# Patient Record
Sex: MALE | Race: ASIAN | NOT HISPANIC OR LATINO
[De-identification: names, ages, dates, MRNs, and addresses within clinical notes are randomized per-mention and may not be internally consistent; named-entity substitution may affect disease eponyms.]

---

## 2017-04-10 ENCOUNTER — APPOINTMENT (OUTPATIENT)
Dept: NEUROLOGY | Facility: CLINIC | Age: 57
End: 2017-04-10

## 2017-06-16 ENCOUNTER — INPATIENT (INPATIENT)
Facility: HOSPITAL | Age: 57
LOS: 4 days | Discharge: HOME | DRG: 312 | End: 2017-06-21
Attending: INTERNAL MEDICINE | Admitting: INTERNAL MEDICINE
Payer: MEDICAID

## 2017-06-16 VITALS
TEMPERATURE: 98 F | OXYGEN SATURATION: 98 % | DIASTOLIC BLOOD PRESSURE: 100 MMHG | HEART RATE: 54 BPM | RESPIRATION RATE: 18 BRPM | SYSTOLIC BLOOD PRESSURE: 186 MMHG

## 2017-06-16 DIAGNOSIS — R42 DIZZINESS AND GIDDINESS: ICD-10-CM

## 2017-06-16 LAB
ALBUMIN SERPL ELPH-MCNC: 4 G/DL — SIGNIFICANT CHANGE UP (ref 3.3–5)
ALP SERPL-CCNC: 62 U/L — SIGNIFICANT CHANGE UP (ref 40–120)
ALT FLD-CCNC: 16 U/L RC — SIGNIFICANT CHANGE UP (ref 10–45)
ANION GAP SERPL CALC-SCNC: 11 MMOL/L — SIGNIFICANT CHANGE UP (ref 5–17)
APPEARANCE UR: CLEAR — SIGNIFICANT CHANGE UP
AST SERPL-CCNC: 21 U/L — SIGNIFICANT CHANGE UP (ref 10–40)
BILIRUB SERPL-MCNC: 1.1 MG/DL — SIGNIFICANT CHANGE UP (ref 0.2–1.2)
BILIRUB UR-MCNC: NEGATIVE — SIGNIFICANT CHANGE UP
BUN SERPL-MCNC: 14 MG/DL — SIGNIFICANT CHANGE UP (ref 7–23)
CALCIUM SERPL-MCNC: 9 MG/DL — SIGNIFICANT CHANGE UP (ref 8.4–10.5)
CHLORIDE SERPL-SCNC: 106 MMOL/L — SIGNIFICANT CHANGE UP (ref 96–108)
CK MB BLD-MCNC: 1.9 % — SIGNIFICANT CHANGE UP (ref 0–3.5)
CK MB CFR SERPL CALC: 2.4 NG/ML — SIGNIFICANT CHANGE UP (ref 0–6.7)
CK SERPL-CCNC: 129 U/L — SIGNIFICANT CHANGE UP (ref 30–200)
CO2 SERPL-SCNC: 24 MMOL/L — SIGNIFICANT CHANGE UP (ref 22–31)
COLOR SPEC: SIGNIFICANT CHANGE UP
CREAT SERPL-MCNC: 1.14 MG/DL — SIGNIFICANT CHANGE UP (ref 0.5–1.3)
DIFF PNL FLD: NEGATIVE — SIGNIFICANT CHANGE UP
GLUCOSE SERPL-MCNC: 96 MG/DL — SIGNIFICANT CHANGE UP (ref 70–99)
GLUCOSE UR QL: NEGATIVE — SIGNIFICANT CHANGE UP
HCT VFR BLD CALC: 44.5 % — SIGNIFICANT CHANGE UP (ref 39–50)
HGB BLD-MCNC: 14.1 G/DL — SIGNIFICANT CHANGE UP (ref 13–17)
KETONES UR-MCNC: NEGATIVE — SIGNIFICANT CHANGE UP
LEUKOCYTE ESTERASE UR-ACNC: NEGATIVE — SIGNIFICANT CHANGE UP
MCHC RBC-ENTMCNC: 25.3 PG — LOW (ref 27–34)
MCHC RBC-ENTMCNC: 31.7 GM/DL — LOW (ref 32–36)
MCV RBC AUTO: 79.9 FL — LOW (ref 80–100)
NITRITE UR-MCNC: NEGATIVE — SIGNIFICANT CHANGE UP
PA ADP PRP-ACNC: 87 PRU — LOW (ref 194–417)
PH UR: 6.5 — SIGNIFICANT CHANGE UP (ref 5–8)
PLATELET # BLD AUTO: 150 K/UL — SIGNIFICANT CHANGE UP (ref 150–400)
POTASSIUM SERPL-MCNC: 4.2 MMOL/L — SIGNIFICANT CHANGE UP (ref 3.5–5.3)
POTASSIUM SERPL-SCNC: 4.2 MMOL/L — SIGNIFICANT CHANGE UP (ref 3.5–5.3)
PROT SERPL-MCNC: 7.6 G/DL — SIGNIFICANT CHANGE UP (ref 6–8.3)
PROT UR-MCNC: NEGATIVE — SIGNIFICANT CHANGE UP
RBC # BLD: 5.57 M/UL — SIGNIFICANT CHANGE UP (ref 4.2–5.8)
RBC # FLD: 14.3 % — SIGNIFICANT CHANGE UP (ref 10.3–14.5)
SODIUM SERPL-SCNC: 141 MMOL/L — SIGNIFICANT CHANGE UP (ref 135–145)
SP GR SPEC: 1.01 — LOW (ref 1.01–1.02)
TROPONIN T SERPL-MCNC: <0.01 NG/ML — SIGNIFICANT CHANGE UP (ref 0–0.06)
UROBILINOGEN FLD QL: NEGATIVE — SIGNIFICANT CHANGE UP
WBC # BLD: 4.9 K/UL — SIGNIFICANT CHANGE UP (ref 3.8–10.5)
WBC # FLD AUTO: 4.9 K/UL — SIGNIFICANT CHANGE UP (ref 3.8–10.5)

## 2017-06-16 PROCEDURE — 70450 CT HEAD/BRAIN W/O DYE: CPT | Mod: 26

## 2017-06-16 PROCEDURE — 71010: CPT | Mod: 26

## 2017-06-16 PROCEDURE — 99285 EMERGENCY DEPT VISIT HI MDM: CPT

## 2017-06-16 RX ORDER — LOSARTAN POTASSIUM 100 MG/1
100 TABLET, FILM COATED ORAL ONCE
Qty: 0 | Refills: 0 | Status: COMPLETED | OUTPATIENT
Start: 2017-06-16 | End: 2017-06-16

## 2017-06-16 RX ORDER — HYDRALAZINE HCL 50 MG
10 TABLET ORAL ONCE
Qty: 0 | Refills: 0 | Status: COMPLETED | OUTPATIENT
Start: 2017-06-16 | End: 2017-06-16

## 2017-06-16 RX ORDER — MECLIZINE HCL 12.5 MG
25 TABLET ORAL ONCE
Qty: 0 | Refills: 0 | Status: COMPLETED | OUTPATIENT
Start: 2017-06-16 | End: 2017-06-16

## 2017-06-16 RX ADMIN — Medication 25 MILLIGRAM(S): at 15:03

## 2017-06-16 RX ADMIN — LOSARTAN POTASSIUM 100 MILLIGRAM(S): 100 TABLET, FILM COATED ORAL at 15:03

## 2017-06-16 RX ADMIN — Medication 10 MILLIGRAM(S): at 15:03

## 2017-06-16 NOTE — ED PROVIDER NOTE - PROGRESS NOTE DETAILS
Patient had 9 beats of VT while in ED--on coreg, took it this morning, BP stable, asymptomatic   Seen by neuro pending MRI/MRA

## 2017-06-16 NOTE — CONSULT NOTE ADULT - PROBLEM SELECTOR RECOMMENDATION 9
[]MRI brain w/o con, MRA head w/o con, MRA neck w/con  []P2Y12 test []MRI brain w/o con, MRA head w/o con, MRA neck w/con  []P2Y12 test  []c/w , plavix 75, lipitor 80

## 2017-06-16 NOTE — ED ADULT NURSE NOTE - OBJECTIVE STATEMENT
55 y/o male PMH cardiac stent placement, stroke, presents to ED c/o dizziness, lightheadedness, SOB since last night. Pt states he began feeling symptoms last night and they are similar to when he was hospitalized last year and was diagnosed with artery occlusion. Also reports recent left leg swelling. Denies chest pain, palpitations. Pt lungs clear bilaterally. Skin warm, dry, intact. Gross motor and neuro intact. Residual left sided weakness noted from previous CVA. Pt does not use walker or assistive devices at home. Pt NSR on cardiac monitor. BP elevated, pt states he is normally on losartan on home, but ran out of meds for "past few days." Pt safety and comfort maintained.

## 2017-06-16 NOTE — CONSULT NOTE ADULT - SUBJECTIVE AND OBJECTIVE BOX
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 No nausea, vomiting  No diarrhea or constipation.   Genitourinary: No dysuria, frequency, hematuria or incontinence  Neurological: No headaches, lightheadedness, vertigo, numbness or tremors  Psychiatric: No depression, anxiety, mood swings or difficulty sleeping  Musculoskeletal: No joint pain or swelling; No muscle, back or extremity pain  Skin: No itching, burning, rashes or lesions   Lymph Nodes: No enlarged glands  Endocrine: No heat or cold intolerance; No hair loss, No h/o diabetes or thyroid dysfunction  Allergy and Immunologic: No hives or eczema    MEDICATIONS        PMH: Stented coronary artery  CVA (cerebral vascular accident)  HTN (hypertension)       PSH: No significant past surgical history      Family history: No history of dementia, strokes, or seizures   FAMILY HISTORY:  No pertinent family history in first degree relatives      SOCIAL HISTORY:  No history of tobacco or alcohol use     Allergies    No Known Allergies    Intolerances            Vital Signs Last 24 Hrs  T(C): 36.6, Max: 36.6 (06-16 @ 14:16)  T(F): 97.9, Max: 97.9 (06-16 @ 14:16)  HR: 62 (54 - 62)  BP: 150/97 (150/97 - 186/100)  BP(mean): --  RR: 18 (18 - 18)  SpO2: 98% (98% - 100%)      On Neurological Examination:    Mental Status - Patient is alert, awake, oriented X3. fluent, names, no dysarthria no aphasia Follows commands well and able to answer questions appropriately. Mood and affect  normal    Cranial Nerves - PERRL, EOMI, VFF, V1-V3 intact, no gross facial asymmetry, tongue/uvula midline    Motor Exam -   Right upper  Left upper  Right lower  Left lower      nml bulk/tone    Sensory    Intact to light touch and pinprick bilaterally    Coord: FTN intact bilaterally     Gait -  normal  ataxia     Reflexes:                                                         GENERAL Exam:     Nontoxic , No Acute Distress   	  HEENT:  normocephalic, atraumatic  		  LUNGS:	Clear bilaterally  No Wheeze  Decreased bilaterally  	  HEART:	 Normal S1S2   No murmur RRR        	  GI/ ABDOMEN:  Soft  Non tender    EXTREMITIES:   No Edema  No Clubbing  No Cyanosis No Edema    MUSCULOSKELETAL: Normal Range of Motion  	   SKIN:      Normal   No Ecchymosis               LABS:  CBC Full  -  ( 16 Jun 2017 15:14 )  WBC Count : 4.9 K/uL  Hemoglobin : 14.1 g/dL  Hematocrit : 44.5 %  Platelet Count - Automated : 150 K/uL  Mean Cell Volume : 79.9 fl  Mean Cell Hemoglobin : 25.3 pg  Mean Cell Hemoglobin Concentration : 31.7 gm/dL  Auto Neutrophil # : x  Auto Lymphocyte # : x  Auto Monocyte # : x  Auto Eosinophil # : x  Auto Basophil # : x  Auto Neutrophil % : x  Auto Lymphocyte % : x  Auto Monocyte % : x  Auto Eosinophil % : x  Auto Basophil % : x      06-16    141  |  106  |  14  ----------------------------<  96  4.2   |  24  |  1.14    Ca    9.0      16 Jun 2017 15:14    TPro  7.6  /  Alb  4.0  /  TBili  1.1  /  DBili  x   /  AST  21  /  ALT  16  /  AlkPhos  62  06-16    LIVER FUNCTIONS - ( 16 Jun 2017 15:14 )  Alb: 4.0 g/dL / Pro: 7.6 g/dL / ALK PHOS: 62 U/L / ALT: 16 U/L RC / AST: 21 U/L / GGT: x           Hemoglobin A1C:             RADIOLOGY  CTH   MRI: Neurology consult    MARCO A [id number]     Patient is a 56y old  Male who presents with a chief complaint of transient vertigo    HPI:  56 RH [place] M PMH 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 Pt now at baseline per patient. States walking at baseline no new focal weakness numbness Of note patient ran out of his Losartan and has not taken it for a weak    REVIEW OF SYSTEMS:    Constitutional: No fever, chills, fatigue, weakness  Eyes: no eye pain, visual disturbances, or discharge  ENT:  No difficulty hearing, tinnitus, vertigo; No sinus or throat pain  Neck: No pain or stiffness  Respiratory: No cough, dyspnea, wheezing   Cardiovascular: No chest pain, palpitations,   Gastrointestinal: No abdominal or epigastric pain. No nausea, vomiting  No diarrhea or constipation.   Genitourinary: No dysuria, frequency, hematuria or incontinence  Neurological: No headaches, lightheadedness, vertigo, numbness or tremors  Psychiatric: No depression, anxiety, mood swings or difficulty sleeping  Musculoskeletal: No joint pain or swelling; No muscle, back or extremity pain  Skin: No itching, burning, rashes or lesions   Lymph Nodes: No enlarged glands  Endocrine: No heat or cold intolerance; No hair loss, No h/o diabetes or thyroid dysfunction  Allergy and Immunologic: No hives or eczema    MEDICATIONS    , plavix, losartan 100 mg daily, carvedilol 12.5 BID, hydralazine 10 BID, lipitor    PMH: Stented coronary artery  CVA (cerebral vascular accident)  HTN (hypertension)  asa above     PSH: CABG        FAMILY HISTORY:  No pertinent family history in first degree relatives      SOCIAL HISTORY:  No history of tobacco or alcohol use     Allergies    No Known Allergies    Intolerances            Vital Signs Last 24 Hrs  T(C): 36.6, Max: 36.6 (06-16 @ 14:16)  T(F): 97.9, Max: 97.9 (06-16 @ 14:16)  HR: 62 (54 - 62)  BP: 150/97 (150/97 - 186/100)  BP(mean): --  RR: 18 (18 - 18)  SpO2: 98% (98% - 100%)      On Neurological Examination:    Mental Status - Patient is alert, awake, oriented X3. fluent, names, subtle dysarthria no aphasia Follows commands well and able to answer questions appropriately. Mood and affect  normal    Cranial Nerves - PERRL, EOMI, VFF, V1-V3 intact, no gross facial asymmetry, tongue/uvula midline    Motor Exam -  RUE, RLE, 5/5 LUe LLE mild drift chronic left hemiparesis    Sensory    Intact to light touch and pinprick bilaterally    Coord: FTN intact bilaterally left mild ataxia in proportion to weakness    Gait - mildly wide based left leg circumduction     Fabienne-Hallpike negative    GENERAL Exam:     Nontoxic , No Acute Distress   	  HEENT:  normocephalic, atraumatic  		  LUNGS:	Clear bilaterally  No Wheeze  Decreased bilaterally  	  HEART:	 Normal S1S2   No murmur RRR        	  GI/ ABDOMEN:  Soft  Non tender    EXTREMITIES:   No Edema  No Clubbing  No Cyanosis No Edema    MUSCULOSKELETAL: Normal Range of Motion  	   SKIN:      Normal   No Ecchymosis               LABS:  CBC Full  -  ( 16 Jun 2017 15:14 )  WBC Count : 4.9 K/uL  Hemoglobin : 14.1 g/dL  Hematocrit : 44.5 %  Platelet Count - Automated : 150 K/uL  Mean Cell Volume : 79.9 fl  Mean Cell Hemoglobin : 25.3 pg  Mean Cell Hemoglobin Concentration : 31.7 gm/dL  Auto Neutrophil # : x  Auto Lymphocyte # : x  Auto Monocyte # : x  Auto Eosinophil # : x  Auto Basophil # : x  Auto Neutrophil % : x  Auto Lymphocyte % : x  Auto Monocyte % : x  Auto Eosinophil % : x  Auto Basophil % : x      06-16    141  |  106  |  14  ----------------------------<  96  4.2   |  24  |  1.14    Ca    9.0      16 Jun 2017 15:14    TPro  7.6  /  Alb  4.0  /  TBili  1.1  /  DBili  x   /  AST  21  /  ALT  16  /  AlkPhos  62  06-16    LIVER FUNCTIONS - ( 16 Jun 2017 15:14 )  Alb: 4.0 g/dL / Pro: 7.6 g/dL / ALK PHOS: 62 U/L / ALT: 16 U/L RC / AST: 21 U/L / GGT: x           Hemoglobin A1C:             RADIOLOGY  CTH   No CT evidence for acute infarct or acute hemorrhage. Old infarcts are   noted as described. If the patient has new and persistent symptoms,   consider short interval follow-up head CT or brain MRI follow-up if there   are no MRI contraindications.

## 2017-06-16 NOTE — ED ADULT NURSE NOTE - CHPI ED SYMPTOMS NEG
no change in level of consciousness/no confusion/no loss of consciousness/no nausea/no vomiting/no fever/no blurred vision

## 2017-06-16 NOTE — ED PROVIDER NOTE - ATTENDING CONTRIBUTION TO CARE
Attending MD Palacios:  I personally have seen and examined this patient.  Resident note reviewed and agree on plan of care and except where noted.  See MDM for details.

## 2017-06-16 NOTE — ED ADULT NURSE REASSESSMENT NOTE - NS ED NURSE REASSESS COMMENT FT1
[address] [place] [address] room notified me of 4 runs of vtach. MD taveras notified. To be addressed by admitting team.     Currently 60 NSR.

## 2017-06-16 NOTE — H&P ADULT - HISTORY OF PRESENT ILLNESS
A 55 yo M w/ CAD sp cabg and stents (last stent 2006, CABG 2002), CVA (2005) w Lt sided  weakness, htn presenting w/ dizziness while leaning over to pick something off the floor yesterday. He was sitting in a chair and when he leaned over, noticed room spinning, self resolved in 3-4 minutes. In the morning he had a similar episode while walking, resolved in a few minutes. No chest pain, no loc, no falls, but noticed some gait ataxia. Reports high BP this am to 190s, ran out of losartan 2 days ago. Also has sob and dry cough and mild LE edema for the last 2 months.

## 2017-06-16 NOTE — ED PROVIDER NOTE - OBJECTIVE STATEMENT
55 yo M w/ CAD sp cabg and stents (last stent 2006, CABG 2002), CVA (2005) w L weakness, htn presenting w/ dizziness while leaning over to pick something off the floor yesterday. He was sitting in a chair and when he leaned over, noticed room spinning, self resolved in 3-4 minutes. In the morning he had a similar episode while walking, resolved in a few minutes. No chest pain, no loc, no falls, but noticed some gait ataxia. Reports high BP this am to 190s, ran out of losartan 2 days ago. Also has sob and dry cough and mild LE edema for the last 2 months.

## 2017-06-16 NOTE — ED PROVIDER NOTE - CARE PLAN
Principal Discharge DX:	Vertigo  Instructions for follow-up, activity and diet:	MRI/MRA head and neck   tele for VT

## 2017-06-16 NOTE — ED PROVIDER NOTE - MEDICAL DECISION MAKING DETAILS
vertigo like symptoms w hypertensive urgency. will give iv hydralazine, losartan po at home dose, check trops and ct head. vertigo like symptoms w hypertensive urgency. will give iv hydralazine, losartan po at home dose, check trops and ct head.  Attending MD Palacios: 57 yo male with PMH for CAD, CVA, HTN presents with complaint of dizziness, room spinning, had similar episode yesterday but resolved on its own.  Patient denies chest pain, palpitations, SOB, or diaphoresis. Patient denies fever, chills, nausea, vomiting, or diarrhea. Attending MD Palacios: A & O x 3, NAD, HEENT WNL and no facial asymmetry; lungs CTAB, heart with reg rhythm without murmur; abdomen soft NTND; extremities with no edema; skin with no rashes, neuro exam non focal with no motor or sensory deficits. Plan: likely BPV but due to hx of CVA will obtain CT, EKG, treat with Meclizine and reassess.

## 2017-06-17 DIAGNOSIS — I10 ESSENTIAL (PRIMARY) HYPERTENSION: ICD-10-CM

## 2017-06-17 DIAGNOSIS — Z95.5 PRESENCE OF CORONARY ANGIOPLASTY IMPLANT AND GRAFT: ICD-10-CM

## 2017-06-17 LAB
ANION GAP SERPL CALC-SCNC: 12 MMOL/L — SIGNIFICANT CHANGE UP (ref 5–17)
BUN SERPL-MCNC: 13 MG/DL — SIGNIFICANT CHANGE UP (ref 7–23)
CALCIUM SERPL-MCNC: 8.4 MG/DL — SIGNIFICANT CHANGE UP (ref 8.4–10.5)
CHLORIDE SERPL-SCNC: 105 MMOL/L — SIGNIFICANT CHANGE UP (ref 96–108)
CO2 SERPL-SCNC: 23 MMOL/L — SIGNIFICANT CHANGE UP (ref 22–31)
CREAT SERPL-MCNC: 1.04 MG/DL — SIGNIFICANT CHANGE UP (ref 0.5–1.3)
GLUCOSE SERPL-MCNC: 106 MG/DL — HIGH (ref 70–99)
HCT VFR BLD CALC: 40.2 % — SIGNIFICANT CHANGE UP (ref 39–50)
HGB BLD-MCNC: 13.4 G/DL — SIGNIFICANT CHANGE UP (ref 13–17)
MAGNESIUM SERPL-MCNC: 1.9 MG/DL — SIGNIFICANT CHANGE UP (ref 1.6–2.6)
MCHC RBC-ENTMCNC: 25.4 PG — LOW (ref 27–34)
MCHC RBC-ENTMCNC: 33.3 GM/DL — SIGNIFICANT CHANGE UP (ref 32–36)
MCV RBC AUTO: 76.3 FL — LOW (ref 80–100)
PHOSPHATE SERPL-MCNC: 2.9 MG/DL — SIGNIFICANT CHANGE UP (ref 2.5–4.5)
PLATELET # BLD AUTO: 159 K/UL — SIGNIFICANT CHANGE UP (ref 150–400)
POTASSIUM SERPL-MCNC: 3.9 MMOL/L — SIGNIFICANT CHANGE UP (ref 3.5–5.3)
POTASSIUM SERPL-SCNC: 3.9 MMOL/L — SIGNIFICANT CHANGE UP (ref 3.5–5.3)
RBC # BLD: 5.27 M/UL — SIGNIFICANT CHANGE UP (ref 4.2–5.8)
RBC # FLD: 15.7 % — HIGH (ref 10.3–14.5)
SODIUM SERPL-SCNC: 140 MMOL/L — SIGNIFICANT CHANGE UP (ref 135–145)
WBC # BLD: 4.51 K/UL — SIGNIFICANT CHANGE UP (ref 3.8–10.5)
WBC # FLD AUTO: 4.51 K/UL — SIGNIFICANT CHANGE UP (ref 3.8–10.5)

## 2017-06-17 PROCEDURE — 70551 MRI BRAIN STEM W/O DYE: CPT | Mod: 26

## 2017-06-17 PROCEDURE — 70548 MR ANGIOGRAPHY NECK W/DYE: CPT | Mod: 26

## 2017-06-17 PROCEDURE — 99233 SBSQ HOSP IP/OBS HIGH 50: CPT | Mod: GC

## 2017-06-17 RX ORDER — ATORVASTATIN CALCIUM 80 MG/1
80 TABLET, FILM COATED ORAL AT BEDTIME
Qty: 0 | Refills: 0 | Status: DISCONTINUED | OUTPATIENT
Start: 2017-06-17 | End: 2017-06-21

## 2017-06-17 RX ORDER — HYDRALAZINE HCL 50 MG
10 TABLET ORAL EVERY 8 HOURS
Qty: 0 | Refills: 0 | Status: DISCONTINUED | OUTPATIENT
Start: 2017-06-17 | End: 2017-06-17

## 2017-06-17 RX ORDER — METOCLOPRAMIDE HCL 10 MG
10 TABLET ORAL EVERY 8 HOURS
Qty: 0 | Refills: 0 | Status: DISCONTINUED | OUTPATIENT
Start: 2017-06-17 | End: 2017-06-21

## 2017-06-17 RX ORDER — ASPIRIN/CALCIUM CARB/MAGNESIUM 324 MG
325 TABLET ORAL DAILY
Qty: 0 | Refills: 0 | Status: DISCONTINUED | OUTPATIENT
Start: 2017-06-17 | End: 2017-06-21

## 2017-06-17 RX ORDER — MECLIZINE HCL 12.5 MG
12.5 TABLET ORAL EVERY 8 HOURS
Qty: 0 | Refills: 0 | Status: COMPLETED | OUTPATIENT
Start: 2017-06-17 | End: 2017-06-19

## 2017-06-17 RX ORDER — METOCLOPRAMIDE HCL 10 MG
10 TABLET ORAL EVERY 8 HOURS
Qty: 0 | Refills: 0 | Status: DISCONTINUED | OUTPATIENT
Start: 2017-06-17 | End: 2017-06-17

## 2017-06-17 RX ORDER — CLOPIDOGREL BISULFATE 75 MG/1
75 TABLET, FILM COATED ORAL DAILY
Qty: 0 | Refills: 0 | Status: DISCONTINUED | OUTPATIENT
Start: 2017-06-17 | End: 2017-06-21

## 2017-06-17 RX ORDER — LOSARTAN POTASSIUM 100 MG/1
100 TABLET, FILM COATED ORAL DAILY
Qty: 0 | Refills: 0 | Status: DISCONTINUED | OUTPATIENT
Start: 2017-06-17 | End: 2017-06-17

## 2017-06-17 RX ORDER — LOSARTAN POTASSIUM 100 MG/1
100 TABLET, FILM COATED ORAL DAILY
Qty: 0 | Refills: 0 | Status: DISCONTINUED | OUTPATIENT
Start: 2017-06-17 | End: 2017-06-21

## 2017-06-17 RX ORDER — CARVEDILOL PHOSPHATE 80 MG/1
6.25 CAPSULE, EXTENDED RELEASE ORAL EVERY 12 HOURS
Qty: 0 | Refills: 0 | Status: DISCONTINUED | OUTPATIENT
Start: 2017-06-17 | End: 2017-06-17

## 2017-06-17 RX ADMIN — CLOPIDOGREL BISULFATE 75 MILLIGRAM(S): 75 TABLET, FILM COATED ORAL at 11:58

## 2017-06-17 RX ADMIN — ATORVASTATIN CALCIUM 80 MILLIGRAM(S): 80 TABLET, FILM COATED ORAL at 21:15

## 2017-06-17 RX ADMIN — Medication 12.5 MILLIGRAM(S): at 21:15

## 2017-06-17 RX ADMIN — Medication 325 MILLIGRAM(S): at 11:58

## 2017-06-17 RX ADMIN — Medication 12.5 MILLIGRAM(S): at 05:24

## 2017-06-17 RX ADMIN — Medication 10 MILLIGRAM(S): at 12:23

## 2017-06-17 RX ADMIN — Medication 10 MILLIGRAM(S): at 05:24

## 2017-06-17 RX ADMIN — Medication 12.5 MILLIGRAM(S): at 14:14

## 2017-06-17 RX ADMIN — LOSARTAN POTASSIUM 100 MILLIGRAM(S): 100 TABLET, FILM COATED ORAL at 09:58

## 2017-06-17 NOTE — CHART NOTE - NSCHARTNOTEFT_GEN_A_CORE
Called by RN for patient with /111, HR 52.  Patient did not receive morning dose of Coreg. Patient states he was previously on Losartan 100 mg po daily at home but he prescription had run out prior to admission.  Coreg D/C for now.  Will restart Losartan and continue to monitor blood pressure.  Discussed with Dr Umanzor who agrees with plan.

## 2017-06-17 NOTE — PROVIDER CONTACT NOTE (OTHER) - ASSESSMENT
Pt is alert and oriented. VSS. Pt is asymptomatic. Denies palpitations, headache, lightheadedness, chest pain. Pt sinus jennifer on tele.

## 2017-06-17 NOTE — PROGRESS NOTE ADULT - SUBJECTIVE AND OBJECTIVE BOX
Patient is a 56y old  Male who presents with a chief complaint of The patient is a 56y Male complaining of dizziness. (2017 20:57)      SUBJECTIVE / OVERNIGHT EVENTS:   Feels better.  Denies CP/SOB/Palpitation/HA.    MEDICATIONS  (STANDING):  aspirin 325milliGRAM(s) Oral daily  meclizine 12.5milliGRAM(s) Oral every 8 hours  atorvastatin 80milliGRAM(s) Oral at bedtime  clopidogrel Tablet 75milliGRAM(s) Oral daily  hydrochlorothiazide    Capsule 12.5milliGRAM(s) Oral daily  losartan 100milliGRAM(s) Oral daily    MEDICATIONS  (PRN):  metoclopramide 10milliGRAM(s) Oral every 8 hours PRN nausea and/or vomiting      Vital Signs Last 24 Hrs  T(C): 36.7, Max: 36.7 (-17 @ 04:23)  HR: 62 (53 - 62)  BP: 154/85 (154/85 - 189/93)  RR: 19 (18 - 19)  SpO2: 95% (95% - 100%)  Wt(kg): --  CAPILLARY BLOOD GLUCOSE    I&O's Summary  I & Os for 24h ending 2017 07:00  =============================================  IN: 240 ml / OUT: 0 ml / NET: 240 ml    I & Os for current day (as of 2017 02:13)  =============================================  IN: 760 ml / OUT: 250 ml / NET: 510 ml      PHYSICAL EXAM:  GENERAL: NAD, well-developed  HEAD:  Atraumatic, Normocephalic  EYES: EOMI, PERRLA, conjunctiva and sclera clear  NECK: Supple, No JVD  CHEST/LUNG: Clear to auscultation bilaterally; No wheeze  HEART: Regular rate and rhythm; No murmurs, rubs, or gallops  ABDOMEN: Soft, Nontender, Nondistended; Bowel sounds present  EXTREMITIES:  2+ Peripheral Pulses, No clubbing, cyanosis, or edema  PSYCH: AAOx3  NEUROLOGY: non-focal  SKIN: No rashes or lesions    LABS:                        13.4   4.51  )-----------( 159      ( 2017 08:23 )             40.2     -    140  |  105  |  13  ----------------------------<  106<H>  3.9   |  23  |  1.04    Ca    8.4      2017 05:21  Phos  2.9     -  Mg     1.9     -    TPro  7.6  /  Alb  4.0  /  TBili  1.1  /  DBili  x   /  AST  21  /  ALT  16  /  AlkPhos  62  06-16      CARDIAC MARKERS ( 2017 15:14 )  x     / <0.01 ng/mL / 129 U/L / x     / 2.4 ng/mL      Urinalysis Basic - ( 2017 17:18 )    Color: PL Yellow / Appearance: Clear / S.006 / pH: x  Gluc: x / Ketone: Negative  / Bili: Negative / Urobili: Negative   Blood: x / Protein: Negative / Nitrite: Negative   Leuk Esterase: Negative / RBC: x / WBC x   Sq Epi: x / Non Sq Epi: x / Bacteria: x      CAPILLARY BLOOD GLUCOSE                RADIOLOGY & ADDITIONAL TESTS:    Imaging Personally Reviewed:    Consultant(s) Notes Reviewed:      Care Discussed with Consultants/Other Providers:

## 2017-06-18 LAB
ANION GAP SERPL CALC-SCNC: 14 MMOL/L — SIGNIFICANT CHANGE UP (ref 5–17)
BUN SERPL-MCNC: 15 MG/DL — SIGNIFICANT CHANGE UP (ref 7–23)
CALCIUM SERPL-MCNC: 9.2 MG/DL — SIGNIFICANT CHANGE UP (ref 8.4–10.5)
CHLORIDE SERPL-SCNC: 100 MMOL/L — SIGNIFICANT CHANGE UP (ref 96–108)
CO2 SERPL-SCNC: 22 MMOL/L — SIGNIFICANT CHANGE UP (ref 22–31)
CREAT SERPL-MCNC: 1.16 MG/DL — SIGNIFICANT CHANGE UP (ref 0.5–1.3)
GLUCOSE SERPL-MCNC: 90 MG/DL — SIGNIFICANT CHANGE UP (ref 70–99)
HCT VFR BLD CALC: 43.7 % — SIGNIFICANT CHANGE UP (ref 39–50)
HGB BLD-MCNC: 14.1 G/DL — SIGNIFICANT CHANGE UP (ref 13–17)
MCHC RBC-ENTMCNC: 24.5 PG — LOW (ref 27–34)
MCHC RBC-ENTMCNC: 32.3 GM/DL — SIGNIFICANT CHANGE UP (ref 32–36)
MCV RBC AUTO: 76 FL — LOW (ref 80–100)
PLATELET # BLD AUTO: 179 K/UL — SIGNIFICANT CHANGE UP (ref 150–400)
POTASSIUM SERPL-MCNC: 3.7 MMOL/L — SIGNIFICANT CHANGE UP (ref 3.5–5.3)
POTASSIUM SERPL-SCNC: 3.7 MMOL/L — SIGNIFICANT CHANGE UP (ref 3.5–5.3)
RBC # BLD: 5.75 M/UL — SIGNIFICANT CHANGE UP (ref 4.2–5.8)
RBC # FLD: 15.6 % — HIGH (ref 10.3–14.5)
SODIUM SERPL-SCNC: 136 MMOL/L — SIGNIFICANT CHANGE UP (ref 135–145)
WBC # BLD: 5.02 K/UL — SIGNIFICANT CHANGE UP (ref 3.8–10.5)
WBC # FLD AUTO: 5.02 K/UL — SIGNIFICANT CHANGE UP (ref 3.8–10.5)

## 2017-06-18 RX ADMIN — Medication 12.5 MILLIGRAM(S): at 21:25

## 2017-06-18 RX ADMIN — ATORVASTATIN CALCIUM 80 MILLIGRAM(S): 80 TABLET, FILM COATED ORAL at 21:25

## 2017-06-18 RX ADMIN — CLOPIDOGREL BISULFATE 75 MILLIGRAM(S): 75 TABLET, FILM COATED ORAL at 11:49

## 2017-06-18 RX ADMIN — Medication 12.5 MILLIGRAM(S): at 13:25

## 2017-06-18 RX ADMIN — Medication 12.5 MILLIGRAM(S): at 05:38

## 2017-06-18 RX ADMIN — Medication 325 MILLIGRAM(S): at 11:49

## 2017-06-18 NOTE — CONSULT NOTE ADULT - ATTENDING COMMENTS
Cristóbal Meza MD  [address] [place], [id number] [phone]
I have seen and examined this patient with the stroke neurology team.     History was reviewed with the patient and available family members.   ROS: All negative except documented in the HPI.   Neurological exam was performed and agree with exam as documented above.   Laboratory results and imaging studies were reviewed by me.   I agree with the neurology resident note as documented above.    56 years old man with multiple vascular risk factors including hypertension, CAD s/p CABG, strokes in 2005 and 2016 and age is admitted to [facility] for evaluation and management of dizziness. He reports with her history of dizziness - described as vertiginous sensations lasting for 1 to 2 minutes without any associated focal neurological deficits/symptoms. His neurological examination today shows left nasolabial flattening, left hemiparesis, and left-sided ataxia/dysmetria - out of proportion to weakness. Fabienne-Hallpike maneuver performed at the bedside was negative for nystagmus and also did not reproduce his typical symptoms. MRI brain did not show any acute stroke and MRA head and neck showed bilateral vertebral artery origin stenoses, left vertebral artery  terminating in PICA and intra-articular distal right vertebral artery severe stenosis - just proximal to vertebrobasilar junction as well as atherosclerotic changes involving the basilar artery, as well as bilateral MCAs M1 stenoses, which appears to be stable as compared to previous CTA. Impression: Dizziness/vertiginous sensations - likely secondary to vertebrobasilar insufficiency likely related to symptomatic intracranial atherosclerosis. Plan: Continue aspirin 325 mg once a day and clopidogrel 75 mg once a day ([id number]) for 3 months followed by Aggrenox one capsule twice a day. Atorvastatin 80 mg at bedtime. Continue with aggressive vascular risk factors modification and DVT prophylaxis. Allow permissive HTN for about 24-48 hours followed by gradual normotension. MRA head with NOVA to determine blood flow in the posterior circulation through vertebral stenosis. PT/OT. Would continue to follow.    Above-mentioned plan was discussed with the patient and family members at bedside in detail. All the questions were answered and concerns were addressed.

## 2017-06-18 NOTE — CONSULT NOTE ADULT - ASSESSMENT
56 RH [place] M CVA 2005 residual right hemiparesis, HTN CAD s/p stents, s/p CABG, intracranial atherosclerotic diseases on , plavix and Lipitor 80 p/w lightheadedness an transient episodes of vertigo Now at baseline PE c/w old CVA left hemiparesis. CTH no acute infarct old infarcts right CR right cerebellar NIHSS 3 MRS 2
NEAR-SYNCOPE-no evidence of recent cardiac injury,no significant arrhythmias noted-likely Vertiginous,MRI/MRA results noted.  CADs/p CABG-Chronic stable.  TTE-Evaluate LVEF-prior EF-42% on ARB  HTN better controlled on resumption of meds.

## 2017-06-18 NOTE — CONSULT NOTE ADULT - SUBJECTIVE AND OBJECTIVE BOX
Patient is a 56y old  Male who presents with a chief complaint of dizziness.     HPI:  A 55 yo M w/ CAD s/p CABG and stents (last stent , CABG ),s/p CVA () w Lt sided  weakness, HTN presenting w/ dizziness while leaning over to pick something off the floor  He was sitting in a chair and when he leaned over, noticed room spinning, self resolved in 3-4 minutes. In the morning he had a similar episode while walking, resolved in a few minutes. No chest pain, no loc, no falls, but noticed some gait ataxia. Reports high BP this am to 190s, ran out of losartan 2 days ago. Also has sob and dry cough and mild LE edema for the last 2 months.     PAST MEDICAL & SURGICAL HISTORY:  Stented coronary artery-  CVA (cerebral vascular accident)--Left paresis  HTN (hypertension)  CABG-    PREVIOUS DIAGNOSTIC TESTING:      ECHO  FINDINGS:Study Date: 8/3/2016 Moderate global left ventricular systolic dysfunction.Mild diastolic dysfunction (Stage I).Mild-moderate mitral regurgitation.EF (Castellanos Method): 42 %        MEDICATIONS  (STANDING):  aspirin 325milliGRAM(s) Oral daily  meclizine 12.5milliGRAM(s) Oral every 8 hours  atorvastatin 80milliGRAM(s) Oral at bedtime  clopidogrel Tablet 75milliGRAM(s) Oral daily  hydrochlorothiazide    Capsule 12.5milliGRAM(s) Oral daily  losartan 100milliGRAM(s) Oral daily    MEDICATIONS  (PRN):  metoclopramide 10milliGRAM(s) Oral every 8 hours PRN nausea and/or vomiting      FAMILY HISTORY:  No pertinent family history in first degree relatives    CIGARETTES:Non Smoker    ALCOHOL:Denies    REVIEW OF SYSTEMS:  · General	negative	  · Respiratory and Thorax	negative	  · Cardiovascular	negative	  · Gastrointestinal	negative	  · Musculoskeletal	negative	  · Neurological Comments	Lt sided weakness,	    Vital Signs Last 24 Hrs  T(C): 37.1, Max: 37.1 ( @ 05:01)  T(F): 98.8, Max: 98.8 (18 @ 05:01)  HR: 50 (50 - 62)  BP: 138/85 (138/85 - 189/93) ORTHOSTATIC BP SUPINE-150/70 STANDING-144/99  RR: 18 (18 - 19)  SpO2: 95% (95% - 97%)      PHYSICAL EXAM:  GENERAL: NAD, well-groomed, well-developed BMI-27  HEAD:  Atraumatic, Normocephalic  EYES: EOMI, PERRLA, conjunctiva and sclera clear  ENMT: No tonsillar erythema, exudates, or enlargement; Moist mucous membranes, Good dentition, No lesions  NECK: Supple, No JVD, Normal thyroid  NERVOUS SYSTEM:  Alert & Oriented X3, Good concentration; Motor Strength 3/5 Left upper and lower extremities; DTRs 2+ intact and symmetric  CHEST/LUNG: Clear to percussion bilaterally; No rales, rhonchi, wheezing, or rubs  HEART: Regular rate and rhythm; 2/6 Systolic murmur,No rubs, or gallops  ABDOMEN: Soft, Nontender, Nondistended; Bowel sounds present  EXTREMITIES:  2+ Peripheral Pulses, No clubbing, cyanosis, or edema  LYMPH: No lymphadenopathy noted  SKIN: No rashes or lesions      INTERPRETATION OF TELEMETRY:Sinus Rhythm,sinus bradycardia    ECG:SINUS BRADYCARDIA Ventricular Rate 55 BPM  NON-SPECIFIC INTRA-VENTRICULAR CONDUCTION BLOCK      LABS:                        13.4   4.51  )-----------( 159      ( 2017 08:23 )             40.2     06-17    140  |  105  |  13  ----------------------------<  106<H>  3.9   |  23  |  1.04    Ca    8.4      2017 05:21  Phos  2.9     06-17  Mg     1.9     06-17    TPro  7.6  /  Alb  4.0  /  TBili  1.1  /  DBili  x   /  AST  21  /  ALT  16  /  AlkPhos  62  06-16    CARDIAC MARKERS ( 2017 15:14 )  x     / <0.01 ng/mL / 129 U/L / x     / 2.4 ng/mL    Urinalysis Basic - ( 2017 17:18 )    Color: PL Yellow / Appearance: Clear / S.006 / pH: x  Gluc: x / Ketone: Negative  / Bili: Negative / Urobili: Negative   Blood: x / Protein: Negative / Nitrite: Negative   Leuk Esterase: Negative / RBC: x / WBC x   Sq Epi: x / Non Sq Epi: x / Bacteria: x    RADIOLOGY & ADDITIONAL STUDIES:MRI/MRA-PROCEDURE DATE:  2017  There is no evidence of recent infarction Old right-sided cerebellar infarcts are appreciated.The MR angiographic evaluation of the [place] demonstrates the  internal carotid arteries to be patent The MR angiographic evaluation of the cervical arteries demonstrates the internal carotid arteries and vertebral arteries to be patent.Significant stenoses involving  the bilateral M1 segments is suspected.

## 2017-06-19 PROCEDURE — 70544 MR ANGIOGRAPHY HEAD W/O DYE: CPT | Mod: 26

## 2017-06-19 RX ADMIN — CLOPIDOGREL BISULFATE 75 MILLIGRAM(S): 75 TABLET, FILM COATED ORAL at 11:32

## 2017-06-19 RX ADMIN — Medication 12.5 MILLIGRAM(S): at 17:18

## 2017-06-19 RX ADMIN — Medication 12.5 MILLIGRAM(S): at 22:21

## 2017-06-19 RX ADMIN — Medication 12.5 MILLIGRAM(S): at 05:29

## 2017-06-19 RX ADMIN — ATORVASTATIN CALCIUM 80 MILLIGRAM(S): 80 TABLET, FILM COATED ORAL at 21:46

## 2017-06-19 RX ADMIN — Medication 325 MILLIGRAM(S): at 11:32

## 2017-06-19 NOTE — PROGRESS NOTE ADULT - PROBLEM SELECTOR PLAN 1
Neuro f/up noted.  MRI brain/Neck reviewed  (No significant change since the prior angiogram of 8/10/2016.   There is markedly diminished flow in the distal vertebral arteries   bilaterally and the right anterior cerebral artery. There is compromise   of the bilateral M1 segments. Intracranial flow is as above.).    Cardio f/up appreciated.

## 2017-06-19 NOTE — PHYSICAL THERAPY INITIAL EVALUATION ADULT - MANUAL MUSCLE TESTING RESULTS, REHAB EVAL
grossly assessed due to/Strength is grossly at least 3/5 throughout except L UE and L LE ROM limited due to PMH on CVA

## 2017-06-19 NOTE — PROGRESS NOTE ADULT - SUBJECTIVE AND OBJECTIVE BOX
SUBJ:Patient awake,alert,less dizziness,no chest pain,dyspnea,is ambulating to bathroom-feels better.      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  EYES:Anicteric [x ]EOMI.  ENMT:No oral lesions.  NECK:[ ]Bruits [ ]Thyroid [ ]JVD.  RESPIRATORY:Equal air entry bilaterally,[x ]Clear to auscultation[ ]Rales[ ]Rhonchi[ ]Wheeze.  CARDIOVASCULAR:Regular rhythm[ ]Rub[x ]Murmur-2/6 systolic              [x ]Normal PMI.  GASTROINTESTINAL:[x ]BS[ ]Tenderness[ ]Reboumd[ ]Ridgitity[ ]Organomegaly.  EXTREMITIES:[ ]Clubbing[ ]Cyanosis[ ]Edema.  VASCULAR:[x ]Pulses intact and symmetrical.  NEUROLOGICAL:Alert &OrientetedX 3[ ]Normal strength[ ]NoFocal deficit. Noted left paresis with no contractures  SKIN:[ ]Lesions[ ]Rash.  MUSCULOSKEKETAL:[ ]Calf tenderness[ ]Joint abnormalities.     TELEMETRY:Sinus rhythm,no significant arrhythmias.    ECG:SINUS BRADYCARDIA NON-SPECIFIC INTRA-VENTRICULAR CONDUCTION BLOCK    TTE:Pending    LABS:                        14.1   5.02  )-----------( 179      ( 18 Jun 2017 10:23 )             43.7     06-18    136  |  100  |  15  ----------------------------<  90  3.7   |  22  |  1.16    Ca    9.2      18 Jun 2017 10:29    I&O's Summary  I & Os for 24h ending 18 Jun 2017 07:00  =============================================  IN: 1000 ml / OUT: 250 ml / NET: 750 ml    I & Os for current day (as of 19 Jun 2017 05:56)  =============================================  IN: 735 ml / OUT: 1050 ml / NET: -315 ml      IMPRESSION AND PLAN:NEAR SYNCOPE,asymptomatic sinus bradycardia,no signif arrhythmias-likely Vertiginous-improving.    ACCELERATED HTN-2/2 non compliance to meds,better controlled    CAD s/p CABG-chronic stable with Hx CVA residual Left paresis,Repeat TTE-evaluate LV Systolic Function,otherwise cardiac status is stable.

## 2017-06-19 NOTE — PHYSICAL THERAPY INITIAL EVALUATION ADULT - ACTIVE RANGE OF MOTION EXAMINATION, REHAB EVAL
L UE and L LE ROM limited due to PMH on CVA/Right UE Active ROM was WFL (within functional limits)/Right LE Active ROM was WFL (within functional limits)

## 2017-06-19 NOTE — PROVIDER CONTACT NOTE (OTHER) - ACTION/TREATMENT ORDERED:
MONI kinsey. Holding coreg till 8 am. Blood work to be ordered in the am.
NP Abbie kinsey. No new orders. Nursing care is ongoing.
Continue to monitor at this time
Intervention to be advised

## 2017-06-19 NOTE — PHYSICAL THERAPY INITIAL EVALUATION ADULT - PERTINENT HX OF CURRENT PROBLEM, REHAB EVAL
Pt is a 56 y.o. male with PMH CVA 2005 residual right hemiparesis, cerebellar infarcts 2016 no residual HTN CAD s/p stents, s/p CABG, intracranial atherosclerotic diseases admitted with lightheadedness an episode of vertigo yesterday and this am each lasting about 4-5 minutes. Continued below.

## 2017-06-19 NOTE — PROGRESS NOTE ADULT - SUBJECTIVE AND OBJECTIVE BOX
Patient is a 56y old  Male who presents with a chief complaint of The patient is a 56y Male complaining of dizziness. (16 Jun 2017 20:57)      SUBJECTIVE / OVERNIGHT EVENTS:   Feels better.  Denies CP/SOB/Palpitation/HA.    MEDICATIONS  (STANDING):  aspirin 325milliGRAM(s) Oral daily  atorvastatin 80milliGRAM(s) Oral at bedtime  clopidogrel Tablet 75milliGRAM(s) Oral daily  hydrochlorothiazide    Capsule 12.5milliGRAM(s) Oral daily  losartan 100milliGRAM(s) Oral daily    MEDICATIONS  (PRN):  metoclopramide 10milliGRAM(s) Oral every 8 hours PRN nausea and/or vomiting      Vital Signs Last 24 Hrs  T(C): 36.7, Max: 36.7 (06-19 @ 04:24)  HR: 69 (56 - 69)  BP: 178/93 (142/77 - 178/93)  RR: 19 (17 - 19)  SpO2: 93% (93% - 97%)  Wt(kg): --  CAPILLARY BLOOD GLUCOSE    I&O's Summary  I & Os for 24h ending 19 Jun 2017 07:00  =============================================  IN: 735 ml / OUT: 1050 ml / NET: -315 ml    I & Os for current day (as of 19 Jun 2017 23:19)  =============================================  IN: 940 ml / OUT: 350 ml / NET: 590 ml      PHYSICAL EXAM:  GENERAL: NAD, well-developed  HEAD:  Atraumatic, Normocephalic  EYES: EOMI, PERRLA, conjunctiva and sclera clear  NECK: Supple, No JVD  CHEST/LUNG: Clear to auscultation bilaterally; No wheeze  HEART: Regular rate and rhythm; No murmurs, rubs, or gallops  ABDOMEN: Soft, Nontender, Nondistended; Bowel sounds present  EXTREMITIES:  2+ Peripheral Pulses, No clubbing, cyanosis, or edema  PSYCH: AAOx3  NEUROLOGY: non-focal  SKIN: No rashes or lesions    LABS:                        14.1   5.02  )-----------( 179      ( 18 Jun 2017 10:23 )             43.7     06-18    136  |  100  |  15  ----------------------------<  90  3.7   |  22  |  1.16    Ca    9.2      18 Jun 2017 10:29              CAPILLARY BLOOD GLUCOSE                RADIOLOGY & ADDITIONAL TESTS:    Imaging Personally Reviewed:    Consultant(s) Notes Reviewed:      Care Discussed with Consultants/Other Providers:

## 2017-06-19 NOTE — PHYSICAL THERAPY INITIAL EVALUATION ADULT - ADDITIONAL COMMENTS
+MRA neck 6/17/17: Evidence of prior cerebellar infarcts that were recent on the prior MR 8/9/2016. Old right corona radiata distribution infarct. Marked compromise to the bilateral M1 segments. Question of bilateral vertebral origin stenoses with the left vertebral at the V4 segment not visualized with termination in the PICA. The right vertebral distal segment is markedly narrowed at the level of the vertebral basilar junction.    Pt states he lives in a private home with 2 friends with 15 steps to enter, +HR. Pt states he owns a cane. Pt states he is independent with all ADLs and ambulation.

## 2017-06-20 LAB
ANION GAP SERPL CALC-SCNC: 14 MMOL/L — SIGNIFICANT CHANGE UP (ref 5–17)
BUN SERPL-MCNC: 11 MG/DL — SIGNIFICANT CHANGE UP (ref 7–23)
CALCIUM SERPL-MCNC: 8.8 MG/DL — SIGNIFICANT CHANGE UP (ref 8.4–10.5)
CHLORIDE SERPL-SCNC: 102 MMOL/L — SIGNIFICANT CHANGE UP (ref 96–108)
CO2 SERPL-SCNC: 23 MMOL/L — SIGNIFICANT CHANGE UP (ref 22–31)
CREAT SERPL-MCNC: 1.17 MG/DL — SIGNIFICANT CHANGE UP (ref 0.5–1.3)
GLUCOSE SERPL-MCNC: 104 MG/DL — HIGH (ref 70–99)
HCT VFR BLD CALC: 42 % — SIGNIFICANT CHANGE UP (ref 39–50)
HGB BLD-MCNC: 13.9 G/DL — SIGNIFICANT CHANGE UP (ref 13–17)
MCHC RBC-ENTMCNC: 25 PG — LOW (ref 27–34)
MCHC RBC-ENTMCNC: 33.1 GM/DL — SIGNIFICANT CHANGE UP (ref 32–36)
MCV RBC AUTO: 75.7 FL — LOW (ref 80–100)
PLATELET # BLD AUTO: 174 K/UL — SIGNIFICANT CHANGE UP (ref 150–400)
POTASSIUM SERPL-MCNC: 3.6 MMOL/L — SIGNIFICANT CHANGE UP (ref 3.5–5.3)
POTASSIUM SERPL-SCNC: 3.6 MMOL/L — SIGNIFICANT CHANGE UP (ref 3.5–5.3)
RBC # BLD: 5.55 M/UL — SIGNIFICANT CHANGE UP (ref 4.2–5.8)
RBC # FLD: 15.3 % — HIGH (ref 10.3–14.5)
SODIUM SERPL-SCNC: 139 MMOL/L — SIGNIFICANT CHANGE UP (ref 135–145)
WBC # BLD: 5.28 K/UL — SIGNIFICANT CHANGE UP (ref 3.8–10.5)
WBC # FLD AUTO: 5.28 K/UL — SIGNIFICANT CHANGE UP (ref 3.8–10.5)

## 2017-06-20 PROCEDURE — 71020: CPT | Mod: 26

## 2017-06-20 RX ADMIN — Medication 325 MILLIGRAM(S): at 12:21

## 2017-06-20 RX ADMIN — LOSARTAN POTASSIUM 100 MILLIGRAM(S): 100 TABLET, FILM COATED ORAL at 05:10

## 2017-06-20 RX ADMIN — CLOPIDOGREL BISULFATE 75 MILLIGRAM(S): 75 TABLET, FILM COATED ORAL at 12:21

## 2017-06-20 RX ADMIN — ATORVASTATIN CALCIUM 80 MILLIGRAM(S): 80 TABLET, FILM COATED ORAL at 21:03

## 2017-06-20 NOTE — PROGRESS NOTE ADULT - PROBLEM SELECTOR PLAN 1
Neuro f/up noted.  MRI brain/Neck reviewed  (No significant change since the prior angiogram of 8/10/2016.)  There is markedly diminished flow in the distal vertebral arteries   bilaterally and the right anterior cerebral artery. There is compromise   of the bilateral M1 segments. Intracranial flow is as above.).    Cardio f/up appreciated.

## 2017-06-20 NOTE — PROGRESS NOTE ADULT - SUBJECTIVE AND OBJECTIVE BOX
Patient is a 56y old  Male who presents with a chief complaint of The patient is a 56y Male complaining of dizziness. (16 Jun 2017 20:57)      SUBJECTIVE / OVERNIGHT EVENTS:   Feels better.  Denies CP/SOB/Palpitation/HA.    MEDICATIONS  (STANDING):  aspirin 325milliGRAM(s) Oral daily  atorvastatin 80milliGRAM(s) Oral at bedtime  clopidogrel Tablet 75milliGRAM(s) Oral daily  hydrochlorothiazide    Capsule 12.5milliGRAM(s) Oral daily  losartan 100milliGRAM(s) Oral daily    MEDICATIONS  (PRN):  metoclopramide 10milliGRAM(s) Oral every 8 hours PRN nausea and/or vomiting  guaiFENesin    Syrup 200milliGRAM(s) Oral every 6 hours PRN Cough      Vital Signs Last 24 Hrs  T(C): 36.9, Max: 36.9 (06-20 @ 20:31)  HR: 72 (72 - 83)  BP: 133/86 (128/86 - 150/93)  RR: 18 (18 - 19)  SpO2: 97% (94% - 97%)  Wt(kg): --  CAPILLARY BLOOD GLUCOSE    I&O's Summary  I & Os for 24h ending 20 Jun 2017 07:00  =============================================  IN: 1060 ml / OUT: 350 ml / NET: 710 ml    I & Os for current day (as of 21 Jun 2017 00:16)  =============================================  IN: 1490 ml / OUT: 1300 ml / NET: 190 ml      PHYSICAL EXAM:  GENERAL: NAD, well-developed  HEAD:  Atraumatic, Normocephalic  EYES: EOMI, PERRLA, conjunctiva and sclera clear  NECK: Supple, No JVD  CHEST/LUNG: Clear to auscultation bilaterally; No wheeze  HEART: Regular rate and rhythm; No murmurs, rubs, or gallops  ABDOMEN: Soft, Nontender, Nondistended; Bowel sounds present  EXTREMITIES:  2+ Peripheral Pulses, No clubbing, cyanosis, or edema  PSYCH: AAOx3  NEUROLOGY: non-focal  SKIN: No rashes or lesions    LABS:                        13.9   5.28  )-----------( 174      ( 20 Jun 2017 07:24 )             42.0     06-20    139  |  102  |  11  ----------------------------<  104<H>  3.6   |  23  |  1.17    Ca    8.8      20 Jun 2017 07:26              CAPILLARY BLOOD GLUCOSE                RADIOLOGY & ADDITIONAL TESTS:    Imaging Personally Reviewed:    Consultant(s) Notes Reviewed:      Care Discussed with Consultants/Other Providers:

## 2017-06-21 VITALS
RESPIRATION RATE: 18 BRPM | OXYGEN SATURATION: 95 % | HEART RATE: 65 BPM | DIASTOLIC BLOOD PRESSURE: 84 MMHG | TEMPERATURE: 98 F | SYSTOLIC BLOOD PRESSURE: 144 MMHG

## 2017-06-21 LAB — HBA1C BLD-MCNC: 6 % — HIGH (ref 4–5.6)

## 2017-06-21 RX ORDER — AZITHROMYCIN 500 MG/1
500 TABLET, FILM COATED ORAL DAILY
Qty: 0 | Refills: 0 | Status: DISCONTINUED | OUTPATIENT
Start: 2017-06-21 | End: 2017-06-21

## 2017-06-21 RX ORDER — AZITHROMYCIN 500 MG/1
1 TABLET, FILM COATED ORAL
Qty: 2 | Refills: 0 | OUTPATIENT
Start: 2017-06-21 | End: 2017-06-23

## 2017-06-21 RX ORDER — FLUTICASONE PROPIONATE 50 MCG
1 SPRAY, SUSPENSION NASAL
Qty: 1 | Refills: 0 | OUTPATIENT
Start: 2017-06-21

## 2017-06-21 RX ORDER — LOSARTAN POTASSIUM 100 MG/1
1 TABLET, FILM COATED ORAL
Qty: 30 | Refills: 0 | OUTPATIENT
Start: 2017-06-21 | End: 2017-07-21

## 2017-06-21 RX ORDER — LOSARTAN POTASSIUM 100 MG/1
1 TABLET, FILM COATED ORAL
Qty: 30 | Refills: 0 | OUTPATIENT
Start: 2017-06-21

## 2017-06-21 RX ORDER — ASPIRIN/CALCIUM CARB/MAGNESIUM 324 MG
1 TABLET ORAL
Qty: 0 | Refills: 0 | COMMUNITY
Start: 2017-06-21

## 2017-06-21 RX ORDER — HYDROCHLOROTHIAZIDE 25 MG
12.5 TABLET ORAL DAILY
Qty: 0 | Refills: 0 | Status: DISCONTINUED | OUTPATIENT
Start: 2017-06-21 | End: 2017-06-21

## 2017-06-21 RX ORDER — LOSARTAN POTASSIUM 100 MG/1
100 TABLET, FILM COATED ORAL DAILY
Qty: 0 | Refills: 0 | Status: DISCONTINUED | OUTPATIENT
Start: 2017-06-21 | End: 2017-06-21

## 2017-06-21 RX ORDER — FLUTICASONE PROPIONATE 50 MCG
1 SPRAY, SUSPENSION NASAL
Qty: 0 | Refills: 0 | Status: DISCONTINUED | OUTPATIENT
Start: 2017-06-21 | End: 2017-06-21

## 2017-06-21 RX ADMIN — Medication 325 MILLIGRAM(S): at 12:14

## 2017-06-21 RX ADMIN — CLOPIDOGREL BISULFATE 75 MILLIGRAM(S): 75 TABLET, FILM COATED ORAL at 12:14

## 2017-06-21 RX ADMIN — Medication 1 SPRAY(S): at 18:42

## 2017-06-21 NOTE — PROGRESS NOTE ADULT - SUBJECTIVE AND OBJECTIVE BOX
Patient is a 56y old  Male who presents with a chief complaint of The patient is a 56y Male complaining of dizziness. (16 Jun 2017 20:57)      SUBJECTIVE / OVERNIGHT EVENTS:   Feels better.  Denies CP/SOB/Palpitation/HA.    MEDICATIONS  (STANDING):  aspirin 325milliGRAM(s) Oral daily  atorvastatin 80milliGRAM(s) Oral at bedtime  clopidogrel Tablet 75milliGRAM(s) Oral daily  fluticasone propionate 50 MICROgram(s)/spray Nasal Spray 1Spray(s) Both Nostrils two times a day  azithromycin   Tablet 500milliGRAM(s) Oral daily  hydrochlorothiazide 12.5milliGRAM(s) Oral daily  losartan 100milliGRAM(s) Oral daily    MEDICATIONS  (PRN):  metoclopramide 10milliGRAM(s) Oral every 8 hours PRN nausea and/or vomiting  guaiFENesin    Syrup 200milliGRAM(s) Oral every 6 hours PRN Cough      Vital Signs Last 24 Hrs  T(C): 36.5, Max: 36.9 (06-20 @ 20:31)  HR: 65 (65 - 78)  BP: 144/84 (133/86 - 149/79)  RR: 18 (18 - 18)  SpO2: 95% (95% - 97%)  Wt(kg): --  CAPILLARY BLOOD GLUCOSE    I&O's Summary  I & Os for 24h ending 21 Jun 2017 07:00  =============================================  IN: 1490 ml / OUT: 1300 ml / NET: 190 ml    I & Os for current day (as of 21 Jun 2017 17:16)  =============================================  IN: 1080 ml / OUT: 0 ml / NET: 1080 ml      PHYSICAL EXAM:  GENERAL: NAD, well-developed  HEAD:  Atraumatic, Normocephalic  EYES: EOMI, PERRLA, conjunctiva and sclera clear  NECK: Supple, No JVD  CHEST/LUNG: Clear to auscultation bilaterally; No wheeze  HEART: Regular rate and rhythm; No murmurs, rubs, or gallops  ABDOMEN: Soft, Nontender, Nondistended; Bowel sounds present  EXTREMITIES:  2+ Peripheral Pulses, No clubbing, cyanosis, or edema  PSYCH: AAOx3  NEUROLOGY: non-focal  SKIN: No rashes or lesions    LABS:                        13.9   5.28  )-----------( 174      ( 20 Jun 2017 07:24 )             42.0     06-20    139  |  102  |  11  ----------------------------<  104<H>  3.6   |  23  |  1.17    Ca    8.8      20 Jun 2017 07:26              CAPILLARY BLOOD GLUCOSE                RADIOLOGY & ADDITIONAL TESTS:    Imaging Personally Reviewed:    Consultant(s) Notes Reviewed:      Care Discussed with Consultants/Other Providers:

## 2017-06-21 NOTE — DISCHARGE NOTE ADULT - CARE PLAN
Principal Discharge DX:	Vertigo  Goal:	stable  Instructions for follow-up, activity and diet:	Please f/u with your PCP in 4 to 7 days.  Secondary Diagnosis:	HTN (hypertension)  Goal:	stable  Instructions for follow-up, activity and diet:	Follow up with your medical doctor to establish long term blood pressure treatment goals. Principal Discharge DX:	Vertigo  Goal:	stable  Instructions for follow-up, activity and diet:	Please f/u with your PCP in 4 to 7 days. Please also see neurologist for further follow up and management  Secondary Diagnosis:	HTN (hypertension)  Goal:	stable  Instructions for follow-up, activity and diet:	Follow up with your medical doctor to establish long term blood pressure treatment goals.

## 2017-06-21 NOTE — DISCHARGE NOTE ADULT - PLAN OF CARE
stable Please f/u with your PCP in 4 to 7 days. Follow up with your medical doctor to establish long term blood pressure treatment goals. Please f/u with your PCP in 4 to 7 days. Please also see neurologist for further follow up and management

## 2017-06-21 NOTE — DISCHARGE NOTE ADULT - PATIENT PORTAL LINK FT
“You can access the [facility] Patient Portal, offered by [facility], by registering with the following website: [email or web address]”

## 2017-06-21 NOTE — DISCHARGE NOTE ADULT - CARE PROVIDER_API CALL
Prem Patel (MBGILMAR), Neurology; Vascular Neurology  [address] [place] [address]  Phone: [phone]  Fax: [phone]

## 2017-06-21 NOTE — DISCHARGE NOTE ADULT - MEDICATION SUMMARY - MEDICATIONS TO TAKE
I will START or STAY ON the medications listed below when I get home from the hospital:    aspirin 325 mg oral tablet  -- 1 tab(s) by mouth once a day  -- Indication: For Vertigo    losartan 100 mg oral tablet  -- 1 tab(s) by mouth once a day  -- Indication: For Essential hypertension    metoclopramide  --  by mouth   -- Indication: For nausea     meclizine  --  by mouth   -- Indication: For Dizziness and giddiness    atorvastatin 80 mg oral tablet  -- 1 tab(s) by mouth once a day (at bedtime)  -- Indication: For hld     clopidogrel 75 mg oral tablet  -- 1 tab(s) by mouth once a day  -- Indication: For Dizziness and giddiness    hydroCHLOROthiazide 12.5 mg oral capsule  -- 1 cap(s) by mouth once a day  -- Indication: For htn     azithromycin 500 mg oral tablet  -- 1 tab(s) by mouth once a day  -- Indication: For abx     fluticasone 50 mcg/inh nasal spray  -- 1 spray(s) into nose 2 times a day  -- Indication: For nasal spray I will START or STAY ON the medications listed below when I get home from the hospital:    aspirin 325 mg oral tablet  -- 1 tab(s) by mouth once a day  -- Indication: For Vertigo    losartan 100 mg oral tablet  -- 1 tab(s) by mouth once a day  -- Indication: For HTN (hypertension)    metoclopramide  --  by mouth   -- Indication: For nausea     atorvastatin 80 mg oral tablet  -- 1 tab(s) by mouth once a day (at bedtime)  -- Indication: For hld     clopidogrel 75 mg oral tablet  -- 1 tab(s) by mouth once a day  -- Indication: For Stented coronary artery    hydroCHLOROthiazide 12.5 mg oral capsule  -- 1 cap(s) by mouth once a day  -- Indication: For HTN (hypertension)    azithromycin 500 mg oral tablet  -- 1 tab(s) by mouth once a day  -- Indication: For Sinusitis     fluticasone 50 mcg/inh nasal spray  -- 1 spray(s) into nose 2 times a day  -- Indication: For nasal spray

## 2017-06-21 NOTE — CHART NOTE - NSCHARTNOTEFT_GEN_A_CORE
Pt. cleared with Neurology ([id number]) and Dr. Umanzor and is stable for discharge. Pt. instructed to make a follow up appointment with neurology. Medications reviewed and reconciled with patient. Patient cleared or discharge.   [facility]   [id number] [phone]

## 2017-06-21 NOTE — DISCHARGE NOTE ADULT - HOSPITAL COURSE
to be completed by physician 57 yo M w/ CAD sp cabg and stents (last stent 2006, CABG 2002), CVA (2005) w Lt sided  weakness, htn presenting w/ dizziness while leaning over to pick something off the floor yesterday. He was sitting in a chair and when he leaned over, noticed room spinning, self resolved in 3-4 minutes. In the morning he had a similar episode while walking, resolved in a few minutes. No chest pain, no loc, no falls, but noticed some gait ataxia. Reports high BP this am to 190s, ran out of losartan 2 days ago. Also has sob and dry cough and mild LE edema for the last 2 months.    6/16 CT Brain- No CT evidence for acute infarct or acute hemorrhage. Old infarcts present           Evaluated by [facility], recommend continuing current medications, MRI Head; 	MRA head/neck.  6/17 11 beats of wct at 0420. while in the ED pateint had 9 beats of wct           Patient with /100 and HR low 50s, Coreg D/C and Losartan restarted, if BP 	remains elevated can start HCTZ.           BP remains elevated with SBP 180s, HCTZ started.  6/17:  regarding recommenddation from neurology attending , Keep BP > 150  6/18 patient had 5 beats of wide complex  6/19  MRA of Head: No significant change since the prior angiogram of 8/10/2016.   There is markedly diminished flow in the distal vertebral arteries   bilaterally and the right anterior cerebral artery. There is compromise   of the bilateral M1 segments. Intracranial flow is as above.  6/20 Pateint with c/o congestion mild SOB, CXR clear lungs  Pt improved and was dc home  f/u PCP

## 2017-06-22 LAB
CHOLEST SERPL-MCNC: 116 MG/DL — SIGNIFICANT CHANGE UP (ref 10–199)
HDLC SERPL-MCNC: 30 MG/DL — LOW (ref 40–125)
LIPID PNL WITH DIRECT LDL SERPL: 31 MG/DL — SIGNIFICANT CHANGE UP
TOTAL CHOLESTEROL/HDL RATIO MEASUREMENT: 3.9 RATIO — SIGNIFICANT CHANGE UP (ref 3.4–9.6)
TRIGL SERPL-MCNC: 274 MG/DL — HIGH (ref 10–149)

## 2017-07-23 PROCEDURE — 85027 COMPLETE CBC AUTOMATED: CPT

## 2017-07-23 PROCEDURE — 80053 COMPREHEN METABOLIC PANEL: CPT

## 2017-07-23 PROCEDURE — 80048 BASIC METABOLIC PNL TOTAL CA: CPT

## 2017-07-23 PROCEDURE — 97530 THERAPEUTIC ACTIVITIES: CPT

## 2017-07-23 PROCEDURE — 70551 MRI BRAIN STEM W/O DYE: CPT

## 2017-07-23 PROCEDURE — 97161 PT EVAL LOW COMPLEX 20 MIN: CPT

## 2017-07-23 PROCEDURE — 81003 URINALYSIS AUTO W/O SCOPE: CPT

## 2017-07-23 PROCEDURE — 70548 MR ANGIOGRAPHY NECK W/DYE: CPT

## 2017-07-23 PROCEDURE — 85576 BLOOD PLATELET AGGREGATION: CPT

## 2017-07-23 PROCEDURE — 99285 EMERGENCY DEPT VISIT HI MDM: CPT | Mod: 25

## 2017-07-23 PROCEDURE — 94640 AIRWAY INHALATION TREATMENT: CPT

## 2017-07-23 PROCEDURE — 84100 ASSAY OF PHOSPHORUS: CPT

## 2017-07-23 PROCEDURE — 84484 ASSAY OF TROPONIN QUANT: CPT

## 2017-07-23 PROCEDURE — 97116 GAIT TRAINING THERAPY: CPT

## 2017-07-23 PROCEDURE — 71046 X-RAY EXAM CHEST 2 VIEWS: CPT

## 2017-07-23 PROCEDURE — 82553 CREATINE MB FRACTION: CPT

## 2017-07-23 PROCEDURE — 83036 HEMOGLOBIN GLYCOSYLATED A1C: CPT

## 2017-07-23 PROCEDURE — 83735 ASSAY OF MAGNESIUM: CPT

## 2017-07-23 PROCEDURE — 96374 THER/PROPH/DIAG INJ IV PUSH: CPT

## 2017-07-23 PROCEDURE — 70450 CT HEAD/BRAIN W/O DYE: CPT

## 2017-07-23 PROCEDURE — 71045 X-RAY EXAM CHEST 1 VIEW: CPT

## 2017-07-23 PROCEDURE — 80061 LIPID PANEL: CPT

## 2017-07-23 PROCEDURE — 70544 MR ANGIOGRAPHY HEAD W/O DYE: CPT

## 2017-07-23 PROCEDURE — 82550 ASSAY OF CK (CPK): CPT

## 2017-08-12 RX ORDER — HYDRALAZINE HCL 50 MG
0 TABLET ORAL
Qty: 0 | Refills: 0 | COMMUNITY

## 2017-08-12 RX ORDER — CARVEDILOL PHOSPHATE 80 MG/1
0 CAPSULE, EXTENDED RELEASE ORAL
Qty: 0 | Refills: 0 | COMMUNITY

## 2017-11-02 RX ADMIN — Medication 650 MILLIGRAM(S): at 00:30

## 2017-11-07 ENCOUNTER — INPATIENT (INPATIENT)
Facility: HOSPITAL | Age: 57
LOS: 33 days | Discharge: NURSING, REHAB OR LONG-TERM CARE | DRG: 64 | End: 2017-12-11
Attending: PSYCHIATRY & NEUROLOGY | Admitting: PSYCHIATRY & NEUROLOGY
Payer: MEDICAID

## 2017-11-07 VITALS
DIASTOLIC BLOOD PRESSURE: 113 MMHG | OXYGEN SATURATION: 98 % | TEMPERATURE: 97 F | SYSTOLIC BLOOD PRESSURE: 216 MMHG | HEART RATE: 69 BPM | RESPIRATION RATE: 18 BRPM

## 2017-11-07 DIAGNOSIS — I16.0 HYPERTENSIVE URGENCY: ICD-10-CM

## 2017-11-07 LAB
ALBUMIN SERPL ELPH-MCNC: 4.6 G/DL — SIGNIFICANT CHANGE UP (ref 3.3–5)
ALP SERPL-CCNC: 65 U/L — SIGNIFICANT CHANGE UP (ref 40–120)
ALT FLD-CCNC: 25 U/L RC — SIGNIFICANT CHANGE UP (ref 10–45)
ANION GAP SERPL CALC-SCNC: 14 MMOL/L — SIGNIFICANT CHANGE UP (ref 5–17)
APTT BLD: 30.7 SEC — SIGNIFICANT CHANGE UP (ref 27.5–37.4)
AST SERPL-CCNC: 29 U/L — SIGNIFICANT CHANGE UP (ref 10–40)
BASOPHILS # BLD AUTO: 0 K/UL — SIGNIFICANT CHANGE UP (ref 0–0.2)
BASOPHILS NFR BLD AUTO: 0.1 % — SIGNIFICANT CHANGE UP (ref 0–2)
BILIRUB SERPL-MCNC: 1 MG/DL — SIGNIFICANT CHANGE UP (ref 0.2–1.2)
BUN SERPL-MCNC: 7 MG/DL — SIGNIFICANT CHANGE UP (ref 7–23)
CALCIUM SERPL-MCNC: 9.2 MG/DL — SIGNIFICANT CHANGE UP (ref 8.4–10.5)
CHLORIDE SERPL-SCNC: 98 MMOL/L — SIGNIFICANT CHANGE UP (ref 96–108)
CO2 SERPL-SCNC: 26 MMOL/L — SIGNIFICANT CHANGE UP (ref 22–31)
CREAT SERPL-MCNC: 1.12 MG/DL — SIGNIFICANT CHANGE UP (ref 0.5–1.3)
EOSINOPHIL # BLD AUTO: 0 K/UL — SIGNIFICANT CHANGE UP (ref 0–0.5)
EOSINOPHIL NFR BLD AUTO: 0.1 % — SIGNIFICANT CHANGE UP (ref 0–6)
GLUCOSE SERPL-MCNC: 134 MG/DL — HIGH (ref 70–99)
HCT VFR BLD CALC: 47.9 % — SIGNIFICANT CHANGE UP (ref 39–50)
HGB BLD-MCNC: 15.9 G/DL — SIGNIFICANT CHANGE UP (ref 13–17)
INR BLD: 1.04 RATIO — SIGNIFICANT CHANGE UP (ref 0.88–1.16)
LYMPHOCYTES # BLD AUTO: 1 K/UL — SIGNIFICANT CHANGE UP (ref 1–3.3)
LYMPHOCYTES # BLD AUTO: 11.2 % — LOW (ref 13–44)
MCHC RBC-ENTMCNC: 26.4 PG — LOW (ref 27–34)
MCHC RBC-ENTMCNC: 33.3 GM/DL — SIGNIFICANT CHANGE UP (ref 32–36)
MCV RBC AUTO: 79.5 FL — LOW (ref 80–100)
MONOCYTES # BLD AUTO: 0.3 K/UL — SIGNIFICANT CHANGE UP (ref 0–0.9)
MONOCYTES NFR BLD AUTO: 3 % — SIGNIFICANT CHANGE UP (ref 2–14)
NEUTROPHILS # BLD AUTO: 7.8 K/UL — HIGH (ref 1.8–7.4)
NEUTROPHILS NFR BLD AUTO: 85.6 % — HIGH (ref 43–77)
PLATELET # BLD AUTO: 229 K/UL — SIGNIFICANT CHANGE UP (ref 150–400)
POTASSIUM SERPL-MCNC: 4 MMOL/L — SIGNIFICANT CHANGE UP (ref 3.5–5.3)
POTASSIUM SERPL-SCNC: 4 MMOL/L — SIGNIFICANT CHANGE UP (ref 3.5–5.3)
PROT SERPL-MCNC: 8.7 G/DL — HIGH (ref 6–8.3)
PROTHROM AB SERPL-ACNC: 11.2 SEC — SIGNIFICANT CHANGE UP (ref 9.8–12.7)
RBC # BLD: 6.03 M/UL — HIGH (ref 4.2–5.8)
RBC # FLD: 14.4 % — SIGNIFICANT CHANGE UP (ref 10.3–14.5)
SODIUM SERPL-SCNC: 138 MMOL/L — SIGNIFICANT CHANGE UP (ref 135–145)
TROPONIN T SERPL-MCNC: <0.01 NG/ML — SIGNIFICANT CHANGE UP (ref 0–0.06)
WBC # BLD: 9.2 K/UL — SIGNIFICANT CHANGE UP (ref 3.8–10.5)
WBC # FLD AUTO: 9.2 K/UL — SIGNIFICANT CHANGE UP (ref 3.8–10.5)

## 2017-11-07 PROCEDURE — 70496 CT ANGIOGRAPHY HEAD: CPT | Mod: 26

## 2017-11-07 PROCEDURE — 70450 CT HEAD/BRAIN W/O DYE: CPT | Mod: 26,59

## 2017-11-07 PROCEDURE — 99285 EMERGENCY DEPT VISIT HI MDM: CPT

## 2017-11-07 PROCEDURE — 70498 CT ANGIOGRAPHY NECK: CPT | Mod: 26

## 2017-11-07 RX ORDER — LOSARTAN POTASSIUM 100 MG/1
100 TABLET, FILM COATED ORAL ONCE
Qty: 0 | Refills: 0 | Status: COMPLETED | OUTPATIENT
Start: 2017-11-07 | End: 2017-11-07

## 2017-11-07 RX ORDER — ENOXAPARIN SODIUM 100 MG/ML
40 INJECTION SUBCUTANEOUS DAILY
Qty: 0 | Refills: 0 | Status: DISCONTINUED | OUTPATIENT
Start: 2017-11-07 | End: 2017-12-05

## 2017-11-07 RX ORDER — CLOPIDOGREL BISULFATE 75 MG/1
75 TABLET, FILM COATED ORAL DAILY
Qty: 0 | Refills: 0 | Status: DISCONTINUED | OUTPATIENT
Start: 2017-11-07 | End: 2017-11-15

## 2017-11-07 RX ORDER — ONDANSETRON 8 MG/1
4 TABLET, FILM COATED ORAL ONCE
Qty: 0 | Refills: 0 | Status: COMPLETED | OUTPATIENT
Start: 2017-11-07 | End: 2017-11-07

## 2017-11-07 RX ORDER — ASPIRIN/CALCIUM CARB/MAGNESIUM 324 MG
325 TABLET ORAL DAILY
Qty: 0 | Refills: 0 | Status: DISCONTINUED | OUTPATIENT
Start: 2017-11-07 | End: 2017-11-08

## 2017-11-07 RX ORDER — INFLUENZA VIRUS VACCINE 15; 15; 15; 15 UG/.5ML; UG/.5ML; UG/.5ML; UG/.5ML
0.5 SUSPENSION INTRAMUSCULAR ONCE
Qty: 0 | Refills: 0 | Status: DISCONTINUED | OUTPATIENT
Start: 2017-11-07 | End: 2017-12-11

## 2017-11-07 RX ORDER — LABETALOL HCL 100 MG
10 TABLET ORAL ONCE
Qty: 0 | Refills: 0 | Status: COMPLETED | OUTPATIENT
Start: 2017-11-07 | End: 2017-11-07

## 2017-11-07 RX ORDER — ATORVASTATIN CALCIUM 80 MG/1
80 TABLET, FILM COATED ORAL AT BEDTIME
Qty: 0 | Refills: 0 | Status: DISCONTINUED | OUTPATIENT
Start: 2017-11-07 | End: 2017-11-15

## 2017-11-07 RX ORDER — ACETAMINOPHEN 500 MG
650 TABLET ORAL EVERY 6 HOURS
Qty: 0 | Refills: 0 | Status: DISCONTINUED | OUTPATIENT
Start: 2017-11-07 | End: 2017-11-15

## 2017-11-07 RX ORDER — HYDROCHLOROTHIAZIDE 25 MG
50 TABLET ORAL ONCE
Qty: 0 | Refills: 0 | Status: COMPLETED | OUTPATIENT
Start: 2017-11-07 | End: 2017-11-07

## 2017-11-07 RX ORDER — ASPIRIN/CALCIUM CARB/MAGNESIUM 324 MG
81 TABLET ORAL DAILY
Qty: 0 | Refills: 0 | Status: DISCONTINUED | OUTPATIENT
Start: 2017-11-07 | End: 2017-11-07

## 2017-11-07 RX ADMIN — ONDANSETRON 4 MILLIGRAM(S): 8 TABLET, FILM COATED ORAL at 18:07

## 2017-11-07 RX ADMIN — Medication 10 MILLIGRAM(S): at 16:36

## 2017-11-07 NOTE — ED PROVIDER NOTE - ATTENDING CONTRIBUTION TO CARE
Attending MD Palacios:  I personally have seen and examined this patient.  PA note reviewed and agree on plan of care and except where noted.  See MDM for details.

## 2017-11-07 NOTE — ED PROVIDER NOTE - OBJECTIVE STATEMENT
57 yo M w/ CAD sp cabg and stents (last stent 2006, CABG 2002), CVA (2008) w Lt sided  weakness, htn presenting w/ acute onset of dizziness, unsteady gait, nausea and vomiting since 9am this morning. pt reports intermittent episodes since yesterday but worse today. pt was admitted in June 2017 for similar symx. pt also reports off HTN meds for over a week. No chest pain or sob. No vision changes. No fever or chills.

## 2017-11-07 NOTE — H&P ADULT - ATTENDING COMMENTS
The patient was seen and examined by me. Imaging (CT head, CTA neck and head) reviewed by me. This is a 57M with HTN, HLD, CAD/CABG, past stroke with residual left hemiparesis, presents with transient right hemiparesis and diplopia, and continuous headache and N/V. CT head shows old right basal ganglia lacune, and CTA shows severe bilateral MCA stenosis, proximal BA occlusion and distal stenosis. On exam, he is awake and alert, has frequent hiccups, right tarsal ptosis, pupils equal, EOMI, VFF, mild dysarthria, dysphagia, left facial, 4+/5 LUE/LLE, 5/5 on right, right dysmetria on FTN and HTS. Impression: cerebral thrombosis with infarction, severe intracranial atherosclerotic disease, possible right lateral medullary stroke. Plan for brain MRI, PT/OT, ASA/Plavix and Lipitor 80. Patient stopped taking all medications because he ran out and could not afford them because he lost his insurance. Will have SW see him. BP is also high and he has 1L urinary retention, so straight cath and may need Wu. Transfer to stroke unit for q2hr neuro checks. NPO pending swallow evaluation. Lovenox VTE prophylaxis.

## 2017-11-07 NOTE — ED ADULT NURSE NOTE - OBJECTIVE STATEMENT
56 yo M arrived to the ed c/o dizziness/nausea/unsteady gait started today @ 0930; last known normal 0930; hx of stroke, baseline L sided upper extremity weakness; code stroke called @ 1530; fingerstick 121; on assessment pt has L sided facial droop, no slurred speech; A&Ox4; pt placed on C.M, transported to CT @1535, labs sent, EKG completed, 2 18G IVs placed on arrival; pt hypertensive in triage 58 yo M arrived to the ed c/o dizziness/nausea/unsteady gait started today @ 0930; last known normal 0930; hx of stroke, baseline L sided upper extremity weakness; code stroke called @ 1530; fingerstick 121; pt reports new R sided weakness; on assessment pt has L sided facial droop, no slurred speech; A&Ox4; pt placed on C.M, transported to CT @1535, labs sent, EKG completed, 2 18G IVs placed on arrival; pt hypertensive in triage

## 2017-11-07 NOTE — CONSULT NOTE ADULT - SUBJECTIVE AND OBJECTIVE BOX
56yo R-handed Mosoth[place] man PMH CVA with residual L-sided weakness, CAD s/p CABG, HTN presents as stroke code for dizziness. Pt at baseline ambulates independently and independent in ADLs. Pt was in usual state of health today at 9AM. At around 9:30AM, he began to complain of lightheadedness not associated with changes in position. Half an hour later, he began to complain of n/v. He also endorses HA, diplopia/blurry vision, R-sided weakness that began a few days ago. No c/o f/c/CP/SOB. He reports he ran out of his BP meds a week ago and has been unable to refill his medications.       PAST MEDICAL & SURGICAL HISTORY:  Stented coronary artery  CVA (cerebral vascular accident)  HTN (hypertension)  No significant past surgical history    Allergies  No Known Allergies    HOME MEDICATIONS: Pt does not know      FAMILY HISTORY:  No pertinent family history in first degree relatives    Social History: Denies tob, EtOH use     Physical Exam:   Vital Signs Last 24 Hrs  T(C): 36.6 (07 Nov 2017 15:55), Max: 36.6 (07 Nov 2017 15:55)  T(F): 97.9 (07 Nov 2017 15:55), Max: 97.9 (07 Nov 2017 15:55)  HR: 80 (07 Nov 2017 15:55) (69 - 80)  BP: 214/116 (07 Nov 2017 15:55) (214/116 - 216/113)  BP(mean): --  RR: 14 (07 Nov 2017 15:55) (14 - 18)  SpO2: 100% (07 Nov 2017 15:55) (98% - 100%)    Exam:  awake, alert, speech fluent, able to name and follow simple commands  EOMI, VFF, L facial droop  no drift x4, LUE 4+/5, LLE 4+/5, RUE and RLE 5/5  intact to LT x 4, no extinction   +dysmetria on L FNF      NIHSS 3, MRS 2     Labs:     CBC Full  -  ( 07 Nov 2017 15:37 )  WBC Count : 9.2 K/uL  Hemoglobin : 15.9 g/dL  Hematocrit : 47.9 %  Platelet Count - Automated : 229 K/uL  Mean Cell Volume : 79.5 fl  Mean Cell Hemoglobin : 26.4 pg  Mean Cell Hemoglobin Concentration : 33.3 gm/dL  Auto Neutrophil # : 7.8 K/uL  Auto Lymphocyte # : 1.0 K/uL  Auto Monocyte # : 0.3 K/uL  Auto Eosinophil # : 0.0 K/uL  Auto Basophil # : 0.0 K/uL  Auto Neutrophil % : 85.6 %  Auto Lymphocyte % : 11.2 %  Auto Monocyte % : 3.0 %  Auto Eosinophil % : 0.1 %  Auto Basophil % : 0.1 %    11-07    138  |  98  |  7   ----------------------------<  134<H>  4.0   |  26  |  1.12    Ca    9.2      07 Nov 2017 15:37    TPro  8.7<H>  /  Alb  4.6  /  TBili  1.0  /  DBili  x   /  AST  29  /  ALT  25  /  AlkPhos  65  11-07    LIVER FUNCTIONS - ( 07 Nov 2017 15:37 )  Alb: 4.6 g/dL / Pro: 8.7 g/dL / ALK PHOS: 65 U/L / ALT: 25 U/L RC / AST: 29 U/L / GGT: x           PT/INR - ( 07 Nov 2017 15:37 )   PT: 11.2 sec;   INR: 1.04 ratio         PTT - ( 07 Nov 2017 15:37 )  PTT:30.7 sec      Imaging:     CT Head:   Chronic R BG infarct. No acute intracranial pathology.

## 2017-11-07 NOTE — ED PROVIDER NOTE - MEDICAL DECISION MAKING DETAILS
Attending MD Palacios: 56 yo male with PMH for HTN, CVA presents with complaint of slurred speech , difficulty walking, and dizziness since 9:30am.  Symptoms started while driving.  Code stroke called.  On exam there is decreased left NLF and left  strength. Otherwise motor is 5/5 bilaterally in the upper and lower extremity.  Plan:  follow up with neuro and control BP.  Patient ran out of BP meds 2 weeks ago.

## 2017-11-07 NOTE — H&P ADULT - NSHPPHYSICALEXAM_GEN_ALL_CORE
awake, alert, oriented x 3, speech fluent, able to name and follow simple commands  EOMI, VFF, L facial droop  no drift x4, LUE 4+/5, LLE 4+/5, RUE and RLE 5/5  intact to LT x 4, no extinction   +dysmetria on L FNF      NIHSS 3, MRS 2

## 2017-11-07 NOTE — ED ADULT TRIAGE NOTE - CHIEF COMPLAINT QUOTE
facial droop, slurred speech since 930am today, feeling off balance, dizziness, nausea, vomit  hx of stroke 2005

## 2017-11-07 NOTE — ED ADULT NURSE REASSESSMENT NOTE - NS ED NURSE REASSESS COMMENT FT1
pt reports speech is getting worse and difficulty swallowing; pt passed dysphagia on assessment, VALENTÍN marin notified, contacting neuro

## 2017-11-07 NOTE — H&P ADULT - ASSESSMENT
56yo R-handed Filipino man PMH CVA with residual L-sided weakness, CAD s/p CABG, HTN presents as stroke code for dizziness. Neuro exam notable for subtle L sided weakness and L FNF dysmetria, consistent with prior exam. NIHSS 3, MRS 2. DDx includes cerebral ischemia due to multifocal stenosis vs hypertensive emergency.     [] f/u CTA Head/Neck  [] MRI Brain w/o   [] TTE  [] tele  [] A1c, Lipid panel  [] Asa/Plavix  [] Atorvastatin 80  [] PT/OT, dysphagia screen

## 2017-11-07 NOTE — H&P ADULT - NSHPLABSRESULTS_GEN_ALL_CORE
CBC Full  -  ( 07 Nov 2017 15:37 )  WBC Count : 9.2 K/uL  Hemoglobin : 15.9 g/dL  Hematocrit : 47.9 %  Platelet Count - Automated : 229 K/uL  Mean Cell Volume : 79.5 fl  Mean Cell Hemoglobin : 26.4 pg  Mean Cell Hemoglobin Concentration : 33.3 gm/dL  Auto Neutrophil # : 7.8 K/uL  Auto Lymphocyte # : 1.0 K/uL  Auto Monocyte # : 0.3 K/uL  Auto Eosinophil # : 0.0 K/uL  Auto Basophil # : 0.0 K/uL  Auto Neutrophil % : 85.6 %  Auto Lymphocyte % : 11.2 %  Auto Monocyte % : 3.0 %  Auto Eosinophil % : 0.1 %  Auto Basophil % : 0.1 %    11-07    138  |  98  |  7   ----------------------------<  134<H>  4.0   |  26  |  1.12    Ca    9.2      07 Nov 2017 15:37    TPro  8.7<H>  /  Alb  4.6  /  TBili  1.0  /  DBili  x   /  AST  29  /  ALT  25  /  AlkPhos  65  11-07    CT Head:   Chronic R BG infarct. No acute intracranial pathology.

## 2017-11-07 NOTE — CONSULT NOTE ADULT - ASSESSMENT
58yo R-handed Argentine man PMH CVA with residual L-sided weakness, CAD s/p CABG, HTN presents as stroke code for dizziness. Neuro exam notable for subtle L sided weakness and L FNF dysmetria, consistent with prior exam. No new neuro deficits on exam. Upon arrival in the ED, pt's BP elevated to 214/116 in setting of not being on anti-hypertensives. Suspect etiology of pt's complaints may be 2/2 hypertensive emergency 2/2 not on home BP meds; less likely CVA given no findings on CT Head and no new neuro findings on exam.      - gradual titration to normotension  - f/u CTA Head/Neck  - if admitted, can check MRI Brain w/o  - check A1c, Lipid panel   - continue with Asa/Plavix   - continue with supportive care as per primary team

## 2017-11-07 NOTE — H&P ADULT - HISTORY OF PRESENT ILLNESS
56yo R-handed Burkin[place] man PMH CVA with residual L-sided weakness, CAD s/p CABG, HTN presents as stroke code for dizziness. Pt at baseline ambulates independently and independent in ADLs. Pt was in usual state of health today at 9AM. At around 9:30AM, he began to complain of lightheadedness not associated with changes in position. Half an hour later, he began to complain of n/v. He also endorses HA, diplopia/blurry vision, R-sided weakness that began a few days ago. No c/o f/c/CP/SOB. He reports he ran out of his BP meds a week ago and has been unable to refill his medications.

## 2017-11-07 NOTE — ED PROVIDER NOTE - PROGRESS NOTE DETAILS
code stroke called pt c/o worsening slurred speech with difficult swallowing. stable vitals. speech intact but more slurred than initial eval. Called neuro resident. Stat Head ct ordered. Case discussed with Dr. Gomez. -VALENTÍN Perez

## 2017-11-08 ENCOUNTER — TRANSCRIPTION ENCOUNTER (OUTPATIENT)
Age: 57
End: 2017-11-08

## 2017-11-08 LAB
ANION GAP SERPL CALC-SCNC: 13 MMOL/L — SIGNIFICANT CHANGE UP (ref 5–17)
ANION GAP SERPL CALC-SCNC: 14 MMOL/L — SIGNIFICANT CHANGE UP (ref 5–17)
APPEARANCE UR: CLEAR — SIGNIFICANT CHANGE UP
BACTERIA # UR AUTO: NEGATIVE — SIGNIFICANT CHANGE UP
BILIRUB UR-MCNC: NEGATIVE — SIGNIFICANT CHANGE UP
BUN SERPL-MCNC: 11 MG/DL — SIGNIFICANT CHANGE UP (ref 7–23)
BUN SERPL-MCNC: 8 MG/DL — SIGNIFICANT CHANGE UP (ref 7–23)
CALCIUM SERPL-MCNC: 9.1 MG/DL — SIGNIFICANT CHANGE UP (ref 8.4–10.5)
CALCIUM SERPL-MCNC: 9.6 MG/DL — SIGNIFICANT CHANGE UP (ref 8.4–10.5)
CHLORIDE SERPL-SCNC: 96 MMOL/L — SIGNIFICANT CHANGE UP (ref 96–108)
CHLORIDE SERPL-SCNC: 97 MMOL/L — SIGNIFICANT CHANGE UP (ref 96–108)
CHOLEST SERPL-MCNC: 241 MG/DL — HIGH (ref 10–199)
CK MB BLD-MCNC: 3.9 % — HIGH (ref 0–3.5)
CK MB CFR SERPL CALC: 3.4 NG/ML — SIGNIFICANT CHANGE UP (ref 0–6.7)
CK MB CFR SERPL CALC: 4 NG/ML — SIGNIFICANT CHANGE UP (ref 0–6.7)
CK SERPL-CCNC: 77 U/L — SIGNIFICANT CHANGE UP (ref 30–200)
CK SERPL-CCNC: 87 U/L — SIGNIFICANT CHANGE UP (ref 30–200)
CO2 SERPL-SCNC: 25 MMOL/L — SIGNIFICANT CHANGE UP (ref 22–31)
CO2 SERPL-SCNC: 25 MMOL/L — SIGNIFICANT CHANGE UP (ref 22–31)
COLOR SPEC: YELLOW — SIGNIFICANT CHANGE UP
CREAT SERPL-MCNC: 0.92 MG/DL — SIGNIFICANT CHANGE UP (ref 0.5–1.3)
CREAT SERPL-MCNC: 0.98 MG/DL — SIGNIFICANT CHANGE UP (ref 0.5–1.3)
DIFF PNL FLD: NEGATIVE — SIGNIFICANT CHANGE UP
EPI CELLS # UR: 10 /HPF — HIGH (ref 0–5)
GLUCOSE SERPL-MCNC: 111 MG/DL — HIGH (ref 70–99)
GLUCOSE SERPL-MCNC: 134 MG/DL — HIGH (ref 70–99)
GLUCOSE UR QL: NEGATIVE MG/DL — SIGNIFICANT CHANGE UP
HBA1C BLD-MCNC: 5.8 % — HIGH (ref 4–5.6)
HCT VFR BLD CALC: 44.5 % — SIGNIFICANT CHANGE UP (ref 39–50)
HCT VFR BLD CALC: 47.8 % — SIGNIFICANT CHANGE UP (ref 39–50)
HDLC SERPL-MCNC: 57 MG/DL — SIGNIFICANT CHANGE UP (ref 40–125)
HGB BLD-MCNC: 15.2 G/DL — SIGNIFICANT CHANGE UP (ref 13–17)
HGB BLD-MCNC: 15.6 G/DL — SIGNIFICANT CHANGE UP (ref 13–17)
HYALINE CASTS # UR AUTO: 0 /LPF — SIGNIFICANT CHANGE UP (ref 0–7)
KETONES UR-MCNC: ABNORMAL
LEUKOCYTE ESTERASE UR-ACNC: NEGATIVE — SIGNIFICANT CHANGE UP
LIPID PNL WITH DIRECT LDL SERPL: 171 MG/DL — HIGH
MAGNESIUM SERPL-MCNC: 1.9 MG/DL — SIGNIFICANT CHANGE UP (ref 1.6–2.6)
MCHC RBC-ENTMCNC: 25.8 PG — LOW (ref 27–34)
MCHC RBC-ENTMCNC: 26.7 PG — LOW (ref 27–34)
MCHC RBC-ENTMCNC: 32.6 GM/DL — SIGNIFICANT CHANGE UP (ref 32–36)
MCHC RBC-ENTMCNC: 34.2 GM/DL — SIGNIFICANT CHANGE UP (ref 32–36)
MCV RBC AUTO: 78.2 FL — LOW (ref 80–100)
MCV RBC AUTO: 79.1 FL — LOW (ref 80–100)
NITRITE UR-MCNC: NEGATIVE — SIGNIFICANT CHANGE UP
PH UR: 7 — SIGNIFICANT CHANGE UP (ref 5–8)
PHOSPHATE SERPL-MCNC: 2.9 MG/DL — SIGNIFICANT CHANGE UP (ref 2.5–4.5)
PLATELET # BLD AUTO: 227 K/UL — SIGNIFICANT CHANGE UP (ref 150–400)
PLATELET # BLD AUTO: 233 K/UL — SIGNIFICANT CHANGE UP (ref 150–400)
POTASSIUM SERPL-MCNC: 3.9 MMOL/L — SIGNIFICANT CHANGE UP (ref 3.5–5.3)
POTASSIUM SERPL-MCNC: 4.6 MMOL/L — SIGNIFICANT CHANGE UP (ref 3.5–5.3)
POTASSIUM SERPL-SCNC: 3.9 MMOL/L — SIGNIFICANT CHANGE UP (ref 3.5–5.3)
POTASSIUM SERPL-SCNC: 4.6 MMOL/L — SIGNIFICANT CHANGE UP (ref 3.5–5.3)
PROT UR-MCNC: ABNORMAL MG/DL
RBC # BLD: 5.69 M/UL — SIGNIFICANT CHANGE UP (ref 4.2–5.8)
RBC # BLD: 6.05 M/UL — HIGH (ref 4.2–5.8)
RBC # FLD: 14.4 % — SIGNIFICANT CHANGE UP (ref 10.3–14.5)
RBC # FLD: 14.6 % — HIGH (ref 10.3–14.5)
RBC CASTS # UR COMP ASSIST: 4 /HPF — SIGNIFICANT CHANGE UP (ref 0–4)
SODIUM SERPL-SCNC: 134 MMOL/L — LOW (ref 135–145)
SODIUM SERPL-SCNC: 136 MMOL/L — SIGNIFICANT CHANGE UP (ref 135–145)
SP GR SPEC: 1.03 — HIGH (ref 1.01–1.02)
TOTAL CHOLESTEROL/HDL RATIO MEASUREMENT: 4.2 RATIO — SIGNIFICANT CHANGE UP (ref 3.4–9.6)
TRIGL SERPL-MCNC: 67 MG/DL — SIGNIFICANT CHANGE UP (ref 10–149)
TROPONIN T SERPL-MCNC: <0.01 NG/ML — SIGNIFICANT CHANGE UP (ref 0–0.06)
TROPONIN T SERPL-MCNC: <0.01 NG/ML — SIGNIFICANT CHANGE UP (ref 0–0.06)
UROBILINOGEN FLD QL: NEGATIVE MG/DL — SIGNIFICANT CHANGE UP
WBC # BLD: 8.3 K/UL — SIGNIFICANT CHANGE UP (ref 3.8–10.5)
WBC # BLD: 9.4 K/UL — SIGNIFICANT CHANGE UP (ref 3.8–10.5)
WBC # FLD AUTO: 8.3 K/UL — SIGNIFICANT CHANGE UP (ref 3.8–10.5)
WBC # FLD AUTO: 9.4 K/UL — SIGNIFICANT CHANGE UP (ref 3.8–10.5)
WBC UR QL: 1 /HPF — SIGNIFICANT CHANGE UP (ref 0–5)

## 2017-11-08 PROCEDURE — 99223 1ST HOSP IP/OBS HIGH 75: CPT

## 2017-11-08 PROCEDURE — 93010 ELECTROCARDIOGRAM REPORT: CPT

## 2017-11-08 PROCEDURE — 76770 US EXAM ABDO BACK WALL COMP: CPT | Mod: 26

## 2017-11-08 PROCEDURE — 74000: CPT | Mod: 26

## 2017-11-08 RX ORDER — HYDRALAZINE HCL 50 MG
1 TABLET ORAL
Qty: 0 | Refills: 0 | COMMUNITY

## 2017-11-08 RX ORDER — LOSARTAN POTASSIUM 100 MG/1
100 TABLET, FILM COATED ORAL DAILY
Qty: 0 | Refills: 0 | Status: DISCONTINUED | OUTPATIENT
Start: 2017-11-08 | End: 2017-11-15

## 2017-11-08 RX ORDER — HYDRALAZINE HCL 50 MG
10 TABLET ORAL
Qty: 0 | Refills: 0 | Status: DISCONTINUED | OUTPATIENT
Start: 2017-11-08 | End: 2017-12-11

## 2017-11-08 RX ORDER — ASPIRIN/CALCIUM CARB/MAGNESIUM 324 MG
300 TABLET ORAL DAILY
Qty: 0 | Refills: 0 | Status: DISCONTINUED | OUTPATIENT
Start: 2017-11-08 | End: 2017-11-11

## 2017-11-08 RX ORDER — TAMSULOSIN HYDROCHLORIDE 0.4 MG/1
0.4 CAPSULE ORAL AT BEDTIME
Qty: 0 | Refills: 0 | Status: DISCONTINUED | OUTPATIENT
Start: 2017-11-08 | End: 2017-11-11

## 2017-11-08 RX ORDER — SODIUM CHLORIDE 9 MG/ML
1000 INJECTION INTRAMUSCULAR; INTRAVENOUS; SUBCUTANEOUS
Qty: 0 | Refills: 0 | Status: DISCONTINUED | OUTPATIENT
Start: 2017-11-08 | End: 2017-11-09

## 2017-11-08 RX ORDER — ONDANSETRON 8 MG/1
4 TABLET, FILM COATED ORAL EVERY 6 HOURS
Qty: 0 | Refills: 0 | Status: COMPLETED | OUTPATIENT
Start: 2017-11-08 | End: 2017-11-08

## 2017-11-08 RX ORDER — ONDANSETRON 8 MG/1
4 TABLET, FILM COATED ORAL EVERY 8 HOURS
Qty: 0 | Refills: 0 | Status: COMPLETED | OUTPATIENT
Start: 2017-11-08 | End: 2017-11-08

## 2017-11-08 RX ORDER — GABAPENTIN 400 MG/1
100 CAPSULE ORAL THREE TIMES A DAY
Qty: 0 | Refills: 0 | Status: DISCONTINUED | OUTPATIENT
Start: 2017-11-08 | End: 2017-11-15

## 2017-11-08 RX ORDER — ONDANSETRON 8 MG/1
4 TABLET, FILM COATED ORAL ONCE
Qty: 0 | Refills: 0 | Status: COMPLETED | OUTPATIENT
Start: 2017-11-08 | End: 2017-11-08

## 2017-11-08 RX ORDER — SIMETHICONE 80 MG/1
80 TABLET, CHEWABLE ORAL DAILY
Qty: 0 | Refills: 0 | Status: DISCONTINUED | OUTPATIENT
Start: 2017-11-08 | End: 2017-11-15

## 2017-11-08 RX ORDER — HYDRALAZINE HCL 50 MG
10 TABLET ORAL
Qty: 0 | Refills: 0 | Status: DISCONTINUED | OUTPATIENT
Start: 2017-11-08 | End: 2017-11-08

## 2017-11-08 RX ORDER — HYDRALAZINE HCL 50 MG
5 TABLET ORAL ONCE
Qty: 0 | Refills: 0 | Status: COMPLETED | OUTPATIENT
Start: 2017-11-08 | End: 2017-11-08

## 2017-11-08 RX ORDER — HYDROCHLOROTHIAZIDE 25 MG
12.5 TABLET ORAL DAILY
Qty: 0 | Refills: 0 | Status: DISCONTINUED | OUTPATIENT
Start: 2017-11-08 | End: 2017-12-07

## 2017-11-08 RX ORDER — LABETALOL HCL 100 MG
10 TABLET ORAL ONCE
Qty: 0 | Refills: 0 | Status: COMPLETED | OUTPATIENT
Start: 2017-11-08 | End: 2017-11-08

## 2017-11-08 RX ADMIN — ONDANSETRON 4 MILLIGRAM(S): 8 TABLET, FILM COATED ORAL at 05:14

## 2017-11-08 RX ADMIN — Medication 10 MILLIGRAM(S): at 01:30

## 2017-11-08 RX ADMIN — Medication 300 MILLIGRAM(S): at 14:58

## 2017-11-08 RX ADMIN — ONDANSETRON 4 MILLIGRAM(S): 8 TABLET, FILM COATED ORAL at 11:03

## 2017-11-08 RX ADMIN — ONDANSETRON 4 MILLIGRAM(S): 8 TABLET, FILM COATED ORAL at 16:59

## 2017-11-08 RX ADMIN — ENOXAPARIN SODIUM 40 MILLIGRAM(S): 100 INJECTION SUBCUTANEOUS at 14:57

## 2017-11-08 RX ADMIN — Medication 5 MILLIGRAM(S): at 03:35

## 2017-11-08 NOTE — OCCUPATIONAL THERAPY INITIAL EVALUATION ADULT - PERTINENT HX OF CURRENT PROBLEM, REHAB EVAL
Pt is 57M admitted 11/7/17 PMHx CVA w/residual L-sided weakness, CAD s/p CABG, HTN presents as stroke code for dizziness. Pt with c/o n/v, HA, diplopia/blurry vision, R-sided weakness that began a few days ago. Pt reports he ran out of his BP meds a week ago & has been unable to refill his medications

## 2017-11-08 NOTE — DISCHARGE NOTE ADULT - PATIENT PORTAL LINK FT
“You can access the FollowHealth Patient Portal, offered by [facility], by registering with the following website: [email or web address]”

## 2017-11-08 NOTE — DISCHARGE NOTE ADULT - CARE PROVIDERS DIRECT ADDRESSES
[email or web address]

## 2017-11-08 NOTE — DISCHARGE NOTE ADULT - MEDICATION SUMMARY - MEDICATIONS TO TAKE
I will START or STAY ON the medications listed below when I get home from the hospital:    aspirin 81 mg oral tablet, chewable  -- 1 tab(s) by mouth once a day  -- Indication: For Stroke    losartan 100 mg oral tablet  -- 1 tab(s) by mouth once a day  -- Indication: For HTN    doxazosin 2 mg oral tablet  -- 1 tab(s) by mouth once a day (at bedtime)  -- Indication: For HTN    gabapentin 250 mg/5 mL oral solution  -- 12 milliliter(s) by mouth 3 times a day  -- Indication: For Hiccups    metoclopramide 5 mg oral tablet  -- 1 tab(s) by mouth 3 times a day  -- Indication: For GI    atorvastatin 80 mg oral tablet  -- 1 tab(s) by mouth once a day (at bedtime)  -- Indication: For HLD    clopidogrel 75 mg oral tablet  -- 1 tab(s) by mouth once a day  -- Indication: For Stroke    hydroCHLOROthiazide 12.5 mg oral capsule  -- 1 cap(s) by mouth once a day  -- Indication: For HTN    guaiFENesin 100 mg/5 mL oral liquid  -- 10 milliliter(s) by mouth every 6 hours, As needed, Cough  -- Indication: For Hiccups    fluticasone 50 mcg/inh nasal spray  -- 1 spray(s) into nose 2 times a day  -- Indication: For Sinusitis    sodium chloride 0.65% nasal spray  --  into nose   -- Indication: For Sinusitis    amoxicillin-clavulanate 400 mg-57 mg/5 mL oral liquid  -- 875 milligram(s) by mouth 2 times a day  end 12/15/17  -- Indication: For Sinusitis I will START or STAY ON the medications listed below when I get home from the hospital:    aspirin 81 mg oral tablet, chewable  -- 1 tab(s) by jejunostomy tube once a day  -- Indication: For Stroke    losartan 100 mg oral tablet  -- 1 tab(s) by jejunostomy tube once a day  -- Indication: For Hypertension    doxazosin 2 mg oral tablet  -- 1 tab(s) by jejunostomy tube once a day (at bedtime)  -- Indication: For BPH     gabapentin 600 mg oral tablet  -- 1 tab(s) by jejunostomy tube 3 times a day   -- It is very important that you take or use this exactly as directed.  Do not skip doses or discontinue unless directed by your doctor.  May cause drowsiness.  Alcohol may intensify this effect.  Use care when operating dangerous machinery.    -- Indication: For Hiccups     FLUoxetine 20 mg oral capsule  -- 1 cap(s) by jejunostomy tube once a day (at bedtime)  -- Indication: For Weakness    metoclopramide 5 mg oral tablet  -- 1 tab(s) by jejunostomy tube every 8 hours  -- Indication: For Hiccups    atorvastatin 80 mg oral tablet  -- 1 tab(s) by jejunostomy tube once a day (at bedtime)  -- Indication: For Stroke    clopidogrel 75 mg oral tablet  -- 1 tab(s) by jejunostomy tube once a day  -- Indication: For Stroke    hydroCHLOROthiazide 12.5 mg oral capsule  -- 1 cap(s) by jejunostomy tube once a day  -- Indication: For Hypertension    fluticasone 50 mcg/inh nasal spray  -- 1 spray(s) into nose 2 times a day  -- Indication: For Sinusitis    sodium chloride 0.65% nasal spray  -- 1 spray(s) into nose every 6 hours  -- Indication: For Sinusitis    amoxicillin-clavulanate 200 mg-28.5 mg/5 mL oral liquid  -- 21.88 milliliter(s) by jejunostomy tube 2 times a day until 12/15  -- Expires___________________  Finish all this medication unless otherwise directed by prescriber.  Refrigerate and shake well.  Expires_______________________  Take with food or milk.    -- Indication: For Sinusitis

## 2017-11-08 NOTE — OCCUPATIONAL THERAPY INITIAL EVALUATION ADULT - ANTICIPATED DISCHARGE DISPOSITION, OT EVAL
tba after functional eval completed rehabilitation facility/tba after functional eval completed 11/9: acute rehab

## 2017-11-08 NOTE — DISCHARGE NOTE ADULT - CARE PROVIDER_API CALL
Prem Patel (MBBS), Neurology; Vascular Neurology  [address] [address]  Phone: [phone]  Fax: [phone] Prem Patel (FARTUN), Neurology; Vascular Neurology  [address] [facility] [address]  [place] [address]  Phone: [phone]  Fax: [phone]    Carmelo Rhodes (MD), Internal Medicine  [address]  [address] [place] [address]  Phone: [phone]  Fax: [phone]    Rajiv Lee), Urology  [address] [facility] [address]  [place] [address]  Phone: [phone]  Fax: [phone]

## 2017-11-08 NOTE — DISCHARGE NOTE ADULT - PROVIDER TOKENS
TOKGABI:'[id number]' TOKGABI:'[id number]',WENDIE:'[id number]',WENDIE:'[id number]'

## 2017-11-08 NOTE — SWALLOW BEDSIDE ASSESSMENT ADULT - COMMENTS
History continued: CT Brain/CTA Head/Neck: Right basal ganglia and corona radiata infarct without significant change since 6/16/2017. There is severe steno-occlusive disease intracranially involving the right supraclinoid internal carotid artery, A1 and M1 branches, and severe narrowing of the left M1 segment of the middle cerebral artery. The distal right vertebral artery is quite small and the proximal basilar artery is not visualized. The dominant left vertebral artery ends at the PICA. Distal basilar flow appears to be due to retrograde flow from the right posterior communicating artery. 11/8 Per RN note, pt with elevated BP. Pt received 10 mg labetalol. Recommend giving another 10 of labetalol. Pt having increased nausea. Rx: Ordering Zofran. History continued: Neuro exam notable for subtle L sided weakness and L FNF dysmetria, consistent with prior exam. NIHSS 3, MRS 2. DDx includes cerebral ischemia due to multifocal stenosis vs hypertensive emergency. CT Brain/CTA Head/Neck: Right basal ganglia and corona radiata infarct without significant change since 6/16/2017. There is severe steno-occlusive disease intracranially involving the right supraclinoid internal carotid artery, A1 and M1 branches, and severe narrowing of the left M1 segment of the middle cerebral artery. The distal right vertebral artery is quite small and the proximal basilar artery is not visualized. The dominant left vertebral artery ends at the PICA. Distal basilar flow appears to be due to retrograde flow from the right posterior communicating artery. 11/8 Per RN note, pt with elevated BP-> given labetalol. Pt having increased nausea. Rx: Ordering Zofran. Patient c/o 1)difficulty urinating 2) nausea and vomiting 3) hiccups.

## 2017-11-08 NOTE — PHYSICAL THERAPY INITIAL EVALUATION ADULT - GAIT DEVIATIONS NOTED, PT EVAL
limited heel strike of LLE with dragging with fatigue, increased flexion of RLE with prolonged walk/decreased charissa/decreased velocity of limb motion/decreased weight-shifting ability

## 2017-11-08 NOTE — SWALLOW BEDSIDE ASSESSMENT ADULT - SLP PERTINENT HISTORY OF CURRENT PROBLEM
56yo R-handed [place]ogol[place] man PMH CVA with residual L-sided weakness, CAD s/p CABG, HTN presents as stroke code for dizziness. Pt at baseline ambulates independently and independent in ADLs. Pt was in usual state of health 9AM on 11/7. At around 9:30AM, he began to complain of lightheadedness not associated with changes in position. Half an hour later, he began to complain of n/v. He also endorses HA, diplopia/blurry vision, R-sided weakness that began a few days prior to admit. No c/o f/c/CP/SOB. He reports he ran out of his BP meds a week prior to admit and has been unable to refill his medications. Neuro exam notable for subtle L sided weakness and L FNF dysmetria, consistent with prior exam. NIHSS 3, MRS 2.  DDx includes cerebral ischemia due to multifocal stenosis vs hypertensive emergency.  Rx:  f/u CTA Head/Neck, MRI Brain w/o,  Asa/Plavix, Atorvastatin 80. 56yo R-handed [place] man PMH CVA with residual L-sided weakness, CAD s/p CABG, HTN presents as stroke code for dizziness. Pt at baseline ambulates independently and independent in ADLs. Pt was in usual state of health 9AM on 11/7. At around 9:30AM, he began to complain of lightheadedness not associated with changes in position. Half an hour later, he began to complain of n/v. He also endorses HA, diplopia/blurry vision, R-sided weakness that began a few days prior to admit. No c/o f/c/CP/SOB. He reports he ran out of his BP meds a week prior to admit and has been unable to refill his medications.

## 2017-11-08 NOTE — PHYSICAL THERAPY INITIAL EVALUATION ADULT - PERTINENT HX OF CURRENT PROBLEM, REHAB EVAL
Pt is 57M admitted 11/7/17 PMHx CVA w/residual L-sided weakness, CAD s/p CABG, HTN presents as stroke code for dizziness. Pt with c/o n/v, HA, diplopia/blurry vision, R-sided weakness that began a few days ago. Pt reports he ran out of his BP meds a week ago & has been unable to refill his medications.

## 2017-11-08 NOTE — SWALLOW BEDSIDE ASSESSMENT ADULT - SWALLOW EVAL: DIAGNOSIS
Chart reviewed, attempted to see pt for bedside swallow evaluation however being tx to stroke unit and also with reported c/o nausea. Service will f/u to assess swallow function as clinically appropriate. Pt presents with an oral and suspected pharyngeal dysphagia superimposed upon baseline throat clears, coughing, and intermittent expectoration of secretions. The swallow is marked by suspected uncontrolled loss, delayed pharyngeal swallow, reduced hyolaryngeal excursion, and throat clears and coughing post PO intake of less than teaspoon amount of thin liquid suggestive of laryngeal penetration/aspiration.

## 2017-11-08 NOTE — OCCUPATIONAL THERAPY INITIAL EVALUATION ADULT - PRECAUTIONS/LIMITATIONS, REHAB EVAL
continue:CTH: No acute intracranial hemorrhage, mass effect, or evidence of acute territorial infarct.Chronic right corona radiata/basal ganglia and right inferior cerebellar hemisphere infarcts.Air-fluid levels in the bilateral maxillary sinuses, correlate for the presence of acute sinusitis.; no known precautions/limitations

## 2017-11-08 NOTE — DISCHARGE NOTE ADULT - HOSPITAL COURSE
57-year-old right handed man of [place] descent with PMH of CVA with residual L-sided weakness, CAD s/p CABG, HTN. He presented with dizziness, headache, dipoplia/burry vision, and right sided weakness that began a few days prior. On further analysis, CT revealed new infarct to the right lateral medulla extending to the inferior brachium pontis and inferior medial right cerebellar hemisphere, and multiple old lacunar infarcts. CTA showed severe bilateral MCA stenosis, proximal BA occlusion and distal stenosis.     Gastroenterology placed a PEG tube because of dysphagia, complicated by nausea/vomiting with uptitrating feeds, which eventually resolved. Hospitalization was complication by aspiration pneumonia and sepsis treated with vancomycin and zosyn.     Patient started on ASA and clopidogrel for secondary stroke prevention, duration of dual antiplatelet therapy would likely be 3 months followed by Aggrenox for stroke prevention     Patient will be discharge to acute rehab and has been instructed to follow up with doctors. 57-year-old right handed man of [place] descent with PMH of CVA with residual L-sided weakness, CAD s/p CABG, HTN. He presented with dizziness, headache, dipoplia/burry vision, and right sided weakness that began a few days prior. On further analysis, CT revealed new infarct to the right lateral medulla extending to the inferior brachium pontis and inferior medial right cerebellar hemisphere, and multiple old lacunar infarcts. CTA showed severe bilateral MCA stenosis, proximal BA occlusion and distal stenosis.     Gastroenterology placed a PEG tube because of dysphagia, complicated by nausea/vomiting with uptitrating feeds, which eventually had to be changed to J tube. Hospitalization was complication by aspiration pneumonia and sepsis treated with vancomycin and zosyn. Patient tolerating feeds well.     Patient started on ASA and clopidogrel for secondary stroke prevention, duration of dual antiplatelet therapy would likely be 3 months followed by Aggrenox for stroke prevention     Patient will be discharge to acute rehab and has been instructed to follow up with doctors. 57-year-old right handed man of [place] descent with PMH of CVA with residual L-sided weakness, CAD s/p CABG, HTN. He presented with dizziness, headache, dipoplia/burry vision, and right sided weakness that began a few days prior. On further analysis, CT revealed new infarct to the right lateral medulla extending to the inferior brachium pontis and inferior medial right cerebellar hemisphere, and multiple old lacunar infarcts. CTA showed severe bilateral MCA stenosis, proximal BA occlusion and distal stenosis.     Gastroenterology placed a PEG tube because of dysphagia, complicated by nausea/vomiting with uptitrating feeds, which eventually had to be changed to J tube. Hospitalization was complication by aspiration pneumonia and sepsis treated with vancomycin and zosyn. Patient tolerating feeds well.    Also found to have sinusitis, will be discharged on 10 day course of augmentin.    Patient started on ASA and clopidogrel for secondary stroke prevention, duration of dual antiplatelet therapy would likely be 3 months followed by Aggrenox for stroke prevention     Patient will be discharge to acute rehab and has been instructed to follow up with doctors. 57-year-old right handed man of [place]olese descent with PMH of CVA with residual L-sided weakness, CAD s/p CABG, HTN. He presented with dizziness, headache, dipoplia/burry vision, and right sided weakness that began a few days prior. On further analysis, CT revealed new infarct to the right lateral medulla extending to the inferior brachium pontis and inferior medial right cerebellar hemisphere, and multiple old lacunar infarcts. CTA showed severe bilateral MCA stenosis, proximal BA occlusion and distal stenosis. Likely etiology of his stroke is secondary to intracranial atherosclerosis.      Gastroenterology placed a PEG tube because of dysphagia, complicated by nausea/vomiting with uptitrating feeds, which eventually had to be changed to J tube, patient is now tolerating feeds with Jevity 1.5 at goal 55 cc/hr. Hospitalization was complication by aspiration pneumonia and sepsis treated with vancomycin and zosyn, which are both completed. Currently, on Augmentin for sinusitis, for a 10 day course, finishing on 12/15.     Patient started on ASA and clopidogrel for secondary stroke prevention, duration of dual antiplatelet therapy would likely be 3 months followed by Aggrenox for stroke prevention     Patient will be discharge to acute rehab and has been instructed to follow up with doctors.

## 2017-11-08 NOTE — OCCUPATIONAL THERAPY INITIAL EVALUATION ADULT - RANGE OF MOTION EXAMINATION, UPPER EXTREMITY
Left UE Passive ROM was WFL  (within functional limits)/Right UE Active ROM was WNL (within normal limits)

## 2017-11-08 NOTE — PHYSICAL THERAPY INITIAL EVALUATION ADULT - ADDITIONAL COMMENTS
Pt resides in private home with 2 roommates, 13 steps to enter with handrail, flight to bedroom. PTA independent in mobility and ADL's, works as , owns cane. Somewhat unreliable historian.

## 2017-11-08 NOTE — DISCHARGE NOTE ADULT - INSTRUCTIONS
Tube feeding: Jevity 1.5 goal 55 ml/hr Tube feeding: Jevity 1.2 goal 70 ml/hr  G tube: administer tube feeds through lumen marked “Feed”. Do not place crushed meds through the feeding tube Tube feeding: Jevity 1.5 goal 55 ml/hr  J tube: administer tube feeds through lumen marked “Feed”

## 2017-11-08 NOTE — DISCHARGE NOTE ADULT - CARE PLAN
Principal Discharge DX:	CVA (cerebral vascular accident)  Goal:	Prevent recurrence  Instructions for follow-up, activity and diet:	Take medications as prescribed  Follow up with PMD and neurologist

## 2017-11-08 NOTE — OCCUPATIONAL THERAPY INITIAL EVALUATION ADULT - ADDITIONAL COMMENTS
Pt resides in private home with 2 roommates, 13 steps to enter with handrail, flight to bedroom. PTA independent in mobility and ADL's, works as , owns cane. Somewhat unreliable historian.  pt reports that he did not utilize his lUE for dressing and adl's pta.

## 2017-11-08 NOTE — DISCHARGE NOTE ADULT - NS AS DC STROKE ED MATERIALS
Risk Factors for Stroke/Prescribed Medications/Need for Followup After Discharge/Stroke Warning Signs and Symptoms/Call 911 for Stroke/Stroke Education Booklet

## 2017-11-08 NOTE — SWALLOW BEDSIDE ASSESSMENT ADULT - CONSISTENCIES ADMINISTERED
less than one full teaspoon of thin liquid administered to grossly assess swallow function Further PO trials deferred as pt deemed high aspiration risk

## 2017-11-09 LAB
ANION GAP SERPL CALC-SCNC: 14 MMOL/L — SIGNIFICANT CHANGE UP (ref 5–17)
BUN SERPL-MCNC: 12 MG/DL — SIGNIFICANT CHANGE UP (ref 7–23)
CALCIUM SERPL-MCNC: 9 MG/DL — SIGNIFICANT CHANGE UP (ref 8.4–10.5)
CHLORIDE SERPL-SCNC: 96 MMOL/L — SIGNIFICANT CHANGE UP (ref 96–108)
CO2 SERPL-SCNC: 25 MMOL/L — SIGNIFICANT CHANGE UP (ref 22–31)
CREAT SERPL-MCNC: 1.04 MG/DL — SIGNIFICANT CHANGE UP (ref 0.5–1.3)
CULTURE RESULTS: NO GROWTH — SIGNIFICANT CHANGE UP
GLUCOSE SERPL-MCNC: 108 MG/DL — HIGH (ref 70–99)
HCT VFR BLD CALC: 45.8 % — SIGNIFICANT CHANGE UP (ref 39–50)
HGB BLD-MCNC: 15.1 G/DL — SIGNIFICANT CHANGE UP (ref 13–17)
MCHC RBC-ENTMCNC: 25.8 PG — LOW (ref 27–34)
MCHC RBC-ENTMCNC: 32.9 GM/DL — SIGNIFICANT CHANGE UP (ref 32–36)
MCV RBC AUTO: 78.4 FL — LOW (ref 80–100)
PLATELET # BLD AUTO: 218 K/UL — SIGNIFICANT CHANGE UP (ref 150–400)
POTASSIUM SERPL-MCNC: 3.9 MMOL/L — SIGNIFICANT CHANGE UP (ref 3.5–5.3)
POTASSIUM SERPL-SCNC: 3.9 MMOL/L — SIGNIFICANT CHANGE UP (ref 3.5–5.3)
RAPID RVP RESULT: SIGNIFICANT CHANGE UP
RBC # BLD: 5.83 M/UL — HIGH (ref 4.2–5.8)
RBC # FLD: 14.6 % — HIGH (ref 10.3–14.5)
SODIUM SERPL-SCNC: 135 MMOL/L — SIGNIFICANT CHANGE UP (ref 135–145)
SPECIMEN SOURCE: SIGNIFICANT CHANGE UP
WBC # BLD: 10.5 K/UL — SIGNIFICANT CHANGE UP (ref 3.8–10.5)
WBC # FLD AUTO: 10.5 K/UL — SIGNIFICANT CHANGE UP (ref 3.8–10.5)

## 2017-11-09 PROCEDURE — 71010: CPT | Mod: 26,76

## 2017-11-09 PROCEDURE — 70551 MRI BRAIN STEM W/O DYE: CPT | Mod: 26

## 2017-11-09 RX ORDER — ACETAMINOPHEN 500 MG
650 TABLET ORAL EVERY 6 HOURS
Qty: 0 | Refills: 0 | Status: DISCONTINUED | OUTPATIENT
Start: 2017-11-09 | End: 2017-12-11

## 2017-11-09 RX ORDER — SODIUM CHLORIDE 9 MG/ML
1000 INJECTION INTRAMUSCULAR; INTRAVENOUS; SUBCUTANEOUS
Qty: 0 | Refills: 0 | Status: DISCONTINUED | OUTPATIENT
Start: 2017-11-09 | End: 2017-11-11

## 2017-11-09 RX ORDER — LABETALOL HCL 100 MG
10 TABLET ORAL ONCE
Qty: 0 | Refills: 0 | Status: COMPLETED | OUTPATIENT
Start: 2017-11-09 | End: 2017-11-09

## 2017-11-09 RX ADMIN — ENOXAPARIN SODIUM 40 MILLIGRAM(S): 100 INJECTION SUBCUTANEOUS at 15:19

## 2017-11-09 RX ADMIN — Medication 650 MILLIGRAM(S): at 20:20

## 2017-11-09 RX ADMIN — CLOPIDOGREL BISULFATE 75 MILLIGRAM(S): 75 TABLET, FILM COATED ORAL at 15:17

## 2017-11-09 RX ADMIN — SIMETHICONE 80 MILLIGRAM(S): 80 TABLET, CHEWABLE ORAL at 15:18

## 2017-11-09 RX ADMIN — Medication 300 MILLIGRAM(S): at 15:17

## 2017-11-09 RX ADMIN — GABAPENTIN 100 MILLIGRAM(S): 400 CAPSULE ORAL at 15:19

## 2017-11-09 RX ADMIN — Medication 10 MILLIGRAM(S): at 15:17

## 2017-11-09 RX ADMIN — Medication 10 MILLIGRAM(S): at 20:15

## 2017-11-09 NOTE — SWALLOW BEDSIDE ASSESSMENT ADULT - ASR SWALLOW RECOMMEND DIAG
VFSS/MBS/d/w MD Nassar and VALENTÍN Spangler MBS TBS for 11/10 upon receipt of order
Objective testing will not  at present time

## 2017-11-09 NOTE — PROGRESS NOTE ADULT - ASSESSMENT
ASSESSMENT: 57M with HTN, HLD, CAD/CABG, past stroke with residual left hemiparesis, presents with transient right hemiparesis and diplopia, and continuous headache and N/V. CT head shows old right basal ganglia lacune, and CTA shows severe bilateral MCA stenosis, proximal BA occlusion and distal stenosis.  Impression: cerebral thrombosis with infarction, severe intracranial atherosclerotic disease, possible right lateral medullary stroke.    NEURO: Continue close monitoring for neurologic deterioration, permissive HTN with slow titration to normotensive, ASA/Plavix and statin for secondary stroke prevention,  titrate statin to LDL goal less than 70, Pending MRI Brain w/o contrast. Physical therapy/OT/Speech eval/treatment.     ANTITHROMBOTIC THERAPY: ASA/Plavix for secondary stroke prevention    PULMONARY: CXR clear, protecting airway, saturating well     CARDIOVASCULAR: check TTE, cardiac monitoring  no events                           GASTROINTESTINAL: Dysphagia screen  failed, NPO, pending S/S eval, abd XrAy: Nonobstructive bowel gas pattern.       Diet: NPO    RENAL: BUN/Cr stable, good urine output, renal US: No hydronephrosis.        Na Goal: Greater than 135     Wu:    HEMATOLOGY: H/H stable, Platelets normal      DVT ppx: LMWH    ID: afebrile, no leukocytosis     OTHER:     DISPOSITION: Rehab or home depending on PT eval once stable and workup is complete    CORE MEASURES:        Admission NIHSS: 3     TPA:  NO      LDL/HDL: 171/57     Depression Screen:      Statin Therapy: Y once PO access obtained     Dysphagia Screen: FAIL     Smoking  NO      Afib NO     Stroke Education YES ASSESSMENT: 57M with HTN, HLD, CAD/CABG, past stroke with residual left hemiparesis, presents with transient right hemiparesis and diplopia, and continuous headache and N/V. CT head shows old right basal ganglia lacune, and CTA shows severe bilateral MCA stenosis, proximal BA occlusion and distal stenosis. Brain MRI show right medullary and cerebellar ischemic infarct   Impression: cerebral thrombosis with infarction, severe intracranial atherosclerotic disease, possible right lateral medullary stroke.    NEURO: Continue close monitoring for neurologic deterioration, permissive HTN with slow titration to normotensive, ASA/Plavix and statin for secondary stroke prevention,  titrate statin to LDL goal less than 70, Pending official report of MRI Brain w/o contrast. Physical therapy/OT/Speech eval/treatment.     ANTITHROMBOTIC THERAPY: Continue ASA and plan to start Plavix once PO access obtained for secondary stroke prevention    PULMONARY: CXR clear, protecting airway, saturating well     CARDIOVASCULAR: check TTE, cardiac monitoring  no events                           GASTROINTESTINAL: Dysphagia screen  failed, as per S/S eval: NPO, pt to be re-eval on Thursday 11/09, abd Xray: Nonobstructive bowel gas pattern.     Diet: NPO    RENAL: BUN/Cr stable, good urine output, renal US: No hydronephrosis.        Na Goal: Greater than 135     Wu: N    HEMATOLOGY: H/H stable, Platelets normal      DVT ppx: LMWH    ID: afebrile, no leukocytosis     OTHER:     DISPOSITION: Rehab or home depending on PT eval once stable and workup is complete    CORE MEASURES:        Admission NIHSS: 3     TPA:  NO      LDL/HDL: 171/57     Depression Screen: 0     Statin Therapy: Y once PO access obtained     Dysphagia Screen: FAIL     Smoking  NO      Afib NO     Stroke Education YES

## 2017-11-09 NOTE — SWALLOW BEDSIDE ASSESSMENT ADULT - ASPIRATION PRECAUTIONS
Aspiration precautions for secretions and enteral feeds if initiated
yes/for secretions and enteral feeds if initiated

## 2017-11-09 NOTE — SWALLOW BEDSIDE ASSESSMENT ADULT - ADDITIONAL RECOMMENDATIONS
Maintain good oral hygiene.  POC pending MBS.
Maintain good oral hygiene  Service will f/u and re-assess swallow function as clinically appropriate

## 2017-11-09 NOTE — SWALLOW BEDSIDE ASSESSMENT ADULT - SWALLOW EVAL: PATIENT/FAMILY GOALS STATEMENT
Pt denied history of dysphagia PTA however reported previous swallow evaluation at OSH in 2005 following his previous CVA. Pt reported acute change in vocal quality since admission however unable to characterize changes.
Patient reports some improvement in throat clearing, frequency in need to suction/expectorate secretions, and hiccuping since yesterday. Statements from previous report: Pt denied history of dysphagia PTA however reported previous swallow evaluation at OSH in 2005 following his previous CVA. Pt reported acute change in vocal quality since admission however unable to characterize changes.

## 2017-11-09 NOTE — SWALLOW BEDSIDE ASSESSMENT ADULT - COMMENTS
History continued: Neuro exam notable for subtle L sided weakness and L FNF dysmetria, consistent with prior exam. NIHSS 3, MRS 2. DDx includes cerebral ischemia due to multifocal stenosis vs hypertensive emergency. CT Brain/CTA Head/Neck: Right basal ganglia and corona radiata infarct without significant change since 6/16/2017. There is severe steno-occlusive disease intracranially involving the right supraclinoid internal carotid artery, A1 and M1 branches, and severe narrowing of the left M1 segment of the middle cerebral artery. The distal right vertebral artery is quite small and the proximal basilar artery is not visualized. The dominant left vertebral artery ends at the PICA. Distal basilar flow appears to be due to retrograde flow from the right posterior communicating artery. 11/8 Per RN note, pt with elevated BP-> given labetalol. Pt having increased nausea. Rx: Ordering Zofran. Patient c/o 1)difficulty urinating 2) nausea and vomiting 3) hiccups.

## 2017-11-09 NOTE — DIETITIAN INITIAL EVALUATION ADULT. - OTHER INFO
Pt seen for: length of stay.   Adm dx: 57M with HTN, HLD, CAD/CABG, past stroke with residual left hemiparesis, presents with transient right hemiparesis and diplopia, and continuous headache and N/V. CT head shows old right basal ganglia lacune, and CTA shows severe bilateral MCA stenosis, proximal BA occlusion and distal stenosis. Brain MRI show right medullary and cerebellar ischemic infarct.   GI issues: denies N/V/D  Last BM: 11/7     Food allergies: NKFA    Vit/supplement PTA: none

## 2017-11-09 NOTE — SWALLOW BEDSIDE ASSESSMENT ADULT - SWALLOW EVAL: RECOMMENDED DIET
Continue NPO, with non-oral nutrition/hydration/medications.
NPO, with non-oral nutrition/hydration/medications.

## 2017-11-09 NOTE — PROGRESS NOTE ADULT - SUBJECTIVE AND OBJECTIVE BOX
THE PATIENT WAS SEEN AND EXAMINED BY ME WITH THE HOUSESTAFF AND STROKE TEAM DURING MORNING ROUNDS.   HPI:  58yo R-handed Citizen of the [place] man PMH CVA with residual L-sided weakness, CAD s/p CABG, HTN presents as stroke code for dizziness. Pt at baseline ambulates independently and independent in ADLs. Pt was in usual state of health today at 9AM. At around 9:30AM, he began to complain of lightheadedness not associated with changes in position. Half an hour later, he began to complain of n/v. He also endorses HA, diplopia/blurry vision, R-sided weakness that began a few days ago. No c/o f/c/CP/SOB. He reports he ran out of his BP meds a week ago and has been unable to refill his medications. (07 Nov 2017 17:02)      SUBJECTIVE: No events overnight.  No new neurologic complaints.      acetaminophen   Tablet. 650 milliGRAM(s) Oral every 6 hours PRN  aspirin Suppository 300 milliGRAM(s) Rectal daily  atorvastatin 80 milliGRAM(s) Oral at bedtime  clopidogrel Tablet 75 milliGRAM(s) Oral daily  enoxaparin Injectable 40 milliGRAM(s) SubCutaneous daily  gabapentin 100 milliGRAM(s) Oral three times a day  hydrALAZINE Injectable 10 milliGRAM(s) IV Push two times a day PRN  hydrochlorothiazide 12.5 milliGRAM(s) Oral daily  influenza   Vaccine 0.5 milliLiter(s) IntraMuscular once  losartan 100 milliGRAM(s) Oral daily  simethicone 80 milliGRAM(s) Chew daily PRN  sodium chloride 0.9%. 1000 milliLiter(s) IV Continuous <Continuous>  tamsulosin 0.4 milliGRAM(s) Oral at bedtime      PHYSICAL EXAM:   Vital Signs Last 24 Hrs  T(C): 37.1 (09 Nov 2017 04:00), Max: 37.1 (09 Nov 2017 04:00)  T(F): 98.8 (09 Nov 2017 04:00), Max: 98.8 (09 Nov 2017 04:00)  HR: 83 (09 Nov 2017 06:00) (65 - 100)  BP: 176/106 (09 Nov 2017 06:00) (143/101 - 203/125)  BP(mean): 121 (09 Nov 2017 06:00) (107 - 147)  RR: 15 (09 Nov 2017 06:00) (12 - 26)  SpO2: 100% (09 Nov 2017 06:00) (97% - 100%)    General: No acute distress  HEENT: EOM intact, visual fields full  Abdomen: Soft, nontender, nondistended   Extremities: No edema    NEUROLOGICAL EXAM:  Mental status: Awake, alert, oriented x3, no aphasia, no neglect, normal memory   Cranial Nerves: left facial droop, right tarsal ptosis, pupils equal, EOMI, VFF, no nystagmus, mild dysarthria,   Motor exam: Normal tone, no drift, RUE 5/5, RLE 5/5, LUE 4+/5, LLE 4+/5.  Sensation: Intact to light touch   Coordination/ Gait: right dysmetria,     LABS:                        15.1   10.5  )-----------( 218      ( 09 Nov 2017 02:46 )             45.8    11-09    135  |  96  |  12  ----------------------------<  108<H>  3.9   |  25  |  1.04    Ca    9.0      09 Nov 2017 02:46  Phos  2.9     11-08  Mg     1.9     11-08    TPro  8.7<H>  /  Alb  4.6  /  TBili  1.0  /  DBili  x   /  AST  29  /  ALT  25  /  AlkPhos  65  11-07  PT/INR - ( 07 Nov 2017 15:37 )   PT: 11.2 sec;   INR: 1.04 ratio         PTT - ( 07 Nov 2017 15:37 )  PTT:30.7 sec  Hemoglobin A1C, Whole Blood: 5.8 % (11-08 @ 10:36)      IMAGING: Reviewed by me.     Head Ct/CTA head/Neck (11/07) Right basal ganglia and corona radiata infarct without  significant change since 6/16/2017. There is severe steno-occlusive disease  intracranially involving the right supraclinoid internal carotid artery, A1  and M1 branches, and severe narrowing of the left M1 segment of the middle  cerebral artery. The distal right vertebral artery is quite small and the  proximal basilar artery is not visualized. The dominant left vertebral  artery ends at the PICA. Distal basilar flow appears to be due to retrograde  flow from the right posterior communicating artery.  Head CT (11/07) No acute intracranial hemorrhage, mass effect, or evidence of  acute territorial infarct.    Chronic right corona radiata/basal ganglia and right inferior cerebellar  hemisphere infarcts. THE PATIENT WAS SEEN AND EXAMINED BY ME WITH THE HOUSESTAFF AND STROKE TEAM DURING MORNING ROUNDS.   HPI:  58 y/o R-handed [place] man PMH CVA with residual L-sided weakness, CAD s/p CABG, HTN presents as stroke code for dizziness. Pt at baseline ambulates independently and independent in ADLs. Pt was in usual state of health on 11/07 at 9AM. At around 9:30AM, he began to complain of lightheadedness not associated with changes in position. Half an hour later, he began to complain of n/v. He also endorses HA, diplopia/blurry vision, R-sided weakness that began a few days prior to admission. No c/o f/c/CP/SOB. He reports he ran out of his BP meds a week prior to admission and has been unable to refill his medications.    SUBJECTIVE: No events overnight.  No new neurologic complaints.      acetaminophen   Tablet. 650 milliGRAM(s) Oral every 6 hours PRN  aspirin Suppository 300 milliGRAM(s) Rectal daily  atorvastatin 80 milliGRAM(s) Oral at bedtime  clopidogrel Tablet 75 milliGRAM(s) Oral daily  enoxaparin Injectable 40 milliGRAM(s) SubCutaneous daily  gabapentin 100 milliGRAM(s) Oral three times a day  hydrALAZINE Injectable 10 milliGRAM(s) IV Push two times a day PRN  hydrochlorothiazide 12.5 milliGRAM(s) Oral daily  influenza   Vaccine 0.5 milliLiter(s) IntraMuscular once  losartan 100 milliGRAM(s) Oral daily  simethicone 80 milliGRAM(s) Chew daily PRN  sodium chloride 0.9%. 1000 milliLiter(s) IV Continuous <Continuous>  tamsulosin 0.4 milliGRAM(s) Oral at bedtime      PHYSICAL EXAM:   Vital Signs Last 24 Hrs  T(C): 37.1 (09 Nov 2017 04:00), Max: 37.1 (09 Nov 2017 04:00)  T(F): 98.8 (09 Nov 2017 04:00), Max: 98.8 (09 Nov 2017 04:00)  HR: 83 (09 Nov 2017 06:00) (65 - 100)  BP: 176/106 (09 Nov 2017 06:00) (143/101 - 203/125)  BP(mean): 121 (09 Nov 2017 06:00) (107 - 147)  RR: 15 (09 Nov 2017 06:00) (12 - 26)  SpO2: 100% (09 Nov 2017 06:00) (97% - 100%)    General: No acute distress  HEENT: EOM intact, visual fields full  Abdomen: Soft, nontender, nondistended   Extremities: No edema    NEUROLOGICAL EXAM:  Mental status: Awake, alert, oriented x3, fluent speech, no neglect, able to follow commands  Cranial Nerves: left facial droop,  EOMI, VFF, no nystagmus, mild dysarthria,   Motor exam: Normal tone, no drift, RUE 5/5, RLE 5/5, LUE 4+/5, LLE 4+/5.  Sensation: Intact to light touch   Coordination/ Gait: LUE moderate-severe dysmetria,     LABS:                        15.1   10.5  )-----------( 218      ( 09 Nov 2017 02:46 )             45.8    11-09    135  |  96  |  12  ----------------------------<  108<H>  3.9   |  25  |  1.04    Ca    9.0      09 Nov 2017 02:46  Phos  2.9     11-08  Mg     1.9     11-08    TPro  8.7<H>  /  Alb  4.6  /  TBili  1.0  /  DBili  x   /  AST  29  /  ALT  25  /  AlkPhos  65  11-07  PT/INR - ( 07 Nov 2017 15:37 )   PT: 11.2 sec;   INR: 1.04 ratio         PTT - ( 07 Nov 2017 15:37 )  PTT:30.7 sec  Hemoglobin A1C, Whole Blood: 5.8 % (11-08 @ 10:36)      IMAGING: Reviewed by me.     Head CT/CTA head/Neck (11/07) Right basal ganglia and corona radiata infarct without significant change since 6/16/2017. There is severe steno-occlusive disease intracranially involving the right supraclinoid internal carotid artery, A1 and M1 branches, and severe narrowing of the left M1 segment of the middle cerebral artery. The distal right vertebral artery is quite small and the proximal basilar artery is not visualized. The dominant left vertebral artery ends at the PICA. Distal basilar flow appears to be due to retrograde flow from the right posterior communicating artery.  Head CT (11/07) No acute intracranial hemorrhage, mass effect, or evidence of acute territorial infarct. Chronic right corona radiata/basal ganglia and right inferior cerebellar hemisphere infarcts.

## 2017-11-09 NOTE — SWALLOW BEDSIDE ASSESSMENT ADULT - PHARYNGEAL PHASE
Decreased laryngeal elevation/Delayed cough post oral intake/Throat clear post oral intake/Multiple swallows/2-3 repeat swallows
Multiple swallows/Decreased laryngeal elevation/Delayed cough post oral intake/Throat clear post oral intake

## 2017-11-09 NOTE — CHART NOTE - NSCHARTNOTEFT_GEN_A_CORE
8:10pm AURORA Mckinley notified NP Rumble that patient was febrile 102.7F. Upon examination, patient was suctioning himself intermittently and seemed comfortable in bed. Denies chills, palpitations, SOB, sweat.     VS: 145/87, 115, 102.7F, 16    Plan:  1. Tylenol 650mg supp for fever  2. Blood cultures * 2  3. RVP  4. Chest Xray  5. UA & C&S  6. Sputum culture  7. Increase IVF NS from 50ml to 75ml/hr

## 2017-11-09 NOTE — SWALLOW BEDSIDE ASSESSMENT ADULT - SLP PERTINENT HISTORY OF CURRENT PROBLEM
58yo R-handed [place] man PMH CVA with residual L-sided weakness, CAD s/p CABG, HTN presents as stroke code for dizziness. Pt at baseline ambulates independently and independent in ADLs. Pt was in usual state of health 9AM on 11/7. At around 9:30AM, he began to complain of lightheadedness not associated with changes in position. Half an hour later, he began to complain of n/v. He also endorses HA, diplopia/blurry vision, R-sided weakness that began a few days prior to admit. No c/o f/c/CP/SOB. He reports he ran out of his BP meds a week prior to admit and has been unable to refill his medications.

## 2017-11-09 NOTE — DIETITIAN INITIAL EVALUATION ADULT. - ENERGY NEEDS
Ht:  69"  Wt: 197  BMI: 29.1 kg/m2   IBW: 160 (+/-10%)     123% IBW  Edema: none   Skin: no pressure injuries

## 2017-11-09 NOTE — SWALLOW BEDSIDE ASSESSMENT ADULT - SLP GENERAL OBSERVATIONS
Pt encountered OOB reclined in chair, self repositioned upright in chair. Pt A&Ox3, able to make wants and needs known and follow directives for purpose of evaluation. + Baseline cough, throat clears, and intermittent expectoration of secretions. + Baseline hiccups. Mildly reduced vocal intensity, however pt reports this is baseline for him. V1-V3 intact.
Pt awake in bed, able to communicate needs and follow directions for exam. + Baseline cough, throat clears, and intermittent expectoration of secretions. + Subjective c/o difficulty with secretions stating it feels like they wont go down. + Baseline hiccups.

## 2017-11-09 NOTE — SWALLOW BEDSIDE ASSESSMENT ADULT - SWALLOW EVAL: DIAGNOSIS
Patient presents with baseline throat clearing, hiccupping and intermittent cough which confound subjective assessment. Upon assessment patient presents with oral and suspected pharyngeal dysphagia characterized by suspected premature spillover, delayed pharyngeal swallow trigger, reduced hyolaryngeal elevation upon palpation, and 2-3 repeat swallows suggestive of pharyngeal retention. Delayed cough noted post intake of honey thickened liquid, which may be suggestive of laryngeal penetration and/or aspiration. Objective assessment is warranted to comprehensively assess.

## 2017-11-09 NOTE — DIETITIAN INITIAL EVALUATION ADULT. - NS AS NUTRI INTERV ENTERAL NUTRITION
Recommend Jevity 1.2 goal 70cc/hr x 24 hrs to provide 2016 kcals, 93 gm protein, 1355cc free water for 22 kcals/kg, 1.0gm protein/kg dosing wt of 89.4kg

## 2017-11-10 LAB
ANION GAP SERPL CALC-SCNC: 16 MMOL/L — SIGNIFICANT CHANGE UP (ref 5–17)
APPEARANCE UR: CLEAR — SIGNIFICANT CHANGE UP
BILIRUB UR-MCNC: NEGATIVE — SIGNIFICANT CHANGE UP
BUN SERPL-MCNC: 16 MG/DL — SIGNIFICANT CHANGE UP (ref 7–23)
CALCIUM SERPL-MCNC: 9.1 MG/DL — SIGNIFICANT CHANGE UP (ref 8.4–10.5)
CHLORIDE SERPL-SCNC: 95 MMOL/L — LOW (ref 96–108)
CO2 SERPL-SCNC: 23 MMOL/L — SIGNIFICANT CHANGE UP (ref 22–31)
COLOR SPEC: YELLOW — SIGNIFICANT CHANGE UP
CREAT SERPL-MCNC: 1.01 MG/DL — SIGNIFICANT CHANGE UP (ref 0.5–1.3)
DIFF PNL FLD: ABNORMAL
GLUCOSE SERPL-MCNC: 100 MG/DL — HIGH (ref 70–99)
GLUCOSE UR QL: NEGATIVE — SIGNIFICANT CHANGE UP
GRAM STN FLD: SIGNIFICANT CHANGE UP
HCT VFR BLD CALC: 46.1 % — SIGNIFICANT CHANGE UP (ref 39–50)
HGB BLD-MCNC: 15.7 G/DL — SIGNIFICANT CHANGE UP (ref 13–17)
KETONES UR-MCNC: ABNORMAL
LEUKOCYTE ESTERASE UR-ACNC: NEGATIVE — SIGNIFICANT CHANGE UP
MCHC RBC-ENTMCNC: 26.7 PG — LOW (ref 27–34)
MCHC RBC-ENTMCNC: 34.1 GM/DL — SIGNIFICANT CHANGE UP (ref 32–36)
MCV RBC AUTO: 78.4 FL — LOW (ref 80–100)
NITRITE UR-MCNC: NEGATIVE — SIGNIFICANT CHANGE UP
PH UR: 6 — SIGNIFICANT CHANGE UP (ref 5–8)
PLATELET # BLD AUTO: 198 K/UL — SIGNIFICANT CHANGE UP (ref 150–400)
POTASSIUM SERPL-MCNC: 4 MMOL/L — SIGNIFICANT CHANGE UP (ref 3.5–5.3)
POTASSIUM SERPL-SCNC: 4 MMOL/L — SIGNIFICANT CHANGE UP (ref 3.5–5.3)
PROT UR-MCNC: 30 MG/DL
RBC # BLD: 5.88 M/UL — HIGH (ref 4.2–5.8)
RBC # FLD: 14.7 % — HIGH (ref 10.3–14.5)
SODIUM SERPL-SCNC: 134 MMOL/L — LOW (ref 135–145)
SP GR SPEC: 1.02 — SIGNIFICANT CHANGE UP (ref 1.01–1.02)
SPECIMEN SOURCE: SIGNIFICANT CHANGE UP
UROBILINOGEN FLD QL: NEGATIVE — SIGNIFICANT CHANGE UP
WBC # BLD: 10.5 K/UL — SIGNIFICANT CHANGE UP (ref 3.8–10.5)
WBC # FLD AUTO: 10.5 K/UL — SIGNIFICANT CHANGE UP (ref 3.8–10.5)

## 2017-11-10 PROCEDURE — 71010: CPT | Mod: 26

## 2017-11-10 PROCEDURE — 93306 TTE W/DOPPLER COMPLETE: CPT | Mod: 26

## 2017-11-10 PROCEDURE — 99233 SBSQ HOSP IP/OBS HIGH 50: CPT

## 2017-11-10 PROCEDURE — 99254 IP/OBS CNSLTJ NEW/EST MOD 60: CPT | Mod: GC

## 2017-11-10 RX ORDER — DOXAZOSIN MESYLATE 4 MG
2 TABLET ORAL AT BEDTIME
Qty: 0 | Refills: 0 | Status: DISCONTINUED | OUTPATIENT
Start: 2017-11-10 | End: 2017-11-15

## 2017-11-10 RX ADMIN — Medication 2 MILLIGRAM(S): at 23:18

## 2017-11-10 RX ADMIN — ATORVASTATIN CALCIUM 80 MILLIGRAM(S): 80 TABLET, FILM COATED ORAL at 23:17

## 2017-11-10 RX ADMIN — CLOPIDOGREL BISULFATE 75 MILLIGRAM(S): 75 TABLET, FILM COATED ORAL at 17:35

## 2017-11-10 RX ADMIN — GABAPENTIN 100 MILLIGRAM(S): 400 CAPSULE ORAL at 17:35

## 2017-11-10 RX ADMIN — Medication 300 MILLIGRAM(S): at 17:35

## 2017-11-10 RX ADMIN — GABAPENTIN 100 MILLIGRAM(S): 400 CAPSULE ORAL at 23:22

## 2017-11-10 RX ADMIN — ENOXAPARIN SODIUM 40 MILLIGRAM(S): 100 INJECTION SUBCUTANEOUS at 17:35

## 2017-11-10 NOTE — SWALLOW VFSS/MBS ASSESSMENT ADULT - SLP PERTINENT HISTORY OF CURRENT PROBLEM
58yo R-handed [place] man PMH CVA with residual L-sided weakness, CAD s/p CABG, HTN presents as stroke code for dizziness. Pt at baseline ambulates independently and independent in ADLs. Pt was in usual state of health 9AM on 11/7. At around 9:30AM, he began to complain of lightheadedness not associated with changes in position. Half an hour later, he began to complain of n/v. He also endorses HA, diplopia/blurry vision, R-sided weakness that began a few days prior to admit. No c/o f/c/CP/SOB. He reports he ran out of his BP meds a week prior to admit and has been unable to refill his medications.

## 2017-11-10 NOTE — SWALLOW VFSS/MBS ASSESSMENT ADULT - SLP GENERAL OBSERVATIONS
Pt received secure in LENARD chair in radiology suite. Hiccuping resolved. +Baseline throat clearing, intermittent cough and expectoration of secretions. Mentation c/w bedside assessment.

## 2017-11-10 NOTE — CHART NOTE - NSCHARTNOTEFT_GEN_A_CORE
8:55pm Patient unable to void, and has been catheterized for the past 3 days. Urology contacted, and appreciate recommendations to place haro catheter and leave in place for discharge to Rehab when medically cleared. Also patient flomax dose was D/Salas due his NPO status, and changed to Cardura 2mg. Patient seen and examined, and was comfortable in bed. Haro to be placed now.     VS: 175/94, 113, 17, 94% RA.

## 2017-11-10 NOTE — SWALLOW VFSS/MBS ASSESSMENT ADULT - PHARYNGEAL PHASE COMMENTS
Following primary swallow, ~40% of bolus remained in the pharynx. As assessment continued 3 repeat swallows were minimally effective in reducing overall residue. Trace aspiration noted of passively spilled pyriform sinus residue over the arytenoids; initially cleared via spontaneous throat clear. Upon reinitiation of flouro trace material noted along the laryngeal surface of the arytenoids; penetration of aforementioned material noted during subsequent swallow deep to the level of the vocal folds, without retrieval. Upon reinitiation of flouro for subsequent swallow trace material noted within the trachea, absent sensation. Unable to visualize whether material cleared with cued cough. A head rotation to the left was ineffective in minimizing pharyngeal residue. Mild-moderate amount of aspiration noted after the swallow of pharyngeal residue noted in a left head turn, without retrieval. Mild amount of aspiration noted after the swallow of aryepiglottic and pyriform sinus residue, without retrieval. Deep penetration noted to the level of the vocal folds after the swallow of residue, during cued cough.

## 2017-11-10 NOTE — PROGRESS NOTE ADULT - ASSESSMENT
ASSESSMENT: 57M with HTN, HLD, CAD/CABG, past stroke with residual left hemiparesis, presents with transient right hemiparesis and diplopia, and continuous headache and N/V. CT head shows old right basal ganglia lacune, and CTA shows severe bilateral MCA stenosis, proximal BA occlusion and distal stenosis. Brain MRI show right medullary and right cerebellar ischemic infarct   Impression: cerebral thrombosis with infarction, severe intracranial atherosclerotic disease, possible right lateral medullary stroke.    NEURO: Continue close monitoring for neurologic deterioration, permissive HTN with slow titration to normotensive, ASA/Plavix and statin for secondary stroke prevention,  titrate statin to LDL goal less than 70,  Physical therapy/OT eval with recommendations for [facility]    ANTITHROMBOTIC THERAPY: Continue ASA and plan to start Plavix once PO access obtained for secondary stroke prevention    PULMONARY: CXR (11/09) clear, protecting airway, saturating well     CARDIOVASCULAR: check TTE, cardiac monitoring  no events                           GASTROINTESTINAL: Dysphagia screen  failed, as per S/S eval: NPO, NGT in place. Tolerating diet, schedule for MBS on 11/10, abd Xray: Nonobstructive bowel gas pattern.     Diet: NGT    RENAL: BUN/Cr stable, good urine output, renal US: No hydronephrosis. hyponatremia: will continue to monitor        Na Goal: Greater than 135     Wu: N    HEMATOLOGY: H/H stable, Platelets normal      DVT ppx: LMWH    ID: febrile on 11/09 (Tmax 39.3), afebrile in am, no leukocytosis, blood/urine cultures sent     OTHER:     DISPOSITION: D/C to [facility] as per PT/OT eval once stable and workup is complete, Pt uninsured, SW aware    CORE MEASURES:        Admission NIHSS: 3     TPA:  NO      LDL/HDL: 171/57     Depression Screen: 0     Statin Therapy: Y     Dysphagia Screen: FAIL     Smoking  NO      Afib NO     Stroke Education YES ASSESSMENT: 57M with HTN, HLD, CAD/CABG, past stroke with residual left hemiparesis, presents with transient right hemiparesis and diplopia, and continuous headache and N/V. CT head shows old right basal ganglia lacune, and CTA shows severe bilateral MCA stenosis, proximal BA occlusion and distal stenosis. Brain MRI show right medullary and right cerebellar ischemic infarct   Impression: Severe intracranial atherosclerotic disease, right medullary and right cerebellar ischemic infarct.    NEURO: Continue close monitoring for neurologic deterioration, permissive HTN with slow titration to normotensive, ASA/Plavix and statin (once po access obtained) for secondary stroke prevention,  titrate statin to LDL goal less than 70,  Physical therapy/OT eval with recommendations for [facility], pt uninsured, will provide daily PT/OT to optimize treatment    ANTITHROMBOTIC THERAPY: Continue ASA NM and plan to start Plavix once PO access obtained for secondary stroke prevention    PULMONARY: CXR (11/09) clear, protecting airway, saturating well     CARDIOVASCULAR: check TTE, cardiac monitoring  no events                           GASTROINTESTINAL: Dysphagia screen  failed, as per S/S eval: NPO, NGT pulled by pt last night (11/09).  schedule for MBS on 11/10, abd Xray: Nonobstructive bowel gas pattern. discussed with pt possible need for PEG to provide meds/nutrition, will contact GI for possible PEG.      Diet: NPO    RENAL: BUN/Cr stable, good urine output, renal US: No hydronephrosis. hyponatremia: will continue to monitor        Na Goal: Greater than 135     Wu: N    HEMATOLOGY: H/H stable, Platelets normal      DVT ppx: LMWH    ID: febrile on 11/09 (Tmax 39.3), afebrile in am, no leukocytosis, blood/urine cultures sent     OTHER:     DISPOSITION: D/C to [facility] as per PT/OT eval once stable and workup is complete, Pt uninsured, SW aware, Daily PT/OT therapy to optimize therapy    CORE MEASURES:        Admission NIHSS: 3     TPA:  NO      LDL/HDL: 171/57     Depression Screen: 0     Statin Therapy: Y     Dysphagia Screen: FAIL     Smoking  NO      Afib NO     Stroke Education YES ASSESSMENT: 57M with HTN, HLD, CAD/CABG, past stroke with residual left hemiparesis, presents with transient right hemiparesis and diplopia, and continuous headache and N/V. CT head shows old right basal ganglia lacune, and CTA shows severe bilateral MCA stenosis, proximal BA occlusion and distal stenosis. Brain MRI show right medullary and right cerebellar ischemic infarct. Impression: Severe intracranial atherosclerotic disease and occlusion of left VA, causing right  lateral medullary and right cerebellar ischemic infarcts.    NEURO: Continue close monitoring for neurologic deterioration, permissive HTN with slow titration to normotensive, ASA/Plavix and statin (once po access obtained) for secondary stroke prevention,  titrate statin to LDL goal less than 70,  Physical therapy/OT eval with recommendations for [facility], pt uninsured, will provide daily PT/OT to optimize treatment. Discuss    ANTITHROMBOTIC THERAPY: Continue ASA CA and plan to start Plavix once PO access obtained for secondary stroke prevention    PULMONARY: CXR (11/09) clear, protecting airway, saturating well     CARDIOVASCULAR: check TTE, cardiac monitoring  no events                           GASTROINTESTINAL: Dysphagia screen  failed, as per S/S eval: NPO, NGT pulled by pt last night (11/09).  schedule for MBS on 11/10, abd Xray: Nonobstructive bowel gas pattern. discussed with pt possible need for PEG to provide meds/nutrition, will contact GI for possible PEG.      Diet: NPO    RENAL: BUN/Cr stable, good urine output, renal US: No hydronephrosis. hyponatremia: will continue to monitor        Na Goal: Greater than 135     Wu: N    HEMATOLOGY: H/H stable, Platelets normal      DVT ppx: LMWH    ID: febrile on 11/09 (Tmax 39.3), afebrile in am, no leukocytosis, blood/urine cultures sent     DISPOSITION: D/C to [facility] as per PT/OT eval once stable and workup is complete, Pt uninsured, SW aware, Daily PT/OT therapy to optimize therapy    CORE MEASURES:        Admission NIHSS: 3     TPA:  NO      LDL/HDL: 171/57     Depression Screen: 0     Statin Therapy: Y     Dysphagia Screen: FAIL     Smoking  NO      Afib NO     Stroke Education YES

## 2017-11-10 NOTE — SWALLOW VFSS/MBS ASSESSMENT ADULT - ADDITIONAL RECOMMENDATIONS
Maintain good oral hygiene.  Consider trial of restorative swallowing therapy. Maintain good oral hygiene.  Consider trial of intensive restorative swallowing therapy.

## 2017-11-10 NOTE — PROGRESS NOTE ADULT - SUBJECTIVE AND OBJECTIVE BOX
THE PATIENT WAS SEEN AND EXAMINED BY ME WITH THE HOUSESTAFF AND STROKE TEAM DURING MORNING ROUNDS.   HPI:  56 y/o R-handed [place] man PMH CVA with residual L-sided weakness, CAD s/p CABG, HTN presents as stroke code for dizziness. Pt at baseline ambulates independently and independent in ADLs. Pt was in usual state of health on 11/07 at 9AM. At around 9:30AM, he began to complain of lightheadedness not associated with changes in position. Half an hour later, he began to complain of n/v. He also endorses HA, diplopia/blurry vision, R-sided weakness that began a few days prior to admission. No c/o f/c/CP/SOB. He reports he ran out of his BP meds a week prior to admission and has been unable to refill his medications.    SUBJECTIVE: No events overnight.  No new neurologic complaints.      acetaminophen   Tablet. 650 milliGRAM(s) Oral every 6 hours PRN  acetaminophen  Suppository 650 milliGRAM(s) Rectal every 6 hours PRN  aspirin Suppository 300 milliGRAM(s) Rectal daily  atorvastatin 80 milliGRAM(s) Oral at bedtime  clopidogrel Tablet 75 milliGRAM(s) Oral daily  enoxaparin Injectable 40 milliGRAM(s) SubCutaneous daily  gabapentin 100 milliGRAM(s) Oral three times a day  hydrALAZINE Injectable 10 milliGRAM(s) IV Push two times a day PRN  hydrochlorothiazide 12.5 milliGRAM(s) Oral daily  influenza   Vaccine 0.5 milliLiter(s) IntraMuscular once  losartan 100 milliGRAM(s) Oral daily  simethicone 80 milliGRAM(s) Chew daily PRN  sodium chloride 0.9%. 1000 milliLiter(s) IV Continuous <Continuous>  tamsulosin 0.4 milliGRAM(s) Oral at bedtime      PHYSICAL EXAM:   Vital Signs Last 24 Hrs  T(C): 37.1 (10 Nov 2017 06:00), Max: 39.3 (09 Nov 2017 20:00)  T(F): 98.7 (10 Nov 2017 06:00), Max: 102.7 (09 Nov 2017 20:00)  HR: 92 (10 Nov 2017 06:00) (86 - 122)  BP: 174/97 (10 Nov 2017 06:00) (139/89 - 196/98)  BP(mean): 114 (10 Nov 2017 06:00) (102 - 128)  RR: 9 (10 Nov 2017 06:00) (9 - 31)  SpO2: 95% (10 Nov 2017 06:00) (93% - 96%)    General: No acute distress  HEENT: EOM intact, visual fields full  Abdomen: Soft, nontender, nondistended   Extremities: No edema    NEUROLOGICAL EXAM:  Mental status: Awake, alert, oriented x3, fluent speech, no neglect, able to follow commands  Cranial Nerves: left facial droop,  EOMI, VFF, no nystagmus, mild dysarthria,   Motor exam: Normal tone, no drift, RUE 5/5, RLE 5/5, LUE 4+/5, LLE 4+/5.  Sensation: Intact to light touch   Coordination/ Gait: LUE moderate-severe dysmetria    LABS:                        15.7   10.5  )-----------( 198      ( 10 Nov 2017 05:46 )             46.1    11-10    134<L>  |  95<L>  |  16  ----------------------------<  100<H>  4.0   |  23  |  1.01    Ca    9.1      10 Nov 2017 05:46  Phos  2.9     11-08  Mg     1.9     11-08      Hemoglobin A1C, Whole Blood: 5.8 % (11-08 @ 10:36)      IMAGING: Reviewed by me.     Head CT/CTA head/Neck (11/07) Right basal ganglia and corona radiata infarct without significant change since 6/16/2017. There is severe steno-occlusive disease intracranially involving the right supraclinoid internal carotid artery, A1 and M1 branches, and severe narrowing of the left M1 segment of the middle cerebral artery. The distal right vertebral artery is quite small and the proximal basilar artery is not visualized. The dominant left vertebral artery ends at the PICA. Distal basilar flow appears to be due to retrograde flow from the right posterior communicating artery.  Head CT (11/07) No acute intracranial hemorrhage, mass effect, or evidence of acute territorial infarct. Chronic right corona radiata/basal ganglia and right inferior cerebellar hemisphere infarcts.  Brain MRI (11/09) In comparison the prior MRI, there is new hyperintense T2 and FLAIR signal with faint increased restricted diffusion in the right medulla extending to the inferior right brachium pontis and inferior medial right cerebellar hemisphere, new compared to 6/17/2017 consistent with evolving area of  ischemic change without hemorrhagic transformation. Redemonstration of volume loss with multiple additional areas of lacunar infarction including chronic infarcts in the right cerebellar hemisphere and right corona radiata. Decreased visualization of the flow-void within the right vertebral artery with continued irregular isointense T1 signal throughout the basilar artery. Bilateral sinus disease with bubbly secretions and air-fluid levels, greatest in the right maxillary sinus. Correlate clinically for symptoms of acute sinusitis. THE PATIENT WAS SEEN AND EXAMINED BY ME WITH THE HOUSESTAFF AND STROKE TEAM DURING MORNING ROUNDS.   HPI:  56 y/o R-handed [place] man PMH CVA with residual L-sided weakness, CAD s/p CABG, HTN presents as stroke code for dizziness. Pt at baseline ambulates independently and independent in ADLs. Pt was in usual state of health on 11/07 at 9AM. At around 9:30AM, he began to complain of lightheadedness not associated with changes in position. Half an hour later, he began to complain of n/v. He also endorses HA, diplopia/blurry vision, R-sided weakness that began a few days prior to admission. No c/o f/c/CP/SOB. He reports he ran out of his BP meds a week prior to admission and has been unable to refill his medications.    SUBJECTIVE: States hiccups are better today.  No new neurologic complaints.      acetaminophen   Tablet. 650 milliGRAM(s) Oral every 6 hours PRN  acetaminophen  Suppository 650 milliGRAM(s) Rectal every 6 hours PRN  aspirin Suppository 300 milliGRAM(s) Rectal daily  atorvastatin 80 milliGRAM(s) Oral at bedtime  clopidogrel Tablet 75 milliGRAM(s) Oral daily  enoxaparin Injectable 40 milliGRAM(s) SubCutaneous daily  gabapentin 100 milliGRAM(s) Oral three times a day  hydrALAZINE Injectable 10 milliGRAM(s) IV Push two times a day PRN  hydrochlorothiazide 12.5 milliGRAM(s) Oral daily  influenza   Vaccine 0.5 milliLiter(s) IntraMuscular once  losartan 100 milliGRAM(s) Oral daily  simethicone 80 milliGRAM(s) Chew daily PRN  sodium chloride 0.9%. 1000 milliLiter(s) IV Continuous <Continuous>  tamsulosin 0.4 milliGRAM(s) Oral at bedtime      PHYSICAL EXAM:   Vital Signs Last 24 Hrs  T(C): 37.1 (10 Nov 2017 06:00), Max: 39.3 (09 Nov 2017 20:00)  T(F): 98.7 (10 Nov 2017 06:00), Max: 102.7 (09 Nov 2017 20:00)  HR: 92 (10 Nov 2017 06:00) (86 - 122)  BP: 174/97 (10 Nov 2017 06:00) (139/89 - 196/98)  BP(mean): 114 (10 Nov 2017 06:00) (102 - 128)  RR: 9 (10 Nov 2017 06:00) (9 - 31)  SpO2: 95% (10 Nov 2017 06:00) (93% - 96%)    General: No acute distress  HEENT: EOM intact, visual fields full  Abdomen: Soft, nontender, nondistended   Extremities: No edema    NEUROLOGICAL EXAM:  Mental status: Awake, alert, oriented x3, fluent speech, no neglect, able to follow commands  Cranial Nerves: left facial droop,  EOMI, VFF, no nystagmus, mild dysarthria,   Motor exam: Normal tone, no drift, RUE 5/5, RLE 5/5, LUE 4+/5, LLE 5-/5.  Sensation: Intact to light touch   Coordination/ Gait: LUE moderate-severe dysmetria    LABS:                        15.7   10.5  )-----------( 198      ( 10 Nov 2017 05:46 )             46.1    11-10    134<L>  |  95<L>  |  16  ----------------------------<  100<H>  4.0   |  23  |  1.01    Ca    9.1      10 Nov 2017 05:46  Phos  2.9     11-08  Mg     1.9     11-08      Hemoglobin A1C, Whole Blood: 5.8 % (11-08 @ 10:36)      IMAGING: Reviewed by me.     Head CT/CTA head/Neck (11/07) Right basal ganglia and corona radiata infarct without significant change since 6/16/2017. There is severe steno-occlusive disease intracranially involving the right supraclinoid internal carotid artery, A1 and M1 branches, and severe narrowing of the left M1 segment of the middle cerebral artery. The distal right vertebral artery is quite small and the proximal basilar artery is not visualized. The dominant left vertebral artery ends at the PICA. Distal basilar flow appears to be due to retrograde flow from the right posterior communicating artery.  Head CT (11/07) No acute intracranial hemorrhage, mass effect, or evidence of acute territorial infarct. Chronic right corona radiata/basal ganglia and right inferior cerebellar hemisphere infarcts.  Brain MRI (11/09) In comparison the prior MRI, there is new hyperintense T2 and FLAIR signal with faint increased restricted diffusion in the right medulla extending to the inferior right brachium pontis and inferior medial right cerebellar hemisphere, new compared to 6/17/2017 consistent with evolving area of  ischemic change without hemorrhagic transformation. Redemonstration of volume loss with multiple additional areas of lacunar infarction including chronic infarcts in the right cerebellar hemisphere and right corona radiata. Decreased visualization of the flow-void within the right vertebral artery with continued irregular isointense T1 signal throughout the basilar artery. Bilateral sinus disease with bubbly secretions and air-fluid levels, greatest in the right maxillary sinus. Correlate clinically for symptoms of acute sinusitis. THE PATIENT WAS SEEN AND EXAMINED BY ME WITH THE HOUSESTAFF AND STROKE TEAM DURING MORNING ROUNDS.   HPI:  56 y/o R-handed [place] man PMH CVA with residual L-sided weakness, CAD s/p CABG, HTN presents as stroke code for dizziness. Pt at baseline ambulates independently and independent in ADLs. Pt was in usual state of health on 11/07 at 9AM. At around 9:30AM, he began to complain of lightheadedness not associated with changes in position. Half an hour later, he began to complain of n/v. He also endorses HA, diplopia/blurry vision, R-sided weakness that began a few days prior to admission. No c/o f/c/CP/SOB. He reports he ran out of his BP meds a week prior to admission and has been unable to refill his medications.    SUBJECTIVE: States hiccups are better today.  No new neurologic complaints.      acetaminophen   Tablet. 650 milliGRAM(s) Oral every 6 hours PRN  acetaminophen  Suppository 650 milliGRAM(s) Rectal every 6 hours PRN  aspirin Suppository 300 milliGRAM(s) Rectal daily  atorvastatin 80 milliGRAM(s) Oral at bedtime  clopidogrel Tablet 75 milliGRAM(s) Oral daily  enoxaparin Injectable 40 milliGRAM(s) SubCutaneous daily  gabapentin 100 milliGRAM(s) Oral three times a day  hydrALAZINE Injectable 10 milliGRAM(s) IV Push two times a day PRN  hydrochlorothiazide 12.5 milliGRAM(s) Oral daily  influenza   Vaccine 0.5 milliLiter(s) IntraMuscular once  losartan 100 milliGRAM(s) Oral daily  simethicone 80 milliGRAM(s) Chew daily PRN  sodium chloride 0.9%. 1000 milliLiter(s) IV Continuous <Continuous>  tamsulosin 0.4 milliGRAM(s) Oral at bedtime    PHYSICAL EXAM:   Vital Signs Last 24 Hrs  T(C): 37.1 (10 Nov 2017 06:00), Max: 39.3 (09 Nov 2017 20:00)  T(F): 98.7 (10 Nov 2017 06:00), Max: 102.7 (09 Nov 2017 20:00)  HR: 92 (10 Nov 2017 06:00) (86 - 122)  BP: 174/97 (10 Nov 2017 06:00) (139/89 - 196/98)  BP(mean): 114 (10 Nov 2017 06:00) (102 - 128)  RR: 9 (10 Nov 2017 06:00) (9 - 31)  SpO2: 95% (10 Nov 2017 06:00) (93% - 96%)    General: No acute distress  HEENT: EOM intact, visual fields full  Abdomen: Soft, nontender, nondistended   Extremities: No edema    NEUROLOGICAL EXAM:  Mental status: Awake, alert, oriented x3, fluent speech, no neglect, able to follow commands  Cranial Nerves: left facial droop, EOMI, VFF, no nystagmus, mild dysarthria,   Motor exam: Normal tone, no drift, RUE 5/5, RLE 5/5, LUE 4+/5, LLE 5-/5.  Sensation: Intact to light touch   Coordination/ Gait: LUE moderate-severe dysmetria    LABS:                        15.7   10.5  )-----------( 198      ( 10 Nov 2017 05:46 )             46.1    11-10    134<L>  |  95<L>  |  16  ----------------------------<  100<H>  4.0   |  23  |  1.01    Ca    9.1      10 Nov 2017 05:46  Phos  2.9     11-08  Mg     1.9     11-08    Hemoglobin A1C, Whole Blood: 5.8 % (11-08 @ 10:36)    IMAGING: Reviewed by me.     Head CT/CTA head/Neck (11/07) Right basal ganglia and corona radiata infarct without significant change since 6/16/2017. There is severe steno-occlusive disease intracranially involving the right supraclinoid internal carotid artery, A1 and M1 branches, and severe narrowing of the left M1 segment of the middle cerebral artery. The distal right vertebral artery is quite small and the proximal basilar artery is not visualized. The dominant left vertebral artery ends at the PICA. Distal basilar flow appears to be due to retrograde flow from the right posterior communicating artery.  Head CT (11/07) No acute intracranial hemorrhage, mass effect, or evidence of acute territorial infarct. Chronic right corona radiata/basal ganglia and right inferior cerebellar hemisphere infarcts.  Brain MRI (11/09) In comparison the prior MRI, there is new hyperintense T2 and FLAIR signal with faint increased restricted diffusion in the right medulla extending to the inferior right brachium pontis and inferior medial right cerebellar hemisphere, new compared to 6/17/2017 consistent with evolving area of  ischemic change without hemorrhagic transformation. Redemonstration of volume loss with multiple additional areas of lacunar infarction including chronic infarcts in the right cerebellar hemisphere and right corona radiata. Decreased visualization of the flow-void within the right vertebral artery with continued irregular isointense T1 signal throughout the basilar artery. Bilateral sinus disease with bubbly secretions and air-fluid levels, greatest in the right maxillary sinus. Correlate clinically for symptoms of acute sinusitis.

## 2017-11-10 NOTE — SWALLOW VFSS/MBS ASSESSMENT ADULT - ADDITIONAL INFORMATION
+calcification of thyroid and arytenoid cartilages  +small cervical osteophytes most prominent at the level of C4-C5  +straightening of normal cervical lordosis

## 2017-11-10 NOTE — CONSULT NOTE ADULT - SUBJECTIVE AND OBJECTIVE BOX
Chief Complaint:  Patient is a 57y old  Male who presents with a chief complaint of stroke code (2017 08:50)      HPI:  57M with HTN, HLD, CAD/CABG, past stroke with residual left hemiparesis, presents with transient right hemiparesis and diplopia, and continuous headache and N/V. CT head shows old right basal ganglia lacune, and CTA shows severe bilateral MCA stenosis, proximal BA occlusion and distal stenosis. Brain MRI show right medullary and right cerebellar ischemic infarct. Impression: Severe intracranial atherosclerotic disease and occlusion of left VA, causing right  lateral medullary and right cerebellar ischemic infarcts. Now consulted for PEG tube placement.    Since admission , patient has been managed for acute stroke. Currently on ASA. He has had dysphagia and has been seen by S/S. NGT was placed but was pulled out on . MBS pending. Now consulted for PEG tube placement.        Allergies:  No Known Allergies      Home Medications:    Hospital Medications:  acetaminophen   Tablet. 650 milliGRAM(s) Oral every 6 hours PRN  acetaminophen  Suppository 650 milliGRAM(s) Rectal every 6 hours PRN  aspirin Suppository 300 milliGRAM(s) Rectal daily  atorvastatin 80 milliGRAM(s) Oral at bedtime  clopidogrel Tablet 75 milliGRAM(s) Oral daily  enoxaparin Injectable 40 milliGRAM(s) SubCutaneous daily  gabapentin 100 milliGRAM(s) Oral three times a day  hydrALAZINE Injectable 10 milliGRAM(s) IV Push two times a day PRN  hydrochlorothiazide 12.5 milliGRAM(s) Oral daily  influenza   Vaccine 0.5 milliLiter(s) IntraMuscular once  losartan 100 milliGRAM(s) Oral daily  simethicone 80 milliGRAM(s) Chew daily PRN  sodium chloride 0.9%. 1000 milliLiter(s) IV Continuous <Continuous>  tamsulosin 0.4 milliGRAM(s) Oral at bedtime      PMHX/PSHX:  Stented coronary artery  CVA (cerebral vascular accident)  HTN (hypertension)  No significant past surgical history      Family history:  No pertinent family history in first degree relatives      Social History:     ROS:     General:  No wt loss, fevers, chills, night sweats, fatigue,   Eyes:  Good vision, no reported pain  ENT:  No sore throat, pain, runny nose, dysphagia  CV:  No pain, palpitations, hypo/hypertension  Resp:  No dyspnea, cough, tachypnea, wheezing  GI:  See HPI  :  No pain, bleeding, incontinence, nocturia  Muscle:  No pain, weakness  Neuro:  No weakness, tingling, memory problems  Psych:  No fatigue, insomnia, mood problems, depression  Endocrine:  No polyuria, polydipsia, cold/heat intolerance  Heme:  No petechiae, ecchymosis, easy bruisability  Skin:  No rash, edema      PHYSICAL EXAM:     GENERAL:  Appears stated age, well-groomed, well-nourished, no distress  HEENT:  NC/AT,  conjunctivae clear and pink,  no JVD  CHEST:  Full & symmetric excursion, no increased effort, breath sounds clear  HEART:  Regular rhythm, S1, S2, no murmur/rub/S3/S4, no abdominal bruit, no edema  ABDOMEN:  Soft, non-tender, non-distended, normoactive bowel sounds,  no masses ,  EXTREMITIES:  no cyanosis,clubbing or edema  SKIN:  No rash/erythema/ecchymoses/petechiae/wounds/abscess/warm/dry  NEURO:  Alert, oriented    Vital Signs:  Vital Signs Last 24 Hrs  T(C): 37.1 (10 Nov 2017 06:00), Max: 39.3 (2017 20:00)  T(F): 98.7 (10 Nov 2017 06:00), Max: 102.7 (2017 20:00)  HR: 84 (10 Nov 2017 10:00) (84 - 122)  BP: 146/131 (10 Nov 2017 10:00) (139/89 - 196/98)  BP(mean): 138 (10 Nov 2017 10:00) (102 - 138)  RR: 14 (10 Nov 2017 10:00) (9 - 26)  SpO2: 97% (10 Nov 2017 10:00) (93% - 99%)  Daily     Daily Weight in k.4 (2017 14:05)    LABS:                        15.7   10.5  )-----------( 198      ( 10 Nov 2017 05:46 )             46.1     11-10    134<L>  |  95<L>  |  16  ----------------------------<  100<H>  4.0   |  23  |  1.01    Ca    9.1      10 Nov 2017 05:46          Urinalysis Basic - ( 2017 23:51 )    Color: Yellow / Appearance: Clear / S.021 / pH: x  Gluc: x / Ketone: Small  / Bili: Negative / Urobili: Negative   Blood: x / Protein: 30 mg/dL / Nitrite: Negative   Leuk Esterase: Negative / RBC: 10-25 /HPF / WBC 5-10 /HPF   Sq Epi: x / Non Sq Epi: x / Bacteria: x          Imaging:

## 2017-11-10 NOTE — SWALLOW VFSS/MBS ASSESSMENT ADULT - ESOPHAGEAL STAGE
trace-mild retention noted in the cervical esophagus  +trace-mild retrograde flow to the level of the pyriform sinuses trace-mild retention noted in the proximal esophagus, +trace retrograde flow to the level of the pyriform sinuses

## 2017-11-10 NOTE — SWALLOW VFSS/MBS ASSESSMENT ADULT - ORAL PHASE COMMENTS
moderate-maximal spillover to the level of the valleculae, trace to the level of the aryepiglottic folds

## 2017-11-10 NOTE — SWALLOW VFSS/MBS ASSESSMENT ADULT - DIAGNOSTIC IMPRESSIONS
Disorders include: reduced lingual strength/ROM/Rate of motion, reduced tongue to palate contact, mildly reduced BOT to posterior pharyngeal wall contact, delay in trigger of the swallow reflex, reduced hyo-laryngeal excursion, reduced laryngeal closure, reduced pharyngeal contractility, and s/s of reduced supraglottic and subglottic sensation. Patient presents with profound oropharyngeal dysphagia characterized primarily by significantly impaired bolus propulsion, and fadia aspiration of pharyngeal residue. Cued repeat swallows and a left head rotation were ineffective in reducing pharyngeal residue or improving airway protection. Esophageal stage of the swallow is notable for trace-mild retention noted in the proximal esophagus and retrograde flow to the level of the pyriform sinuses.     Disorders include: reduced lingual strength/ROM/Rate of motion, reduced tongue to palate contact, mildly reduced BOT to posterior pharyngeal wall contact, delay in trigger of the swallow reflex, reduced hyo-laryngeal elevation and excursion, reduced laryngeal closure, reduced pharyngeal contractility, and s/s of reduced supraglottic and subglottic sensation. Patient presents with profound oropharyngeal dysphagia characterized primarily by significantly impaired bolus propulsion with severe post swallow pharyngeal retention, and fadia aspiration of residue with most conservative textures. Cued repeat swallows and a left head rotation were ineffective in reducing pharyngeal residue or improving airway protection. Esophageal stage of the swallow is notable for trace-mild retention noted in the proximal esophagus and retrograde flow to the level of the pyriform sinuses.     Disorders include: reduced lingual strength/ROM/Rate of motion, reduced tongue to palate contact, mildly reduced BOT to posterior pharyngeal wall contact, delay in trigger of the swallow reflex, reduced hyo-laryngeal elevation and excursion, reduced laryngeal closure, reduced pharyngeal contractility, and s/s of reduced supraglottic and subglottic sensation.

## 2017-11-10 NOTE — SWALLOW VFSS/MBS ASSESSMENT ADULT - ROSENBEK'S PENETRATION ASPIRATION SCALE
(8) contrast passes glottis, visible subglottic residue remains, absent patient response (aspiration)

## 2017-11-10 NOTE — CONSULT NOTE ADULT - ASSESSMENT
57M with HTN, HLD, CAD/CABG, past stroke with residual left hemiparesis, presents with transient right hemiparesis and diplopia, and continuous headache and N/V. CT head shows old right basal ganglia lacune, and CTA shows severe bilateral MCA stenosis, proximal BA occlusion and distal stenosis. Brain MRI show right medullary and right cerebellar ischemic infarct. Impression: Severe intracranial atherosclerotic disease and occlusion of left VA, causing right  lateral medullary and right cerebellar ischemic infarcts. Now consulted for PEG tube placement.    1)Dysphagia  2) Right Medullary and cerebellar ischemic infarct  3) CAD/CABG    -f/u MBS  -please obtain cardiology clearance  - check INR, PT, PTT  - if he fails MBS and is cleared by cardiology, can plan for PEG on Tuesday next week.  - GI will follow

## 2017-11-11 LAB
ANION GAP SERPL CALC-SCNC: 15 MMOL/L — SIGNIFICANT CHANGE UP (ref 5–17)
BUN SERPL-MCNC: 21 MG/DL — SIGNIFICANT CHANGE UP (ref 7–23)
CALCIUM SERPL-MCNC: 9.2 MG/DL — SIGNIFICANT CHANGE UP (ref 8.4–10.5)
CHLORIDE SERPL-SCNC: 98 MMOL/L — SIGNIFICANT CHANGE UP (ref 96–108)
CO2 SERPL-SCNC: 22 MMOL/L — SIGNIFICANT CHANGE UP (ref 22–31)
CREAT SERPL-MCNC: 0.97 MG/DL — SIGNIFICANT CHANGE UP (ref 0.5–1.3)
CULTURE RESULTS: NO GROWTH — SIGNIFICANT CHANGE UP
GLUCOSE SERPL-MCNC: 118 MG/DL — HIGH (ref 70–99)
HCT VFR BLD CALC: 45.5 % — SIGNIFICANT CHANGE UP (ref 39–50)
HGB BLD-MCNC: 15.1 G/DL — SIGNIFICANT CHANGE UP (ref 13–17)
MCHC RBC-ENTMCNC: 26.3 PG — LOW (ref 27–34)
MCHC RBC-ENTMCNC: 33.2 GM/DL — SIGNIFICANT CHANGE UP (ref 32–36)
MCV RBC AUTO: 79 FL — LOW (ref 80–100)
PLATELET # BLD AUTO: 202 K/UL — SIGNIFICANT CHANGE UP (ref 150–400)
POTASSIUM SERPL-MCNC: 3.9 MMOL/L — SIGNIFICANT CHANGE UP (ref 3.5–5.3)
POTASSIUM SERPL-SCNC: 3.9 MMOL/L — SIGNIFICANT CHANGE UP (ref 3.5–5.3)
RBC # BLD: 5.76 M/UL — SIGNIFICANT CHANGE UP (ref 4.2–5.8)
RBC # FLD: 14.4 % — SIGNIFICANT CHANGE UP (ref 10.3–14.5)
SODIUM SERPL-SCNC: 135 MMOL/L — SIGNIFICANT CHANGE UP (ref 135–145)
SPECIMEN SOURCE: SIGNIFICANT CHANGE UP
WBC # BLD: 8.2 K/UL — SIGNIFICANT CHANGE UP (ref 3.8–10.5)
WBC # FLD AUTO: 8.2 K/UL — SIGNIFICANT CHANGE UP (ref 3.8–10.5)

## 2017-11-11 PROCEDURE — 74230 X-RAY XM SWLNG FUNCJ C+: CPT | Mod: 26

## 2017-11-11 RX ORDER — ASPIRIN/CALCIUM CARB/MAGNESIUM 324 MG
81 TABLET ORAL DAILY
Qty: 0 | Refills: 0 | Status: DISCONTINUED | OUTPATIENT
Start: 2017-11-11 | End: 2017-11-15

## 2017-11-11 RX ADMIN — Medication 81 MILLIGRAM(S): at 16:00

## 2017-11-11 RX ADMIN — SIMETHICONE 80 MILLIGRAM(S): 80 TABLET, CHEWABLE ORAL at 21:23

## 2017-11-11 RX ADMIN — LOSARTAN POTASSIUM 100 MILLIGRAM(S): 100 TABLET, FILM COATED ORAL at 21:22

## 2017-11-11 RX ADMIN — ATORVASTATIN CALCIUM 80 MILLIGRAM(S): 80 TABLET, FILM COATED ORAL at 21:21

## 2017-11-11 RX ADMIN — Medication 12.5 MILLIGRAM(S): at 06:13

## 2017-11-11 RX ADMIN — GABAPENTIN 100 MILLIGRAM(S): 400 CAPSULE ORAL at 13:38

## 2017-11-11 RX ADMIN — Medication 300 MILLIGRAM(S): at 11:42

## 2017-11-11 RX ADMIN — ENOXAPARIN SODIUM 40 MILLIGRAM(S): 100 INJECTION SUBCUTANEOUS at 11:06

## 2017-11-11 RX ADMIN — GABAPENTIN 100 MILLIGRAM(S): 400 CAPSULE ORAL at 06:15

## 2017-11-11 RX ADMIN — LOSARTAN POTASSIUM 100 MILLIGRAM(S): 100 TABLET, FILM COATED ORAL at 06:13

## 2017-11-11 RX ADMIN — Medication 2 MILLIGRAM(S): at 21:24

## 2017-11-11 RX ADMIN — CLOPIDOGREL BISULFATE 75 MILLIGRAM(S): 75 TABLET, FILM COATED ORAL at 11:06

## 2017-11-11 RX ADMIN — GABAPENTIN 100 MILLIGRAM(S): 400 CAPSULE ORAL at 21:22

## 2017-11-11 NOTE — PROGRESS NOTE ADULT - SUBJECTIVE AND OBJECTIVE BOX
THE PATIENT WAS SEEN AND EXAMINED BY ME WITH THE HOUSESTAFF AND STROKE TEAM DURING MORNING ROUNDS.   HPI:  58 y/o R-handed [place] man PMH CVA with residual L-sided weakness, CAD s/p CABG, HTN presents as stroke code for dizziness. Pt at baseline ambulates independently and independent in ADLs. Pt was in usual state of health on 11/07 at 9AM. At around 9:30AM, he began to complain of lightheadedness not associated with changes in position. Half an hour later, he began to complain of n/v. He also endorses HA, diplopia/blurry vision, R-sided weakness that began a few days prior to admission. No c/o f/c/CP/SOB. He reports he ran out of his BP meds a week prior to admission and has been unable to refill his medications.      SUBJECTIVE: No events overnight.  No new neurologic complaints.      acetaminophen   Tablet. 650 milliGRAM(s) Oral every 6 hours PRN  acetaminophen  Suppository 650 milliGRAM(s) Rectal every 6 hours PRN  aspirin Suppository 300 milliGRAM(s) Rectal daily  atorvastatin 80 milliGRAM(s) Oral at bedtime  clopidogrel Tablet 75 milliGRAM(s) Oral daily  doxazosin 2 milliGRAM(s) Oral at bedtime  enoxaparin Injectable 40 milliGRAM(s) SubCutaneous daily  gabapentin 100 milliGRAM(s) Oral three times a day  hydrALAZINE Injectable 10 milliGRAM(s) IV Push two times a day PRN  hydrochlorothiazide 12.5 milliGRAM(s) Oral daily  influenza   Vaccine 0.5 milliLiter(s) IntraMuscular once  losartan 100 milliGRAM(s) Oral daily  simethicone 80 milliGRAM(s) Chew daily PRN      PHYSICAL EXAM:   Vital Signs Last 24 Hrs  T(C): 37.1 (11 Nov 2017 04:00), Max: 37.2 (10 Nov 2017 20:00)  T(F): 98.8 (11 Nov 2017 04:00), Max: 99 (10 Nov 2017 20:00)  HR: 117 (11 Nov 2017 08:00) (84 - 117)  BP: 131/93 (11 Nov 2017 08:00) (131/93 - 177/92)  BP(mean): 105 (11 Nov 2017 08:00) (99 - 138)  RR: 29 (11 Nov 2017 08:00) (14 - 29)  SpO2: 98% (11 Nov 2017 08:00) (95% - 98%)    General: No acute distress  HEENT: EOM intact, visual fields full  Abdomen: Soft, nontender, nondistended   Extremities: No edema    NEUROLOGICAL EXAM:  Mental status: Awake, alert, oriented x3, fluent speech, no neglect, able to follow commands  Cranial Nerves: left facial droop, EOMI, VFF, no nystagmus, mild dysarthria,   Motor exam: Normal tone, no drift, RUE 5/5, RLE 5/5, LUE 4+/5, LLE 5-/5.  Sensation: Intact to light touch   Coordination/ Gait: LUE moderate-severe dysmetria      LABS:                        15.1   8.2   )-----------( 202      ( 11 Nov 2017 04:59 )             45.5    11-11    135  |  98  |  21  ----------------------------<  118<H>  3.9   |  22  |  0.97    Ca    9.2      11 Nov 2017 04:59      Hemoglobin A1C, Whole Blood: 5.8 % (11-08 @ 10:36)      IMAGING: Reviewed by me.   Head CT/CTA head/Neck (11/07) Right basal ganglia and corona radiata infarct without significant change since 6/16/2017. There is severe steno-occlusive disease intracranially involving the right supraclinoid internal carotid artery, A1 and M1 branches, and severe narrowing of the left M1 segment of the middle cerebral artery. The distal right vertebral artery is quite small and the proximal basilar artery is not visualized. The dominant left vertebral artery ends at the PICA. Distal basilar flow appears to be due to retrograde flow from the right posterior communicating artery.  Head CT (11/07) No acute intracranial hemorrhage, mass effect, or evidence of acute territorial infarct. Chronic right corona radiata/basal ganglia and right inferior cerebellar hemisphere infarcts.  Brain MRI (11/09) In comparison the prior MRI, there is new hyperintense T2 and FLAIR signal with faint increased restricted diffusion in the right medulla extending to the inferior right brachium pontis and inferior medial right cerebellar hemisphere, new compared to 6/17/2017 consistent with evolving area of  ischemic change without hemorrhagic transformation. Redemonstration of volume loss with multiple additional areas of lacunar infarction including chronic infarcts in the right cerebellar hemisphere and right corona radiata. Decreased visualization of the flow-void within the right vertebral artery with continued irregular isointense T1 signal throughout the basilar artery. Bilateral sinus disease with bubbly secretions and air-fluid levels, greatest in the right maxillary sinus. Correlate clinically for symptoms of acute sinusitis. THE PATIENT WAS SEEN AND EXAMINED BY ME WITH THE HOUSESTAFF AND STROKE TEAM DURING MORNING ROUNDS.   HPI:  56 y/o R-handed [place] Island[place] man PMH CVA with residual L-sided weakness, CAD s/p CABG, HTN presents as stroke code for dizziness. Pt at baseline ambulates independently and independent in ADLs. Pt was in usual state of health on 11/07 at 9AM. At around 9:30AM, he began to complain of lightheadedness not associated with changes in position. Half an hour later, he began to complain of n/v. He also endorses HA, diplopia/blurry vision, R-sided weakness that began a few days prior to admission. No c/o f/c/CP/SOB. He reports he ran out of his BP meds a week prior to admission and has been unable to refill his medications.      SUBJECTIVE: No events overnight.  No new neurologic complaints.      acetaminophen   Tablet. 650 milliGRAM(s) Oral every 6 hours PRN  acetaminophen  Suppository 650 milliGRAM(s) Rectal every 6 hours PRN  aspirin Suppository 300 milliGRAM(s) Rectal daily  atorvastatin 80 milliGRAM(s) Oral at bedtime  clopidogrel Tablet 75 milliGRAM(s) Oral daily  doxazosin 2 milliGRAM(s) Oral at bedtime  enoxaparin Injectable 40 milliGRAM(s) SubCutaneous daily  gabapentin 100 milliGRAM(s) Oral three times a day  hydrALAZINE Injectable 10 milliGRAM(s) IV Push two times a day PRN  hydrochlorothiazide 12.5 milliGRAM(s) Oral daily  influenza   Vaccine 0.5 milliLiter(s) IntraMuscular once  losartan 100 milliGRAM(s) Oral daily  simethicone 80 milliGRAM(s) Chew daily PRN      PHYSICAL EXAM:   Vital Signs Last 24 Hrs  T(C): 37.1 (11 Nov 2017 04:00), Max: 37.2 (10 Nov 2017 20:00)  T(F): 98.8 (11 Nov 2017 04:00), Max: 99 (10 Nov 2017 20:00)  HR: 117 (11 Nov 2017 08:00) (84 - 117)  BP: 131/93 (11 Nov 2017 08:00) (131/93 - 177/92)  BP(mean): 105 (11 Nov 2017 08:00) (99 - 138)  RR: 29 (11 Nov 2017 08:00) (14 - 29)  SpO2: 98% (11 Nov 2017 08:00) (95% - 98%)    General: No acute distress  HEENT: EOM intact, visual fields full  Abdomen: Soft, nontender, nondistended   Extremities: No edema    NEUROLOGICAL EXAM:  Mental status: Awake, alert, oriented x3, fluent speech, no neglect, able to follow commands  Cranial Nerves: left facial droop, EOMI, VFF, no nystagmus, mild dysarthria, hoarse voice   Motor exam: Normal tone, no drift, RUE 5/5, RLE 5/5, LUE deltoid 3/5 distally 4+/5, LLE 3/5 AT 3/5  Sensation: Intact to light touch   Coordination/ Gait: LUE moderate-severe dysmetria      LABS:                        15.1   8.2   )-----------( 202      ( 11 Nov 2017 04:59 )             45.5    11-11    135  |  98  |  21  ----------------------------<  118<H>  3.9   |  22  |  0.97    Ca    9.2      11 Nov 2017 04:59      Hemoglobin A1C, Whole Blood: 5.8 % (11-08 @ 10:36)      IMAGING: Reviewed by me.   Head CT/CTA head/Neck (11/07) Right basal ganglia and corona radiata infarct without significant change since 6/16/2017. There is severe steno-occlusive disease intracranially involving the right supraclinoid internal carotid artery, A1 and M1 branches, and severe narrowing of the left M1 segment of the middle cerebral artery. The distal right vertebral artery is quite small and the proximal basilar artery is not visualized. The dominant left vertebral artery ends at the PICA. Distal basilar flow appears to be due to retrograde flow from the right posterior communicating artery.  Head CT (11/07) No acute intracranial hemorrhage, mass effect, or evidence of acute territorial infarct. Chronic right corona radiata/basal ganglia and right inferior cerebellar hemisphere infarcts.  Brain MRI (11/09) In comparison the prior MRI, there is new hyperintense T2 and FLAIR signal with faint increased restricted diffusion in the right medulla extending to the inferior right brachium pontis and inferior medial right cerebellar hemisphere, new compared to 6/17/2017 consistent with evolving area of  ischemic change without hemorrhagic transformation. Redemonstration of volume loss with multiple additional areas of lacunar infarction including chronic infarcts in the right cerebellar hemisphere and right corona radiata. Decreased visualization of the flow-void within the right vertebral artery with continued irregular isointense T1 signal throughout the basilar artery. Bilateral sinus disease with bubbly secretions and air-fluid levels, greatest in the right maxillary sinus. Correlate clinically for symptoms of acute sinusitis.

## 2017-11-11 NOTE — PROGRESS NOTE ADULT - ASSESSMENT
ASSESSMENT: 57M with HTN, HLD, CAD/CABG, past stroke with residual left hemiparesis, presents with transient right hemiparesis and diplopia, and continuous headache and N/V. CT head shows old right basal ganglia lacune, and CTA shows severe bilateral MCA stenosis, proximal BA occlusion and distal stenosis. Brain MRI show right medullary and right cerebellar ischemic infarct. Impression: Severe intracranial atherosclerotic disease and occlusion of left VA, causing right  lateral medullary and right cerebellar ischemic infarcts.    NEURO: neurologically without acute change, continue close monitoring for neurologic deterioration, permissive HTN with slow titration to normotensive, ASA/Plavix and statin  for secondary stroke prevention,  titrate statin to LDL goal less than 70,  Physical therapy/OT eval with recommendations for AR, pt uninsured, will provide daily PT/OT to optimize treatment. Discuss    ANTITHROMBOTIC THERAPY: Continue ASA AR and Plavix once PO access obtained for secondary stroke prevention    PULMONARY: CXR (11/09) clear, protecting airway, saturating well     CARDIOVASCULAR: TTE: ef 41%, mitral annular calcification, mild-moderate MR, global LV systolic dysfunction, mild stage 1diastolic syfunction, mild TR,  cardiac monitoring  no events, obtain cardiac clearance                           GASTROINTESTINAL: Dysphagia: per MBS maintain NPO, abd Xray: Nonobstructive bowel gas pattern. discussed with pt need for PEG to provide meds/nutrition, contacted GI for possible PEG. Anticipate early next week.     Diet: NPO    RENAL: BUN/Cr without acute change, good urine output, renal US: No hydronephrosis. hyponatremia: resolved        Na Goal: Greater than 135     Wu: N    HEMATOLOGY: H/H without acute change, Platelets 202     DVT ppx: LMWH    ID: febrile on 11/09 (Tmax 39.3), afebrile in am, no leukocytosis, blood/urine cultures pending.    DISPOSITION: D/C to AR as per PT/OT eval once stable and workup is complete, Pt uninsured, SW aware, Daily PT/OT therapy to optimize therapy    CORE MEASURES:        Admission NIHSS: 3     TPA:  NO      LDL/HDL: 171/57     Depression Screen: 0     Statin Therapy: Y     Dysphagia Screen: FAIL     Smoking  NO      Afib NO     Stroke Education YES ASSESSMENT: 57M with HTN, HLD, CAD/CABG, past stroke with residual left hemiparesis, presents with transient right hemiparesis and diplopia, and continuous headache and N/V. CT head shows old right basal ganglia lacune, and CTA shows severe bilateral MCA stenosis, proximal BA occlusion and distal stenosis. Brain MRI show right medullary and right cerebellar ischemic infarct. Impression: Severe intracranial atherosclerotic disease and occlusion of left VA, causing right  lateral medullary and right cerebellar ischemic infarcts.    NEURO: neurologically without acute change, continue close monitoring for neurologic deterioration, permissive HTN with slow titration to normotensive, ASA/Plavix and statin  for secondary stroke prevention,  titrate statin to LDL goal less than 70,  Physical therapy/OT eval with recommendations for AR, pt uninsured, will provide daily PT/OT to optimize treatment.     ANTITHROMBOTIC THERAPY: Continue ASA [place] and Plavix once PO access obtained for secondary stroke prevention    PULMONARY: CXR (11/09) clear, protecting airway, saturating well     CARDIOVASCULAR: TTE: ef 41%, mitral annular calcification, mild-moderate MR, global LV systolic dysfunction, mild stage 1diastolic dysfunction mild TR,  cardiac monitoring  no events, obtain cardiac clearance prior to PEG, house aware- to reconsult 11/13.                         GASTROINTESTINAL: Dysphagia: per MBS maintain NPO, abd Xray: Nonobstructive bowel gas pattern. discussed with pt need for PEG to provide meds/nutrition, contacted GI for possible PEG. Anticipate early next week. Patient agrees.     Diet: NPO    RENAL: BUN/Cr without acute change, good urine output, renal US: No hydronephrosis. hyponatremia: resolved, haro inserted for retention.        Na Goal: Greater than 135     Haro: y    HEMATOLOGY: H/H without acute change, Platelets 202     DVT ppx: LMWH    ID: febrile on 11/09 (Tmax 39.3), afebrile in am, no leukocytosis, blood/urine cultures NGTD, continue to monitor.    DISPOSITION: D/C to AR as per PT/OT eval once stable and workup is complete, Pt uninsured, SW aware, Daily PT/OT therapy to optimize therapy    CORE MEASURES:        Admission NIHSS: 3     TPA:  NO      LDL/HDL: 171/57     Depression Screen: 0     Statin Therapy: Y     Dysphagia Screen: FAIL     Smoking  NO      Afib NO     Stroke Education YES

## 2017-11-12 LAB
-  AZITHROMYCIN: SIGNIFICANT CHANGE UP
-  CEFTRIAXONE: SIGNIFICANT CHANGE UP
-  CLINDAMYCIN: SIGNIFICANT CHANGE UP
-  ERYTHROMYCIN: SIGNIFICANT CHANGE UP
-  LEVOFLOXACIN: SIGNIFICANT CHANGE UP
-  PENICILLIN: SIGNIFICANT CHANGE UP
-  TRIMETHOPRIM/SULFAMETHOXAZOLE: SIGNIFICANT CHANGE UP
-  VANCOMYCIN: SIGNIFICANT CHANGE UP
ANION GAP SERPL CALC-SCNC: 14 MMOL/L — SIGNIFICANT CHANGE UP (ref 5–17)
BUN SERPL-MCNC: 24 MG/DL — HIGH (ref 7–23)
CALCIUM SERPL-MCNC: 9.3 MG/DL — SIGNIFICANT CHANGE UP (ref 8.4–10.5)
CHLORIDE SERPL-SCNC: 100 MMOL/L — SIGNIFICANT CHANGE UP (ref 96–108)
CO2 SERPL-SCNC: 26 MMOL/L — SIGNIFICANT CHANGE UP (ref 22–31)
CREAT SERPL-MCNC: 1.18 MG/DL — SIGNIFICANT CHANGE UP (ref 0.5–1.3)
CULTURE RESULTS: SIGNIFICANT CHANGE UP
GLUCOSE SERPL-MCNC: 113 MG/DL — HIGH (ref 70–99)
HCT VFR BLD CALC: 46.4 % — SIGNIFICANT CHANGE UP (ref 39–50)
HGB BLD-MCNC: 15.3 G/DL — SIGNIFICANT CHANGE UP (ref 13–17)
MCHC RBC-ENTMCNC: 26.4 PG — LOW (ref 27–34)
MCHC RBC-ENTMCNC: 32.9 GM/DL — SIGNIFICANT CHANGE UP (ref 32–36)
MCV RBC AUTO: 80.4 FL — SIGNIFICANT CHANGE UP (ref 80–100)
METHOD TYPE: SIGNIFICANT CHANGE UP
METHOD TYPE: SIGNIFICANT CHANGE UP
ORGANISM # SPEC MICROSCOPIC CNT: SIGNIFICANT CHANGE UP
PLATELET # BLD AUTO: 197 K/UL — SIGNIFICANT CHANGE UP (ref 150–400)
POTASSIUM SERPL-MCNC: 3.6 MMOL/L — SIGNIFICANT CHANGE UP (ref 3.5–5.3)
POTASSIUM SERPL-SCNC: 3.6 MMOL/L — SIGNIFICANT CHANGE UP (ref 3.5–5.3)
RBC # BLD: 5.77 M/UL — SIGNIFICANT CHANGE UP (ref 4.2–5.8)
RBC # FLD: 14.8 % — HIGH (ref 10.3–14.5)
SODIUM SERPL-SCNC: 140 MMOL/L — SIGNIFICANT CHANGE UP (ref 135–145)
SPECIMEN SOURCE: SIGNIFICANT CHANGE UP
WBC # BLD: 6.9 K/UL — SIGNIFICANT CHANGE UP (ref 3.8–10.5)
WBC # FLD AUTO: 6.9 K/UL — SIGNIFICANT CHANGE UP (ref 3.8–10.5)

## 2017-11-12 RX ORDER — SODIUM CHLORIDE 9 MG/ML
1000 INJECTION INTRAMUSCULAR; INTRAVENOUS; SUBCUTANEOUS
Qty: 0 | Refills: 0 | Status: DISCONTINUED | OUTPATIENT
Start: 2017-11-12 | End: 2017-11-13

## 2017-11-12 RX ADMIN — Medication 12.5 MILLIGRAM(S): at 05:05

## 2017-11-12 RX ADMIN — ENOXAPARIN SODIUM 40 MILLIGRAM(S): 100 INJECTION SUBCUTANEOUS at 11:05

## 2017-11-12 RX ADMIN — Medication 81 MILLIGRAM(S): at 11:05

## 2017-11-12 RX ADMIN — CLOPIDOGREL BISULFATE 75 MILLIGRAM(S): 75 TABLET, FILM COATED ORAL at 11:05

## 2017-11-12 RX ADMIN — SODIUM CHLORIDE 75 MILLILITER(S): 9 INJECTION INTRAMUSCULAR; INTRAVENOUS; SUBCUTANEOUS at 23:00

## 2017-11-12 RX ADMIN — ATORVASTATIN CALCIUM 80 MILLIGRAM(S): 80 TABLET, FILM COATED ORAL at 22:25

## 2017-11-12 RX ADMIN — Medication 2 MILLIGRAM(S): at 22:25

## 2017-11-12 RX ADMIN — GABAPENTIN 100 MILLIGRAM(S): 400 CAPSULE ORAL at 13:07

## 2017-11-12 RX ADMIN — GABAPENTIN 100 MILLIGRAM(S): 400 CAPSULE ORAL at 22:25

## 2017-11-12 RX ADMIN — GABAPENTIN 100 MILLIGRAM(S): 400 CAPSULE ORAL at 05:05

## 2017-11-12 NOTE — PROGRESS NOTE ADULT - ASSESSMENT
ASSESSMENT: 57M with HTN, HLD, CAD/CABG, past stroke with residual left hemiparesis, presents with transient right hemiparesis and diplopia, and continuous headache and N/V. CT head shows old right basal ganglia lacune, and CTA shows severe bilateral MCA stenosis, proximal BA occlusion and distal stenosis. Brain MRI show right medullary and right cerebellar ischemic infarct. Impression: Severe intracranial atherosclerotic disease and occlusion of left VA, causing right  lateral medullary and right cerebellar ischemic infarcts.    NEURO: neurologically without acute change, continue close monitoring for neurologic deterioration, permissive HTN with slow titration to normotensive, ASA/Plavix and statin  for secondary stroke prevention,  titrate statin to LDL goal less than 70,  Physical therapy/OT eval with recommendations for AR, pt uninsured, will provide daily PT/OT to optimize treatment.     ANTITHROMBOTIC THERAPY: Continue ASA GA and Plavix as PO access obtained for secondary stroke prevention    PULMONARY: CXR (11/09) clear, protecting airway, saturating well     CARDIOVASCULAR: TTE: ef 41%, mitral annular calcification, mild-moderate MR, global LV systolic dysfunction, mild stage 1diastolic dysfunction mild TR,  cardiac monitoring  no events, obtain cardiac clearance prior to PEG, house aware- to reconsult 11/13.                         GASTROINTESTINAL: Dysphagia: per MBS maintain NPO, abd Xray: Nonobstructive bowel gas pattern. discussed with pt need for PEG to provide meds/nutrition, contacted GI for possible PEG. Anticipate early next week. (tues)  Patient agrees.     Diet: NPO-NGT    RENAL: BUN/Cr without acute change, good urine output, renal US: No hydronephrosis. hyponatremia: resolved, haro for retention.     Na Goal: Greater than 135     Haro: y    HEMATOLOGY: H/H without acute change, Platelets 197     DVT ppx: LMWH    ID: febrile on 11/09 (Tmax 39.3), afebrile in am, no leukocytosis, blood/urine cultures NGTD, continue to monitor.    DISPOSITION: D/C to AR as per PT/OT eval once stable and workup is complete, Pt uninsured, SW aware, Daily PT/OT therapy to optimize therapy    CORE MEASURES:        Admission NIHSS: 3     TPA:  NO      LDL/HDL: 171/57     Depression Screen: 0     Statin Therapy: Y     Dysphagia Screen: FAIL     Smoking  NO      Afib NO     Stroke Education YES ASSESSMENT: 57M with HTN, HLD, CAD/CABG, past stroke with residual left hemiparesis, presents with transient right hemiparesis and diplopia, and continuous headache and N/V. CT head shows old right basal ganglia lacune, and CTA shows severe bilateral MCA stenosis, proximal BA occlusion and distal stenosis. Brain MRI show right medullary and right cerebellar ischemic infarct. Impression: Severe intracranial atherosclerotic disease and occlusion of left VA, causing right  lateral medullary and right cerebellar ischemic infarcts.    NEURO: neurologically without acute change, continue close monitoring for neurologic deterioration, permissive HTN with slow titration to normotensive, ASA/Plavix and statin  for secondary stroke prevention,  titrate statin to LDL goal less than 70,  Physical therapy/OT eval with recommendations for AR, pt uninsured, will provide daily PT/OT to optimize treatment.     ANTITHROMBOTIC THERAPY: Continue ASA DC and Plavix as PO access obtained for secondary stroke prevention    PULMONARY: CXR (11/09) clear, protecting airway, saturating well     CARDIOVASCULAR: TTE: ef 41%, mitral annular calcification, mild-moderate MR, global LV systolic dysfunction, mild stage 1diastolic dysfunction mild TR,  cardiac monitoring  no events, obtain cardiac clearance prior to PEG, house aware- to reconsult 11/13.                         GASTROINTESTINAL: Dysphagia: per MBS maintain NPO, abd Xray: Nonobstructive bowel gas pattern. discussed with pt need for PEG to provide meds/nutrition, contacted GI for possible PEG. Anticipate early next week. (tues)  Patient agrees.     Diet: NPO-NGT    RENAL: BUN/Cr without acute change but slight uptrend- will provide hydration, good urine output, renal US: No hydronephrosis. hyponatremia: resolved, haro for retention.      Na Goal: Greater than 135     Haro: y    HEMATOLOGY: H/H without acute change, Platelets 197     DVT ppx: LMWH    ID: febrile on 11/09 (Tmax 39.3), afebrile in am, no leukocytosis, blood/urine cultures NGTD, continue to monitor.    DISPOSITION: D/C to AR as per PT/OT eval once stable and workup is complete, Pt uninsured, SW aware, Daily PT/OT therapy to optimize therapy    CORE MEASURES:        Admission NIHSS: 3     TPA:  NO      LDL/HDL: 171/57     Depression Screen: 0     Statin Therapy: Y     Dysphagia Screen: FAIL     Smoking  NO      Afib NO     Stroke Education YES

## 2017-11-12 NOTE — PROGRESS NOTE ADULT - SUBJECTIVE AND OBJECTIVE BOX
THE PATIENT WAS SEEN AND EXAMINED BY ME WITH THE HOUSESTAFF AND STROKE TEAM DURING MORNING ROUNDS.   HPI:  58 y/o R-handed Ethiopian man PMH CVA with residual L-sided weakness, CAD s/p CABG, HTN presents as stroke code for dizziness. Pt at baseline ambulates independently and independent in ADLs. Pt was in usual state of health on 11/07 at 9AM. At around 9:30AM, he began to complain of lightheadedness not associated with changes in position. Half an hour later, he began to complain of n/v. He also endorses HA, diplopia/blurry vision, R-sided weakness that began a few days prior to admission. No c/o f/c/CP/SOB. He reports he ran out of his BP meds a week prior to admission and has been unable to refill his medications.    SUBJECTIVE: No events overnight.  No new neurologic complaints.      acetaminophen   Tablet. 650 milliGRAM(s) Oral every 6 hours PRN  acetaminophen  Suppository 650 milliGRAM(s) Rectal every 6 hours PRN  aspirin  chewable 81 milliGRAM(s) Oral daily  atorvastatin 80 milliGRAM(s) Oral at bedtime  clopidogrel Tablet 75 milliGRAM(s) Oral daily  doxazosin 2 milliGRAM(s) Oral at bedtime  enoxaparin Injectable 40 milliGRAM(s) SubCutaneous daily  gabapentin 100 milliGRAM(s) Oral three times a day  hydrALAZINE Injectable 10 milliGRAM(s) IV Push two times a day PRN  hydrochlorothiazide 12.5 milliGRAM(s) Oral daily  influenza   Vaccine 0.5 milliLiter(s) IntraMuscular once  losartan 100 milliGRAM(s) Oral daily  simethicone 80 milliGRAM(s) Chew daily PRN      PHYSICAL EXAM:   Vital Signs Last 24 Hrs  T(C): 36.8 (12 Nov 2017 08:00), Max: 37.2 (11 Nov 2017 20:00)  T(F): 98.2 (12 Nov 2017 08:00), Max: 98.9 (11 Nov 2017 20:00)  HR: 84 (12 Nov 2017 08:00) (76 - 105)  BP: 135/89 (12 Nov 2017 08:00) (116/89 - 170/100)  BP(mean): 102 (12 Nov 2017 08:00) (88 - 125)  RR: 16 (12 Nov 2017 08:00) (14 - 25)  SpO2: 97% (12 Nov 2017 08:00) (95% - 99%)      General: No acute distress  HEENT: EOM intact, visual fields full  Abdomen: Soft, nontender, nondistended   Extremities: No edema    NEUROLOGICAL EXAM:  Mental status: Awake, alert, oriented x3, fluent speech, no neglect, able to follow commands  Cranial Nerves: left facial droop, EOMI, VFF, no nystagmus, mild dysarthria,   Motor exam: Normal tone, no drift, RUE 5/5, RLE 5/5, LUE 4+/5, LLE 5-/5.  Sensation: Intact to light touch   Coordination/ Gait: LUE moderate-severe dysmetria  LABS:                        15.3   6.9   )-----------( 197      ( 12 Nov 2017 04:24 )             46.4    11-12    140  |  100  |  24<H>  ----------------------------<  113<H>  3.6   |  26  |  1.18    Ca    9.3      12 Nov 2017 04:23      Hemoglobin A1C, Whole Blood: 5.8 % (11-08 @ 10:36)      IMAGING: Reviewed by me.   Head CT/CTA head/Neck (11/07) Right basal ganglia and corona radiata infarct without significant change since 6/16/2017. There is severe steno-occlusive disease intracranially involving the right supraclinoid internal carotid artery, A1 and M1 branches, and severe narrowing of the left M1 segment of the middle cerebral artery. The distal right vertebral artery is quite small and the proximal basilar artery is not visualized. The dominant left vertebral artery ends at the PICA. Distal basilar flow appears to be due to retrograde flow from the right posterior communicating artery.  Head CT (11/07) No acute intracranial hemorrhage, mass effect, or evidence of acute territorial infarct. Chronic right corona radiata/basal ganglia and right inferior cerebellar hemisphere infarcts.  Brain MRI (11/09) In comparison the prior MRI, there is new hyperintense T2 and FLAIR signal with faint increased restricted diffusion in the right medulla extending to the inferior right brachium pontis and inferior medial right cerebellar hemisphere, new compared to 6/17/2017 consistent with evolving area of  ischemic change without hemorrhagic transformation. Redemonstration of volume loss with multiple additional areas of lacunar infarction including chronic infarcts in the right cerebellar hemisphere and right corona radiata. Decreased visualization of the flow-void within the right vertebral artery with continued irregular isointense T1 signal throughout the basilar artery. Bilateral sinus disease with bubbly secretions and air-fluid levels, greatest in the right maxillary sinus. Correlate clinically for symptoms of acute sinusitis. THE PATIENT WAS SEEN AND EXAMINED BY ME WITH THE HOUSESTAFF AND STROKE TEAM DURING MORNING ROUNDS.   HPI:  58 y/o R-handed [place]onese man PMH CVA with residual L-sided weakness, CAD s/p CABG, HTN presents as stroke code for dizziness. Pt at baseline ambulates independently and independent in ADLs. Pt was in usual state of health on 11/07 at 9AM. At around 9:30AM, he began to complain of lightheadedness not associated with changes in position. Half an hour later, he began to complain of n/v. He also endorses HA, diplopia/blurry vision, R-sided weakness that began a few days prior to admission. No c/o f/c/CP/SOB. He reports he ran out of his BP meds a week prior to admission and has been unable to refill his medications.    SUBJECTIVE: No events overnight.  No new neurologic complaints.      acetaminophen   Tablet. 650 milliGRAM(s) Oral every 6 hours PRN  acetaminophen  Suppository 650 milliGRAM(s) Rectal every 6 hours PRN  aspirin  chewable 81 milliGRAM(s) Oral daily  atorvastatin 80 milliGRAM(s) Oral at bedtime  clopidogrel Tablet 75 milliGRAM(s) Oral daily  doxazosin 2 milliGRAM(s) Oral at bedtime  enoxaparin Injectable 40 milliGRAM(s) SubCutaneous daily  gabapentin 100 milliGRAM(s) Oral three times a day  hydrALAZINE Injectable 10 milliGRAM(s) IV Push two times a day PRN  hydrochlorothiazide 12.5 milliGRAM(s) Oral daily  influenza   Vaccine 0.5 milliLiter(s) IntraMuscular once  losartan 100 milliGRAM(s) Oral daily  simethicone 80 milliGRAM(s) Chew daily PRN      PHYSICAL EXAM:   Vital Signs Last 24 Hrs  T(C): 36.8 (12 Nov 2017 08:00), Max: 37.2 (11 Nov 2017 20:00)  T(F): 98.2 (12 Nov 2017 08:00), Max: 98.9 (11 Nov 2017 20:00)  HR: 84 (12 Nov 2017 08:00) (76 - 105)  BP: 135/89 (12 Nov 2017 08:00) (116/89 - 170/100)  BP(mean): 102 (12 Nov 2017 08:00) (88 - 125)  RR: 16 (12 Nov 2017 08:00) (14 - 25)  SpO2: 97% (12 Nov 2017 08:00) (95% - 99%)      General: No acute distress  HEENT: EOM intact, visual fields full  Abdomen: Soft, nontender, nondistended   Extremities: No edema    NEUROLOGICAL EXAM:  Mental status: Awake, alert, oriented x3, fluent speech, no neglect, able to follow commands  Cranial Nerves: left facial droop, EOMI, VFF, no nystagmus, dysarthria,   Motor exam: Normal tone, no drift, RUE 5/5, RLE 5/5, LUE 4+/5, LLE 5-/5.  Sensation: Intact to light touch   Coordination/ Gait: LUE moderate-severe dysmetria  LABS:                        15.3   6.9   )-----------( 197      ( 12 Nov 2017 04:24 )             46.4    11-12    140  |  100  |  24<H>  ----------------------------<  113<H>  3.6   |  26  |  1.18    Ca    9.3      12 Nov 2017 04:23      Hemoglobin A1C, Whole Blood: 5.8 % (11-08 @ 10:36)      IMAGING: Reviewed by me.   Head CT/CTA head/Neck (11/07) Right basal ganglia and corona radiata infarct without significant change since 6/16/2017. There is severe steno-occlusive disease intracranially involving the right supraclinoid internal carotid artery, A1 and M1 branches, and severe narrowing of the left M1 segment of the middle cerebral artery. The distal right vertebral artery is quite small and the proximal basilar artery is not visualized. The dominant left vertebral artery ends at the PICA. Distal basilar flow appears to be due to retrograde flow from the right posterior communicating artery.  Head CT (11/07) No acute intracranial hemorrhage, mass effect, or evidence of acute territorial infarct. Chronic right corona radiata/basal ganglia and right inferior cerebellar hemisphere infarcts.  Brain MRI (11/09) In comparison the prior MRI, there is new hyperintense T2 and FLAIR signal with faint increased restricted diffusion in the right medulla extending to the inferior right brachium pontis and inferior medial right cerebellar hemisphere, new compared to 6/17/2017 consistent with evolving area of  ischemic change without hemorrhagic transformation. Redemonstration of volume loss with multiple additional areas of lacunar infarction including chronic infarcts in the right cerebellar hemisphere and right corona radiata. Decreased visualization of the flow-void within the right vertebral artery with continued irregular isointense T1 signal throughout the basilar artery. Bilateral sinus disease with bubbly secretions and air-fluid levels, greatest in the right maxillary sinus. Correlate clinically for symptoms of acute sinusitis.

## 2017-11-13 LAB
ANION GAP SERPL CALC-SCNC: 13 MMOL/L — SIGNIFICANT CHANGE UP (ref 5–17)
BUN SERPL-MCNC: 28 MG/DL — HIGH (ref 7–23)
CALCIUM SERPL-MCNC: 9.1 MG/DL — SIGNIFICANT CHANGE UP (ref 8.4–10.5)
CHLORIDE SERPL-SCNC: 103 MMOL/L — SIGNIFICANT CHANGE UP (ref 96–108)
CO2 SERPL-SCNC: 27 MMOL/L — SIGNIFICANT CHANGE UP (ref 22–31)
CREAT SERPL-MCNC: 1.18 MG/DL — SIGNIFICANT CHANGE UP (ref 0.5–1.3)
GLUCOSE SERPL-MCNC: 122 MG/DL — HIGH (ref 70–99)
HCT VFR BLD CALC: 46 % — SIGNIFICANT CHANGE UP (ref 39–50)
HGB BLD-MCNC: 15 G/DL — SIGNIFICANT CHANGE UP (ref 13–17)
MCHC RBC-ENTMCNC: 26.3 PG — LOW (ref 27–34)
MCHC RBC-ENTMCNC: 32.7 GM/DL — SIGNIFICANT CHANGE UP (ref 32–36)
MCV RBC AUTO: 80.6 FL — SIGNIFICANT CHANGE UP (ref 80–100)
PLATELET # BLD AUTO: 221 K/UL — SIGNIFICANT CHANGE UP (ref 150–400)
POTASSIUM SERPL-MCNC: 3.8 MMOL/L — SIGNIFICANT CHANGE UP (ref 3.5–5.3)
POTASSIUM SERPL-SCNC: 3.8 MMOL/L — SIGNIFICANT CHANGE UP (ref 3.5–5.3)
RBC # BLD: 5.71 M/UL — SIGNIFICANT CHANGE UP (ref 4.2–5.8)
RBC # FLD: 14.8 % — HIGH (ref 10.3–14.5)
SODIUM SERPL-SCNC: 143 MMOL/L — SIGNIFICANT CHANGE UP (ref 135–145)
WBC # BLD: 6.5 K/UL — SIGNIFICANT CHANGE UP (ref 3.8–10.5)
WBC # FLD AUTO: 6.5 K/UL — SIGNIFICANT CHANGE UP (ref 3.8–10.5)

## 2017-11-13 PROCEDURE — 99254 IP/OBS CNSLTJ NEW/EST MOD 60: CPT

## 2017-11-13 RX ORDER — SODIUM CHLORIDE 9 MG/ML
1000 INJECTION INTRAMUSCULAR; INTRAVENOUS; SUBCUTANEOUS
Qty: 0 | Refills: 0 | Status: DISCONTINUED | OUTPATIENT
Start: 2017-11-13 | End: 2017-11-15

## 2017-11-13 RX ADMIN — GABAPENTIN 100 MILLIGRAM(S): 400 CAPSULE ORAL at 13:33

## 2017-11-13 RX ADMIN — ENOXAPARIN SODIUM 40 MILLIGRAM(S): 100 INJECTION SUBCUTANEOUS at 11:38

## 2017-11-13 RX ADMIN — Medication 2 MILLIGRAM(S): at 21:25

## 2017-11-13 RX ADMIN — Medication 12.5 MILLIGRAM(S): at 05:38

## 2017-11-13 RX ADMIN — LOSARTAN POTASSIUM 100 MILLIGRAM(S): 100 TABLET, FILM COATED ORAL at 11:37

## 2017-11-13 RX ADMIN — ATORVASTATIN CALCIUM 80 MILLIGRAM(S): 80 TABLET, FILM COATED ORAL at 21:25

## 2017-11-13 RX ADMIN — CLOPIDOGREL BISULFATE 75 MILLIGRAM(S): 75 TABLET, FILM COATED ORAL at 11:37

## 2017-11-13 RX ADMIN — GABAPENTIN 100 MILLIGRAM(S): 400 CAPSULE ORAL at 21:25

## 2017-11-13 RX ADMIN — GABAPENTIN 100 MILLIGRAM(S): 400 CAPSULE ORAL at 05:38

## 2017-11-13 RX ADMIN — Medication 81 MILLIGRAM(S): at 11:37

## 2017-11-13 NOTE — CONSULT NOTE ADULT - SUBJECTIVE AND OBJECTIVE BOX
N-[id number]    CHIEF COMPLAINT:  Pre-Operative Optimization    HISTORY OF PRESENT ILLNESS:  MARCO A LOW is a 57y Male patient with past medical history of CVA with residual L-sided weakness, CAD s/p CABG 2002 and subsequent stenting 2006, HTN presents with a new CVA with residual symptoms including dysphagia. He denies chest pain, shortness of breath, syncope, orthopnea, PND, lower extremity edema. He has no difficulty going up two flights of stairs. Denies diabetes and kidney problems in the past. Cardiology consulted for pre-operative optimization.     Allergies  No Known Allergies    PAST MEDICAL & SURGICAL HISTORY:  Stented coronary artery  CVA (cerebral vascular accident)  HTN (hypertension)  No significant past surgical history    FAMILY HISTORY:  No pertinent family history in first degree relatives    SOCIAL HISTORY:    Social alcohol.     REVIEW OF SYSTEMS:  CONSTITUTIONAL: No fever, fatigue  EYES: No eye pain, Positive visual disturbance/blurry vision  ENMT:  No difficulty hearing, tinnitus  NECK: No pain or stiffness  RESPIRATORY: No cough, wheezing, chills or hemoptysis; No Shortness of Breath  CARDIOVASCULAR: No chest pain, palpitations, leg swelling. Positive dizziness.   GASTROINTESTINAL: No abdominal or epigastric pain. Positive nausea, vomiting  GENITOURINARY: No dysuria, frequency  NEUROLOGICAL: Positive headache, right sided weakness.   SKIN: No itching, burning  LYMPH Nodes: No enlarged glands  ENDOCRINE: No heat or cold intolerance  MUSCULOSKELETAL: No joint pain or swelling;   PSYCHIATRIC: No depression, anxiety  HEME/LYMPH: No easy bruising, or bleeding gums  ALLERY AND IMMUNOLOGIC: No hives or eczema	    I&O's Summary  12 Nov 2017 07:01  -  13 Nov 2017 07:00  --------------------------------------------------------  IN: 1195 mL / OUT: 1500 mL / NET: -305 mL    13 Nov 2017 07:01  -  13 Nov 2017 18:00  --------------------------------------------------------  IN: 865 mL / OUT: 450 mL / NET: 415 mL    PHYSICAL EXAM:  Vital Signs Last 24 Hrs  T(C): 36.9 (13 Nov 2017 08:00), Max: 36.9 (13 Nov 2017 08:00)  T(F): 98.4 (13 Nov 2017 08:00), Max: 98.4 (13 Nov 2017 08:00)  HR: 95 (13 Nov 2017 16:00) (73 - 95)  BP: 161/78 (13 Nov 2017 16:00) (128/91 - 162/110)  BP(mean): 103 (13 Nov 2017 16:00) (103 - 127)  RR: 18 (13 Nov 2017 16:00) (11 - 24)  SpO2: 95% (13 Nov 2017 16:00) (95% - 100%)    Appearance: Normal, resting in chair.   HEENT:   Normal oral mucosa  Lymphatic: No lymphadenopathy  Cardiovascular: Normal S1 S2, No JVD, No murmurs, No edema  Respiratory: Lungs clear to auscultation	  Psychiatry: A & O x 3,  Gastrointestinal:  Soft, Non-tender  Skin: No rashes, No ecchymoses, No cyanosis	  Neurologic: right sided weakness.   Extremities: No clubbing, cyanosis or edema  Vascular: Peripheral pulses palpable 2+ bilaterally    MEDICATIONS:  MEDICATIONS  (STANDING):  aspirin  chewable 81 milliGRAM(s) Oral daily  atorvastatin 80 milliGRAM(s) Oral at bedtime  clopidogrel Tablet 75 milliGRAM(s) Oral daily  doxazosin 2 milliGRAM(s) Oral at bedtime  enoxaparin Injectable 40 milliGRAM(s) SubCutaneous daily  gabapentin 100 milliGRAM(s) Oral three times a day  hydrochlorothiazide 12.5 milliGRAM(s) Oral daily  influenza   Vaccine 0.5 milliLiter(s) IntraMuscular once  losartan 100 milliGRAM(s) Oral daily  sodium chloride 0.9%. 1000 milliLiter(s) (75 mL/Hr) IV Continuous <Continuous>    MEDICATIONS  (PRN):  acetaminophen   Tablet. 650 milliGRAM(s) Oral every 6 hours PRN Mild Pain (1 - 3)  acetaminophen  Suppository 650 milliGRAM(s) Rectal every 6 hours PRN For Temp greater than 38.5 C (101.3 F)  hydrALAZINE Injectable 10 milliGRAM(s) IV Push two times a day PRN BP > 180  simethicone 80 milliGRAM(s) Chew daily PRN bloating    LABS:	 	  CBC Full  -  ( 13 Nov 2017 05:12 )  WBC Count : 6.5 K/uL  Hemoglobin : 15.0 g/dL  Hematocrit : 46.0 %  Platelet Count - Automated : 221 K/uL  Mean Cell Volume : 80.6 fl  Mean Cell Hemoglobin : 26.3 pg  Mean Cell Hemoglobin Concentration : 32.7 gm/dL    11-13    143  |  103  |  28<H>  ----------------------------<  122<H>  3.8   |  27  |  1.18  11-12    140  |  100  |  24<H>  ----------------------------<  113<H>  3.6   |  26  |  1.18    Ca    9.1      13 Nov 2017 05:12  Ca    9.3      12 Nov 2017 04:23    ECG: NSR, non-specific interventricular conduction delay, normal axis, good r wave progression, non-specific ST changes     RADIOLOGY:  CTA Brain/Neck 11/7/2017  IMPRESSION: Right basal ganglia and corona radiata infarct without   significant change since 6/16/2017. There is severe steno-occlusive   disease intracranially involving the right supraclinoid internal carotid   artery, A1 and M1 branches, and severe narrowing of the left M1 segment   of the middle cerebral artery. The distal right vertebral artery is quite   small and the proximal basilar artery is not visualized. The dominant   left vertebral artery ends at the PICA. Distal basilar flow appears to be   due to retrograde flow from the right posterior communicating artery.    CARDIAC TESTING/STUDIES:    [id number]/10/2017  Conclusions:  1. Mitral annular calcification, otherwise normal mitral  valve. Mild-moderate mitral regurgitation.  2. Calcified trileaflet aortic valve with normal opening.  3. Moderate global left ventricular systolic dysfunction.  4. Mild diastolic dysfunction (Stage I).  5. Normal right ventricular size and function.  6. EF 41%  *** Compared with echocardiogram of 8/3/2016, no  significant changes noted.    ECHO 8/3/2016  Conclusions:  1. Mitral annular calcification, otherwise normal mitral  valve. Mild-moderate mitral regurgitation.  2. Normal trileaflet aortic valve.  3. Mildly dilated left atrium.  LA volume index = 36 cc/m2.  4. Increased relative wall thickness with normal left  ventricular mass index, consistent with concentric left  ventricular remodeling.  5. Moderate global left ventricular systolic dysfunction.  6. Mild diastolic dysfunction (Stage I).  7. The right ventricle is not well visualized.  8. No pericardial effusion.  9. EF 42%.  *** No previous Echo exam.    ASSESSMENT/PLAN: 	  57y Male patient with past medical history of CVA with residual L-sided weakness, CAD s/p CABG 2002 and subsequent stenting 2006, HTN presents with a new CVA with residual symptoms including dysphagia and planned PEG tube placement.     1) Pre-operative Optimization   RCRI 3 (CVA, CAD, CHF) 11% risk of MACE event  METS>4; functional   Asymptomatic and well compensated.   Low-intermediate risk procedure.    ·	No further cardiac testing indicated.  ·	No absolute contraindications and can proceed with procedure.   ·	Continue aspirin 81 mg daily, clopidogrel 75 mg daily,  ·	Blood pressure remain elevated despite taking Losartan 100 mg daily. I would continue this sherri-operatively given this finding.     2) CHF  Compensated   EF 41% per recent ECHO     ·	Continue losartan 100 mg daily.  ·	Start metoprolol succinate 25 mg daily after procedure and up-titrate as tolerated. (unclear if he is on this as home medication)  ·	Follow up with his outpatient cardiologist. Consideration of ischemic evaluation after achieving optimal medical management may be appropriate given his EF has remained unchanged over the course of the year.     3) CAD  s/p CABG 2002 and PCI 2006  Asymptomatic     ·	Plan as above.     No further recommendations. Signing off. Call with questions.     Carmelo Rhodes MD, MPH, DESMOND  Cardiovascular Specialist Attending  [facility]  C: [phone]  E: [email or web address]  (Cardiology Nocturnist cell number available 7 pm - 7 am every night; available daytime week days for follow-up only; daytime weekends covered by general cardiology consult service)

## 2017-11-13 NOTE — PROGRESS NOTE ADULT - ASSESSMENT
ASSESSMENT: 57M with HTN, HLD, CAD/CABG, past stroke with residual left hemiparesis, presents with transient right hemiparesis and diplopia, and continuous headache and N/V. CT head shows old right basal ganglia lacune, and CTA shows severe bilateral MCA stenosis, proximal BA occlusion and distal stenosis. Brain MRI show right medullary and right cerebellar ischemic infarct. Impression: Severe intracranial atherosclerotic disease and occlusion of left VA, causing right  lateral medullary and right cerebellar ischemic infarcts.    NEURO: neurologically without acute change, continue close monitoring for neurologic deterioration, permissive HTN with slow titration to normotensive, ASA/Plavix and statin  for secondary stroke prevention,  titrate statin to LDL goal less than 70,  Physical therapy/OT eval with recommendations for AR, pt uninsured, will provide daily PT/OT to optimize treatment.     ANTITHROMBOTIC THERAPY: Continue ASA AR and Plavix as PO access obtained for secondary stroke prevention    PULMONARY: CXR (11/09) clear, protecting airway, saturating well     CARDIOVASCULAR: TTE: ef 41%, mitral annular calcification, mild-moderate MR, global LV systolic dysfunction, mild stage 1diastolic dysfunction mild TR,  cardiac monitoring  no events, obtain cardiac clearance prior to PEG, house aware- reconsulted 11/13.                         GASTROINTESTINAL: Dysphagia: per MBS maintain NPO, abd Xray: Nonobstructive bowel gas pattern. discussed with pt need for PEG to provide meds/nutrition, contacted GI for possible PEG. Anticipate 11/14 (tues)  Patient agrees.     Diet: NPO-NGT    RENAL: BUN/Cr without acute change but slight uptrend- will provide hydration, good urine output, renal US: No hydronephrosis. hyponatremia: resolved, haro for retention.      Na Goal: Greater than 135     Haro: y    HEMATOLOGY: H/H without acute change, Platelets 221     DVT ppx: LMWH    ID: febrile on 11/09 (Tmax 39.3), afebrile in am, no leukocytosis, blood/urine cultures NGTD, continue to monitor.    DISPOSITION: D/C to AR as per PT/OT eval once stable and workup is complete, Pt uninsured, SW aware, Daily PT/OT therapy to optimize therapy    CORE MEASURES:        Admission NIHSS: 3     TPA:  NO      LDL/HDL: 171/57     Depression Screen: 0     Statin Therapy: Y     Dysphagia Screen: FAIL     Smoking  NO      Afib NO     Stroke Education YES

## 2017-11-13 NOTE — PROGRESS NOTE ADULT - SUBJECTIVE AND OBJECTIVE BOX
THE PATIENT WAS SEEN AND EXAMINED BY ME WITH THE HOUSESTAFF AND STROKE TEAM DURING MORNING ROUNDS.   HPI:  58 y/o R-handed [place]gu[place] man PMH CVA with residual L-sided weakness, CAD s/p CABG, HTN presents as stroke code for dizziness. Pt at baseline ambulates independently and independent in ADLs. Pt was in usual state of health on 11/07 at 9AM. At around 9:30AM, he began to complain of lightheadedness not associated with changes in position. Half an hour later, he began to complain of n/v. He also endorses HA, diplopia/blurry vision, R-sided weakness that began a few days prior to admission. No c/o f/c/CP/SOB. He reports he ran out of his BP meds a week prior to admission and has been unable to refill his medications.    SUBJECTIVE: No events overnight.  No new neurologic complaints.      acetaminophen   Tablet. 650 milliGRAM(s) Oral every 6 hours PRN  acetaminophen  Suppository 650 milliGRAM(s) Rectal every 6 hours PRN  aspirin  chewable 81 milliGRAM(s) Oral daily  atorvastatin 80 milliGRAM(s) Oral at bedtime  clopidogrel Tablet 75 milliGRAM(s) Oral daily  doxazosin 2 milliGRAM(s) Oral at bedtime  enoxaparin Injectable 40 milliGRAM(s) SubCutaneous daily  gabapentin 100 milliGRAM(s) Oral three times a day  hydrALAZINE Injectable 10 milliGRAM(s) IV Push two times a day PRN  hydrochlorothiazide 12.5 milliGRAM(s) Oral daily  influenza   Vaccine 0.5 milliLiter(s) IntraMuscular once  losartan 100 milliGRAM(s) Oral daily  simethicone 80 milliGRAM(s) Chew daily PRN  sodium chloride 0.9%. 1000 milliLiter(s) IV Continuous <Continuous>      PHYSICAL EXAM:   Vital Signs Last 24 Hrs  T(C): 36.9 (13 Nov 2017 08:00), Max: 36.9 (12 Nov 2017 18:00)  T(F): 98.4 (13 Nov 2017 08:00), Max: 98.5 (12 Nov 2017 18:00)  HR: 84 (13 Nov 2017 14:00) (73 - 95)  BP: 156/99 (13 Nov 2017 14:00) (128/91 - 175/99)  BP(mean): 120 (13 Nov 2017 14:00) (103 - 137)  RR: 24 (13 Nov 2017 14:00) (11 - 24)  SpO2: 99% (13 Nov 2017 14:00) (96% - 100%)    General: No acute distress  HEENT: EOM intact, visual fields full  Abdomen: Soft, nontender, nondistended   Extremities: No edema    NEUROLOGICAL EXAM:  Mental status: Awake, alert, oriented x3, fluent speech, no neglect, able to follow commands  Cranial Nerves: left facial droop, EOMI, VFF, no nystagmus, dysarthria,   Motor exam: Normal tone, no drift, RUE 5/5, RLE 5/5, LUE 4+/5, LLE 5-/5.  Sensation: Intact to light touch   Coordination/ Gait: LUE moderate-severe dysmetria  LABS:                        15.0   6.5   )-----------( 221      ( 13 Nov 2017 05:12 )             46.0    11-13    143  |  103  |  28<H>  ----------------------------<  122<H>  3.8   |  27  |  1.18    Ca    9.1      13 Nov 2017 05:12      Hemoglobin A1C, Whole Blood: 5.8 % (11-08 @ 10:36)      IMAGING: Reviewed by me.   Head CT/CTA head/Neck (11/07) Right basal ganglia and corona radiata infarct without significant change since 6/16/2017. There is severe steno-occlusive disease intracranially involving the right supraclinoid internal carotid artery, A1 and M1 branches, and severe narrowing of the left M1 segment of the middle cerebral artery. The distal right vertebral artery is quite small and the proximal basilar artery is not visualized. The dominant left vertebral artery ends at the PICA. Distal basilar flow appears to be due to retrograde flow from the right posterior communicating artery.  Head CT (11/07) No acute intracranial hemorrhage, mass effect, or evidence of acute territorial infarct. Chronic right corona radiata/basal ganglia and right inferior cerebellar hemisphere infarcts.  Brain MRI (11/09) In comparison the prior MRI, there is new hyperintense T2 and FLAIR signal with faint increased restricted diffusion in the right medulla extending to the inferior right brachium pontis and inferior medial right cerebellar hemisphere, new compared to 6/17/2017 consistent with evolving area of  ischemic change without hemorrhagic transformation. Redemonstration of volume loss with multiple additional areas of lacunar infarction including chronic infarcts in the right cerebellar hemisphere and right corona radiata. Decreased visualization of the flow-void within the right vertebral artery with continued irregular isointense T1 signal throughout the basilar artery. Bilateral sinus disease with bubbly secretions and air-fluid levels, greatest in the right maxillary sinus. Correlate clinically for symptoms of acute sinusitis.

## 2017-11-14 LAB
ANION GAP SERPL CALC-SCNC: 13 MMOL/L — SIGNIFICANT CHANGE UP (ref 5–17)
BUN SERPL-MCNC: 26 MG/DL — HIGH (ref 7–23)
CALCIUM SERPL-MCNC: 9.4 MG/DL — SIGNIFICANT CHANGE UP (ref 8.4–10.5)
CHLORIDE SERPL-SCNC: 103 MMOL/L — SIGNIFICANT CHANGE UP (ref 96–108)
CO2 SERPL-SCNC: 25 MMOL/L — SIGNIFICANT CHANGE UP (ref 22–31)
CREAT SERPL-MCNC: 1.13 MG/DL — SIGNIFICANT CHANGE UP (ref 0.5–1.3)
GLUCOSE SERPL-MCNC: 116 MG/DL — HIGH (ref 70–99)
HCT VFR BLD CALC: 44 % — SIGNIFICANT CHANGE UP (ref 39–50)
HGB BLD-MCNC: 14.6 G/DL — SIGNIFICANT CHANGE UP (ref 13–17)
MCHC RBC-ENTMCNC: 26.8 PG — LOW (ref 27–34)
MCHC RBC-ENTMCNC: 33.3 GM/DL — SIGNIFICANT CHANGE UP (ref 32–36)
MCV RBC AUTO: 80.6 FL — SIGNIFICANT CHANGE UP (ref 80–100)
PLATELET # BLD AUTO: 211 K/UL — SIGNIFICANT CHANGE UP (ref 150–400)
POTASSIUM SERPL-MCNC: 3.7 MMOL/L — SIGNIFICANT CHANGE UP (ref 3.5–5.3)
POTASSIUM SERPL-SCNC: 3.7 MMOL/L — SIGNIFICANT CHANGE UP (ref 3.5–5.3)
RBC # BLD: 5.46 M/UL — SIGNIFICANT CHANGE UP (ref 4.2–5.8)
RBC # FLD: 14.4 % — SIGNIFICANT CHANGE UP (ref 10.3–14.5)
SODIUM SERPL-SCNC: 141 MMOL/L — SIGNIFICANT CHANGE UP (ref 135–145)
WBC # BLD: 5.7 K/UL — SIGNIFICANT CHANGE UP (ref 3.8–10.5)
WBC # FLD AUTO: 5.7 K/UL — SIGNIFICANT CHANGE UP (ref 3.8–10.5)

## 2017-11-14 PROCEDURE — 71010: CPT | Mod: 26

## 2017-11-14 PROCEDURE — 99223 1ST HOSP IP/OBS HIGH 75: CPT | Mod: GC

## 2017-11-14 RX ADMIN — LOSARTAN POTASSIUM 100 MILLIGRAM(S): 100 TABLET, FILM COATED ORAL at 19:30

## 2017-11-14 RX ADMIN — ATORVASTATIN CALCIUM 80 MILLIGRAM(S): 80 TABLET, FILM COATED ORAL at 22:09

## 2017-11-14 RX ADMIN — Medication 10 MILLIGRAM(S): at 17:16

## 2017-11-14 RX ADMIN — ENOXAPARIN SODIUM 40 MILLIGRAM(S): 100 INJECTION SUBCUTANEOUS at 17:23

## 2017-11-14 RX ADMIN — CLOPIDOGREL BISULFATE 75 MILLIGRAM(S): 75 TABLET, FILM COATED ORAL at 19:29

## 2017-11-14 RX ADMIN — Medication 81 MILLIGRAM(S): at 19:29

## 2017-11-14 RX ADMIN — Medication 2 MILLIGRAM(S): at 22:09

## 2017-11-14 RX ADMIN — GABAPENTIN 100 MILLIGRAM(S): 400 CAPSULE ORAL at 22:09

## 2017-11-14 NOTE — PROGRESS NOTE ADULT - ASSESSMENT
ASSESSMENT: 57M with HTN, HLD, CAD/CABG, past stroke with residual left hemiparesis, presents with transient right hemiparesis and diplopia, and continuous headache and N/V. CT head shows old right basal ganglia lacune, and CTA shows severe bilateral MCA stenosis, proximal BA occlusion and distal stenosis. Brain MRI show right medullary and right cerebellar ischemic infarct. Impression: Severe intracranial atherosclerotic disease and occlusion of left VA, causing right  lateral medullary and right cerebellar ischemic infarcts.    NEURO: neurologically without acute change, continue close monitoring for neurologic deterioration, permissive HTN with slow titration to normotensive, ASA/Plavix and statin  for secondary stroke prevention,  titrate statin to LDL goal less than 70,  Physical therapy/OT eval with recommendations for AR, pt uninsured, will provide daily PT/OT to optimize treatment.     ANTITHROMBOTIC THERAPY: Continue ASA [place] and Plavix as PO access obtained for secondary stroke prevention    PULMONARY: CXR (11/09) clear, protecting airway, saturating well     CARDIOVASCULAR: TTE: ef 41%, mitral annular calcification, mild-moderate MR, global LV systolic dysfunction, mild stage 1diastolic dysfunction mild TR,  cardiac monitoring  no events, cardio consult appreciated: add metoprolol as tolerated                 GASTROINTESTINAL: Dysphagia: per MBS maintain NPO, abd Xray: Nonobstructive bowel gas pattern. discussed with pt need for PEG to provide meds/nutrition, contacted GI for possible PEG. Anticipate today 11/14 (tues)  Patient agrees.     Diet: NPO-NGT    RENAL: BUN/Cr without acute change,- provide hydration, good urine output, renal US: No hydronephrosis. hyponatremia: resolved, haro for retention.      Na Goal: Greater than 135     Haro: y    HEMATOLOGY: H/H without acute change, Platelets 211     DVT ppx: LMWH    ID: febrile on 11/09 (Tmax 39.3), afebrile in am, no leukocytosis, blood/urine cultures NGTD, continue to monitor.    DISPOSITION: D/C to AR as per PT/OT eval once stable and workup is complete, Pt uninsured, SW aware, Daily PT/OT therapy to optimize therapy    CORE MEASURES:        Admission NIHSS: 3     TPA:  NO      LDL/HDL: 171/57     Depression Screen: 0     Statin Therapy: Y     Dysphagia Screen: FAIL     Smoking  NO      Afib NO     Stroke Education YES

## 2017-11-14 NOTE — CHART NOTE - NSCHARTNOTEFT_GEN_A_CORE
Follow up.    57M with HTN, HLD, CAD/CABG, past stroke with residual left hemiparesis, presents with transient right hemiparesis and diplopia, and continuous headache and N/V. CT head shows old right basal ganglia lacune, and CTA shows severe bilateral MCA stenosis, proximal BA occlusion and distal stenosis. Brain MRI show right medullary and right cerebellar ischemic infarct. Impression: Severe intracranial atherosclerotic disease and occlusion of left VA, causing right  lateral medullary and right cerebellar ischemic infarcts. Failed MBS, plan for PEG today.    Source: Patient [ ]    Family [ ]     other [x ] chart    Diet : 11/13  NPO p MN  (previously had NGT feeds Jevity 1.2 70cc/hr x 24 hrs)    GI: no N/V/D, no BM since adm     Enteral /Parenteral Nutrition:  goal 1680cc/day. 24 hr intake 11/14 840cc (made NPO p MN), 11/13 420cc (was NPO for procedure)      Current Weight: no recent, dosing Weight (kg): 89.4 (11-08 @ 07:34)  no edema    Pertinent Medications: MEDICATIONS  (STANDING):  aspirin  chewable 81 milliGRAM(s) Oral daily  atorvastatin 80 milliGRAM(s) Oral at bedtime  clopidogrel Tablet 75 milliGRAM(s) Oral daily  doxazosin 2 milliGRAM(s) Oral at bedtime  enoxaparin Injectable 40 milliGRAM(s) SubCutaneous daily  gabapentin 100 milliGRAM(s) Oral three times a day  hydrochlorothiazide 12.5 milliGRAM(s) Oral daily  influenza   Vaccine 0.5 milliLiter(s) IntraMuscular once  losartan 100 milliGRAM(s) Oral daily  sodium chloride 0.9%. 1000 milliLiter(s) (75 mL/Hr) IV Continuous <Continuous>    MEDICATIONS  (PRN):  acetaminophen   Tablet. 650 milliGRAM(s) Oral every 6 hours PRN Mild Pain (1 - 3)  acetaminophen  Suppository 650 milliGRAM(s) Rectal every 6 hours PRN For Temp greater than 38.5 C (101.3 F)  hydrALAZINE Injectable 10 milliGRAM(s) IV Push two times a day PRN BP > 180  simethicone 80 milliGRAM(s) Chew daily PRN bloating    Pertinent Labs:  11-14 Na141 mmol/L Glu 116 mg/dL<H> K+ 3.7 mmol/L Cr  1.13 mg/dL BUN 26 mg/dL<H> Phos n/a   Alb n/a          Skin: no pressure injuries    Estimated Needs:   [x ] no change since previous assessment  [ ] recalculated:       Previous Nutrition Diagnosis: swallowing difficulty    Nutrition Diagnosis is [x ] ongoing  [ ] resolved [ ] not applicable   to be addressed w/ PEG placement       New Nutrition Diagnosis: [ x] not applicable      Recommend    [ ] Change Diet To:    [ ] Nutrition Supplement    [x ] Nutrition Support:  Recommend Jevity 1.2 goal 70cc/hr x 24 hrs to provide 2016 kcals, 93 gm protein, 1355cc free water for 22 kcals/kg, 1.0gm protein/kg dosing wt of 89.4kg  recommend free water flush of 250cc 4 times/day  [ ] Other:        Monitoring and Evaluation:     [ ] PO intake [x ] Tolerance to diet prescription [x ] weights [x ] follow up per protocol    [ ] other: Adequate: hears normal conversation without difficulty

## 2017-11-14 NOTE — PROGRESS NOTE ADULT - SUBJECTIVE AND OBJECTIVE BOX
Chief Complaint:  Patient is a 57y old  Male who presents with a chief complaint of stroke code (08 Nov 2017 08:50)      Interval Events:     Allergies:  No Known Allergies      Hospital Medications:  acetaminophen   Tablet. 650 milliGRAM(s) Oral every 6 hours PRN  acetaminophen  Suppository 650 milliGRAM(s) Rectal every 6 hours PRN  aspirin  chewable 81 milliGRAM(s) Oral daily  atorvastatin 80 milliGRAM(s) Oral at bedtime  clopidogrel Tablet 75 milliGRAM(s) Oral daily  doxazosin 2 milliGRAM(s) Oral at bedtime  enoxaparin Injectable 40 milliGRAM(s) SubCutaneous daily  gabapentin 100 milliGRAM(s) Oral three times a day  hydrALAZINE Injectable 10 milliGRAM(s) IV Push two times a day PRN  hydrochlorothiazide 12.5 milliGRAM(s) Oral daily  influenza   Vaccine 0.5 milliLiter(s) IntraMuscular once  losartan 100 milliGRAM(s) Oral daily  simethicone 80 milliGRAM(s) Chew daily PRN  sodium chloride 0.9%. 1000 milliLiter(s) IV Continuous <Continuous>      PMHX/PSHX:  Stented coronary artery  CVA (cerebral vascular accident)  HTN (hypertension)  No significant past surgical history      Family history:  No pertinent family history in first degree relatives      ROS:     General:  No wt loss, fevers, chills, night sweats, fatigue,   Eyes:  Good vision, no reported pain  ENT:  No sore throat, pain, runny nose, dysphagia  CV:  No pain, palpitations, hypo/hypertension  Resp:  No dyspnea, cough, tachypnea, wheezing  GI:  See HPI  :  No pain, bleeding, incontinence, nocturia  Muscle:  No pain, weakness  Neuro:  No weakness, tingling, memory problems  Psych:  No fatigue, insomnia, mood problems, depression  Endocrine:  No polyuria, polydipsia, cold/heat intolerance  Heme:  No petechiae, ecchymosis, easy bruisability  Skin:  No rash, edema      PHYSICAL EXAM:     General: No acute distress  HEENT: EOM intact, visual fields full  Abdomen: Soft, nontender, nondistended   Extremities: No edema  NEUROLOGICAL EXAM:  Mental status: Awake, alert, oriented x3, fluent speech, no neglect, able to follow commands    Vital Signs:  Vital Signs Last 24 Hrs  T(C): 36.9 (13 Nov 2017 18:00), Max: 36.9 (13 Nov 2017 18:00)  T(F): 98.5 (13 Nov 2017 18:00), Max: 98.5 (13 Nov 2017 18:00)  HR: 75 (14 Nov 2017 12:00) (69 - 95)  BP: 172/90 (14 Nov 2017 12:00) (137/86 - 172/90)  BP(mean): 115 (14 Nov 2017 12:00) (101 - 125)  RR: 10 (14 Nov 2017 12:00) (9 - 27)  SpO2: 98% (14 Nov 2017 12:00) (95% - 100%)  Daily     Daily     LABS:                        14.6   5.7   )-----------( 211      ( 14 Nov 2017 03:18 )             44.0     11-14    141  |  103  |  26<H>  ----------------------------<  116<H>  3.7   |  25  |  1.13    Ca    9.4      14 Nov 2017 03:18                Imaging:

## 2017-11-14 NOTE — PROGRESS NOTE ADULT - SUBJECTIVE AND OBJECTIVE BOX
THE PATIENT WAS SEEN AND EXAMINED BY ME WITH THE HOUSESTAFF AND STROKE TEAM DURING MORNING ROUNDS.   HPI:  58 y/o R-handed [place] man PMH CVA with residual L-sided weakness, CAD s/p CABG, HTN presents as stroke code for dizziness. Pt at baseline ambulates independently and independent in ADLs. Pt was in usual state of health on 11/07 at 9AM. At around 9:30AM, he began to complain of lightheadedness not associated with changes in position. Half an hour later, he began to complain of n/v. He also endorses HA, diplopia/blurry vision, R-sided weakness that began a few days prior to admission. No c/o f/c/CP/SOB. He reports he ran out of his BP meds a week prior to admission and has been unable to refill his medications.    SUBJECTIVE: No events overnight.  No new neurologic complaints.      acetaminophen   Tablet. 650 milliGRAM(s) Oral every 6 hours PRN  acetaminophen  Suppository 650 milliGRAM(s) Rectal every 6 hours PRN  aspirin  chewable 81 milliGRAM(s) Oral daily  atorvastatin 80 milliGRAM(s) Oral at bedtime  clopidogrel Tablet 75 milliGRAM(s) Oral daily  doxazosin 2 milliGRAM(s) Oral at bedtime  enoxaparin Injectable 40 milliGRAM(s) SubCutaneous daily  gabapentin 100 milliGRAM(s) Oral three times a day  hydrALAZINE Injectable 10 milliGRAM(s) IV Push two times a day PRN  hydrochlorothiazide 12.5 milliGRAM(s) Oral daily  influenza   Vaccine 0.5 milliLiter(s) IntraMuscular once  losartan 100 milliGRAM(s) Oral daily  simethicone 80 milliGRAM(s) Chew daily PRN  sodium chloride 0.9%. 1000 milliLiter(s) IV Continuous <Continuous>      PHYSICAL EXAM:   Vital Signs Last 24 Hrs  T(C): 36.9 (13 Nov 2017 18:00), Max: 36.9 (13 Nov 2017 18:00)  T(F): 98.5 (13 Nov 2017 18:00), Max: 98.5 (13 Nov 2017 18:00)  HR: 84 (14 Nov 2017 10:01) (69 - 95)  BP: 165/99 (14 Nov 2017 10:01) (137/86 - 169/92)  BP(mean): 119 (14 Nov 2017 10:01) (101 - 125)  RR: 22 (14 Nov 2017 10:01) (9 - 27)  SpO2: 98% (14 Nov 2017 10:01) (95% - 100%)    General: No acute distress  HEENT: EOM intact, visual fields full  Abdomen: Soft, nontender, nondistended   Extremities: No edema    NEUROLOGICAL EXAM:  Mental status: Awake, alert, oriented x3, fluent speech, no neglect, able to follow commands  Cranial Nerves: left facial droop, EOMI, VFF, no nystagmus, dysarthria,   Motor exam: Normal tone, no drift, RUE 5/5, RLE 5/5, LUE 4+/5, LLE 5-/5.  Sensation: Intact to light touch   Coordination/ Gait: LUE moderate-severe dysmetria    LABS:                        14.6   5.7   )-----------( 211      ( 14 Nov 2017 03:18 )             44.0    11-14    141  |  103  |  26<H>  ----------------------------<  116<H>  3.7   |  25  |  1.13    Ca    9.4      14 Nov 2017 03:18      Hemoglobin A1C, Whole Blood: 5.8 % (11-08 @ 10:36)      IMAGING: Reviewed by me.   Head CT/CTA head/Neck (11/07) Right basal ganglia and corona radiata infarct without significant change since 6/16/2017. There is severe steno-occlusive disease intracranially involving the right supraclinoid internal carotid artery, A1 and M1 branches, and severe narrowing of the left M1 segment of the middle cerebral artery. The distal right vertebral artery is quite small and the proximal basilar artery is not visualized. The dominant left vertebral artery ends at the PICA. Distal basilar flow appears to be due to retrograde flow from the right posterior communicating artery.  Head CT (11/07) No acute intracranial hemorrhage, mass effect, or evidence of acute territorial infarct. Chronic right corona radiata/basal ganglia and right inferior cerebellar hemisphere infarcts.  Brain MRI (11/09) In comparison the prior MRI, there is new hyperintense T2 and FLAIR signal with faint increased restricted diffusion in the right medulla extending to the inferior right brachium pontis and inferior medial right cerebellar hemisphere, new compared to 6/17/2017 consistent with evolving area of  ischemic change without hemorrhagic transformation. Redemonstration of volume loss with multiple additional areas of lacunar infarction including chronic infarcts in the right cerebellar hemisphere and right corona radiata. Decreased visualization of the flow-void within the right vertebral artery with continued irregular isointense T1 signal throughout the basilar artery. Bilateral sinus disease with bubbly secretions and air-fluid levels, greatest in the right maxillary sinus. Correlate clinically for symptoms of acute sinusitis.

## 2017-11-14 NOTE — CHART NOTE - NSCHARTNOTEFT_GEN_A_CORE
After speaking with Dr. Funk at 13:37 on 11/14/2017 following correspondence with GI regarding placement of PEG tube, it was determined that the patient is at high-risk for recurrence of CVA should the aspirin and plavix be held. Given this, neither treatment can be held at this time from a Neurologic perspective and thus the stroke team has no problem with the procedure to occur under these circumstances[place]  [id number]

## 2017-11-14 NOTE — PROGRESS NOTE ADULT - ASSESSMENT
57M with HTN, HLD, CAD/CABG, past stroke with residual left hemiparesis, presents with transient right hemiparesis and diplopia, and continuous headache and N/V. CT head shows old right basal ganglia lacune, and CTA shows severe bilateral MCA stenosis, proximal BA occlusion and distal stenosis. Brain MRI show right medullary and right cerebellar ischemic infarct. Impression: Severe intracranial atherosclerotic disease and occlusion of left VA, causing right  lateral medullary and right cerebellar ischemic infarcts. Now consulted for PEG tube placement.    1)Dysphagia  2) Right Medullary and cerebellar ischemic infarct  3) CAD/CABG    - patient was seen in endoscopy suite for peg tube placement. Patient is on dual antiplatelet therapy including ASA and plavix for his stroke. Discussed with the patient, in detail, regarding the risks of increased bleeding during peg tube placement. Patient wishes to hold off of the placement of the peg tube  - please hold plavix for 5 days  - will plan for peg tube placement on Monday  - supportive care as per primary team

## 2017-11-14 NOTE — PROGRESS NOTE ADULT - SUBJECTIVE AND OBJECTIVE BOX
Pre-Endoscopy Evaluation      Referring Physician:  Dr. Waldemar Handy                           Procedure: Egd/Peg placement    Indication for Procedure: Dysphagia    Pertinent History: 57 year old with hx HTN, HLD, CAD/CABG, past CVA with residual left hemiparesis, presents with transient right hemiparesis and diplopia, and continuous headache and N/V. CT head shows old right basal ganglia lacune, and CTA shows severe bilateral MCA stenosis, proximal BA occlusion and distal stenosis. Brain MRI show right medullary and right cerebellar ischemic infarct, now with failed MBS requiring PEG tube placement    Sedation by Anesthesia [X]    PAST MEDICAL & SURGICAL HISTORY:  CAD  CABG 2002  Stented coronary artery 2006  CVA (cerebral vascular accident)  HTN (hypertension)  HLD  No significant past surgical history      PMH of Gastroparesis [ ]  Gastric Surgery [ ]  Gastric Outlet Obstruction [ ]    Allergies:    No Known Allergies    Intolerances:    Latex allergy: [ ] yes [X] no    Medications:MEDICATIONS  (STANDING):  aspirin  chewable 81 milliGRAM(s) Oral daily  atorvastatin 80 milliGRAM(s) Oral at bedtime  clopidogrel Tablet 75 milliGRAM(s) Oral daily  doxazosin 2 milliGRAM(s) Oral at bedtime  enoxaparin Injectable 40 milliGRAM(s) SubCutaneous daily  gabapentin 100 milliGRAM(s) Oral three times a day  hydrochlorothiazide 12.5 milliGRAM(s) Oral daily  influenza   Vaccine 0.5 milliLiter(s) IntraMuscular once  losartan 100 milliGRAM(s) Oral daily  sodium chloride 0.9%. 1000 milliLiter(s) (75 mL/Hr) IV Continuous <Continuous>    MEDICATIONS  (PRN):  acetaminophen   Tablet. 650 milliGRAM(s) Oral every 6 hours PRN Mild Pain (1 - 3)  acetaminophen  Suppository 650 milliGRAM(s) Rectal every 6 hours PRN For Temp greater than 38.5 C (101.3 F)  hydrALAZINE Injectable 10 milliGRAM(s) IV Push two times a day PRN BP > 180  simethicone 80 milliGRAM(s) Chew daily PRN bloating      Smoking: [ ] yes  [X] no    AICD/PPM: [ ] yes   [X] no    Pertinent lab data:                        14.6   5.7   )-----------( 211      ( 14 Nov 2017 03:18 )             44.0     11-14    141  |  103  |  26<H>  ----------------------------<  116<H>  3.7   |  25  |  1.13    Ca    9.4      14 Nov 2017 03:18    < from: Transthoracic Echocardiogram (11.10.17 @ 07:18) >  Patient name: MARCO A LOW  YOB: 1960   Age: 57 (M)   MR#: [id number]  Study Date: 11/10/2017  Location: [place] [address]  DSonographer: Carol Walter [facility]  Study quality: Technically good  Referring Physician: Waldemar Handy MD  Blood Pressure: 174/97 mmHg  Height: 175 cm  Weight: 86 kg  BSA: 2 m2  ------------------------------------------------------------------------  PROCEDURE: Transthoracic echocardiogram with 2-D, M-Mode  and complete spectral and color flow Doppler.  INDICATION: Cerebral infarction, unspecified (I63.9)  ------------------------------------------------------------------------  Dimensions:    Normal Values:  LA:     4.4    2.0 - 4.0 cm  Ao:     3.4    2.0 - 3.8 cm  SEPTUM: 1.3    0.6 - 1.2 cm  PWT:    1.2  0.6 - 1.1 cm  LVIDd:  4.5    3.0 - 5.6 cm  LVIDs:  3.6    1.8 - 4.0 cm  Derived variables:  LVMI: 105 g/m2  RWT: 0.53  Fractional short: 20 %  EF (Boogie): 41 %  ------------------------------------------------------------------------  Observations:  Mitral Valve: Mitral annular calcification, otherwise  normal mitral valve. Mild-moderate mitral regurgitation.  Aortic Valve/Aorta: Calcified trileaflet aortic valve with  normal opening.  Aortic Root: 3.4 cm.  Left Atrium: Normal left atrium.LA volume index = 21  cc/m2.  Left Ventricle: Moderate global left ventricular systolic  dysfunction. Normal left ventricular internal dimensions  and wall thicknesses. Mild diastolic dysfunction (Stage I).  Right Heart: Normal right atrium. Normal right ventricular  size and function. Normal tricuspid valve. Mild tricuspid  regurgitation. Normal pulmonic valve.  Pericardium/Pleura: Normal pericardium with no pericardial  effusion.  Hemodynamic: Estimated right atrial pressure is 8 mm Hg.  Estimated right ventricular systolic pressure equals 24 mm  Hg, assuming right atrial pressure equals 8 mm Hg,  consistent with normal pulmonary pressures.  ------------------------------------------------------------------------  Conclusions:  1. Mitral annular calcification, otherwise normal mitral  valve. Mild-moderate mitral regurgitation.  2. Calcified trileaflet aortic valve with normal opening.  3. Moderate global left ventricular systolic dysfunction.  4. Mild diastolic dysfunction (Stage I).  5. Normal right ventricular size and function.  *** Compared with echocardiogram of 8/3/2016, no  significant changes noted.  ------------------------------------------------------------------------  Confirmed on  11/10/2017 - 13:41:30 by Brown Zamora MD  ------------------------------------------------------------------------      Physical Examination:      Daily   Vital Signs Last 24 Hrs  T(C): 36.9 (13 Nov 2017 18:00), Max: 36.9 (13 Nov 2017 18:00)  T(F): 98.5 (13 Nov 2017 18:00), Max: 98.5 (13 Nov 2017 18:00)  HR: 72 (14 Nov 2017 08:00) (69 - 95)  BP: 159/111 (14 Nov 2017 08:00) (137/86 - 169/92)  BP(mean): 125 (14 Nov 2017 08:00) (101 - 127)  RR: 9 (14 Nov 2017 08:00) (9 - 27)  SpO2: 99% (14 Nov 2017 08:00) (95% - 100%)    Constitutional: NAD     Neck:  No JVD    Respiratory: CTAB/L    Cardiovascular: S1 and S2    Gastrointestinal: BS+, soft, NT/ND    Extremities: No peripheral edema    Neurological:    : No Wu    Skin: No rashes    Comments:    ASA Class: I [ ]  II [ ]  III [ ]  IV [x]    The patient is a suitable candidate for the planned procedure unless box checked [ ]  No, explain:

## 2017-11-15 LAB
ANION GAP SERPL CALC-SCNC: 15 MMOL/L — SIGNIFICANT CHANGE UP (ref 5–17)
APTT BLD: 44.8 SEC — HIGH (ref 27.5–37.4)
BUN SERPL-MCNC: 27 MG/DL — HIGH (ref 7–23)
CALCIUM SERPL-MCNC: 9.6 MG/DL — SIGNIFICANT CHANGE UP (ref 8.4–10.5)
CHLORIDE SERPL-SCNC: 102 MMOL/L — SIGNIFICANT CHANGE UP (ref 96–108)
CO2 SERPL-SCNC: 24 MMOL/L — SIGNIFICANT CHANGE UP (ref 22–31)
CREAT SERPL-MCNC: 1.05 MG/DL — SIGNIFICANT CHANGE UP (ref 0.5–1.3)
CULTURE RESULTS: SIGNIFICANT CHANGE UP
CULTURE RESULTS: SIGNIFICANT CHANGE UP
GLUCOSE SERPL-MCNC: 126 MG/DL — HIGH (ref 70–99)
HCT VFR BLD CALC: 46.5 % — SIGNIFICANT CHANGE UP (ref 39–50)
HGB BLD-MCNC: 15.5 G/DL — SIGNIFICANT CHANGE UP (ref 13–17)
INR BLD: 1.16 RATIO — SIGNIFICANT CHANGE UP (ref 0.88–1.16)
MCHC RBC-ENTMCNC: 26.9 PG — LOW (ref 27–34)
MCHC RBC-ENTMCNC: 33.4 GM/DL — SIGNIFICANT CHANGE UP (ref 32–36)
MCV RBC AUTO: 80.4 FL — SIGNIFICANT CHANGE UP (ref 80–100)
PLATELET # BLD AUTO: 224 K/UL — SIGNIFICANT CHANGE UP (ref 150–400)
POTASSIUM SERPL-MCNC: 3.7 MMOL/L — SIGNIFICANT CHANGE UP (ref 3.5–5.3)
POTASSIUM SERPL-SCNC: 3.7 MMOL/L — SIGNIFICANT CHANGE UP (ref 3.5–5.3)
PROTHROM AB SERPL-ACNC: 12.7 SEC — SIGNIFICANT CHANGE UP (ref 9.8–12.7)
RBC # BLD: 5.78 M/UL — SIGNIFICANT CHANGE UP (ref 4.2–5.8)
RBC # FLD: 14.8 % — HIGH (ref 10.3–14.5)
SODIUM SERPL-SCNC: 141 MMOL/L — SIGNIFICANT CHANGE UP (ref 135–145)
SPECIMEN SOURCE: SIGNIFICANT CHANGE UP
SPECIMEN SOURCE: SIGNIFICANT CHANGE UP
WBC # BLD: 6 K/UL — SIGNIFICANT CHANGE UP (ref 3.8–10.5)
WBC # FLD AUTO: 6 K/UL — SIGNIFICANT CHANGE UP (ref 3.8–10.5)

## 2017-11-15 PROCEDURE — 43246 EGD PLACE GASTROSTOMY TUBE: CPT | Mod: GC

## 2017-11-15 RX ORDER — GABAPENTIN 400 MG/1
200 CAPSULE ORAL
Qty: 0 | Refills: 0 | Status: DISCONTINUED | OUTPATIENT
Start: 2017-11-15 | End: 2017-11-16

## 2017-11-15 RX ORDER — ACETAMINOPHEN 500 MG
650 TABLET ORAL EVERY 6 HOURS
Qty: 0 | Refills: 0 | Status: DISCONTINUED | OUTPATIENT
Start: 2017-11-15 | End: 2017-12-07

## 2017-11-15 RX ORDER — SODIUM CHLORIDE 9 MG/ML
1000 INJECTION INTRAMUSCULAR; INTRAVENOUS; SUBCUTANEOUS
Qty: 0 | Refills: 0 | Status: DISCONTINUED | OUTPATIENT
Start: 2017-11-15 | End: 2017-11-16

## 2017-11-15 RX ORDER — ATORVASTATIN CALCIUM 80 MG/1
80 TABLET, FILM COATED ORAL AT BEDTIME
Qty: 0 | Refills: 0 | Status: DISCONTINUED | OUTPATIENT
Start: 2017-11-15 | End: 2017-12-11

## 2017-11-15 RX ORDER — CLOPIDOGREL BISULFATE 75 MG/1
75 TABLET, FILM COATED ORAL DAILY
Qty: 0 | Refills: 0 | Status: DISCONTINUED | OUTPATIENT
Start: 2017-11-15 | End: 2017-11-22

## 2017-11-15 RX ORDER — LOSARTAN POTASSIUM 100 MG/1
100 TABLET, FILM COATED ORAL DAILY
Qty: 0 | Refills: 0 | Status: DISCONTINUED | OUTPATIENT
Start: 2017-11-15 | End: 2017-12-11

## 2017-11-15 RX ORDER — SIMETHICONE 80 MG/1
80 TABLET, CHEWABLE ORAL DAILY
Qty: 0 | Refills: 0 | Status: DISCONTINUED | OUTPATIENT
Start: 2017-11-15 | End: 2017-12-07

## 2017-11-15 RX ORDER — GABAPENTIN 400 MG/1
200 CAPSULE ORAL THREE TIMES A DAY
Qty: 0 | Refills: 0 | Status: DISCONTINUED | OUTPATIENT
Start: 2017-11-15 | End: 2017-11-15

## 2017-11-15 RX ORDER — DOXAZOSIN MESYLATE 4 MG
2 TABLET ORAL AT BEDTIME
Qty: 0 | Refills: 0 | Status: DISCONTINUED | OUTPATIENT
Start: 2017-11-15 | End: 2017-12-11

## 2017-11-15 RX ORDER — ASPIRIN/CALCIUM CARB/MAGNESIUM 324 MG
81 TABLET ORAL DAILY
Qty: 0 | Refills: 0 | Status: DISCONTINUED | OUTPATIENT
Start: 2017-11-15 | End: 2017-12-07

## 2017-11-15 RX ADMIN — Medication 2 MILLIGRAM(S): at 22:28

## 2017-11-15 RX ADMIN — CLOPIDOGREL BISULFATE 75 MILLIGRAM(S): 75 TABLET, FILM COATED ORAL at 18:26

## 2017-11-15 RX ADMIN — SODIUM CHLORIDE 75 MILLILITER(S): 9 INJECTION INTRAMUSCULAR; INTRAVENOUS; SUBCUTANEOUS at 06:09

## 2017-11-15 RX ADMIN — Medication 81 MILLIGRAM(S): at 18:26

## 2017-11-15 RX ADMIN — GABAPENTIN 200 MILLIGRAM(S): 400 CAPSULE ORAL at 22:31

## 2017-11-15 RX ADMIN — ENOXAPARIN SODIUM 40 MILLIGRAM(S): 100 INJECTION SUBCUTANEOUS at 18:27

## 2017-11-15 RX ADMIN — SODIUM CHLORIDE 75 MILLILITER(S): 9 INJECTION INTRAMUSCULAR; INTRAVENOUS; SUBCUTANEOUS at 22:27

## 2017-11-15 RX ADMIN — Medication 650 MILLIGRAM(S): at 19:20

## 2017-11-15 RX ADMIN — Medication 650 MILLIGRAM(S): at 18:27

## 2017-11-15 RX ADMIN — ATORVASTATIN CALCIUM 80 MILLIGRAM(S): 80 TABLET, FILM COATED ORAL at 22:27

## 2017-11-15 RX ADMIN — LOSARTAN POTASSIUM 100 MILLIGRAM(S): 100 TABLET, FILM COATED ORAL at 18:27

## 2017-11-15 NOTE — PROGRESS NOTE ADULT - ASSESSMENT
ASSESSMENT: 57M with HTN, HLD, CAD/CABG, past stroke with residual left hemiparesis, presents with transient right hemiparesis and diplopia, and continuous headache and N/V. CT head shows old right basal ganglia lacune, and CTA shows severe bilateral MCA stenosis, proximal BA occlusion and distal stenosis. Brain MRI show right medullary and right cerebellar ischemic infarct. Impression: Severe intracranial atherosclerotic disease and occlusion of left VA, causing right  lateral medullary and right cerebellar ischemic infarcts.    NEURO: neurologically without acute change, continue close monitoring for neurologic deterioration, permissive HTN with slow titration to normotensive, ASA/Plavix and statin  for secondary stroke prevention,  titrate statin to LDL goal less than 70,  Physical therapy/OT eval with recommendations for AR, pt uninsured, will provide daily PT/OT to optimize treatment.     ANTITHROMBOTIC THERAPY: Continue ASA and Plavix as PO access obtained for secondary stroke prevention    PULMONARY: CXR (11/09) clear, protecting airway, saturating well     CARDIOVASCULAR: TTE: ef 41%, mitral annular calcification, mild-moderate MR, global LV systolic dysfunction, mild stage 1diastolic dysfunction mild TR,  cardiac monitoring  no events, cardio consult appreciated: add metoprolol as tolerated                 GASTROINTESTINAL: Dysphagia: per MBS maintain NPO, abd Xray: Nonobstructive bowel gas pattern. discussed with pt risks/benefits alternatives re: need for PEG to provide meds/nutrition, he is aware of increased risk of bleeding should he remain on DAPT and increased risk of stroke should we hold DAPT, risks/benefit discussed, consents to PEG.     Diet: NPO-NGT    RENAL: BUN/Cr without acute change,- provide hydration, good urine output, renal US: No hydronephrosis, haro for retention.      Na Goal: Greater than 135     Haro: y    HEMATOLOGY: H/H without acute change, Platelets 224      DVT ppx: LMWH    ID: febrile on 11/09 (Tmax 39.3), afebrile in am, no leukocytosis, blood/urine cultures NGTD, continue to monitor.    DISPOSITION: D/C to AR as per PT/OT eval once stable and workup is complete, Pt uninsured, SW aware, Daily PT/OT therapy to optimize therapy    CORE MEASURES:        Admission NIHSS: 3     TPA:  NO      LDL/HDL: 171/57     Depression Screen: 0     Statin Therapy: Y     Dysphagia Screen: FAIL     Smoking  NO      Afib NO     Stroke Education YES

## 2017-11-15 NOTE — CHART NOTE - NSCHARTNOTEFT_GEN_A_CORE
The patient was explained the increased risk of bleeding during or after the PEG-tube placement, since neurology attending Dr. Handy judged that neither aspirin or plavix can be stopped.  Possible consequences, including hypotension, worsening or recurrent stroke, need for transfusion of blood products and ICU admission were explained. He understood and agreed to the procedure.    Souleymane Chung MD  GI attending The patient was explained the increased risk of bleeding during or after the PEG-tube placement, since neurology attending Dr. Handy judged that neither aspirin or plavix can be stopped.  Possible consequences, including hypotension, worsening or recurrent stroke, need for transfusion of blood products, ICU admission, and surgery were explained. He understood and agreed to the procedure.    Souleymane Chung MD  GI attending

## 2017-11-15 NOTE — PROGRESS NOTE ADULT - SUBJECTIVE AND OBJECTIVE BOX
THE PATIENT WAS SEEN AND EXAMINED BY ME WITH THE HOUSESTAFF AND STROKE TEAM DURING MORNING ROUNDS.   HPI:  58 y/o R-handed [place] man PMH CVA with residual L-sided weakness, CAD s/p CABG, HTN presents as stroke code for dizziness. Pt at baseline ambulates independently and independent in ADLs. Pt was in usual state of health on 11/07 at 9AM. At around 9:30AM, he began to complain of lightheadedness not associated with changes in position. Half an hour later, he began to complain of n/v. He also endorses HA, diplopia/blurry vision, R-sided weakness that began a few days prior to admission. No c/o f/c/CP/SOB. He reports he ran out of his BP meds a week prior to admission and has been unable to refill his medications.      SUBJECTIVE: No events overnight.  No new neurologic complaints.      acetaminophen   Tablet. 650 milliGRAM(s) Oral every 6 hours PRN  acetaminophen  Suppository 650 milliGRAM(s) Rectal every 6 hours PRN  aspirin  chewable 81 milliGRAM(s) Oral daily  atorvastatin 80 milliGRAM(s) Oral at bedtime  clopidogrel Tablet 75 milliGRAM(s) Oral daily  doxazosin 2 milliGRAM(s) Oral at bedtime  enoxaparin Injectable 40 milliGRAM(s) SubCutaneous daily  gabapentin 200 milliGRAM(s) Oral three times a day  hydrALAZINE Injectable 10 milliGRAM(s) IV Push two times a day PRN  hydrochlorothiazide 12.5 milliGRAM(s) Oral daily  influenza   Vaccine 0.5 milliLiter(s) IntraMuscular once  losartan 100 milliGRAM(s) Oral daily  simethicone 80 milliGRAM(s) Chew daily PRN  sodium chloride 0.9%. 1000 milliLiter(s) IV Continuous <Continuous>  sodium chloride 0.9%. 1000 milliLiter(s) IV Continuous <Continuous>      PHYSICAL EXAM:   Vital Signs Last 24 Hrs  T(C): 36.8 (15 Nov 2017 08:00), Max: 36.8 (15 Nov 2017 08:00)  T(F): 98.3 (15 Nov 2017 08:00), Max: 98.3 (15 Nov 2017 08:00)  HR: 89 (15 Nov 2017 10:00) (76 - 115)  BP: 157/100 (15 Nov 2017 10:00) (125/98 - 189/118)  BP(mean): 117 (15 Nov 2017 10:00) (96 - 139)  RR: 23 (15 Nov 2017 10:00) (7 - 23)  SpO2: 97% (15 Nov 2017 10:00) (93% - 98%)    General: No acute distress  HEENT: EOM intact, visual fields full  Abdomen: Soft, nontender, nondistended   Extremities: No edema    NEUROLOGICAL EXAM:  Mental status: Awake, alert, oriented x3, fluent speech, no neglect, able to follow commands  Cranial Nerves: left facial droop, EOMI, VFF, no nystagmus, dysarthria,   Motor exam: Normal tone, no drift, RUE 5/5, RLE 5/5, LUE 4+/5, LLE 5-/5.  Sensation: Intact to light touch   Coordination/ Gait: LUE moderate-severe dysmetria    LABS:                        15.5   6.0   )-----------( 224      ( 15 Nov 2017 02:07 )             46.5    11-15    141  |  102  |  27<H>  ----------------------------<  126<H>  3.7   |  24  |  1.05    Ca    9.6      15 Nov 2017 02:07    PT/INR - ( 15 Nov 2017 02:07 )   PT: 12.7 sec;   INR: 1.16 ratio         PTT - ( 15 Nov 2017 02:07 )  PTT:44.8 sec  Hemoglobin A1C, Whole Blood: 5.8 % (11-08 @ 10:36)      IMAGING: Reviewed by me.   Head CT/CTA head/Neck (11/07) Right basal ganglia and corona radiata infarct without significant change since 6/16/2017. There is severe steno-occlusive disease intracranially involving the right supraclinoid internal carotid artery, A1 and M1 branches, and severe narrowing of the left M1 segment of the middle cerebral artery. The distal right vertebral artery is quite small and the proximal basilar artery is not visualized. The dominant left vertebral artery ends at the PICA. Distal basilar flow appears to be due to retrograde flow from the right posterior communicating artery.  Head CT (11/07) No acute intracranial hemorrhage, mass effect, or evidence of acute territorial infarct. Chronic right corona radiata/basal ganglia and right inferior cerebellar hemisphere infarcts.  Brain MRI (11/09) In comparison the prior MRI, there is new hyperintense T2 and FLAIR signal with faint increased restricted diffusion in the right medulla extending to the inferior right brachium pontis and inferior medial right cerebellar hemisphere, new compared to 6/17/2017 consistent with evolving area of  ischemic change without hemorrhagic transformation. Redemonstration of volume loss with multiple additional areas of lacunar infarction including chronic infarcts in the right cerebellar hemisphere and right corona radiata. Decreased visualization of the flow-void within the right vertebral artery with continued irregular isointense T1 signal throughout the basilar artery. Bilateral sinus disease with bubbly secretions and air-fluid levels, greatest in the right maxillary sinus. Correlate clinically for symptoms of acute sinusitis.

## 2017-11-16 LAB
ANION GAP SERPL CALC-SCNC: 13 MMOL/L — SIGNIFICANT CHANGE UP (ref 5–17)
BUN SERPL-MCNC: 25 MG/DL — HIGH (ref 7–23)
CALCIUM SERPL-MCNC: 9 MG/DL — SIGNIFICANT CHANGE UP (ref 8.4–10.5)
CHLORIDE SERPL-SCNC: 103 MMOL/L — SIGNIFICANT CHANGE UP (ref 96–108)
CO2 SERPL-SCNC: 24 MMOL/L — SIGNIFICANT CHANGE UP (ref 22–31)
CREAT SERPL-MCNC: 1 MG/DL — SIGNIFICANT CHANGE UP (ref 0.5–1.3)
GLUCOSE SERPL-MCNC: 111 MG/DL — HIGH (ref 70–99)
HCT VFR BLD CALC: 43.1 % — SIGNIFICANT CHANGE UP (ref 39–50)
HGB BLD-MCNC: 14.4 G/DL — SIGNIFICANT CHANGE UP (ref 13–17)
MCHC RBC-ENTMCNC: 26.9 PG — LOW (ref 27–34)
MCHC RBC-ENTMCNC: 33.4 GM/DL — SIGNIFICANT CHANGE UP (ref 32–36)
MCV RBC AUTO: 80.4 FL — SIGNIFICANT CHANGE UP (ref 80–100)
PLATELET # BLD AUTO: 211 K/UL — SIGNIFICANT CHANGE UP (ref 150–400)
POTASSIUM SERPL-MCNC: 3.6 MMOL/L — SIGNIFICANT CHANGE UP (ref 3.5–5.3)
POTASSIUM SERPL-SCNC: 3.6 MMOL/L — SIGNIFICANT CHANGE UP (ref 3.5–5.3)
RBC # BLD: 5.36 M/UL — SIGNIFICANT CHANGE UP (ref 4.2–5.8)
RBC # FLD: 14.7 % — HIGH (ref 10.3–14.5)
SODIUM SERPL-SCNC: 140 MMOL/L — SIGNIFICANT CHANGE UP (ref 135–145)
WBC # BLD: 8.4 K/UL — SIGNIFICANT CHANGE UP (ref 3.8–10.5)
WBC # FLD AUTO: 8.4 K/UL — SIGNIFICANT CHANGE UP (ref 3.8–10.5)

## 2017-11-16 PROCEDURE — 99253 IP/OBS CNSLTJ NEW/EST LOW 45: CPT | Mod: GC

## 2017-11-16 PROCEDURE — 99232 SBSQ HOSP IP/OBS MODERATE 35: CPT | Mod: GC

## 2017-11-16 RX ORDER — GABAPENTIN 400 MG/1
300 CAPSULE ORAL THREE TIMES A DAY
Qty: 0 | Refills: 0 | Status: DISCONTINUED | OUTPATIENT
Start: 2017-11-16 | End: 2017-11-19

## 2017-11-16 RX ADMIN — CLOPIDOGREL BISULFATE 75 MILLIGRAM(S): 75 TABLET, FILM COATED ORAL at 12:32

## 2017-11-16 RX ADMIN — GABAPENTIN 200 MILLIGRAM(S): 400 CAPSULE ORAL at 05:26

## 2017-11-16 RX ADMIN — Medication 2 MILLIGRAM(S): at 21:12

## 2017-11-16 RX ADMIN — GABAPENTIN 300 MILLIGRAM(S): 400 CAPSULE ORAL at 21:12

## 2017-11-16 RX ADMIN — Medication 200 MILLIGRAM(S): at 23:24

## 2017-11-16 RX ADMIN — ATORVASTATIN CALCIUM 80 MILLIGRAM(S): 80 TABLET, FILM COATED ORAL at 21:12

## 2017-11-16 RX ADMIN — LOSARTAN POTASSIUM 100 MILLIGRAM(S): 100 TABLET, FILM COATED ORAL at 05:26

## 2017-11-16 RX ADMIN — Medication 12.5 MILLIGRAM(S): at 05:25

## 2017-11-16 RX ADMIN — Medication 81 MILLIGRAM(S): at 12:32

## 2017-11-16 RX ADMIN — ENOXAPARIN SODIUM 40 MILLIGRAM(S): 100 INJECTION SUBCUTANEOUS at 12:32

## 2017-11-16 RX ADMIN — GABAPENTIN 200 MILLIGRAM(S): 400 CAPSULE ORAL at 12:32

## 2017-11-16 NOTE — CONSULT NOTE ADULT - SUBJECTIVE AND OBJECTIVE BOX
HPI:  57M PMHx HTN, HLD, CAD s/p stent, CVA with residual left hemiparesis presented to [facility] 11/7 with HA, dizziness, right sided weakness. Head CT showed chronic right basal ganglia and corona radiata infarcts. CTA of the head and neck showed severe stenosis bilaterally in the MCA distribution. Follow-up MRI revealed new ischemic changes in the right medulla and inferior right hemisphere. He was started on ASA, Plavix and Lipitor. He failed bedside swallow and MBS performed 11/11. PEG tube was placed by GI 11/15. ECHO was unchanged from previous report in June showing moderate left systolic dysfunction and mild diastolic dysfunction.       REVIEW OF SYSTEMS  Constitutional - No fever, No fatigue  HEENT - No visual disturbances, No difficulty hearing,  No neck pain  Respiratory - No cough, No wheezing, No shortness of breath  Cardiovascular - No chest pain, No palpitations  Gastrointestinal - No abdominal pain, No nausea, No vomiting, No diarrhea, No constipation  Genitourinary - No dysuria, No frequency, No hematuria, No incontinence  Neurological - No headaches, No loss of strength, No numbness  Skin - No itching, No rashes  Endocrine - No temperature intolerance  Musculoskeletal - No joint pain, No joint swelling, No muscle pain  Psychiatric - No depression, No anxiety  All other review of systems negative    PAST MEDICAL & SURGICAL HISTORY  CVA (cerebral vascular accident) with left residual hemiparesis  HTN (hypertension)  HLD  CAD s/p stent  CHF (systolic and diastolic)    SOCIAL HISTORY  Smoking - Denied  EtOH - Denied   Drugs - Denied    FUNCTIONAL HISTORY  Right hand dominant  Lives with 2 roommates in a private house with 13 SHEILA +1 HR and 1 flight STI +1 HR  Independent in ambulation, uses cane at times. Independent in ADL's, transfers prior to hospitalization  Works as a       CURRENT FUNCTIONAL STATUS  Bed mobility - contact guard  Transfers - contact guard/min assist  Gait - Amb 40' x 2 RW contact guard/min assist  ADL's - max assist UE and LE dressing    FAMILY HISTORY   Reviewed and non-contributory    ALLERGIES  No Known Allergies    VITALS  T(C): 36.9 (16 Nov 2017 08:00), Max: 37.2 (15 Nov 2017 20:00)  T(F): 98.5 (16 Nov 2017 08:00), Max: 99 (15 Nov 2017 20:00)  HR: 98 (16 Nov 2017 10:00) (71 - 98)  BP: 142/91 (16 Nov 2017 08:00) (133/84 - 174/96)  RR: 16 (16 Nov 2017 10:00) (13 - 20)  SpO2: 98% (16 Nov 2017 10:00) (95% - 100%)      PHYSICAL EXAM  Constitutional - NAD, Comfortable  HEENT - NCAT, EOMI  Neck - Supple  Chest - CTA bilaterally  Cardiovascular - RRR, S1S2  Abdomen - BS+, Soft, NTND  Extremities - No C/C/E, No calf tenderness   Neurologic Exam -                    Cognitive - Awake, Alert, AAO to self, place, date, year, situation     Communication - Fluent, No dysarthria     Attention - Intact, able to recite days of week backwards     Memory - Intact, able to recall 3/3 items after 3 minutes     Cranial Nerves - CN 2-12 grossly intact     Motor -                     LEFT    UE - ShAB 5/5, EF 5/5, EE 5/5, WE 5/5,  5/5                    RIGHT UE - ShAB 5/5, EF 5/5, EE 5/5, WE 5/5,  5/5                    LEFT    LE - HF 5/5, KE 5/5, DF 5/5, PF 5/5                    RIGHT LE - HF 5/5, KE 5/5, DF 5/5, PF 5/5        Sensory - Intact to light touch     Reflexes - DTR intact and symmetrical, Negative Zhu's bilaterally, Negative Babinski's bilaterally      Coordination - Finger-to-nose intact bilaterally   Psychiatric - Affect WNL    RECENT LABS/IMAGING                        14.4   8.4   )-----------( 211      ( 16 Nov 2017 04:50 )             43.1     11-16    140  |  103  |  25<H>  ----------------------------<  111<H>  3.6   |  24  |  1.00    Ca    9.0      16 Nov 2017 04:50      PT/INR - ( 15 Nov 2017 02:07 )   PT: 12.7 sec;   INR: 1.16 ratio         PTT - ( 15 Nov 2017 02:07 )  PTT:44.8 sec      HbA1C - 5.8  CHL - 241  Tri - 67  HDL - 57  LDL - 171    MEDICATIONS   MEDICATIONS  (STANDING):  aspirin  chewable 81 milliGRAM(s) Oral daily  atorvastatin 80 milliGRAM(s) Oral at bedtime  clopidogrel Tablet 75 milliGRAM(s) Oral daily  doxazosin 2 milliGRAM(s) Oral at bedtime  enoxaparin Injectable 40 milliGRAM(s) SubCutaneous daily  gabapentin   Solution 200 milliGRAM(s) Oral four times a day  hydrochlorothiazide 12.5 milliGRAM(s) Oral daily  losartan 100 milliGRAM(s) Oral daily  sodium chloride 0.9%. 1000 milliLiter(s) (75 mL/Hr) IV Continuous <Continuous>    MEDICATIONS  (PRN):  acetaminophen   Tablet. 650 milliGRAM(s) Oral every 6 hours PRN Mild Pain (1 - 3)  acetaminophen  Suppository 650 milliGRAM(s) Rectal every 6 hours PRN For Temp greater than 38.5 C (101.3 F)  hydrALAZINE Injectable 10 milliGRAM(s) IV Push two times a day PRN BP > 180  simethicone 80 milliGRAM(s) Chew daily PRN bloating      ASSESSMENT/PLAN  58 y/o male with left hemiparesis secondary to prior CVA now s/p new ischemic infarcts in the right medulla and inferior right hemisphere with dysphagia, functional, gait, ADL impairments.    Disposition - Recommend acute rehab. Can tolerate 3hrs PT/OT/SLP  PT - ROM, Bed mobility, Transfers, Ambulation with assistive device  OT - ADLs, ROM  SLP - Dysphagia eval and treat  Precautions - Falls, Cardiac  DVT Prophylaxis - Lovenox  Weight bearing status - no restriction  Skin - Turn Q2hrs  Diet - NPO (PEG placed yesterday) HPI:  57M PMHx HTN, HLD, CAD s/p stent, CVA with residual left hemiparesis presented to [facility] 11/7 with HA, dizziness, right sided weakness. Head CT showed chronic right basal ganglia and corona radiata infarcts. CTA of the head and neck showed severe stenosis bilaterally in the MCA distribution. Follow-up MRI revealed new ischemic changes in the right medulla and inferior right hemisphere. He was started on ASA, Plavix and Lipitor. He failed bedside swallow and MBS performed 11/11. PEG tube was placed by GI 11/15. ECHO was unchanged from previous report in June showing moderate left systolic dysfunction and mild diastolic dysfunction. Haro placed for urinary retention.      REVIEW OF SYSTEMS  Constitutional - No fever, +Tired  HEENT - No visual disturbances, No difficulty hearing,  No neck pain  Respiratory - +Wet cough, No wheezing, No shortness of breath  Cardiovascular - No chest pain, No palpitations  Gastrointestinal - No abdominal pain, No nausea, No vomiting, No diarrhea, No constipation, +Hiccups  Genitourinary - Retention with haro intact  Neurological - No headaches, Left hemiparesis worse from baseline  Skin - No itching, No rashes  Endocrine - No temperature intolerance  Musculoskeletal - No joint pain, No joint swelling, No muscle pain  Psychiatric - No depression, No anxiety  All other review of systems negative    PAST MEDICAL & SURGICAL HISTORY  CVA (cerebral vascular accident) with left residual hemiparesis  HTN (hypertension)  HLD  CAD s/p stent  CHF (systolic and diastolic)    SOCIAL HISTORY  Smoking - Denied  EtOH - Denied   Drugs - Denied    FUNCTIONAL HISTORY  Right hand dominant  Lives with 2 roommates in a private house with 13 SHEILA +1 HR and 1 flight STI +1 HR  Independent in ambulation. Independent in ADL's, transfers prior to hospitalization  Works as a       CURRENT FUNCTIONAL STATUS  Bed mobility - contact guard  Transfers - contact guard/min assist  Gait - Amb 40' x 2 RW contact guard/min assist  ADL's - max assist UE and LE dressing    FAMILY HISTORY   Reviewed and non-contributory    ALLERGIES  No Known Allergies    VITALS  T(C): 36.9 (16 Nov 2017 08:00), Max: 37.2 (15 Nov 2017 20:00)  T(F): 98.5 (16 Nov 2017 08:00), Max: 99 (15 Nov 2017 20:00)  HR: 98 (16 Nov 2017 10:00) (71 - 98)  BP: 142/91 (16 Nov 2017 08:00) (133/84 - 174/96)  RR: 16 (16 Nov 2017 10:00) (13 - 20)  SpO2: 98% (16 Nov 2017 10:00) (95% - 100%)      PHYSICAL EXAM  Constitutional - NAD, Comfortable in chair  HEENT - NCAT, EOMI  Neck - Supple  Chest - CTA bilaterally  Cardiovascular - RRR, S1S2  Abdomen - BS+, Soft, NTND, +PEG, +Hiccups  Extremities - No C/C/E, No calf tenderness   Neurologic Exam -                    Cognitive - Awake, Alert, AAO to self, place, date, year, situation     Communication - Fluent, No dysarthria     Cranial Nerves - CN 2-12 grossly intact     Motor -                     LEFT    UE - ShAB 2/5, EF 2/5, EE 2/5, WE 2/5,  1/5                    RIGHT UE - ShAB 5/5, EF 5/5, EE 5/5, WE 5/5,  5/5                    LEFT    LE - HF 4/5, KE 5/5, DF 5/5, PF 5/5                    RIGHT LE - HF 5/5, KE 5/5, DF 5/5, PF 5/5        Sensory - Intact to light touch     Reflexes - DTR 3+ at left knee, 2+ left achilles 2+ right knee and achilles, Negative Zhu's bilaterally, Positive Babinski's on left     Coordination - Finger-to-nose intact bilaterally   Psychiatric - Affect WNL    RECENT LABS/IMAGING                        14.4   8.4   )-----------( 211      ( 16 Nov 2017 04:50 )             43.1     11-16    140  |  103  |  25<H>  ----------------------------<  111<H>  3.6   |  24  |  1.00    Ca    9.0      16 Nov 2017 04:50      PT/INR - ( 15 Nov 2017 02:07 )   PT: 12.7 sec;   INR: 1.16 ratio         PTT - ( 15 Nov 2017 02:07 )  PTT:44.8 sec      HbA1C - 5.8  CHL - 241  Tri - 67  HDL - 57  LDL - 171    MEDICATIONS   MEDICATIONS  (STANDING):  aspirin  chewable 81 milliGRAM(s) Oral daily  atorvastatin 80 milliGRAM(s) Oral at bedtime  clopidogrel Tablet 75 milliGRAM(s) Oral daily  doxazosin 2 milliGRAM(s) Oral at bedtime  enoxaparin Injectable 40 milliGRAM(s) SubCutaneous daily  gabapentin   Solution 200 milliGRAM(s) Oral four times a day  hydrochlorothiazide 12.5 milliGRAM(s) Oral daily  losartan 100 milliGRAM(s) Oral daily  sodium chloride 0.9%. 1000 milliLiter(s) (75 mL/Hr) IV Continuous <Continuous>    MEDICATIONS  (PRN):  acetaminophen   Tablet. 650 milliGRAM(s) Oral every 6 hours PRN Mild Pain (1 - 3)  acetaminophen  Suppository 650 milliGRAM(s) Rectal every 6 hours PRN For Temp greater than 38.5 C (101.3 F)  hydrALAZINE Injectable 10 milliGRAM(s) IV Push two times a day PRN BP > 180  simethicone 80 milliGRAM(s) Chew daily PRN bloating      ASSESSMENT/PLAN  58 y/o male with left hemiparesis secondary to prior CVA now s/p new ischemic infarcts in the right medulla and inferior right hemisphere with dysphagia, neurogenic bladder, functional, gait, ADL impairments.    Disposition - Recommend acute rehab. Can tolerate 3hrs PT/OT/SLP  PT - ROM, Bed mobility, Transfers, Ambulation with assistive device  OT - ADLs, ROM  SLP - Dysphagia eval and treat  Precautions - Falls, Cardiac  DVT Prophylaxis - Lovenox  Weight bearing status - no restriction  Skin - Turn Q2hrs  Diet - NPO (PEG placed yesterday)

## 2017-11-16 NOTE — PROGRESS NOTE ADULT - ASSESSMENT
ASSESSMENT: 57M with HTN, HLD, CAD/CABG, past stroke with residual left hemiparesis, presents with transient right hemiparesis and diplopia, and continuous headache and N/V. CT head shows old right basal ganglia lacune, and CTA shows severe bilateral MCA stenosis, proximal BA occlusion and distal stenosis. Brain MRI show right medullary and right cerebellar ischemic infarct. Impression: Severe intracranial atherosclerotic disease and occlusion of left VA, causing right  lateral medullary and right cerebellar ischemic infarcts.    NEURO: neurologically without acute change, continue close monitoring for neurologic deterioration, permissive HTN with slow titration to normotensive, ASA/Plavix and statin  for secondary stroke prevention,  titrate statin to LDL goal less than 70, pt with hiccups, will increase gabapentin to 300 TID, Physical therapy/OT eval with recommendations for AR, pt uninsured, will provide daily PT/OT to optimize treatment.     ANTITHROMBOTIC THERAPY: Continue ASA and Plavix obtained for secondary stroke prevention    PULMONARY: CXR (11/09) clear, protecting airway, saturating well     CARDIOVASCULAR: TTE: ef 41%, mitral annular calcification, mild-moderate MR, global LV systolic dysfunction, mild stage 1diastolic dysfunction mild TR,  cardiac monitoring  no events, cardio consult appreciated: add metoprolol as tolerated                 GASTROINTESTINAL: Dysphagia: per MBS maintain NPO, abd Xray: Nonobstructive bowel gas pattern. discussed with pt risks/benefits alternatives re: need for PEG to provide meds/nutrition, he is aware of increased risk of bleeding should he remain on DAPT and increased risk of stroke should we hold DAPT, risks/benefit discussed, consents to PEG.     Diet: NPO-NGT    RENAL: BUN/Cr without acute change,- BUN trending down, provide hydration, good urine output, renal US: No hydronephrosis, d/c haro and monitor void trial.      Na Goal: Greater than 135     Haro: D/C and monitor void    HEMATOLOGY: H/H, Platelets without acute change,       DVT ppx: LMWH    ID: febrile on 11/09 (Tmax 39.3), afebrile in am, no leukocytosis, blood/urine cultures NGTD, continue to monitor.    DISPOSITION: D/C to [facility] as per PT/OT eval once stable and workup is complete, Pt uninsured, SW aware, Daily PT/OT therapy to optimize therapy    CORE MEASURES:        Admission NIHSS: 3     TPA:  NO      LDL/HDL: 171/57     Depression Screen: 0     Statin Therapy: Y     Dysphagia Screen: FAIL     Smoking  NO      Afib NO     Stroke Education YES ASSESSMENT: 57M with HTN, HLD, CAD/CABG, past stroke with residual left hemiparesis, presents with transient right hemiparesis and diplopia, and continuous headache and N/V. CT head shows old right basal ganglia lacune, and CTA shows severe bilateral MCA stenosis, proximal BA occlusion and distal stenosis. Brain MRI show right medullary and right cerebellar ischemic infarct. Impression: Severe intracranial atherosclerotic disease and occlusion of left VA, causing right  lateral medullary and right cerebellar ischemic infarcts.    NEURO: neurologically without acute change, continue close monitoring for neurologic deterioration, permissive HTN with slow titration to normotensive, ASA/Plavix and statin  for secondary stroke prevention,  titrate statin to LDL goal less than 70, pt with hiccups, will increase gabapentin to 300 TID, Physical therapy/OT eval with recommendations for AR, pt uninsured, will provide daily PT/OT to optimize treatment.     ANTITHROMBOTIC THERAPY: Continue ASA and Plavix obtained for secondary stroke prevention    PULMONARY: CXR (11/09) clear, protecting airway, saturating well     CARDIOVASCULAR: TTE: EF 41%, mitral annular calcification, mild-moderate MR, global LV systolic dysfunction, mild stage 1diastolic dysfunction mild TR,  cardiac monitoring  no events, cardio consult appreciated: add metoprolol as tolerated                 GASTROINTESTINAL: Dysphagia: per MBS maintain NPO, abd Xray: Nonobstructive bowel gas pattern. discussed with pt risks/benefits alternatives re: need for PEG to provide meds/nutrition, he is aware of increased risk of bleeding should he remain on DAPT and increased risk of stroke should we hold DAPT, risks/benefit discussed, consents to PEG.     Diet: NPO-NGT    RENAL: BUN/Cr without acute change,- BUN trending down, provide hydration, good urine output, renal US: No hydronephrosis, d/c haro and monitor void trial. Continue Cardura for BPH     Na Goal: Greater than 135     Haro: D/C and monitor void    HEMATOLOGY: H/H, Platelets without acute change,       DVT ppx: LMWH    ID: febrile on 11/09 (Tmax 39.3), afebrile in am, no leukocytosis, blood/urine cultures NGTD, continue to monitor.    DISPOSITION: D/C to [facility] as per PT/OT eval once stable and workup is complete, Pt uninsured, SW aware, Daily PT/OT therapy to optimize therapy    CORE MEASURES:        Admission NIHSS: 3     TPA:  NO      LDL/HDL: 171/57     Depression Screen: 0     Statin Therapy: Y     Dysphagia Screen: FAIL     Smoking  NO      Afib NO     Stroke Education YES

## 2017-11-16 NOTE — PROGRESS NOTE ADULT - ASSESSMENT
57M with HTN, HLD, CAD/CABG, past stroke with residual left hemiparesis, presents with transient right hemiparesis and diplopia, and continuous headache and N/V. CT head shows old right basal ganglia lacune, and CTA shows severe bilateral MCA stenosis, proximal BA occlusion and distal stenosis. Brain MRI show right medullary and right cerebellar ischemic infarct. Impression: Severe intracranial atherosclerotic disease and occlusion of left VA, causing right  lateral medullary and right cerebellar ischemic infarcts. Now consulted for PEG tube placement.    1) Dysphagia  2) Right Medullary and cerebellar ischemic infarct  3) CAD/CABG    - s/p peg tube placement 57M with HTN, HLD, CAD/CABG, past stroke with residual left hemiparesis, presents with transient right hemiparesis and diplopia, and continuous headache and N/V. CT head shows old right basal ganglia lacune, and CTA shows severe bilateral MCA stenosis, proximal BA occlusion and distal stenosis. Brain MRI show right medullary and right cerebellar ischemic infarct. Impression: Severe intracranial atherosclerotic disease and occlusion of left VA, causing right  lateral medullary and right cerebellar ischemic infarcts. Now consulted for PEG tube placement.    1) Dysphagia  2) Right Medullary and cerebellar ischemic infarct  3) CAD/CABG    - s/p peg tube placement on 11/15, no bleeding at the site, bumper is freely moving and there is no leakage, discharge; okay to use peg tube for feeding  - supportive care as per primary team    GI team will signoff.  Please call us back with questions.  Thank you for the consult. 57M with HTN, HLD, CAD/CABG, past stroke with residual left hemiparesis, presents with transient right hemiparesis and diplopia, and continuous headache and N/V. CT head shows old right basal ganglia lacune, and CTA shows severe bilateral MCA stenosis, proximal BA occlusion and distal stenosis. Brain MRI show right medullary and right cerebellar ischemic infarct. Impression: Severe intracranial atherosclerotic disease and occlusion of left VA, causing right  lateral medullary and right cerebellar ischemic infarcts. Now consulted for PEG tube placement.    1) Dysphagia  2) Right Medullary and cerebellar ischemic infarct  3) CAD/CABG    - s/p peg tube placement on 11/15, no bleeding at the site, bumper is freely moving and there is no leakage, discharge; okay to use peg tube for feeding  - skin bumper of the PEG-tube is still kept somewhat tight on the skin on purpose to prevent bleeding - will loosen it in one or two days.     This has to be done before discharge, otherwise there is a risk of developing a buried bumper syndrome.

## 2017-11-16 NOTE — PROGRESS NOTE ADULT - SUBJECTIVE AND OBJECTIVE BOX
THE PATIENT WAS SEEN AND EXAMINED BY ME WITH THE HOUSESTAFF AND STROKE TEAM DURING MORNING ROUNDS.   HPI:  58 y/o R-handed Citizen of [place] man PMH CVA with residual L-sided weakness, CAD s/p CABG, HTN presents as stroke code for dizziness. Pt at baseline ambulates independently and independent in ADLs. Pt was in usual state of health on 11/07 at 9AM. At around 9:30AM, he began to complain of lightheadedness not associated with changes in position. Half an hour later, he began to complain of n/v. He also endorses HA, diplopia/blurry vision, R-sided weakness that began a few days prior to admission. No c/o f/c/CP/SOB. He reports he ran out of his BP meds a week prior to admission and has been unable to refill his medications.      SUBJECTIVE: No events overnight.  No new neurologic complaints.      acetaminophen   Tablet. 650 milliGRAM(s) Oral every 6 hours PRN  acetaminophen  Suppository 650 milliGRAM(s) Rectal every 6 hours PRN  aspirin  chewable 81 milliGRAM(s) Oral daily  atorvastatin 80 milliGRAM(s) Oral at bedtime  clopidogrel Tablet 75 milliGRAM(s) Oral daily  doxazosin 2 milliGRAM(s) Oral at bedtime  enoxaparin Injectable 40 milliGRAM(s) SubCutaneous daily  gabapentin   Solution 200 milliGRAM(s) Oral four times a day  hydrALAZINE Injectable 10 milliGRAM(s) IV Push two times a day PRN  hydrochlorothiazide 12.5 milliGRAM(s) Oral daily  influenza   Vaccine 0.5 milliLiter(s) IntraMuscular once  losartan 100 milliGRAM(s) Oral daily  simethicone 80 milliGRAM(s) Chew daily PRN  sodium chloride 0.9%. 1000 milliLiter(s) IV Continuous <Continuous>      PHYSICAL EXAM:   Vital Signs Last 24 Hrs  T(C): 36.9 (16 Nov 2017 08:00), Max: 37.2 (15 Nov 2017 20:00)  T(F): 98.5 (16 Nov 2017 08:00), Max: 99 (15 Nov 2017 20:00)  HR: 79 (16 Nov 2017 12:00) (71 - 98)  BP: 152/95 (16 Nov 2017 12:00) (133/84 - 174/96)  BP(mean): 113 (16 Nov 2017 12:00) (97 - 113)  RR: 15 (16 Nov 2017 12:00) (13 - 20)  SpO2: 99% (16 Nov 2017 12:00) (95% - 100%)    General: No acute distress  HEENT: EOM intact, visual fields full  Abdomen: Soft, nontender, nondistended   Extremities: No edema    NEUROLOGICAL EXAM:  Mental status: Awake, alert, oriented x3, fluent speech, no neglect, able to follow commands  Cranial Nerves: left facial droop, EOMI, VFF, no nystagmus, dysarthria,   Motor exam: Normal tone, no drift, RUE 5/5, RLE 5/5, LUE 4+/5, LLE 5-/5.  Sensation: Intact to light touch   Coordination/ Gait: LUE moderate-severe dysmetria  LABS:                        14.4   8.4   )-----------( 211      ( 16 Nov 2017 04:50 )             43.1    11-16    140  |  103  |  25<H>  ----------------------------<  111<H>  3.6   |  24  |  1.00    Ca    9.0      16 Nov 2017 04:50    PT/INR - ( 15 Nov 2017 02:07 )   PT: 12.7 sec;   INR: 1.16 ratio         PTT - ( 15 Nov 2017 02:07 )  PTT:44.8 sec  Hemoglobin A1C, Whole Blood: 5.8 % (11-08 @ 10:36)      IMAGING: Reviewed by me.     Head CT/CTA head/Neck (11/07) Right basal ganglia and corona radiata infarct without significant change since 6/16/2017. There is severe steno-occlusive disease intracranially involving the right supraclinoid internal carotid artery, A1 and M1 branches, and severe narrowing of the left M1 segment of the middle cerebral artery. The distal right vertebral artery is quite small and the proximal basilar artery is not visualized. The dominant left vertebral artery ends at the PICA. Distal basilar flow appears to be due to retrograde flow from the right posterior communicating artery.  Head CT (11/07) No acute intracranial hemorrhage, mass effect, or evidence of acute territorial infarct. Chronic right corona radiata/basal ganglia and right inferior cerebellar hemisphere infarcts.  Brain MRI (11/09) In comparison the prior MRI, there is new hyperintense T2 and FLAIR signal with faint increased restricted diffusion in the right medulla extending to the inferior right brachium pontis and inferior medial right cerebellar hemisphere, new compared to 6/17/2017 consistent with evolving area of  ischemic change without hemorrhagic transformation. Redemonstration of volume loss with multiple additional areas of lacunar infarction including chronic infarcts in the right cerebellar hemisphere and right corona radiata. Decreased visualization of the flow-void within the right vertebral artery with continued irregular isointense T1 signal throughout the basilar artery. Bilateral sinus disease with bubbly secretions and air-fluid levels, greatest in the right maxillary sinus. Correlate clinically for symptoms of acute sinusitis.

## 2017-11-16 NOTE — PROGRESS NOTE ADULT - SUBJECTIVE AND OBJECTIVE BOX
Chief Complaint:  Patient is a 57y old  Male who presents with a chief complaint of stroke code (08 Nov 2017 08:50)      Interval Events:   Pt had a peg tube placed yest.     Allergies:  No Known Allergies      Hospital Medications:  acetaminophen   Tablet. 650 milliGRAM(s) Oral every 6 hours PRN  acetaminophen  Suppository 650 milliGRAM(s) Rectal every 6 hours PRN  aspirin  chewable 81 milliGRAM(s) Oral daily  atorvastatin 80 milliGRAM(s) Oral at bedtime  clopidogrel Tablet 75 milliGRAM(s) Oral daily  doxazosin 2 milliGRAM(s) Oral at bedtime  enoxaparin Injectable 40 milliGRAM(s) SubCutaneous daily  gabapentin   Solution 200 milliGRAM(s) Oral four times a day  hydrALAZINE Injectable 10 milliGRAM(s) IV Push two times a day PRN  hydrochlorothiazide 12.5 milliGRAM(s) Oral daily  influenza   Vaccine 0.5 milliLiter(s) IntraMuscular once  losartan 100 milliGRAM(s) Oral daily  simethicone 80 milliGRAM(s) Chew daily PRN  sodium chloride 0.9%. 1000 milliLiter(s) IV Continuous <Continuous>      PMHX/PSHX:  Stented coronary artery  CVA (cerebral vascular accident)  HTN (hypertension)  No significant past surgical history      Family history:  No pertinent family history in first degree relatives      ROS:     General:  No wt loss, fevers, chills, night sweats, fatigue,   Eyes:  Good vision, no reported pain  ENT:  No sore throat, pain, runny nose, dysphagia  CV:  No pain, palpitations, hypo/hypertension  Resp:  No dyspnea, cough, tachypnea, wheezing  GI:  See HPI  :  No pain, bleeding, incontinence, nocturia  Muscle:  No pain, weakness  Neuro:  No weakness, tingling, memory problems  Psych:  No fatigue, insomnia, mood problems, depression  Endocrine:  No polyuria, polydipsia, cold/heat intolerance  Heme:  No petechiae, ecchymosis, easy bruisability  Skin:  No rash, edema      PHYSICAL EXAM:     GENERAL:  Appears stated age, well-groomed, well-nourished, no distress  HEENT:  NC/AT,  conjunctivae clear, sclera -anicteric  CHEST:  Full & symmetric excursion, no increased effort, breath sounds clear  HEART:  Regular rhythm, S1, S2, no murmur/rub/S3/S4,  no edema  ABDOMEN:  Soft, non-tender, non-distended, normoactive bowel sounds,  no masses ,no hepato-splenomegaly,   EXTREMITIES:  no cyanosis,clubbing or edema  SKIN:  No rash/erythema/ecchymoses/petechiae/wounds/abscess/warm/dry  NEURO:  Alert, oriented    Vital Signs:  Vital Signs Last 24 Hrs  T(C): 36.9 (16 Nov 2017 04:00), Max: 37.2 (15 Nov 2017 20:00)  T(F): 98.4 (16 Nov 2017 04:00), Max: 99 (15 Nov 2017 20:00)  HR: 84 (16 Nov 2017 08:00) (71 - 90)  BP: 142/91 (16 Nov 2017 08:00) (133/84 - 174/96)  BP(mean): 107 (16 Nov 2017 08:00) (97 - 117)  RR: 15 (16 Nov 2017 08:00) (13 - 23)  SpO2: 100% (16 Nov 2017 08:00) (95% - 100%)  Daily     Daily     LABS:                        14.4   8.4   )-----------( 211      ( 16 Nov 2017 04:50 )             43.1     11-16    140  |  103  |  25<H>  ----------------------------<  111<H>  3.6   |  24  |  1.00    Ca    9.0      16 Nov 2017 04:50        PT/INR - ( 15 Nov 2017 02:07 )   PT: 12.7 sec;   INR: 1.16 ratio         PTT - ( 15 Nov 2017 02:07 )  PTT:44.8 sec        Imaging:    < from: Upper Endoscopy (11.15.17 @ 14:43) >  Impression:          - Medium-sized hiatus hernia.                       - Non-bleeding erosive gastropathy.                       - Portal hypertensive gastropathy.                       - Normal examined duodenum.                       - An externally removable PEG placement was successfully completed.                       - No specimens collected.  Recommendation:      - [facility] for [facility] care.                       - Please follow the post-PEG recommendations including: may use PEG tomorrow                        for feedings.    < end of copied text > Chief Complaint:  Patient is a 57y old  Male who presents with a chief complaint of stroke code (08 Nov 2017 08:50)      Interval Events:   Pt had a peg tube placed yest.   He tolerated the procedure well.  He feels well and does not have any pain at the site of the peg tube.     Allergies:  No Known Allergies      Hospital Medications:  acetaminophen   Tablet. 650 milliGRAM(s) Oral every 6 hours PRN  acetaminophen  Suppository 650 milliGRAM(s) Rectal every 6 hours PRN  aspirin  chewable 81 milliGRAM(s) Oral daily  atorvastatin 80 milliGRAM(s) Oral at bedtime  clopidogrel Tablet 75 milliGRAM(s) Oral daily  doxazosin 2 milliGRAM(s) Oral at bedtime  enoxaparin Injectable 40 milliGRAM(s) SubCutaneous daily  gabapentin   Solution 200 milliGRAM(s) Oral four times a day  hydrALAZINE Injectable 10 milliGRAM(s) IV Push two times a day PRN  hydrochlorothiazide 12.5 milliGRAM(s) Oral daily  influenza   Vaccine 0.5 milliLiter(s) IntraMuscular once  losartan 100 milliGRAM(s) Oral daily  simethicone 80 milliGRAM(s) Chew daily PRN  sodium chloride 0.9%. 1000 milliLiter(s) IV Continuous <Continuous>      PMHX/PSHX:  Stented coronary artery  CVA (cerebral vascular accident)  HTN (hypertension)  No significant past surgical history      Family history:  No pertinent family history in first degree relatives      ROS:     General:  No wt loss, fevers, chills, night sweats, fatigue,   Eyes:  Good vision, no reported pain  ENT:  No sore throat, pain, runny nose, dysphagia  CV:  No pain, palpitations, hypo/hypertension  Resp:  No dyspnea, cough, tachypnea, wheezing  GI:  See HPI  :  No pain, bleeding, incontinence, nocturia  Muscle:  No pain, weakness  Neuro:  No weakness, tingling, memory problems  Psych:  No fatigue, insomnia, mood problems, depression  Endocrine:  No polyuria, polydipsia, cold/heat intolerance  Heme:  No petechiae, ecchymosis, easy bruisability  Skin:  No rash, edema      PHYSICAL EXAM:     General: No acute distress  HEENT: EOM intact, visual fields full  Abdomen: Soft, nontender, nondistended   Extremities: No edema  NEUROLOGICAL EXAM:  Mental status: Awake, alert, oriented x3, fluent speech, no neglect, able to follow commands    Vital Signs:  Vital Signs Last 24 Hrs  T(C): 36.9 (16 Nov 2017 04:00), Max: 37.2 (15 Nov 2017 20:00)  T(F): 98.4 (16 Nov 2017 04:00), Max: 99 (15 Nov 2017 20:00)  HR: 84 (16 Nov 2017 08:00) (71 - 90)  BP: 142/91 (16 Nov 2017 08:00) (133/84 - 174/96)  BP(mean): 107 (16 Nov 2017 08:00) (97 - 117)  RR: 15 (16 Nov 2017 08:00) (13 - 23)  SpO2: 100% (16 Nov 2017 08:00) (95% - 100%)  Daily     Daily     LABS:                        14.4   8.4   )-----------( 211      ( 16 Nov 2017 04:50 )             43.1     11-16    140  |  103  |  25<H>  ----------------------------<  111<H>  3.6   |  24  |  1.00    Ca    9.0      16 Nov 2017 04:50        PT/INR - ( 15 Nov 2017 02:07 )   PT: 12.7 sec;   INR: 1.16 ratio         PTT - ( 15 Nov 2017 02:07 )  PTT:44.8 sec        Imaging:    < from: Upper Endoscopy (11.15.17 @ 14:43) >  Impression:          - Medium-sized hiatus hernia.                       - Non-bleeding erosive gastropathy.                       - Portal hypertensive gastropathy.                       - Normal examined duodenum.                       - An externally removable PEG placement was successfully completed.                       - No specimens collected.  Recommendation:      - [facility] to [facility] for ongoing care.                       - Please follow the post-PEG recommendations including: may use PEG tomorrow                        for feedings.    < end of copied text >

## 2017-11-17 LAB
ANION GAP SERPL CALC-SCNC: 17 MMOL/L — SIGNIFICANT CHANGE UP (ref 5–17)
APPEARANCE UR: CLEAR — SIGNIFICANT CHANGE UP
BILIRUB UR-MCNC: NEGATIVE — SIGNIFICANT CHANGE UP
BUN SERPL-MCNC: 27 MG/DL — HIGH (ref 7–23)
CALCIUM SERPL-MCNC: 9.3 MG/DL — SIGNIFICANT CHANGE UP (ref 8.4–10.5)
CHLORIDE SERPL-SCNC: 98 MMOL/L — SIGNIFICANT CHANGE UP (ref 96–108)
CO2 SERPL-SCNC: 23 MMOL/L — SIGNIFICANT CHANGE UP (ref 22–31)
COLOR SPEC: YELLOW — SIGNIFICANT CHANGE UP
CREAT SERPL-MCNC: 1 MG/DL — SIGNIFICANT CHANGE UP (ref 0.5–1.3)
DIFF PNL FLD: NEGATIVE — SIGNIFICANT CHANGE UP
GLUCOSE SERPL-MCNC: 112 MG/DL — HIGH (ref 70–99)
GLUCOSE UR QL: NEGATIVE — SIGNIFICANT CHANGE UP
HCT VFR BLD CALC: 43.8 % — SIGNIFICANT CHANGE UP (ref 39–50)
HGB BLD-MCNC: 14.8 G/DL — SIGNIFICANT CHANGE UP (ref 13–17)
KETONES UR-MCNC: NEGATIVE — SIGNIFICANT CHANGE UP
LEUKOCYTE ESTERASE UR-ACNC: NEGATIVE — SIGNIFICANT CHANGE UP
MCHC RBC-ENTMCNC: 27 PG — SIGNIFICANT CHANGE UP (ref 27–34)
MCHC RBC-ENTMCNC: 33.8 GM/DL — SIGNIFICANT CHANGE UP (ref 32–36)
MCV RBC AUTO: 80 FL — SIGNIFICANT CHANGE UP (ref 80–100)
NITRITE UR-MCNC: NEGATIVE — SIGNIFICANT CHANGE UP
PH UR: 6 — SIGNIFICANT CHANGE UP (ref 5–8)
PLATELET # BLD AUTO: 215 K/UL — SIGNIFICANT CHANGE UP (ref 150–400)
POTASSIUM SERPL-MCNC: 3.4 MMOL/L — LOW (ref 3.5–5.3)
POTASSIUM SERPL-SCNC: 3.4 MMOL/L — LOW (ref 3.5–5.3)
PROT UR-MCNC: 30 MG/DL
RBC # BLD: 5.47 M/UL — SIGNIFICANT CHANGE UP (ref 4.2–5.8)
RBC # FLD: 14.6 % — HIGH (ref 10.3–14.5)
RBC CASTS # UR COMP ASSIST: ABNORMAL /HPF (ref 0–2)
SODIUM SERPL-SCNC: 138 MMOL/L — SIGNIFICANT CHANGE UP (ref 135–145)
SP GR SPEC: 1.03 — HIGH (ref 1.01–1.02)
UROBILINOGEN FLD QL: 1
WBC # BLD: 14.4 K/UL — HIGH (ref 3.8–10.5)
WBC # FLD AUTO: 14.4 K/UL — HIGH (ref 3.8–10.5)
WBC UR QL: SIGNIFICANT CHANGE UP /HPF (ref 0–5)

## 2017-11-17 RX ORDER — BACLOFEN 100 %
5 POWDER (GRAM) MISCELLANEOUS ONCE
Qty: 0 | Refills: 0 | Status: COMPLETED | OUTPATIENT
Start: 2017-11-17 | End: 2017-11-17

## 2017-11-17 RX ORDER — POTASSIUM CHLORIDE 20 MEQ
40 PACKET (EA) ORAL ONCE
Qty: 0 | Refills: 0 | Status: COMPLETED | OUTPATIENT
Start: 2017-11-17 | End: 2017-11-17

## 2017-11-17 RX ORDER — CHLORPROMAZINE HCL 10 MG
25 TABLET ORAL
Qty: 0 | Refills: 0 | Status: DISCONTINUED | OUTPATIENT
Start: 2017-11-17 | End: 2017-11-19

## 2017-11-17 RX ORDER — METOCLOPRAMIDE HCL 10 MG
10 TABLET ORAL DAILY
Qty: 0 | Refills: 0 | Status: DISCONTINUED | OUTPATIENT
Start: 2017-11-17 | End: 2017-11-17

## 2017-11-17 RX ORDER — FUROSEMIDE 40 MG
80 TABLET ORAL ONCE
Qty: 0 | Refills: 0 | Status: COMPLETED | OUTPATIENT
Start: 2017-11-17 | End: 2017-11-17

## 2017-11-17 RX ADMIN — ATORVASTATIN CALCIUM 80 MILLIGRAM(S): 80 TABLET, FILM COATED ORAL at 21:00

## 2017-11-17 RX ADMIN — Medication 40 MILLIEQUIVALENT(S): at 05:47

## 2017-11-17 RX ADMIN — GABAPENTIN 300 MILLIGRAM(S): 400 CAPSULE ORAL at 17:23

## 2017-11-17 RX ADMIN — Medication 5 MILLIGRAM(S): at 14:13

## 2017-11-17 RX ADMIN — GABAPENTIN 300 MILLIGRAM(S): 400 CAPSULE ORAL at 21:01

## 2017-11-17 RX ADMIN — CLOPIDOGREL BISULFATE 75 MILLIGRAM(S): 75 TABLET, FILM COATED ORAL at 12:18

## 2017-11-17 RX ADMIN — Medication 2 MILLIGRAM(S): at 21:01

## 2017-11-17 RX ADMIN — Medication 10 MILLIGRAM(S): at 12:23

## 2017-11-17 RX ADMIN — LOSARTAN POTASSIUM 100 MILLIGRAM(S): 100 TABLET, FILM COATED ORAL at 05:46

## 2017-11-17 RX ADMIN — GABAPENTIN 300 MILLIGRAM(S): 400 CAPSULE ORAL at 05:46

## 2017-11-17 RX ADMIN — ENOXAPARIN SODIUM 40 MILLIGRAM(S): 100 INJECTION SUBCUTANEOUS at 12:18

## 2017-11-17 RX ADMIN — Medication 81 MILLIGRAM(S): at 12:18

## 2017-11-17 RX ADMIN — Medication 12.5 MILLIGRAM(S): at 05:46

## 2017-11-17 RX ADMIN — Medication 25 MILLIGRAM(S): at 22:02

## 2017-11-17 NOTE — PROGRESS NOTE ADULT - SUBJECTIVE AND OBJECTIVE BOX
THE PATIENT WAS SEEN AND EXAMINED BY ME WITH THE HOUSESTAFF AND STROKE TEAM DURING MORNING ROUNDS.   HPI:  58 y/o R-handed [place] man PMH CVA with residual L-sided weakness, CAD s/p CABG, HTN presents as stroke code for dizziness. Pt at baseline ambulates independently and independent in ADLs. Pt was in usual state of health on 11/07 at 9AM. At around 9:30AM, he began to complain of lightheadedness not associated with changes in position. Half an hour later, he began to complain of n/v. He also endorses HA, diplopia/blurry vision, R-sided weakness that began a few days prior to admission. No c/o f/c/CP/SOB. He reports he ran out of his BP meds a week prior to admission and has been unable to refill his medications    SUBJECTIVE: emesis X 2 overnight.  No new neurologic complaints.      acetaminophen   Tablet. 650 milliGRAM(s) Oral every 6 hours PRN  acetaminophen  Suppository 650 milliGRAM(s) Rectal every 6 hours PRN  aspirin  chewable 81 milliGRAM(s) Oral daily  atorvastatin 80 milliGRAM(s) Oral at bedtime  clopidogrel Tablet 75 milliGRAM(s) Oral daily  doxazosin 2 milliGRAM(s) Oral at bedtime  enoxaparin Injectable 40 milliGRAM(s) SubCutaneous daily  gabapentin   Solution 300 milliGRAM(s) Oral three times a day  guaiFENesin    Syrup 200 milliGRAM(s) Oral every 6 hours PRN  hydrALAZINE Injectable 10 milliGRAM(s) IV Push two times a day PRN  hydrochlorothiazide 12.5 milliGRAM(s) Oral daily  influenza   Vaccine 0.5 milliLiter(s) IntraMuscular once  losartan 100 milliGRAM(s) Oral daily  simethicone 80 milliGRAM(s) Chew daily PRN      PHYSICAL EXAM:   Vital Signs Last 24 Hrs  T(C): 36.8 (17 Nov 2017 08:00), Max: 37.3 (17 Nov 2017 04:00)  T(F): 98.2 (17 Nov 2017 08:00), Max: 99.1 (17 Nov 2017 04:00)  HR: 95 (17 Nov 2017 08:00) (68 - 109)  BP: 107/80 (17 Nov 2017 08:00) (107/80 - 154/87)  BP(mean): 90 (17 Nov 2017 08:00) (90 - 113)  RR: 22 (17 Nov 2017 08:00) (15 - 30)  SpO2: 90% (17 Nov 2017 08:00) (90% - 99%)    General: No acute distress  HEENT: EOM intact, visual fields full  Abdomen: Soft, nontender, nondistended   Extremities: No edema    NEUROLOGICAL EXAM:  Mental status: Awake, alert, oriented x3, fluent speech, no neglect, able to follow commands  Cranial Nerves: left facial droop, EOMI, VFF, no nystagmus, dysarthria,   Motor exam: Normal tone, no drift, RUE 5/5, RLE 5/5, LUE 4+/5, LLE 5-/5.  Sensation: Intact to light touch   Coordination/ Gait: LUE moderate-severe dysmetria    LABS:                        14.8   14.4  )-----------( 215      ( 17 Nov 2017 04:30 )             43.8    11-17    138  |  98  |  27<H>  ----------------------------<  112<H>  3.4<L>   |  23  |  1.00    Ca    9.3      17 Nov 2017 04:30      Hemoglobin A1C, Whole Blood: 5.8 % (11-08 @ 10:36)      IMAGING: Reviewed by me.     Head CT/CTA head/Neck (11/07) Right basal ganglia and corona radiata infarct without significant change since 6/16/2017. There is severe steno-occlusive disease intracranially involving the right supraclinoid internal carotid artery, A1 and M1 branches, and severe narrowing of the left M1 segment of the middle cerebral artery. The distal right vertebral artery is quite small and the proximal basilar artery is not visualized. The dominant left vertebral artery ends at the PICA. Distal basilar flow appears to be due to retrograde flow from the right posterior communicating artery.  Head CT (11/07) No acute intracranial hemorrhage, mass effect, or evidence of acute territorial infarct. Chronic right corona radiata/basal ganglia and right inferior cerebellar hemisphere infarcts.  Brain MRI (11/09) In comparison the prior MRI, there is new hyperintense T2 and FLAIR signal with faint increased restricted diffusion in the right medulla extending to the inferior right brachium pontis and inferior medial right cerebellar hemisphere, new compared to 6/17/2017 consistent with evolving area of  ischemic change without hemorrhagic transformation. Redemonstration of volume loss with multiple additional areas of lacunar infarction including chronic infarcts in the right cerebellar hemisphere and right corona radiata. Decreased visualization of the flow-void within the right vertebral artery with continued irregular isointense T1 signal throughout the basilar artery. Bilateral sinus disease with bubbly secretions and air-fluid levels, greatest in the right maxillary sinus. Correlate clinically for symptoms of acute sinusitis. THE PATIENT WAS SEEN AND EXAMINED BY ME WITH THE HOUSESTAFF AND STROKE TEAM DURING MORNING ROUNDS.   HPI:  58 y/o R-handed [place] man PMH CVA with residual L-sided weakness, CAD s/p CABG, HTN presents as stroke code for dizziness. Pt at baseline ambulates independently and independent in ADLs. Pt was in usual state of health on 11/07 at 9AM. At around 9:30AM, he began to complain of lightheadedness not associated with changes in position. Half an hour later, he began to complain of n/v. He also endorses HA, diplopia/blurry vision, R-sided weakness that began a few days prior to admission. No c/o f/c/CP/SOB. He reports he ran out of his BP meds a week prior to admission and has been unable to refill his medications    SUBJECTIVE: Emesis X 2 overnight. c/o hiccups.  No new neurologic complaints.      acetaminophen   Tablet. 650 milliGRAM(s) Oral every 6 hours PRN  acetaminophen  Suppository 650 milliGRAM(s) Rectal every 6 hours PRN  aspirin  chewable 81 milliGRAM(s) Oral daily  atorvastatin 80 milliGRAM(s) Oral at bedtime  clopidogrel Tablet 75 milliGRAM(s) Oral daily  doxazosin 2 milliGRAM(s) Oral at bedtime  enoxaparin Injectable 40 milliGRAM(s) SubCutaneous daily  gabapentin   Solution 300 milliGRAM(s) Oral three times a day  guaiFENesin    Syrup 200 milliGRAM(s) Oral every 6 hours PRN  hydrALAZINE Injectable 10 milliGRAM(s) IV Push two times a day PRN  hydrochlorothiazide 12.5 milliGRAM(s) Oral daily  influenza   Vaccine 0.5 milliLiter(s) IntraMuscular once  losartan 100 milliGRAM(s) Oral daily  simethicone 80 milliGRAM(s) Chew daily PRN      PHYSICAL EXAM:   Vital Signs Last 24 Hrs  T(C): 36.8 (17 Nov 2017 08:00), Max: 37.3 (17 Nov 2017 04:00)  T(F): 98.2 (17 Nov 2017 08:00), Max: 99.1 (17 Nov 2017 04:00)  HR: 95 (17 Nov 2017 08:00) (68 - 109)  BP: 107/80 (17 Nov 2017 08:00) (107/80 - 154/87)  BP(mean): 90 (17 Nov 2017 08:00) (90 - 113)  RR: 22 (17 Nov 2017 08:00) (15 - 30)  SpO2: 90% (17 Nov 2017 08:00) (90% - 99%)    General: No acute distress  HEENT: EOM intact, visual fields full, PERRLA  Abdomen: Soft, nontender, nondistended   Extremities: No edema    NEUROLOGICAL EXAM:  Mental status: Awake, alert, oriented x3, fluent speech, no neglect, able to follow commands  Cranial Nerves: left facial droop, EOMI, VFF, no nystagmus, dysarthria,   Motor exam: Normal tone, no drift, RUE 5/5, RLE 5/5, LUE 4+/5, LLE 5-/5.  Sensation: Intact to light touch   Coordination/ Gait: LUE moderate-severe dysmetria    LABS:                        14.8   14.4  )-----------( 215      ( 17 Nov 2017 04:30 )             43.8    11-17    138  |  98  |  27<H>  ----------------------------<  112<H>  3.4<L>   |  23  |  1.00    Ca    9.3      17 Nov 2017 04:30      Hemoglobin A1C, Whole Blood: 5.8 % (11-08 @ 10:36)      IMAGING: Reviewed by me.     Head CT/CTA head/Neck (11/07) Right basal ganglia and corona radiata infarct without significant change since 6/16/2017. There is severe steno-occlusive disease intracranially involving the right supraclinoid internal carotid artery, A1 and M1 branches, and severe narrowing of the left M1 segment of the middle cerebral artery. The distal right vertebral artery is quite small and the proximal basilar artery is not visualized. The dominant left vertebral artery ends at the PICA. Distal basilar flow appears to be due to retrograde flow from the right posterior communicating artery.  Head CT (11/07) No acute intracranial hemorrhage, mass effect, or evidence of acute territorial infarct. Chronic right corona radiata/basal ganglia and right inferior cerebellar hemisphere infarcts.  Brain MRI (11/09) In comparison the prior MRI, there is new hyperintense T2 and FLAIR signal with faint increased restricted diffusion in the right medulla extending to the inferior right brachium pontis and inferior medial right cerebellar hemisphere, new compared to 6/17/2017 consistent with evolving area of  ischemic change without hemorrhagic transformation. Redemonstration of volume loss with multiple additional areas of lacunar infarction including chronic infarcts in the right cerebellar hemisphere and right corona radiata. Decreased visualization of the flow-void within the right vertebral artery with continued irregular isointense T1 signal throughout the basilar artery. Bilateral sinus disease with bubbly secretions and air-fluid levels, greatest in the right maxillary sinus. Correlate clinically for symptoms of acute sinusitis.

## 2017-11-17 NOTE — CHART NOTE - NSCHARTNOTEFT_GEN_A_CORE
Follow up note.    57M with HTN, HLD, CAD/CABG, past stroke with residual left hemiparesis, presents with transient right hemiparesis and diplopia, and continuous headache and N/V. CT head shows old right basal ganglia lacune, and CTA shows severe bilateral MCA stenosis, proximal BA occlusion and distal stenosis. Brain MRI show right medullary and right cerebellar ischemic infarct. Impression: Severe intracranial atherosclerotic disease and occlusion of left VA, causing right  lateral medullary and right cerebellar ischemic infarcts. Failed MBS, S/P PEG 11/15.    Source: Patient [ ]    Family [ ]     other [X  ]    Diet : 11/16 Jevity 1.2 70cc/hr x 24 hrs    GI: had 2 episode of vomiting last night, last BM doc 11/7     Enteral /Parenteral Nutrition: daily goal  1680cc. 24 hr intake 11/17 220cc (feeds held for vomiting, resumed at 40cc/hr)      Current Weight: no recent, dosing wt 11/8 197 lb      Pertinent Medications: MEDICATIONS  (STANDING):  aspirin  chewable 81 milliGRAM(s) Oral daily  atorvastatin 80 milliGRAM(s) Oral at bedtime  baclofen 5 milliGRAM(s) Oral once  clopidogrel Tablet 75 milliGRAM(s) Oral daily  doxazosin 2 milliGRAM(s) Oral at bedtime  enoxaparin Injectable 40 milliGRAM(s) SubCutaneous daily  furosemide   Injectable 80 milliGRAM(s) IV Push once  gabapentin   Solution 300 milliGRAM(s) Oral three times a day  hydrochlorothiazide 12.5 milliGRAM(s) Oral daily  influenza   Vaccine 0.5 milliLiter(s) IntraMuscular once  losartan 100 milliGRAM(s) Oral daily    MEDICATIONS  (PRN):  acetaminophen   Tablet. 650 milliGRAM(s) Oral every 6 hours PRN Mild Pain (1 - 3)  acetaminophen  Suppository 650 milliGRAM(s) Rectal every 6 hours PRN For Temp greater than 38.5 C (101.3 F)  guaiFENesin    Syrup 200 milliGRAM(s) Oral every 6 hours PRN Cough  hydrALAZINE Injectable 10 milliGRAM(s) IV Push two times a day PRN BP > 180  simethicone 80 milliGRAM(s) Chew daily PRN bloating    Pertinent Labs:  11-17 Na138 mmol/L Glu 112 mg/dL<H> K+ 3.4 mmol/L<L> Cr  1.00 mg/dL BUN 27 mg/dL<H> Phos n/a   Alb n/a   PAB n/a         Skin: no pressure injuries    Estimated Needs:   [x ] no change since previous assessment  [ ] recalculated:       Previous Nutrition Diagnosis: swallowing difficulty    Nutrition Diagnosis is [ x] ongoing  [ ] resolved [ ] not applicable   addressed w/ PEG placement       New Nutrition Diagnosis: [x ] not applicable    Recommend    [ ] Change Diet To:    [ ] Nutrition Supplement    [x ] Nutrition Support: Recommend Jevity 1.2 goal 70cc/hr x 24 hrs to provide 2016 kcals, 93 gm protein, 1355cc free water for 22 kcals/kg, 1.0gm protein/kg dosing wt of 89.4kg  recommend free water flush of 250cc 4 times/day    [ x] Other:  add bowel regimen       Monitoring and Evaluation:     [ ] PO intake [x ] Tolerance to diet prescription [x ] weights [x ] follow up per protocol    [ ] other:

## 2017-11-17 NOTE — PROGRESS NOTE ADULT - ASSESSMENT
ASSESSMENT: 57M with HTN, HLD, CAD/CABG, past stroke with residual left hemiparesis, presents with transient right hemiparesis and diplopia, and continuous headache and N/V. CT head shows old right basal ganglia lacune, and CTA shows severe bilateral MCA stenosis, proximal BA occlusion and distal stenosis. Brain MRI show right medullary and right cerebellar ischemic infarct. Impression: Severe intracranial atherosclerotic disease and occlusion of left VA, causing right  lateral medullary and right cerebellar ischemic infarcts.    NEURO: neurologically without acute change, continue close monitoring for neurologic deterioration, permissive HTN with slow titration to normotensive, ASA/Plavix and statin  for secondary stroke prevention,  titrate statin to LDL goal less than 70, pt with hiccups, continue gabapentin to 300 TID, Physical therapy/OT eval with recommendations for [facility],. pt uninsured, will provide daily PT/OT to optimize treatment.       ANTITHROMBOTIC THERAPY: Continue ASA and Plavix obtained for secondary stroke prevention    PULMONARY: CXR (11/09) clear, protecting airway, saturating well     CARDIOVASCULAR: TTE: EF 41%, mitral annular calcification, mild-moderate MR, global LV systolic dysfunction, mild stage 1diastolic dysfunction mild TR,  cardiac monitoring  no events, cardio consult appreciated: add metoprolol as tolerated                 GASTROINTESTINAL: s/p PEG placement (11/14) emesis X 2,      Diet: NPO-NGT    RENAL: BUN/Cr without acute change,- BUN trending down, provide hydration, good urine output, renal US: No hydronephrosis, d/c haro and monitor void trial. Continue Cardura for BPH     Na Goal: Greater than 135     Haro: D/C and monitor void    HEMATOLOGY: H/H, Platelets without acute change,       DVT ppx: LMWH    ID: febrile on 11/09 (Tmax 39.3), afebrile in am, leukocytosis, will continue to monitor, blood/urine cultures NGTD, continue to monitor.    DISPOSITION: D/C to [facility] as per PT/OT eval once stable and workup is complete, Pt uninsured, SW aware, Daily PT/OT therapy to optimize therapy  referral to [facility] for possible suman.      CORE MEASURES:        Admission NIHSS: 3     TPA:  NO      LDL/HDL: 171/57     Depression Screen: 0     Statin Therapy: Y     Dysphagia Screen: FAIL     Smoking  NO      Afib NO     Stroke Education YES ASSESSMENT: 57M with HTN, HLD, CAD/CABG, past stroke with residual left hemiparesis, presents with transient right hemiparesis and diplopia, and continuous headache and N/V. CT head shows old right basal ganglia lacune, and CTA shows severe bilateral MCA stenosis, proximal BA occlusion and distal stenosis. Brain MRI show right medullary and right cerebellar ischemic infarct. Impression: Severe intracranial atherosclerotic disease and occlusion of left VA, causing right  lateral medullary and right cerebellar ischemic infarcts.    NEURO: neurologically without acute change, continue close monitoring for neurologic deterioration, permissive HTN with slow titration to normotensive, ASA/Plavix and statin  for secondary stroke prevention,  titrate statin to LDL goal less than 70, pt with hiccups, continue gabapentin to 300 TID, will provide one dose of baclofen 5mg for hiccups Physical therapy/OT eval with recommendations for AR,. pt uninsured, will provide daily PT/OT to optimize treatment.       ANTITHROMBOTIC THERAPY: Continue ASA and Plavix obtained for secondary stroke prevention    PULMONARY: CXR (11/09) clear, protecting airway, saturating well     CARDIOVASCULAR: TTE: EF 41%, mitral annular calcification, mild-moderate MR, global LV systolic dysfunction, mild stage 1diastolic dysfunction mild TR,  cardiac monitoring  no events, cardio consult appreciated: add metoprolol as tolerated                 GASTROINTESTINAL: s/p PEG placement (11/14) emesis X 2, feedings on hold last night, will restart feedings in am at a low rate and will continue to monitor     Diet: NPO-NGT    RENAL: BUN/Cr without acute change,- BUN trending down, provide hydration, good urine output, renal US: No hydronephrosis, d/c haro and monitor void trial. Continue Cardura for BPH     Na Goal: Greater than 135     Haro: D/C and monitor void    HEMATOLOGY: H/H, Platelets without acute change,       DVT ppx: LMWH    ID: febrile on 11/09 (Tmax 39.3), afebrile in am, leukocytosis, UA ordered, will continue to monitor, blood/urine cultures NGTD, continue to monitor.    DISPOSITION: D/C to [facility] as per PT/OT eval once stable and workup is complete, Pt uninsured, SW aware, Daily PT/OT therapy to optimize therapy  referral to [facility] for possible suman.      CORE MEASURES:        Admission NIHSS: 3     TPA:  NO      LDL/HDL: 171/57     Depression Screen: 0     Statin Therapy: Y     Dysphagia Screen: FAIL     Smoking  NO      Afib NO     Stroke Education YES

## 2017-11-18 LAB
ANION GAP SERPL CALC-SCNC: 14 MMOL/L — SIGNIFICANT CHANGE UP (ref 5–17)
BUN SERPL-MCNC: 30 MG/DL — HIGH (ref 7–23)
CALCIUM SERPL-MCNC: 9.1 MG/DL — SIGNIFICANT CHANGE UP (ref 8.4–10.5)
CHLORIDE SERPL-SCNC: 98 MMOL/L — SIGNIFICANT CHANGE UP (ref 96–108)
CO2 SERPL-SCNC: 28 MMOL/L — SIGNIFICANT CHANGE UP (ref 22–31)
CREAT SERPL-MCNC: 1.06 MG/DL — SIGNIFICANT CHANGE UP (ref 0.5–1.3)
GLUCOSE SERPL-MCNC: 115 MG/DL — HIGH (ref 70–99)
HCT VFR BLD CALC: 43.4 % — SIGNIFICANT CHANGE UP (ref 39–50)
HGB BLD-MCNC: 14.2 G/DL — SIGNIFICANT CHANGE UP (ref 13–17)
MCHC RBC-ENTMCNC: 26.1 PG — LOW (ref 27–34)
MCHC RBC-ENTMCNC: 32.6 GM/DL — SIGNIFICANT CHANGE UP (ref 32–36)
MCV RBC AUTO: 80 FL — SIGNIFICANT CHANGE UP (ref 80–100)
PLATELET # BLD AUTO: 220 K/UL — SIGNIFICANT CHANGE UP (ref 150–400)
POTASSIUM SERPL-MCNC: 3.4 MMOL/L — LOW (ref 3.5–5.3)
POTASSIUM SERPL-SCNC: 3.4 MMOL/L — LOW (ref 3.5–5.3)
RBC # BLD: 5.43 M/UL — SIGNIFICANT CHANGE UP (ref 4.2–5.8)
RBC # FLD: 14.7 % — HIGH (ref 10.3–14.5)
SODIUM SERPL-SCNC: 140 MMOL/L — SIGNIFICANT CHANGE UP (ref 135–145)
WBC # BLD: 9.1 K/UL — SIGNIFICANT CHANGE UP (ref 3.8–10.5)
WBC # FLD AUTO: 9.1 K/UL — SIGNIFICANT CHANGE UP (ref 3.8–10.5)

## 2017-11-18 RX ORDER — POTASSIUM CHLORIDE 20 MEQ
40 PACKET (EA) ORAL ONCE
Qty: 0 | Refills: 0 | Status: COMPLETED | OUTPATIENT
Start: 2017-11-18 | End: 2017-11-18

## 2017-11-18 RX ORDER — BACLOFEN 100 %
10 POWDER (GRAM) MISCELLANEOUS EVERY 8 HOURS
Qty: 0 | Refills: 0 | Status: DISCONTINUED | OUTPATIENT
Start: 2017-11-18 | End: 2017-11-19

## 2017-11-18 RX ORDER — IPRATROPIUM/ALBUTEROL SULFATE 18-103MCG
3 AEROSOL WITH ADAPTER (GRAM) INHALATION ONCE
Qty: 0 | Refills: 0 | Status: COMPLETED | OUTPATIENT
Start: 2017-11-18 | End: 2017-11-19

## 2017-11-18 RX ADMIN — Medication 10 MILLIGRAM(S): at 17:15

## 2017-11-18 RX ADMIN — ENOXAPARIN SODIUM 40 MILLIGRAM(S): 100 INJECTION SUBCUTANEOUS at 11:57

## 2017-11-18 RX ADMIN — Medication 25 MILLIGRAM(S): at 11:57

## 2017-11-18 RX ADMIN — CLOPIDOGREL BISULFATE 75 MILLIGRAM(S): 75 TABLET, FILM COATED ORAL at 11:57

## 2017-11-18 RX ADMIN — SIMETHICONE 80 MILLIGRAM(S): 80 TABLET, CHEWABLE ORAL at 21:42

## 2017-11-18 RX ADMIN — Medication 25 MILLIGRAM(S): at 17:14

## 2017-11-18 RX ADMIN — Medication 12.5 MILLIGRAM(S): at 05:25

## 2017-11-18 RX ADMIN — LOSARTAN POTASSIUM 100 MILLIGRAM(S): 100 TABLET, FILM COATED ORAL at 05:25

## 2017-11-18 RX ADMIN — Medication 40 MILLIEQUIVALENT(S): at 06:18

## 2017-11-18 RX ADMIN — Medication 81 MILLIGRAM(S): at 11:57

## 2017-11-18 RX ADMIN — ATORVASTATIN CALCIUM 80 MILLIGRAM(S): 80 TABLET, FILM COATED ORAL at 21:42

## 2017-11-18 RX ADMIN — GABAPENTIN 300 MILLIGRAM(S): 400 CAPSULE ORAL at 05:25

## 2017-11-18 RX ADMIN — Medication 2 MILLIGRAM(S): at 21:42

## 2017-11-18 RX ADMIN — Medication 25 MILLIGRAM(S): at 23:37

## 2017-11-18 RX ADMIN — Medication 25 MILLIGRAM(S): at 05:25

## 2017-11-18 RX ADMIN — GABAPENTIN 300 MILLIGRAM(S): 400 CAPSULE ORAL at 21:42

## 2017-11-18 RX ADMIN — Medication 25 MILLIGRAM(S): at 10:16

## 2017-11-18 RX ADMIN — GABAPENTIN 300 MILLIGRAM(S): 400 CAPSULE ORAL at 14:22

## 2017-11-18 NOTE — PROGRESS NOTE ADULT - SUBJECTIVE AND OBJECTIVE BOX
THE PATIENT WAS SEEN AND EXAMINED BY ME WITH THE HOUSESTAFF AND STROKE TEAM DURING MORNING ROUNDS.   HPI:  58 y/o R-handed [place] man PMH CVA with residual L-sided weakness, CAD s/p CABG, HTN presents as stroke code for dizziness. Pt at baseline ambulates independently and independent in ADLs. Pt was in usual state of health on 11/07 at 9AM. At around 9:30AM, he began to complain of lightheadedness not associated with changes in position. Half an hour later, he began to complain of n/v. He also endorses HA, diplopia/blurry vision, R-sided weakness that began a few days prior to admission. No c/o f/c/CP/SOB. He reports he ran out of his BP meds a week prior to admission and has been unable to refill his medications      SUBJECTIVE: Continues to c/o hiccups, but better.  No new neurologic complaints.      acetaminophen   Tablet. 650 milliGRAM(s) Oral every 6 hours PRN  acetaminophen  Suppository 650 milliGRAM(s) Rectal every 6 hours PRN  aspirin  chewable 81 milliGRAM(s) Oral daily  atorvastatin 80 milliGRAM(s) Oral at bedtime  chlorproMAZINE    Tablet 25 milliGRAM(s) Oral four times a day  clopidogrel Tablet 75 milliGRAM(s) Oral daily  doxazosin 2 milliGRAM(s) Oral at bedtime  enoxaparin Injectable 40 milliGRAM(s) SubCutaneous daily  gabapentin   Solution 300 milliGRAM(s) Oral three times a day  guaiFENesin    Syrup 200 milliGRAM(s) Oral every 6 hours PRN  hydrALAZINE Injectable 10 milliGRAM(s) IV Push two times a day PRN  hydrochlorothiazide 12.5 milliGRAM(s) Oral daily  influenza   Vaccine 0.5 milliLiter(s) IntraMuscular once  losartan 100 milliGRAM(s) Oral daily  simethicone 80 milliGRAM(s) Chew daily PRN      PHYSICAL EXAM:   Vital Signs Last 24 Hrs  T(C): 37.1 (18 Nov 2017 08:00), Max: 37.1 (17 Nov 2017 20:00)  T(F): 98.7 (18 Nov 2017 08:00), Max: 98.8 (17 Nov 2017 20:00)  HR: 70 (18 Nov 2017 08:00) (65 - 92)  BP: 118/80 (18 Nov 2017 08:00) (95/69 - 133/82)  BP(mean): 91 (18 Nov 2017 08:00) (77 - 96)  RR: 19 (18 Nov 2017 08:00) (12 - 32)  SpO2: 98% (18 Nov 2017 08:00) (89% - 99%)    eneral: No acute distress  HEENT: EOM intact, visual fields full, PERRLA  Abdomen: Soft, nontender, nondistended   Extremities: No edema    NEUROLOGICAL EXAM:  Mental status: Awake, alert, oriented x3, fluent speech, no neglect, able to follow commands  Cranial Nerves: left facial droop, EOMI, VFF, no nystagmus, dysarthria,   Motor exam: Normal tone, no drift, RUE 5/5, RLE 5/5, LUE 4+/5, LLE 5-/5.  Sensation: Intact to light touch   Coordination/ Gait: LUE moderate-severe dysmetria    LABS:                        14.2   9.1   )-----------( 220      ( 18 Nov 2017 05:30 )             43.4    11-18    140  |  98  |  30<H>  ----------------------------<  115<H>  3.4<L>   |  28  |  1.06    Ca    9.1      18 Nov 2017 05:30      Hemoglobin A1C, Whole Blood: 5.8 % (11-08 @ 10:36)      IMAGING: Reviewed by me.   Head CT/CTA head/Neck (11/07) Right basal ganglia and corona radiata infarct without significant change since 6/16/2017. There is severe steno-occlusive disease intracranially involving the right supraclinoid internal carotid artery, A1 and M1 branches, and severe narrowing of the left M1 segment of the middle cerebral artery. The distal right vertebral artery is quite small and the proximal basilar artery is not visualized. The dominant left vertebral artery ends at the PICA. Distal basilar flow appears to be due to retrograde flow from the right posterior communicating artery.  Head CT (11/07) No acute intracranial hemorrhage, mass effect, or evidence of acute territorial infarct. Chronic right corona radiata/basal ganglia and right inferior cerebellar hemisphere infarcts.  Brain MRI (11/09) In comparison the prior MRI, there is new hyperintense T2 and FLAIR signal with faint increased restricted diffusion in the right medulla extending to the inferior right brachium pontis and inferior medial right cerebellar hemisphere, new compared to 6/17/2017 consistent with evolving area of  ischemic change without hemorrhagic transformation. Redemonstration of volume loss with multiple additional areas of lacunar infarction including chronic infarcts in the right cerebellar hemisphere and right corona radiata. Decreased visualization of the flow-void within the right vertebral artery with continued irregular isointense T1 signal throughout the basilar artery. Bilateral sinus disease with bubbly secretions and air-fluid levels, greatest in the right maxillary sinus. Correlate clinically for symptoms of acute sinusitis. THE PATIENT WAS SEEN AND EXAMINED BY ME WITH THE HOUSESTAFF AND STROKE TEAM DURING MORNING ROUNDS.   HPI:  56 y/o R-handed [place] man PMH CVA with residual L-sided weakness, CAD s/p CABG, HTN presents as stroke code for dizziness. Pt at baseline ambulates independently and independent in ADLs. Pt was in usual state of health on 11/07 at 9AM. At around 9:30AM, he began to complain of lightheadedness not associated with changes in position. Half an hour later, he began to complain of n/v. He also endorses HA, diplopia/blurry vision, R-sided weakness that began a few days prior to admission. No c/o f/c/CP/SOB. He reports he ran out of his BP meds a week prior to admission and has been unable to refill his medications      SUBJECTIVE: Continues to c/o hiccups.  No new neurologic complaints.      acetaminophen   Tablet. 650 milliGRAM(s) Oral every 6 hours PRN  acetaminophen  Suppository 650 milliGRAM(s) Rectal every 6 hours PRN  aspirin  chewable 81 milliGRAM(s) Oral daily  atorvastatin 80 milliGRAM(s) Oral at bedtime  chlorproMAZINE    Tablet 25 milliGRAM(s) Oral four times a day  clopidogrel Tablet 75 milliGRAM(s) Oral daily  doxazosin 2 milliGRAM(s) Oral at bedtime  enoxaparin Injectable 40 milliGRAM(s) SubCutaneous daily  gabapentin   Solution 300 milliGRAM(s) Oral three times a day  guaiFENesin    Syrup 200 milliGRAM(s) Oral every 6 hours PRN  hydrALAZINE Injectable 10 milliGRAM(s) IV Push two times a day PRN  hydrochlorothiazide 12.5 milliGRAM(s) Oral daily  influenza   Vaccine 0.5 milliLiter(s) IntraMuscular once  losartan 100 milliGRAM(s) Oral daily  simethicone 80 milliGRAM(s) Chew daily PRN      PHYSICAL EXAM:   Vital Signs Last 24 Hrs  T(C): 37.1 (18 Nov 2017 08:00), Max: 37.1 (17 Nov 2017 20:00)  T(F): 98.7 (18 Nov 2017 08:00), Max: 98.8 (17 Nov 2017 20:00)  HR: 70 (18 Nov 2017 08:00) (65 - 92)  BP: 118/80 (18 Nov 2017 08:00) (95/69 - 133/82)  BP(mean): 91 (18 Nov 2017 08:00) (77 - 96)  RR: 19 (18 Nov 2017 08:00) (12 - 32)  SpO2: 98% (18 Nov 2017 08:00) (89% - 99%)    eneral: No acute distress  HEENT: EOM intact, visual fields full, PERRLA  Abdomen: Soft, nontender, nondistended   Extremities: No edema    NEUROLOGICAL EXAM:  Mental status: Awake, alert, oriented x3, fluent speech, no neglect, able to follow commands  Cranial Nerves: left facial droop, EOMI, VFF, no nystagmus, dysarthria, palate symmetrical   Motor exam: Normal tone, no drift, RUE 5/5, RLE 5/5, drift LUE 4+/5, drift LLE 5-/5.  Sensation: Intact to light touch   Coordination/ Gait: LUE moderate-severe dysmetria    LABS:                        14.2   9.1   )-----------( 220      ( 18 Nov 2017 05:30 )             43.4    11-18    140  |  98  |  30<H>  ----------------------------<  115<H>  3.4<L>   |  28  |  1.06    Ca    9.1      18 Nov 2017 05:30      Hemoglobin A1C, Whole Blood: 5.8 % (11-08 @ 10:36)      IMAGING: Reviewed by me.   Head CT/CTA head/Neck (11/07) Right basal ganglia and corona radiata infarct without significant change since 6/16/2017. There is severe steno-occlusive disease intracranially involving the right supraclinoid internal carotid artery, A1 and M1 branches, and severe narrowing of the left M1 segment of the middle cerebral artery. The distal right vertebral artery is quite small and the proximal basilar artery is not visualized. The dominant left vertebral artery ends at the PICA. Distal basilar flow appears to be due to retrograde flow from the right posterior communicating artery.  Head CT (11/07) No acute intracranial hemorrhage, mass effect, or evidence of acute territorial infarct. Chronic right corona radiata/basal ganglia and right inferior cerebellar hemisphere infarcts.  Brain MRI (11/09) In comparison the prior MRI, there is new hyperintense T2 and FLAIR signal with faint increased restricted diffusion in the right medulla extending to the inferior right brachium pontis and inferior medial right cerebellar hemisphere, new compared to 6/17/2017 consistent with evolving area of  ischemic change without hemorrhagic transformation. Redemonstration of volume loss with multiple additional areas of lacunar infarction including chronic infarcts in the right cerebellar hemisphere and right corona radiata. Decreased visualization of the flow-void within the right vertebral artery with continued irregular isointense T1 signal throughout the basilar artery. Bilateral sinus disease with bubbly secretions and air-fluid levels, greatest in the right maxillary sinus. Correlate clinically for symptoms of acute sinusitis.

## 2017-11-18 NOTE — PROGRESS NOTE ADULT - ASSESSMENT
ASSESSMENT: 57M with HTN, HLD, CAD/CABG, past stroke with residual left hemiparesis, presents with transient right hemiparesis and diplopia, and continuous headache and N/V. CT head shows old right basal ganglia lacune, and CTA shows severe bilateral MCA stenosis, proximal BA occlusion and distal stenosis. Brain MRI show right medullary and right cerebellar ischemic infarct. Impression: Severe intracranial atherosclerotic disease and occlusion of left VA, causing right  lateral medullary and right cerebellar ischemic infarcts.    NEURO: neurologically without acute change, continue close monitoring for neurologic deterioration, permissive HTN with slow titration to normotensive, ASA/Plavix and statin  for secondary stroke prevention,  titrate statin to LDL goal less than 70, pt with hiccups, continue gabapentin to 300 TID,  one dose of baclofen 5mg for hiccups, thorazine overnight started for hiccups. Physical therapy/OT eval with recommendations for [facility],. pt uninsured and pending re-instatement of medicaid, will provide daily PT/OT to optimize treatment.     ANTITHROMBOTIC THERAPY: Continue ASA and Plavix obtained for secondary stroke prevention    PULMONARY: CXR (11/09) clear, protecting airway, saturating well     CARDIOVASCULAR: TTE: EF 41%, mitral annular calcification, mild-moderate MR, global LV systolic dysfunction, mild stage 1diastolic dysfunction mild TR,  cardiac monitoring  no events, cardio consult appreciated: add metoprolol as tolerated                 GASTROINTESTINAL: s/p PEG placement (11/14) emesis X 2, feedings on hold last night, will restart feedings in am at a low rate and will continue to monitor     Diet: NPO-NGT    RENAL: BUN/Cr without acute change,- BUN trending down, provide hydration, good urine output, renal US: No hydronephrosis, d/c haro and monitor void trial. Continue Cardura for BPH     Na Goal: Greater than 135     Haro: D/C and monitor void    HEMATOLOGY: H/H without acute changes, Platelets 220,  no s/s of bleeding     DVT ppx: LMWH    ID: febrile on 11/09 (Tmax 39.3), afebrile in am, leukocytosis, UA ordered, will continue to monitor, blood/urine cultures NGTD, continue to monitor.    DISPOSITION: D/C to [facility] as per PT/OT eval once stable and workup is complete, Pt uninsured, SW aware, medicaid re-instatement pending. Daily PT/OT therapy to optimize therapy and referral to [facility] for possible suman.      CORE MEASURES:        Admission NIHSS: 3     TPA:  NO      LDL/HDL: 171/57     Depression Screen: 0     Statin Therapy: Y     Dysphagia Screen: FAIL     Smoking  NO      Afib NO     Stroke Education YES ASSESSMENT: 57M with HTN, HLD, CAD/CABG, past stroke with residual left hemiparesis, presents with transient right hemiparesis and diplopia, and continuous headache and N/V. CT head shows old right basal ganglia lacune, and CTA shows severe bilateral MCA stenosis, proximal BA occlusion and distal stenosis. Brain MRI show right medullary and right cerebellar ischemic infarct. Impression: Severe intracranial atherosclerotic disease and occlusion of left VA, causing right  lateral medullary and right cerebellar ischemic infarcts.    NEURO: neurologically without acute change, continue close monitoring for neurologic deterioration, permissive HTN with slow titration to normotensive, ASA/Plavix and statin  for secondary stroke prevention,  titrate statin to LDL goal less than 70, pt with hiccups, continue gabapentin to 300 TID,  one dose of baclofen 5mg for hiccups, thorazine overnight started for hiccups added atc baclofen. Physical therapy/OT eval with recommendations for [facility],. pt uninsured and pending re-instatement of medicaid, will provide daily PT/OT to optimize treatment.     ANTITHROMBOTIC THERAPY: Continue ASA and Plavix obtained for secondary stroke prevention    PULMONARY: CXR (11/09) clear, protecting airway, saturating well     CARDIOVASCULAR: TTE: EF 41%, mitral annular calcification, mild-moderate MR, global LV systolic dysfunction, mild stage 1diastolic dysfunction mild TR,  cardiac monitoring  no events, cardio consult appreciated: add metoprolol as tolerated                 GASTROINTESTINAL: s/p PEG placement (11/14) emesis X 2, feedings on hold last night, will restart feedings in am at a low rate and will continue to monitor     Diet: NPO-NGT    RENAL: BUN/Cr without acute change,- BUN trending down, provide hydration, good urine output, renal US: No hydronephrosis, haro reinserted for urinary retention. Continue Cardura for BPH. Hypokalemia, replete as needed.     Na Goal: Greater than 135     Haro: re-inserted 11/17    HEMATOLOGY: H/H without acute changes, Platelets 220,  no s/s of bleeding     DVT ppx: LMWH    ID: febrile on 11/09 (Tmax 39.3), afebrile in am, leukocytosis resolved, UA negative for LE, WBC 2-5, will continue to monitor, blood/urine cultures NGTD, continue to monitor.    DISPOSITION: D/C to [facility] as per PT/OT eval once stable and workup is complete, Pt uninsured, SW aware, medicaid re-instatement pending. Daily PT/OT therapy to optimize therapy and referral to [facility] for possible suman.      CORE MEASURES:        Admission NIHSS: 3     TPA:  NO      LDL/HDL: 171/57     Depression Screen: 0     Statin Therapy: Y     Dysphagia Screen: FAIL     Smoking  NO      Afib NO     Stroke Education YES

## 2017-11-19 LAB
ANION GAP SERPL CALC-SCNC: 10 MMOL/L — SIGNIFICANT CHANGE UP (ref 5–17)
BUN SERPL-MCNC: 28 MG/DL — HIGH (ref 7–23)
CALCIUM SERPL-MCNC: 9 MG/DL — SIGNIFICANT CHANGE UP (ref 8.4–10.5)
CHLORIDE SERPL-SCNC: 100 MMOL/L — SIGNIFICANT CHANGE UP (ref 96–108)
CO2 SERPL-SCNC: 27 MMOL/L — SIGNIFICANT CHANGE UP (ref 22–31)
CREAT SERPL-MCNC: 0.96 MG/DL — SIGNIFICANT CHANGE UP (ref 0.5–1.3)
GLUCOSE SERPL-MCNC: 110 MG/DL — HIGH (ref 70–99)
HCT VFR BLD CALC: 42.9 % — SIGNIFICANT CHANGE UP (ref 39–50)
HGB BLD-MCNC: 14.2 G/DL — SIGNIFICANT CHANGE UP (ref 13–17)
MCHC RBC-ENTMCNC: 26.4 PG — LOW (ref 27–34)
MCHC RBC-ENTMCNC: 33.1 GM/DL — SIGNIFICANT CHANGE UP (ref 32–36)
MCV RBC AUTO: 79.8 FL — LOW (ref 80–100)
PLATELET # BLD AUTO: 233 K/UL — SIGNIFICANT CHANGE UP (ref 150–400)
POTASSIUM SERPL-MCNC: 3.5 MMOL/L — SIGNIFICANT CHANGE UP (ref 3.5–5.3)
POTASSIUM SERPL-SCNC: 3.5 MMOL/L — SIGNIFICANT CHANGE UP (ref 3.5–5.3)
RBC # BLD: 5.38 M/UL — SIGNIFICANT CHANGE UP (ref 4.2–5.8)
RBC # FLD: 14.7 % — HIGH (ref 10.3–14.5)
SODIUM SERPL-SCNC: 137 MMOL/L — SIGNIFICANT CHANGE UP (ref 135–145)
WBC # BLD: 12.8 K/UL — HIGH (ref 3.8–10.5)
WBC # FLD AUTO: 12.8 K/UL — HIGH (ref 3.8–10.5)

## 2017-11-19 RX ORDER — BACLOFEN 100 %
5 POWDER (GRAM) MISCELLANEOUS THREE TIMES A DAY
Qty: 0 | Refills: 0 | Status: DISCONTINUED | OUTPATIENT
Start: 2017-11-19 | End: 2017-11-22

## 2017-11-19 RX ORDER — GABAPENTIN 400 MG/1
600 CAPSULE ORAL THREE TIMES A DAY
Qty: 0 | Refills: 0 | Status: DISCONTINUED | OUTPATIENT
Start: 2017-11-19 | End: 2017-11-23

## 2017-11-19 RX ADMIN — GABAPENTIN 600 MILLIGRAM(S): 400 CAPSULE ORAL at 21:52

## 2017-11-19 RX ADMIN — Medication 25 MILLIGRAM(S): at 05:35

## 2017-11-19 RX ADMIN — Medication 12.5 MILLIGRAM(S): at 05:35

## 2017-11-19 RX ADMIN — GABAPENTIN 600 MILLIGRAM(S): 400 CAPSULE ORAL at 13:57

## 2017-11-19 RX ADMIN — Medication 650 MILLIGRAM(S): at 11:01

## 2017-11-19 RX ADMIN — Medication 3 MILLILITER(S): at 00:25

## 2017-11-19 RX ADMIN — Medication 2 MILLIGRAM(S): at 21:52

## 2017-11-19 RX ADMIN — Medication 81 MILLIGRAM(S): at 11:01

## 2017-11-19 RX ADMIN — ATORVASTATIN CALCIUM 80 MILLIGRAM(S): 80 TABLET, FILM COATED ORAL at 21:52

## 2017-11-19 RX ADMIN — GABAPENTIN 300 MILLIGRAM(S): 400 CAPSULE ORAL at 05:35

## 2017-11-19 RX ADMIN — ENOXAPARIN SODIUM 40 MILLIGRAM(S): 100 INJECTION SUBCUTANEOUS at 11:02

## 2017-11-19 RX ADMIN — Medication 5 MILLIGRAM(S): at 21:51

## 2017-11-19 RX ADMIN — LOSARTAN POTASSIUM 100 MILLIGRAM(S): 100 TABLET, FILM COATED ORAL at 05:36

## 2017-11-19 RX ADMIN — Medication 5 MILLIGRAM(S): at 13:57

## 2017-11-19 RX ADMIN — CLOPIDOGREL BISULFATE 75 MILLIGRAM(S): 75 TABLET, FILM COATED ORAL at 11:01

## 2017-11-19 RX ADMIN — Medication 650 MILLIGRAM(S): at 11:31

## 2017-11-19 NOTE — PROGRESS NOTE ADULT - ASSESSMENT
ASSESSMENT: 57M with HTN, HLD, CAD/CABG, past stroke with residual left hemiparesis, presents with transient right hemiparesis and diplopia, and continuous headache and N/V. CT head shows old right basal ganglia lacune, and CTA shows severe bilateral MCA stenosis, proximal BA occlusion and distal stenosis. Brain MRI show right medullary and right cerebellar ischemic infarct. Impression: Severe intracranial atherosclerotic disease and occlusion of left VA, causing right  lateral medullary and right cerebellar ischemic infarcts.    NEURO: neurologically without acute change, continue close monitoring for neurologic deterioration, permissive HTN with slow titration to normotensive, ASA/Plavix and statin  for secondary stroke prevention,  titrate statin to LDL goal less than 70, pt with hiccups, continue gabapentin to 300 TID, thorazine and  baclofen 10mg Q8hrs PRN for hiccups,  Physical therapy/OT eval with recommendations for AR,. pt uninsured and pending re-instatement of medicaid, will provide daily PT/OT to optimize treatment.     ANTITHROMBOTIC THERAPY: Continue ASA and Plavix obtained for secondary stroke prevention    PULMONARY: CXR (11/09) clear, protecting airway, saturating well     CARDIOVASCULAR: TTE: EF 41%, mitral annular calcification, mild-moderate MR, global LV systolic dysfunction, mild stage 1diastolic dysfunction mild TR,  cardiac monitoring  no events, cardio consult appreciated: add metoprolol as tolerated                 GASTROINTESTINAL: s/p PEG placement (11/14) tolerating feedings     Diet: PEG    RENAL: BUN/Cr without acute change,- BUN trending down, provide hydration, good urine output, renal US: No hydronephrosis, haro reinserted for urinary retention. Continue Cardura for BPH.     Na Goal: Greater than 135     Haro: re-inserted 11/17    HEMATOLOGY: H/H without acute changes, Platelets 233,  no s/s of bleeding     DVT ppx: LMWH    ID: febrile on 11/09 (Tmax 39.3), afebrile in am, leukocytosis, UA negative for LE, WBC 2-5, will continue to monitor, blood/urine cultures NGTD, continue to monitor.    DISPOSITION: D/C to [facility] as per PT/OT eval once stable and workup is complete, Pt uninsured, SW aware, medicaid re-instatement pending. Daily PT/OT therapy to optimize therapy and referral to [facility] for possible suman.      CORE MEASURES:        Admission NIHSS: 3     TPA:  NO      LDL/HDL: 171/57     Depression Screen: 0     Statin Therapy: Y     Dysphagia Screen: FAIL     Smoking  NO      Afib NO     Stroke Education YES ASSESSMENT: 57M with HTN, HLD, CAD/CABG, past stroke with residual left hemiparesis, presents with transient right hemiparesis and diplopia, and continuous headache and N/V. CT head shows old right basal ganglia lacune, and CTA shows severe bilateral MCA stenosis, proximal BA occlusion and distal stenosis. Brain MRI show right medullary and right cerebellar ischemic infarct. Impression: Severe intracranial atherosclerotic disease and occlusion of left VA, causing right  lateral medullary and right cerebellar ischemic infarcts.    NEURO: neurologically without acute change, continue close monitoring for neurologic deterioration, permissive HTN with slow titration to normotensive, ASA/Plavix and statin  for secondary stroke prevention,  titrate statin to LDL goal less than 70, pt with hiccups, Will increase gabapentin to 600 TID, d/c thorazine and start standing baclofen 5mg Q8hrs for hiccups,  Physical therapy/OT eval with recommendations for AR,. pt uninsured and pending re-instatement of medicaid, will provide daily PT/OT to optimize treatment.     ANTITHROMBOTIC THERAPY: Continue ASA and Plavix obtained for secondary stroke prevention    PULMONARY: CXR (11/09) clear, protecting airway, saturating well     CARDIOVASCULAR: TTE: EF 41%, mitral annular calcification, mild-moderate MR, global LV systolic dysfunction, mild stage 1diastolic dysfunction mild TR,  cardiac monitoring  no events, cardio consult appreciated: add metoprolol as tolerated                 GASTROINTESTINAL: s/p PEG placement (11/14) tolerating feedings     Diet: PEG    RENAL: BUN/Cr without acute change,- BUN trending down, provide hydration, good urine output, renal US: No hydronephrosis, haro reinserted for urinary retention. Continue Cardura for BPH.     Na Goal: Greater than 135     Haro: re-inserted 11/17    HEMATOLOGY: H/H without acute changes, Platelets 233,  no s/s of bleeding     DVT ppx: LMWH    ID: febrile on 11/09 (Tmax 39.3), afebrile in am, leukocytosis, UA negative for LE, WBC 2-5, will continue to monitor, blood/urine cultures NGTD, continue to monitor.    DISPOSITION: D/C to [facility] as per PT/OT eval once stable and workup is complete, Pt uninsured, SW aware, medicaid re-instatement pending. Daily PT/OT therapy to optimize therapy and referral to [facility] for possible suman.      CORE MEASURES:        Admission NIHSS: 3     TPA:  NO      LDL/HDL: 171/57     Depression Screen: 0     Statin Therapy: Y     Dysphagia Screen: FAIL     Smoking  NO      Afib NO     Stroke Education YES

## 2017-11-19 NOTE — CHART NOTE - NSCHARTNOTEFT_GEN_A_CORE
GI Fellow PEG tube check:  -tube working well, almost at goal  -no oozing at site or hematoma  -PEG loosened slightly to avoid buried bumper syndrome

## 2017-11-19 NOTE — PROGRESS NOTE ADULT - SUBJECTIVE AND OBJECTIVE BOX
THE PATIENT WAS SEEN AND EXAMINED BY ME WITH THE HOUSESTAFF AND STROKE TEAM DURING MORNING ROUNDS.   HPI:  58 y/o R-handed [place] man PMH CVA with residual L-sided weakness, CAD s/p CABG, HTN presents as stroke code for dizziness. Pt at baseline ambulates independently and independent in ADLs. Pt was in usual state of health on 11/07 at 9AM. At around 9:30AM, he began to complain of lightheadedness not associated with changes in position. Half an hour later, he began to complain of n/v. He also endorses HA, diplopia/blurry vision, R-sided weakness that began a few days prior to admission. No c/o f/c/CP/SOB. He reports he ran out of his BP meds a week prior to admission and has been unable to refill his medications      SUBJECTIVE: No events overnight.  No new neurologic complaints.      acetaminophen   Tablet. 650 milliGRAM(s) Oral every 6 hours PRN  acetaminophen  Suppository 650 milliGRAM(s) Rectal every 6 hours PRN  aspirin  chewable 81 milliGRAM(s) Oral daily  atorvastatin 80 milliGRAM(s) Oral at bedtime  baclofen 10 milliGRAM(s) Oral every 8 hours PRN  chlorproMAZINE    Tablet 25 milliGRAM(s) Oral four times a day  clopidogrel Tablet 75 milliGRAM(s) Oral daily  doxazosin 2 milliGRAM(s) Oral at bedtime  enoxaparin Injectable 40 milliGRAM(s) SubCutaneous daily  gabapentin   Solution 300 milliGRAM(s) Oral three times a day  guaiFENesin    Syrup 200 milliGRAM(s) Oral every 6 hours PRN  hydrALAZINE Injectable 10 milliGRAM(s) IV Push two times a day PRN  hydrochlorothiazide 12.5 milliGRAM(s) Oral daily  influenza   Vaccine 0.5 milliLiter(s) IntraMuscular once  losartan 100 milliGRAM(s) Oral daily  simethicone 80 milliGRAM(s) Chew daily PRN      PHYSICAL EXAM:   Vital Signs Last 24 Hrs  T(C): 36.5 (19 Nov 2017 07:00), Max: 37.7 (18 Nov 2017 20:00)  T(F): 97.7 (19 Nov 2017 07:00), Max: 99.9 (18 Nov 2017 20:00)  HR: 73 (19 Nov 2017 07:00) (67 - 98)  BP: 103/73 (19 Nov 2017 07:00) (103/73 - 138/91)  BP(mean): 96 (19 Nov 2017 06:00) (75 - 96)  RR: 20 (19 Nov 2017 07:00) (16 - 24)  SpO2: 95% (19 Nov 2017 07:00) (92% - 98%)    General: No acute distress  HEENT: EOM intact, visual fields full, PERRLA  Abdomen: Soft, nontender, nondistended   Extremities: No edema    NEUROLOGICAL EXAM:  Mental status: Awake, alert, oriented x3, fluent speech, no neglect, able to follow commands  Cranial Nerves: left facial droop, EOMI, VFF, no nystagmus, dysarthria, palate symmetrical   Motor exam: Normal tone, no drift, RUE 5/5, RLE 5/5, drift LUE 4+/5, drift LLE 5-/5.  Sensation: Intact to light touch   Coordination/ Gait: LUE moderate-severe dysmetria    LABS:                        14.2   12.8  )-----------( 233      ( 19 Nov 2017 05:08 )             42.9    11-19    137  |  100  |  28<H>  ----------------------------<  110<H>  3.5   |  27  |  0.96    Ca    9.0      19 Nov 2017 05:08      Hemoglobin A1C, Whole Blood: 5.8 % (11-08 @ 10:36)      IMAGING: Reviewed by me.     Head CT/CTA head/Neck (11/07) Right basal ganglia and corona radiata infarct without significant change since 6/16/2017. There is severe steno-occlusive disease intracranially involving the right supraclinoid internal carotid artery, A1 and M1 branches, and severe narrowing of the left M1 segment of the middle cerebral artery. The distal right vertebral artery is quite small and the proximal basilar artery is not visualized. The dominant left vertebral artery ends at the PICA. Distal basilar flow appears to be due to retrograde flow from the right posterior communicating artery.  Head CT (11/07) No acute intracranial hemorrhage, mass effect, or evidence of acute territorial infarct. Chronic right corona radiata/basal ganglia and right inferior cerebellar hemisphere infarcts.  Brain MRI (11/09) In comparison the prior MRI, there is new hyperintense T2 and FLAIR signal with faint increased restricted diffusion in the right medulla extending to the inferior right brachium pontis and inferior medial right cerebellar hemisphere, new compared to 6/17/2017 consistent with evolving area of  ischemic change without hemorrhagic transformation. Redemonstration of volume loss with multiple additional areas of lacunar infarction including chronic infarcts in the right cerebellar hemisphere and right corona radiata. Decreased visualization of the flow-void within the right vertebral artery with continued irregular isointense T1 signal throughout the basilar artery. Bilateral sinus disease with bubbly secretions and air-fluid levels, greatest in the right maxillary sinus. THE PATIENT WAS SEEN AND EXAMINED BY ME WITH THE HOUSESTAFF AND STROKE TEAM DURING MORNING ROUNDS.   HPI:  58 y/o R-handed [place] man PMH CVA with residual L-sided weakness, CAD s/p CABG, HTN presents as stroke code for dizziness. Pt at baseline ambulates independently and independent in ADLs. Pt was in usual state of health on 11/07 at 9AM. At around 9:30AM, he began to complain of lightheadedness not associated with changes in position. Half an hour later, he began to complain of n/v. He also endorses HA, diplopia/blurry vision, R-sided weakness that began a few days prior to admission. No c/o f/c/CP/SOB. He reports he ran out of his BP meds a week prior to admission and has been unable to refill his medications      SUBJECTIVE: Hiccups in am,.  No new neurologic complaints.      acetaminophen   Tablet. 650 milliGRAM(s) Oral every 6 hours PRN  acetaminophen  Suppository 650 milliGRAM(s) Rectal every 6 hours PRN  aspirin  chewable 81 milliGRAM(s) Oral daily  atorvastatin 80 milliGRAM(s) Oral at bedtime  baclofen 10 milliGRAM(s) Oral every 8 hours PRN  chlorproMAZINE    Tablet 25 milliGRAM(s) Oral four times a day  clopidogrel Tablet 75 milliGRAM(s) Oral daily  doxazosin 2 milliGRAM(s) Oral at bedtime  enoxaparin Injectable 40 milliGRAM(s) SubCutaneous daily  gabapentin   Solution 300 milliGRAM(s) Oral three times a day  guaiFENesin    Syrup 200 milliGRAM(s) Oral every 6 hours PRN  hydrALAZINE Injectable 10 milliGRAM(s) IV Push two times a day PRN  hydrochlorothiazide 12.5 milliGRAM(s) Oral daily  influenza   Vaccine 0.5 milliLiter(s) IntraMuscular once  losartan 100 milliGRAM(s) Oral daily  simethicone 80 milliGRAM(s) Chew daily PRN      PHYSICAL EXAM:   Vital Signs Last 24 Hrs  T(C): 36.5 (19 Nov 2017 07:00), Max: 37.7 (18 Nov 2017 20:00)  T(F): 97.7 (19 Nov 2017 07:00), Max: 99.9 (18 Nov 2017 20:00)  HR: 73 (19 Nov 2017 07:00) (67 - 98)  BP: 103/73 (19 Nov 2017 07:00) (103/73 - 138/91)  BP(mean): 96 (19 Nov 2017 06:00) (75 - 96)  RR: 20 (19 Nov 2017 07:00) (16 - 24)  SpO2: 95% (19 Nov 2017 07:00) (92% - 98%)    General: No acute distress  HEENT: EOM intact, visual fields full, PERRLA  Abdomen: Soft, nontender, nondistended   Extremities: No edema    NEUROLOGICAL EXAM:  Mental status: Awake, alert, oriented x3, fluent speech, no neglect, able to follow commands  Cranial Nerves: left facial droop, EOMI, VFF, no nystagmus, dysarthria, palate symmetrical   Motor exam: Normal tone, no drift, RUE 5/5, RLE 5/5, drift LUE 4+/5, drift LLE 5-/5.  Sensation: Intact to light touch   Coordination/ Gait: LUE moderate-severe dysmetria    LABS:                        14.2   12.8  )-----------( 233      ( 19 Nov 2017 05:08 )             42.9    11-19    137  |  100  |  28<H>  ----------------------------<  110<H>  3.5   |  27  |  0.96    Ca    9.0      19 Nov 2017 05:08      Hemoglobin A1C, Whole Blood: 5.8 % (11-08 @ 10:36)      IMAGING: Reviewed by me.     Head CT/CTA head/Neck (11/07) Right basal ganglia and corona radiata infarct without significant change since 6/16/2017. There is severe steno-occlusive disease intracranially involving the right supraclinoid internal carotid artery, A1 and M1 branches, and severe narrowing of the left M1 segment of the middle cerebral artery. The distal right vertebral artery is quite small and the proximal basilar artery is not visualized. The dominant left vertebral artery ends at the PICA. Distal basilar flow appears to be due to retrograde flow from the right posterior communicating artery.  Head CT (11/07) No acute intracranial hemorrhage, mass effect, or evidence of acute territorial infarct. Chronic right corona radiata/basal ganglia and right inferior cerebellar hemisphere infarcts.  Brain MRI (11/09) In comparison the prior MRI, there is new hyperintense T2 and FLAIR signal with faint increased restricted diffusion in the right medulla extending to the inferior right brachium pontis and inferior medial right cerebellar hemisphere, new compared to 6/17/2017 consistent with evolving area of  ischemic change without hemorrhagic transformation. Redemonstration of volume loss with multiple additional areas of lacunar infarction including chronic infarcts in the right cerebellar hemisphere and right corona radiata. Decreased visualization of the flow-void within the right vertebral artery with continued irregular isointense T1 signal throughout the basilar artery. Bilateral sinus disease with bubbly secretions and air-fluid levels, greatest in the right maxillary sinus.

## 2017-11-20 LAB
ANION GAP SERPL CALC-SCNC: 13 MMOL/L — SIGNIFICANT CHANGE UP (ref 5–17)
BUN SERPL-MCNC: 37 MG/DL — HIGH (ref 7–23)
CALCIUM SERPL-MCNC: 9.8 MG/DL — SIGNIFICANT CHANGE UP (ref 8.4–10.5)
CHLORIDE SERPL-SCNC: 98 MMOL/L — SIGNIFICANT CHANGE UP (ref 96–108)
CO2 SERPL-SCNC: 28 MMOL/L — SIGNIFICANT CHANGE UP (ref 22–31)
CREAT SERPL-MCNC: 1.22 MG/DL — SIGNIFICANT CHANGE UP (ref 0.5–1.3)
GLUCOSE SERPL-MCNC: 119 MG/DL — HIGH (ref 70–99)
HCT VFR BLD CALC: 41.5 % — SIGNIFICANT CHANGE UP (ref 39–50)
HGB BLD-MCNC: 13.9 G/DL — SIGNIFICANT CHANGE UP (ref 13–17)
MCHC RBC-ENTMCNC: 25.5 PG — LOW (ref 27–34)
MCHC RBC-ENTMCNC: 33.5 GM/DL — SIGNIFICANT CHANGE UP (ref 32–36)
MCV RBC AUTO: 76 FL — LOW (ref 80–100)
PLATELET # BLD AUTO: 258 K/UL — SIGNIFICANT CHANGE UP (ref 150–400)
POTASSIUM SERPL-MCNC: 3.7 MMOL/L — SIGNIFICANT CHANGE UP (ref 3.5–5.3)
POTASSIUM SERPL-SCNC: 3.7 MMOL/L — SIGNIFICANT CHANGE UP (ref 3.5–5.3)
RBC # BLD: 5.46 M/UL — SIGNIFICANT CHANGE UP (ref 4.2–5.8)
RBC # FLD: 15.1 % — HIGH (ref 10.3–14.5)
SODIUM SERPL-SCNC: 139 MMOL/L — SIGNIFICANT CHANGE UP (ref 135–145)
WBC # BLD: 12.39 K/UL — HIGH (ref 3.8–10.5)
WBC # FLD AUTO: 12.39 K/UL — HIGH (ref 3.8–10.5)

## 2017-11-20 PROCEDURE — 99233 SBSQ HOSP IP/OBS HIGH 50: CPT

## 2017-11-20 PROCEDURE — 93010 ELECTROCARDIOGRAM REPORT: CPT

## 2017-11-20 RX ORDER — DOCUSATE SODIUM 100 MG
100 CAPSULE ORAL DAILY
Qty: 0 | Refills: 0 | Status: DISCONTINUED | OUTPATIENT
Start: 2017-11-20 | End: 2017-11-20

## 2017-11-20 RX ORDER — DOCUSATE SODIUM 100 MG
100 CAPSULE ORAL DAILY
Qty: 0 | Refills: 0 | Status: DISCONTINUED | OUTPATIENT
Start: 2017-11-20 | End: 2017-11-23

## 2017-11-20 RX ORDER — SENNA PLUS 8.6 MG/1
1 TABLET ORAL AT BEDTIME
Qty: 0 | Refills: 0 | Status: DISCONTINUED | OUTPATIENT
Start: 2017-11-20 | End: 2017-11-23

## 2017-11-20 RX ADMIN — ENOXAPARIN SODIUM 40 MILLIGRAM(S): 100 INJECTION SUBCUTANEOUS at 12:23

## 2017-11-20 RX ADMIN — Medication 12.5 MILLIGRAM(S): at 05:49

## 2017-11-20 RX ADMIN — Medication 81 MILLIGRAM(S): at 12:23

## 2017-11-20 RX ADMIN — Medication 100 MILLIGRAM(S): at 17:58

## 2017-11-20 RX ADMIN — SENNA PLUS 1 TABLET(S): 8.6 TABLET ORAL at 21:09

## 2017-11-20 RX ADMIN — ATORVASTATIN CALCIUM 80 MILLIGRAM(S): 80 TABLET, FILM COATED ORAL at 21:09

## 2017-11-20 RX ADMIN — Medication 5 MILLIGRAM(S): at 05:49

## 2017-11-20 RX ADMIN — Medication 5 MILLIGRAM(S): at 21:10

## 2017-11-20 RX ADMIN — GABAPENTIN 600 MILLIGRAM(S): 400 CAPSULE ORAL at 12:25

## 2017-11-20 RX ADMIN — Medication 2 MILLIGRAM(S): at 21:10

## 2017-11-20 RX ADMIN — Medication 5 MILLIGRAM(S): at 12:24

## 2017-11-20 RX ADMIN — GABAPENTIN 600 MILLIGRAM(S): 400 CAPSULE ORAL at 21:09

## 2017-11-20 RX ADMIN — GABAPENTIN 600 MILLIGRAM(S): 400 CAPSULE ORAL at 05:49

## 2017-11-20 RX ADMIN — LOSARTAN POTASSIUM 100 MILLIGRAM(S): 100 TABLET, FILM COATED ORAL at 05:49

## 2017-11-20 RX ADMIN — CLOPIDOGREL BISULFATE 75 MILLIGRAM(S): 75 TABLET, FILM COATED ORAL at 12:23

## 2017-11-20 NOTE — PROGRESS NOTE ADULT - ASSESSMENT
ASSESSMENT: 57M with HTN, HLD, CAD/CABG, past stroke with residual left hemiparesis, presents with transient right hemiparesis and diplopia, and continuous headache and N/V. CT head shows old right basal ganglia lacune, and CTA shows severe bilateral MCA stenosis, proximal BA occlusion and distal stenosis. Brain MRI show right medullary and right cerebellar ischemic infarct. Impression: Severe intracranial atherosclerotic disease and occlusion of left VA, causing right  lateral medullary and right cerebellar ischemic infarcts.    NEURO: neurologically without acute change,   permissive HTN with slow titration to normotensive, ASA/Plavix and statin  for secondary stroke prevention,  titrate statin to LDL goal less than 70, pt with hiccups- titrate gabapentin- baclofen,  Physical therapy/OT eval with recommendations for [facility],. pt uninsured and pending re-instatement of medicaid, will provide daily PT/OT to optimize treatment.     ANTITHROMBOTIC THERAPY: Continue ASA and Plavix obtained for secondary stroke prevention    PULMONARY: CXR (11/09) clear, protecting airway, saturating well     CARDIOVASCULAR: TTE: EF 41%, mitral annular calcification, mild-moderate MR, global LV systolic dysfunction, mild stage 1diastolic dysfunction mild TR,  cardiac monitoring  no events, cardio consult appreciated: add metoprolol as tolerated                 GASTROINTESTINAL: s/p PEG placement (11/14), noted residuals this am, RD consult appreciated: check residual prior to restarting, if restarted 20cc/hr increase by 10cc q 6 until goal of 70.  continue to monitor. Bowel regimen.       Diet: PEG    RENAL: BUN/Cr without acute change but elevated-, provide hydration, good urine output, renal US: No hydronephrosis, haro reinserted for urinary retention. Continue Cardura for BPH.     Na Goal: Greater than 135     Haro: re-inserted 11/17    HEMATOLOGY: H/H without acute changes, Platelets 258  no s/s of bleeding     DVT ppx: LMWH    ID: febrile on 11/09 (Tmax 39.3), afebrile in am, leukocytosis, UA negative for LE, WBC 2-5, will continue to monitor, blood/urine cultures NGTD, continue to monitor. Encourage incentive spirometry.     DISPOSITION: D/C to [facility] as per PT/OT eval once stable and workup is complete, Pt uninsured, SW aware, medicaid re-instatement pending. Daily PT/OT therapy to optimize therapy and referral to [facility] for possible suman.      CORE MEASURES:        Admission NIHSS: 3     TPA:  NO      LDL/HDL: 171/57     Depression Screen: 0     Statin Therapy: Y     Dysphagia Screen: FAIL     Smoking  NO      Afib NO     Stroke Education YES ASSESSMENT: 57M with HTN, HLD, CAD/CABG, past stroke with residual left hemiparesis, presents with transient right hemiparesis and diplopia, and continuous headache and N/V. CT head shows old right basal ganglia lacune, and CTA shows severe bilateral MCA stenosis, proximal BA occlusion and distal stenosis. Brain MRI show right lateral  medullary and right cerebellar ischemic infarcts. Impression: Severe intracranial atherosclerotic disease and occlusion of left VA, with a small distal right vertebral artery,  causing right  lateral medullary and right cerebellar ischemic infarcts, i.e. perhaps due to large artery intracranial atherosclerosis.    NEURO: neurologically without acute change,   permissive HTN with slow titration to normotensive, ASA/Plavix and statin  for secondary stroke prevention,  titrate statin to LDL goal less than 70, pt with hiccups- titrate gabapentin- baclofen,  Physical therapy/OT eval with recommendations for [facility],. pt uninsured and pending re-instatement of medicaid, will provide daily PT/OT to optimize treatment.     ANTITHROMBOTIC THERAPY: Continue ASA and Plavix obtained for secondary stroke prevention    PULMONARY: CXR (11/09) clear, protecting airway, saturating well     CARDIOVASCULAR: TTE: EF 41%, mitral annular calcification, mild-moderate MR, global LV systolic dysfunction, mild stage 1diastolic dysfunction mild TR,  cardiac monitoring  no events, cardio consult appreciated: add metoprolol as tolerated                 GASTROINTESTINAL: s/p PEG placement (11/14), noted residuals this am, RD consult appreciated: check residual prior to restarting, if restarted 20cc/hr increase by 10cc q 6 until goal of 70.  continue to monitor. Bowel regimen.       Diet: PEG    RENAL: BUN/Cr without acute change but elevated-, provide hydration, good urine output, renal US: No hydronephrosis, haro reinserted for urinary retention. Continue Cardura for BPH.     Na Goal: Greater than 135     Haro: re-inserted 11/17    HEMATOLOGY: H/H without acute changes, Platelets 258  no s/s of bleeding     DVT ppx: LMWH    ID: febrile on 11/09 (Tmax 39.3), afebrile in am, leukocytosis, UA negative for LE, WBC 2-5, will continue to monitor, blood/urine cultures NGTD, continue to monitor. Encourage incentive spirometry.     DISPOSITION: D/C to [facility] as per PT/OT eval once stable and workup is complete, Pt uninsured, SW aware, medicaid re-instatement pending. Daily PT/OT therapy to optimize therapy and referral to [facility] for possible suman.      CORE MEASURES:        Admission NIHSS: 3     TPA:  NO      LDL/HDL: 171/57     Depression Screen: 0     Statin Therapy: Y     Dysphagia Screen: FAIL     Smoking  NO      Afib NO     Stroke Education YES

## 2017-11-20 NOTE — CHART NOTE - NSCHARTNOTEFT_GEN_A_CORE
BRIEF NUTRITION FOLLOW UP NOTE     RD called by stroke team to evaluate tube feeding.     Per RN Pt c residuals of 150cc at noon yesterday and feeds restarted at 20cc/hr at 3pm yesterday. Before increasing rate today RN found residuals of 100cc. Of note Pt receives 100cc free water Q6 hrs. Tube feeds are currently being held. Pt reports no BM since admission, per flow sheets Pt noted c 1 BM on day of admission. Pt also reports hiccups since admission and seen coughing up phlegm this morning. Pt reports some abdominal discomfort, noted PEG placed 11/15.  Pt denies any nausea/vomiting.     Meds/labs reviewed. Pt not currently on bowel regimen. Dosing Wt 89.5kg, IBW 72.7kg. No edema or skin breakdown noted.     Previous nutrition dx swallowing difficulty continues and is being addressed c enteral nutrition.     Suspect dysmotility, d/w team.   Recommend checking residuals before restarting feeds and starting a bowel regimen.  If feeds are restarted recommend Jevity 1.2 @ 20cc/hr increase by 10cc Q6hrs as tolerated until goal of 70cc/hr x 24hrs. Goal provides 2016kcal, 93g protein (22kcal/kg, 1.0g protein/kg/dosing Wt)    RD to remain available for further nutritional interventions as indicated/requested by medical team/pt.   Wojciech Gutierrez, RD, CDN, CDE. Pager: [phone]

## 2017-11-21 LAB
ANION GAP SERPL CALC-SCNC: 13 MMOL/L — SIGNIFICANT CHANGE UP (ref 5–17)
APPEARANCE UR: ABNORMAL
BACTERIA # UR AUTO: ABNORMAL
BILIRUB UR-MCNC: ABNORMAL
BUN SERPL-MCNC: 38 MG/DL — HIGH (ref 7–23)
CALCIUM SERPL-MCNC: 9.6 MG/DL — SIGNIFICANT CHANGE UP (ref 8.4–10.5)
CHLORIDE SERPL-SCNC: 103 MMOL/L — SIGNIFICANT CHANGE UP (ref 96–108)
CO2 SERPL-SCNC: 29 MMOL/L — SIGNIFICANT CHANGE UP (ref 22–31)
COLOR SPEC: ABNORMAL
CREAT SERPL-MCNC: 1.38 MG/DL — HIGH (ref 0.5–1.3)
DIFF PNL FLD: ABNORMAL
EPI CELLS # UR: 1 /HPF — SIGNIFICANT CHANGE UP (ref 0–5)
GLUCOSE SERPL-MCNC: 136 MG/DL — HIGH (ref 70–99)
GLUCOSE UR QL: NEGATIVE MG/DL — SIGNIFICANT CHANGE UP
HCT VFR BLD CALC: 40.9 % — SIGNIFICANT CHANGE UP (ref 39–50)
HGB BLD-MCNC: 13.9 G/DL — SIGNIFICANT CHANGE UP (ref 13–17)
HYALINE CASTS # UR AUTO: 6 /LPF — SIGNIFICANT CHANGE UP (ref 0–7)
KETONES UR-MCNC: NEGATIVE — SIGNIFICANT CHANGE UP
LEUKOCYTE ESTERASE UR-ACNC: ABNORMAL
MCHC RBC-ENTMCNC: 25.7 PG — LOW (ref 27–34)
MCHC RBC-ENTMCNC: 34 GM/DL — SIGNIFICANT CHANGE UP (ref 32–36)
MCV RBC AUTO: 75.7 FL — LOW (ref 80–100)
NITRITE UR-MCNC: NEGATIVE — SIGNIFICANT CHANGE UP
PH UR: 5.5 — SIGNIFICANT CHANGE UP (ref 5–8)
PLATELET # BLD AUTO: 253 K/UL — SIGNIFICANT CHANGE UP (ref 150–400)
POTASSIUM SERPL-MCNC: 3.6 MMOL/L — SIGNIFICANT CHANGE UP (ref 3.5–5.3)
POTASSIUM SERPL-SCNC: 3.6 MMOL/L — SIGNIFICANT CHANGE UP (ref 3.5–5.3)
PROT UR-MCNC: 100 MG/DL
RBC # BLD: 5.4 M/UL — SIGNIFICANT CHANGE UP (ref 4.2–5.8)
RBC # FLD: 15.4 % — HIGH (ref 10.3–14.5)
RBC CASTS # UR COMP ASSIST: >720 /HPF — HIGH (ref 0–4)
SODIUM SERPL-SCNC: 145 MMOL/L — SIGNIFICANT CHANGE UP (ref 135–145)
SP GR SPEC: 1.02 — SIGNIFICANT CHANGE UP (ref 1.01–1.02)
UROBILINOGEN FLD QL: 1 MG/DL — SIGNIFICANT CHANGE UP
WBC # BLD: 13.84 K/UL — HIGH (ref 3.8–10.5)
WBC # FLD AUTO: 13.84 K/UL — HIGH (ref 3.8–10.5)
WBC UR QL: 327 /HPF — HIGH (ref 0–5)

## 2017-11-21 PROCEDURE — 71010: CPT | Mod: 26

## 2017-11-21 PROCEDURE — 99232 SBSQ HOSP IP/OBS MODERATE 35: CPT

## 2017-11-21 PROCEDURE — 99233 SBSQ HOSP IP/OBS HIGH 50: CPT

## 2017-11-21 PROCEDURE — 74020: CPT | Mod: 26

## 2017-11-21 RX ORDER — SODIUM CHLORIDE 9 MG/ML
1000 INJECTION INTRAMUSCULAR; INTRAVENOUS; SUBCUTANEOUS
Qty: 0 | Refills: 0 | Status: DISCONTINUED | OUTPATIENT
Start: 2017-11-21 | End: 2017-11-21

## 2017-11-21 RX ORDER — SODIUM CHLORIDE 9 MG/ML
1000 INJECTION INTRAMUSCULAR; INTRAVENOUS; SUBCUTANEOUS
Qty: 0 | Refills: 0 | Status: DISCONTINUED | OUTPATIENT
Start: 2017-11-21 | End: 2017-11-22

## 2017-11-21 RX ADMIN — Medication 5 MILLIGRAM(S): at 21:23

## 2017-11-21 RX ADMIN — Medication 81 MILLIGRAM(S): at 12:32

## 2017-11-21 RX ADMIN — CLOPIDOGREL BISULFATE 75 MILLIGRAM(S): 75 TABLET, FILM COATED ORAL at 12:32

## 2017-11-21 RX ADMIN — SODIUM CHLORIDE 75 MILLILITER(S): 9 INJECTION INTRAMUSCULAR; INTRAVENOUS; SUBCUTANEOUS at 12:32

## 2017-11-21 RX ADMIN — GABAPENTIN 600 MILLIGRAM(S): 400 CAPSULE ORAL at 21:23

## 2017-11-21 RX ADMIN — GABAPENTIN 600 MILLIGRAM(S): 400 CAPSULE ORAL at 12:32

## 2017-11-21 RX ADMIN — SENNA PLUS 1 TABLET(S): 8.6 TABLET ORAL at 21:23

## 2017-11-21 RX ADMIN — GABAPENTIN 600 MILLIGRAM(S): 400 CAPSULE ORAL at 05:34

## 2017-11-21 RX ADMIN — Medication 5 MILLIGRAM(S): at 05:33

## 2017-11-21 RX ADMIN — LOSARTAN POTASSIUM 100 MILLIGRAM(S): 100 TABLET, FILM COATED ORAL at 05:33

## 2017-11-21 RX ADMIN — Medication 100 MILLIGRAM(S): at 12:32

## 2017-11-21 RX ADMIN — Medication 5 MILLIGRAM(S): at 12:31

## 2017-11-21 RX ADMIN — ENOXAPARIN SODIUM 40 MILLIGRAM(S): 100 INJECTION SUBCUTANEOUS at 12:32

## 2017-11-21 RX ADMIN — Medication 12.5 MILLIGRAM(S): at 05:33

## 2017-11-21 RX ADMIN — Medication 2 MILLIGRAM(S): at 21:23

## 2017-11-21 RX ADMIN — ATORVASTATIN CALCIUM 80 MILLIGRAM(S): 80 TABLET, FILM COATED ORAL at 21:23

## 2017-11-21 NOTE — PROGRESS NOTE ADULT - SUBJECTIVE AND OBJECTIVE BOX
Chief Complaint:  Patient is a 57y old  Male who presents with a chief complaint of stroke code (08 Nov 2017 08:50)      Interval Events: Patient is a 58 yo [place] Islander male w/ PMH significant for HTN, HLD, CAD s/p CABG, previous CVA w/ residual left hemiparesis, admitted for R sided weakness, HA, diplopia, diagnosed with R lateral medullary and R cerebellar ischemic infarcts per MRI. Patient failed MBS and PEG tube was placed on 11/14 and tube feeding was initiated on 11/16 with goal of 70cc/hr. Upon initiation of tube feeding, patient had 2 episodes of emesis and feeding was started at a lower rate and gradually titrated up as tolerated. Per RN, Residuals of 150 cc's were noted on 11/19 after running tube feeds at 50cc/hr. Patient also had not had a documented BM since 11/7 and he was complaining of abdominal fullness. Tube feeding were stopped, patient was placed on colace and senna, and abd xray is pending d/t concern for ileus/SBO. However, patient had 2 large formed BMs today (no melena, no BRBPR) and states that his abdominal fullness has resolved.      Allergies:  No Known Allergies      Home Medications:    Hospital Medications:  acetaminophen   Tablet. 650 milliGRAM(s) Oral every 6 hours PRN  acetaminophen  Suppository 650 milliGRAM(s) Rectal every 6 hours PRN  aspirin  chewable 81 milliGRAM(s) Oral daily  atorvastatin 80 milliGRAM(s) Oral at bedtime  baclofen 5 milliGRAM(s) Oral three times a day  clopidogrel Tablet 75 milliGRAM(s) Oral daily  docusate sodium Liquid 100 milliGRAM(s) Oral daily  doxazosin 2 milliGRAM(s) Oral at bedtime  enoxaparin Injectable 40 milliGRAM(s) SubCutaneous daily  gabapentin   Solution 600 milliGRAM(s) Oral three times a day  guaiFENesin    Syrup 200 milliGRAM(s) Oral every 6 hours PRN  hydrALAZINE Injectable 10 milliGRAM(s) IV Push two times a day PRN  hydrochlorothiazide 12.5 milliGRAM(s) Oral daily  influenza   Vaccine 0.5 milliLiter(s) IntraMuscular once  losartan 100 milliGRAM(s) Oral daily  senna 1 Tablet(s) Oral at bedtime  simethicone 80 milliGRAM(s) Chew daily PRN  sodium chloride 0.9%. 1000 milliLiter(s) IV Continuous <Continuous>      PMHX/PSHX:  Stented coronary artery  CVA (cerebral vascular accident)  HTN (hypertension)  No significant past surgical history      Family history:  No pertinent family history in first degree relatives      ROS:     General:  No wt loss, fevers, chills, night sweats, fatigue,   Eyes:  diplopia  ENT:  No sore throat, pain, runny nose; +dysphagia  CV:  No pain, palpitations, hypo/hypertension  Resp:  +cough, no wheezing, no tachypnea  GI:  No pain, No nausea, No vomiting, No diarrhea, +constipation, no melena, no BRBPR  :  No pain, bleeding, incontinence, nocturia  Neuro:  AOx3, L sided weakness, L facial droop  Psych:  No fatigue, insomnia, mood problems, depression  Endocrine:  No polyuria, polydipsia, cold/heat intolerance  Heme:  No petechiae, ecchymosis, easy bruisability  Skin:  No rash, tattoos, scars, edema      PHYSICAL EXAM:   Vital Signs:  Vital Signs Last 24 Hrs  T(C): 36.9 (21 Nov 2017 16:09), Max: 38.1 (21 Nov 2017 10:42)  T(F): 98.4 (21 Nov 2017 16:09), Max: 100.6 (21 Nov 2017 10:42)  HR: 91 (21 Nov 2017 16:09) (89 - 110)  BP: 142/88 (21 Nov 2017 16:09) (96/64 - 142/88)  BP(mean): --  RR: 18 (21 Nov 2017 16:09) (18 - 20)  SpO2: 94% (21 Nov 2017 16:09) (94% - 99%)  Daily     Daily     GENERAL:  thin, no distress  HEENT:  L facial droop, conjunctivae clear and pink, sclera -anicteric  CHEST:  cough, course breath sounds and crackles throughout bilaterally, no wheezing.  HEART:  Regular rhythm, S1, S2, no murmur/rub/S3/S4, no abdominal bruit, no edema  ABDOMEN:  Soft, non-tender, non-distended, normoactive bowel sounds, no guarding, PEG tube in place, Wu in place.  EXTEREMITIES:  no cyanosis,clubbing or edema, L sided weakness.  SKIN:  No rash/erythema/ecchymoses/petechiae/wounds/abscess/warm/dry  NEURO:  Alert, oriented, no asterixis, no tremor, no encephalopathy    LABS:                        13.9   13.84 )-----------( 253      ( 21 Nov 2017 07:35 )             40.9     11-21    145  |  103  |  38<H>  ----------------------------<  136<H>  3.6   |  29  |  1.38<H>    Ca    9.6      21 Nov 2017 06:30                Imaging:

## 2017-11-21 NOTE — PROGRESS NOTE ADULT - ASSESSMENT
Patient is a 56 yo [place] male w/ PMH significant for HTN, HLD, CAD s/p CABG, previous CVA w/ residual left hemiparesis, admitted for R sided weakness, HA, diplopia, diagnosed with R lateral medullary and R cerebellar ischemic infarcts per MRI. Patient failed MBS and PEG tube was placed and tube feeds initiated. GI now being reconsulted d/t concern for ileus.     #r/o ileus.  Patient had not had a BM since 11/7 but had 2 large BMs today. He is AFVSS and he had +bowel sounds on exam.  - pending abdominal xray  - c/w senna/colace  - c/w tube feeds per RD as tolerated Patient is a 58 yo [place] male w/ PMH significant for HTN, HLD, CAD s/p CABG, previous CVA w/ residual left hemiparesis, admitted for R sided weakness, HA, diplopia, diagnosed with R lateral medullary and R cerebellar ischemic infarcts per MRI. Patient failed MBS and PEG tube was placed and tube feeds initiated. GI now being reconsulted d/t concern for ileus.     #r/o ileus.  Patient had not had a BM since 11/7 but had 2 large BMs today. He is AFVSS and he had +bowel sounds on exam.  - pending abdominal xray  - c/w senna/colace; consider miralax.  - currently NPO pending abd xray Patient is a 56 yo [place] male w/ PMH significant for HTN, HLD, CAD s/p CABG, previous CVA w/ residual left hemiparesis, admitted for R sided weakness, HA, diplopia, diagnosed with R lateral medullary and R cerebellar ischemic infarcts per MRI. Patient failed MBS and PEG tube was placed and tube feeds initiated. GI now being reconsulted d/t concern for ileus.     #r/o ileus.  Patient had not had a BM since 11/7 but had 2 large BMs today. He is AFVSS, electrolytes wnl, and he had +bowel sounds on exam.  - c/w senna/colace; consider miralax.  - encourage activity.  - currently NPO pending abdominal xray  - If patient continues to have symptoms, can consider neostigmine, decompression via NGT or colonoscope  - adv tube feeds per RD as tolerated. Patient is a 58 yo [place]ron[place] male w/ PMH significant for HTN, HLD, CAD s/p CABG, previous CVA w/ residual left hemiparesis, admitted for R sided weakness, HA, diplopia, diagnosed with R lateral medullary and R cerebellar ischemic infarcts per MRI. Patient failed MBS and PEG tube was placed and tube feeds initiated. GI now being reconsulted d/t concern for ileus.     # Ileus and difficulty tolerating TFs  Patient had not had a BM since 11/7 but had 2 large BMs today. He is AFVSS, electrolytes wnl, and he had +bowel sounds on exam.  - c/w senna/colace; consider miralax.  - encourage activity.  - currently NPO pending abdominal xray  - adv tube feeds per RD as tolerated.

## 2017-11-21 NOTE — PROGRESS NOTE ADULT - SUBJECTIVE AND OBJECTIVE BOX
THE PATIENT WAS SEEN AND EXAMINED BY ME WITH THE HOUSESTAFF AND STROKE TEAM DURING MORNING ROUNDS.   HPI:  58 y/o R-handed Tuval[place] man PMH CVA with residual L-sided weakness, CAD s/p CABG, HTN presents as stroke code for dizziness. Pt at baseline ambulates independently and independent in ADLs. Pt was in usual state of health on 11/07 at 9AM. At around 9:30AM, he began to complain of lightheadedness not associated with changes in position. Half an hour later, he began to complain of n/v. He also endorses HA, diplopia/blurry vision, R-sided weakness that began a few days prior to admission.  He reports he ran out of his BP meds a week prior to admission and has been unable to refill his medications      SUBJECTIVE: No events overnight.  No new neurologic complaints.      acetaminophen   Tablet. 650 milliGRAM(s) Oral every 6 hours PRN  acetaminophen  Suppository 650 milliGRAM(s) Rectal every 6 hours PRN  aspirin  chewable 81 milliGRAM(s) Oral daily  atorvastatin 80 milliGRAM(s) Oral at bedtime  baclofen 5 milliGRAM(s) Oral three times a day  clopidogrel Tablet 75 milliGRAM(s) Oral daily  docusate sodium Liquid 100 milliGRAM(s) Oral daily  doxazosin 2 milliGRAM(s) Oral at bedtime  enoxaparin Injectable 40 milliGRAM(s) SubCutaneous daily  gabapentin   Solution 600 milliGRAM(s) Oral three times a day  guaiFENesin    Syrup 200 milliGRAM(s) Oral every 6 hours PRN  hydrALAZINE Injectable 10 milliGRAM(s) IV Push two times a day PRN  hydrochlorothiazide 12.5 milliGRAM(s) Oral daily  influenza   Vaccine 0.5 milliLiter(s) IntraMuscular once  losartan 100 milliGRAM(s) Oral daily  senna 1 Tablet(s) Oral at bedtime  simethicone 80 milliGRAM(s) Chew daily PRN      PHYSICAL EXAM:   Vital Signs Last 24 Hrs  T(C): 36.8 (21 Nov 2017 04:32), Max: 37.3 (20 Nov 2017 20:00)  T(F): 98.2 (21 Nov 2017 04:32), Max: 99.1 (20 Nov 2017 20:00)  HR: 95 (21 Nov 2017 04:32) (84 - 110)  BP: 131/82 (21 Nov 2017 04:32) (97/63 - 135/92)  BP(mean): --  RR: 20 (21 Nov 2017 04:32) (19 - 20)  SpO2: 96% (21 Nov 2017 04:32) (94% - 97%)    General: No acute distress  HEENT: EOM intact, visual fields full, PERRLA  Abdomen: Soft, nontender, nondistended   Extremities: No edema    NEUROLOGICAL EXAM:  Mental status: Awake, alert, oriented x3, fluent speech, no neglect, able to follow commands  Cranial Nerves: left facial droop, EOMI, VFF, no nystagmus, dysarthria, slight palatal deviation to the left, recurrent hiccups  Motor exam: Normal tone, no drift, RUE 5/5, RLE 5/5, drift LUE 4+/5, drift LLE 5-/5.  Sensation: decreased temperature sensation on left arm  Coordination/ Gait: LUE moderate-severe dysmetria    LABS:                        13.9   13.84 )-----------( 253      ( 21 Nov 2017 07:35 )             40.9    11-21    145  |  103  |  38<H>  ----------------------------<  136<H>  3.6   |  29  |  1.38<H>    Ca    9.6      21 Nov 2017 06:30      Hemoglobin A1C, Whole Blood: 5.8 % (11-08 @ 10:36)      IMAGING: Reviewed by me.   Head CT/CTA head/Neck (11/07) Right basal ganglia and corona radiata infarct without significant change since 6/16/2017. There is severe steno-occlusive disease intracranially involving the right supraclinoid internal carotid artery, A1 and M1 branches, and severe narrowing of the left M1 segment of the middle cerebral artery. The distal right vertebral artery is quite small and the proximal basilar artery is not visualized. The dominant left vertebral artery ends at the PICA. Distal basilar flow appears to be due to retrograde flow from the right posterior communicating artery.  Head CT (11/07) No acute intracranial hemorrhage, mass effect, or evidence of acute territorial infarct. Chronic right corona radiata/basal ganglia and right inferior cerebellar hemisphere infarcts.  Brain MRI (11/09) In comparison the prior MRI, there is new hyperintense T2 and FLAIR signal with faint increased restricted diffusion in the right medulla extending to the inferior right brachium pontis and inferior medial right cerebellar hemisphere, new compared to 6/17/2017 consistent with evolving area of  ischemic change without hemorrhagic transformation. Redemonstration of volume loss with multiple additional areas of lacunar infarction including chronic infarcts in the right cerebellar hemisphere and right corona radiata. Decreased visualization of the flow-void within the right vertebral artery with continued irregular isointense T1 signal throughout the basilar artery. Bilateral sinus disease with bubbly secretions and air-fluid levels, greatest in the right maxillary sinus.    THE PATIENT WAS SEEN AND EXAMINED BY ME WITH THE HOUSESTAFF AND STROKE TEAM DURING MORNING ROUNDS.   HPI:  58 y/o R-handed [place] man PMH CVA with residual L-sided weakness, CAD s/p CABG, HTN presents as stroke code for dizziness. Pt at baseline ambulates independently and independent in ADLs. Pt was in usual state of health on 11/07 at 9AM. At around 9:30AM, he began to complain of lightheadedness not associated with changes in position. Half an hour later, he began to complain of n/v. He also endorses HA, diplopia/blurry vision, R-sided weakness that began a few days prior to admission.  He reports he ran out of his BP meds a week prior to admission and has been unable to refill his medications    SUBJECTIVE: No events overnight.  No new neurologic complaints.      acetaminophen   Tablet. 650 milliGRAM(s) Oral every 6 hours PRN  acetaminophen  Suppository 650 milliGRAM(s) Rectal every 6 hours PRN  aspirin  chewable 81 milliGRAM(s) Oral daily  atorvastatin 80 milliGRAM(s) Oral at bedtime  baclofen 5 milliGRAM(s) Oral three times a day  clopidogrel Tablet 75 milliGRAM(s) Oral daily  doxazosin 2 milliGRAM(s) Oral at bedtime  enoxaparin Injectable 40 milliGRAM(s) SubCutaneous daily  gabapentin   Solution 600 milliGRAM(s) Oral three times a day  guaiFENesin    Syrup 200 milliGRAM(s) Oral every 6 hours PRN  hydrALAZINE Injectable 10 milliGRAM(s) IV Push two times a day PRN  hydrochlorothiazide 12.5 milliGRAM(s) Oral daily  influenza   Vaccine 0.5 milliLiter(s) IntraMuscular once  losartan 100 milliGRAM(s) Oral daily  simethicone 80 milliGRAM(s) Chew daily PRN      PHYSICAL EXAM:   Vital Signs Last 24 Hrs  T(C): 34.8 (20 Nov 2017 07:59), Max: 36.9 (19 Nov 2017 20:00)  T(F): 94.7 (20 Nov 2017 07:59), Max: 98.4 (19 Nov 2017 20:00)  HR: 86 (20 Nov 2017 10:00) (76 - 104)  BP: 97/63 (20 Nov 2017 10:00) (91/64 - 116/76)  BP(mean): --  RR: 19 (20 Nov 2017 07:59) (18 - 20)  SpO2: 97% (20 Nov 2017 10:00) (93% - 97%)    General: No acute distress  HEENT: EOM intact, visual fields full, PERRLA  Abdomen: Soft, nontender, nondistended   Extremities: No edema    NEUROLOGICAL EXAM:  Mental status: Awake, alert, oriented x3, fluent speech, no neglect, able to follow commands  Cranial Nerves: left facial droop, EOMI, VFF, no nystagmus, dysarthria, palate symmetrical   Motor exam: Normal tone, no drift, RUE 5/5, RLE 5/5, drift LUE 4+/5, drift LLE 5-/5.  Sensation: Intact to light touch   Coordination/ Gait: LUE moderate-severe dysmetria    LABS:                        13.9   12.39 )-----------( 258      ( 20 Nov 2017 07:18 )             41.5    11-20    139  |  98  |  37<H>  ----------------------------<  119<H>  3.7   |  28  |  1.22    Ca    9.8      20 Nov 2017 07:27      Hemoglobin A1C, Whole Blood: 5.8 % (11-08 @ 10:36)      IMAGING: Reviewed by me.     Head CT/CTA head/Neck (11/07) Right basal ganglia and corona radiata infarct without significant change since 6/16/2017. There is severe steno-occlusive disease intracranially involving the right supraclinoid internal carotid artery, A1 and M1 branches, and severe narrowing of the left M1 segment of the middle cerebral artery. The distal right vertebral artery is quite small and the proximal basilar artery is not visualized. The dominant left vertebral artery ends at the PICA. Distal basilar flow appears to be due to retrograde flow from the right posterior communicating artery.  Head CT (11/07) No acute intracranial hemorrhage, mass effect, or evidence of acute territorial infarct. Chronic right corona radiata/basal ganglia and right inferior cerebellar hemisphere infarcts.  Brain MRI (11/09) In comparison the prior MRI, there is new hyperintense T2 and FLAIR signal with faint increased restricted diffusion in the right medulla extending to the inferior right brachium pontis and inferior medial right cerebellar hemisphere, new compared to 6/17/2017 consistent with evolving area of  ischemic change without hemorrhagic transformation. Redemonstration of volume loss with multiple additional areas of lacunar infarction including chronic infarcts in the right cerebellar hemisphere and right corona radiata. Decreased visualization of the flow-void within the right vertebral artery with continued irregular isointense T1 signal throughout the basilar artery. Bilateral sinus disease with bubbly secretions and air-fluid levels, greatest in the right maxillary sinus.

## 2017-11-21 NOTE — PROGRESS NOTE ADULT - ASSESSMENT
ASSESSMENT: 57M with HTN, HLD, CAD/CABG, past stroke with residual left hemiparesis, presents with transient right hemiparesis and diplopia, and continuous headache and N/V. CT head shows old right basal ganglia lacune, and CTA shows severe bilateral MCA stenosis, proximal BA occlusion and distal stenosis. Brain MRI show right lateral  medullary and right cerebellar ischemic infarcts. Impression: Severe intracranial atherosclerotic disease and occlusion of left VA, with a small distal right vertebral artery,  causing right  lateral medullary and right cerebellar ischemic infarcts, i.e. perhaps due to large artery intracranial atherosclerosis.    NEURO: neurologically without acute change,   permissive HTN with slow titration to normotensive, ASA/Plavix and statin  for secondary stroke prevention,  titrate statin to LDL goal less than 70, pt with hiccups- titrate gabapentin- baclofen as needed,  Physical therapy/OT eval with recommendations for [facility],. pt uninsured and pending re-instatement of medicaid, will provide daily PT/OT to optimize treatment.     ANTITHROMBOTIC THERAPY:  ASA and Plavix  for secondary stroke prevention    PULMONARY: CXR (11/09) clear, protecting airway, saturating well     CARDIOVASCULAR: TTE: EF 41%, mitral annular calcification, mild-moderate MR, global LV systolic dysfunction, mild stage 1diastolic dysfunction mild TR,  cardiac monitoring  no events, cardio consult appreciated: add metoprolol as tolerated                 GASTROINTESTINAL: s/p PEG placement (11/14), noted residuals of 100cc 11/20, RD consult appreciated:  restarted 20cc/hr increase by 10cc q 6 until goal of 70.  continue to monitor. Bowel regimen with BM 11/21.       Diet: PEG    RENAL: BUN/Cr without acute change but elevated-, provide hydration, good urine output, renal US: No hydronephrosis, haro reinserted for urinary retention. Continue Cardura for BPH.     Na Goal: Greater than 135     Haro: re-inserted 11/17 for retention    HEMATOLOGY: H/H without acute changes, Platelets 253 no s/s of bleeding     DVT ppx: LMWH    ID: febrile on 11/09 (Tmax 39.3), afebrile in am, leukocytosis, UA negative 11/17 for LE, WBC 2-5, will continue to monitor, blood/urine cultures NGTD, continue to monitor. Encourage incentive spirometry. Recheck UA/Cx/CXR.     DISPOSITION: D/C to [facility] as per PT/OT eval once stable and workup is complete, Pt uninsured, SW aware, medicaid re-instatement pending. Daily PT/OT therapy to optimize therapy and referral to [facility] for possible suman.      CORE MEASURES:        Admission NIHSS: 3     TPA:  NO      LDL/HDL: 171/57     Depression Screen: 0     Statin Therapy: Y     Dysphagia Screen: FAIL     Smoking  NO      Afib NO     Stroke Education YES ASSESSMENT: 57M with HTN, HLD, CAD/CABG, past stroke with residual left hemiparesis, presents with transient right hemiparesis and diplopia, and continuous headache and N/V. CT head shows old right basal ganglia lacune, and CTA shows severe bilateral MCA stenosis, proximal BA occlusion and distal stenosis. Brain MRI show right lateral  medullary and right cerebellar ischemic infarcts. Impression: Severe intracranial atherosclerotic disease and occlusion of left VA, with a small distal right vertebral artery,  causing right  lateral medullary and right cerebellar ischemic infarcts, i.e. perhaps due to large artery intracranial atherosclerosis.    NEURO: neurologically without acute change,   permissive HTN with slow titration to normotensive, ASA/Plavix and statin  for secondary stroke prevention,  titrate statin to LDL goal less than 70, pt with hiccups- titrate gabapentin- baclofen as needed,  Physical therapy/OT eval with recommendations for [facility],. pt uninsured and pending re-instatement of medicaid, will provide daily PT/OT to optimize treatment.     ANTITHROMBOTIC THERAPY:  ASA and Plavix  for secondary stroke prevention    PULMONARY: CXR (11/09) clear, protecting airway, saturating well     CARDIOVASCULAR: TTE: EF 41%, mitral annular calcification, mild-moderate MR, global LV systolic dysfunction, mild stage 1diastolic dysfunction mild TR,  cardiac monitoring  no events, cardio consult appreciated: add metoprolol as tolerated                 GASTROINTESTINAL: s/p PEG placement (11/14), noted residuals after restarted 20cc/hr increase  .  continue to monitor. Bowel regimen with BM 11/21.  NPO except meds with x ray pending to r/o ileus.  Gi reconsulted.     Diet: PEG    RENAL: BUN/Cr without acute change but elevated-, provide hydration, good urine output, renal US: No hydronephrosis, haro reinserted for urinary retention. Continue Cardura for BPH.     Na Goal: Greater than 135     Haro: re-inserted 11/17 for retention    HEMATOLOGY: H/H without acute changes, Platelets 253 no s/s of bleeding     DVT ppx: LMWH    ID: febrile on 11/09 (Tmax 39.3), afebrile in am, leukocytosis, UA negative 11/17 for LE, WBC 2-5, will continue to monitor, blood/urine cultures NGTD, continue to monitor. Encourage incentive spirometry. Recheck UA/Cx/CXR.     DISPOSITION: D/C to [facility] as per PT/OT eval once stable and workup is complete, Pt uninsured, SW aware, medicaid re-instatement pending. Daily PT/OT therapy to optimize therapy and referral to [facility] for possible suman.      CORE MEASURES:        Admission NIHSS: 3     TPA:  NO      LDL/HDL: 171/57     Depression Screen: 0     Statin Therapy: Y     Dysphagia Screen: FAIL     Smoking  NO      Afib NO     Stroke Education YES

## 2017-11-22 DIAGNOSIS — I63.9 CEREBRAL INFARCTION, UNSPECIFIED: ICD-10-CM

## 2017-11-22 DIAGNOSIS — K56.7 ILEUS, UNSPECIFIED: ICD-10-CM

## 2017-11-22 DIAGNOSIS — J69.0 PNEUMONITIS DUE TO INHALATION OF FOOD AND VOMIT: ICD-10-CM

## 2017-11-22 DIAGNOSIS — A41.9 SEPSIS, UNSPECIFIED ORGANISM: ICD-10-CM

## 2017-11-22 LAB
ALBUMIN SERPL ELPH-MCNC: 3.2 G/DL — LOW (ref 3.3–5)
ALP SERPL-CCNC: 63 U/L — SIGNIFICANT CHANGE UP (ref 40–120)
ALT FLD-CCNC: 17 U/L RC — SIGNIFICANT CHANGE UP (ref 10–45)
ANION GAP SERPL CALC-SCNC: 18 MMOL/L — HIGH (ref 5–17)
ANION GAP SERPL CALC-SCNC: 18 MMOL/L — HIGH (ref 5–17)
APPEARANCE UR: ABNORMAL
AST SERPL-CCNC: 30 U/L — SIGNIFICANT CHANGE UP (ref 10–40)
BILIRUB SERPL-MCNC: 1.3 MG/DL — HIGH (ref 0.2–1.2)
BILIRUB UR-MCNC: NEGATIVE — SIGNIFICANT CHANGE UP
BUN SERPL-MCNC: 39 MG/DL — HIGH (ref 7–23)
BUN SERPL-MCNC: 45 MG/DL — HIGH (ref 7–23)
CALCIUM SERPL-MCNC: 9.4 MG/DL — SIGNIFICANT CHANGE UP (ref 8.4–10.5)
CALCIUM SERPL-MCNC: 9.4 MG/DL — SIGNIFICANT CHANGE UP (ref 8.4–10.5)
CHLORIDE SERPL-SCNC: 101 MMOL/L — SIGNIFICANT CHANGE UP (ref 96–108)
CHLORIDE SERPL-SCNC: 103 MMOL/L — SIGNIFICANT CHANGE UP (ref 96–108)
CO2 SERPL-SCNC: 23 MMOL/L — SIGNIFICANT CHANGE UP (ref 22–31)
CO2 SERPL-SCNC: 25 MMOL/L — SIGNIFICANT CHANGE UP (ref 22–31)
COLOR SPEC: YELLOW — SIGNIFICANT CHANGE UP
CREAT SERPL-MCNC: 1.45 MG/DL — HIGH (ref 0.5–1.3)
CREAT SERPL-MCNC: 1.61 MG/DL — HIGH (ref 0.5–1.3)
DIFF PNL FLD: ABNORMAL
GAS PNL BLDA: SIGNIFICANT CHANGE UP
GLUCOSE BLDC GLUCOMTR-MCNC: 108 MG/DL — HIGH (ref 70–99)
GLUCOSE SERPL-MCNC: 124 MG/DL — HIGH (ref 70–99)
GLUCOSE SERPL-MCNC: 136 MG/DL — HIGH (ref 70–99)
GLUCOSE UR QL: NEGATIVE MG/DL — SIGNIFICANT CHANGE UP
HCT VFR BLD CALC: 39.6 % — SIGNIFICANT CHANGE UP (ref 39–50)
HCT VFR BLD CALC: 39.7 % — SIGNIFICANT CHANGE UP (ref 39–50)
HGB BLD-MCNC: 13 G/DL — SIGNIFICANT CHANGE UP (ref 13–17)
HGB BLD-MCNC: 13.2 G/DL — SIGNIFICANT CHANGE UP (ref 13–17)
INR BLD: 1.65 RATIO — HIGH (ref 0.88–1.16)
KETONES UR-MCNC: NEGATIVE — SIGNIFICANT CHANGE UP
LEUKOCYTE ESTERASE UR-ACNC: ABNORMAL
LYMPHOCYTES # BLD AUTO: 0.8 K/UL — LOW (ref 1–3.3)
LYMPHOCYTES # BLD AUTO: 6 % — LOW (ref 13–44)
MAGNESIUM SERPL-MCNC: 2.6 MG/DL — SIGNIFICANT CHANGE UP (ref 1.6–2.6)
MCHC RBC-ENTMCNC: 25.3 PG — LOW (ref 27–34)
MCHC RBC-ENTMCNC: 26.8 PG — LOW (ref 27–34)
MCHC RBC-ENTMCNC: 32.7 GM/DL — SIGNIFICANT CHANGE UP (ref 32–36)
MCHC RBC-ENTMCNC: 33.3 GM/DL — SIGNIFICANT CHANGE UP (ref 32–36)
MCV RBC AUTO: 77.2 FL — LOW (ref 80–100)
MCV RBC AUTO: 80.5 FL — SIGNIFICANT CHANGE UP (ref 80–100)
MONOCYTES # BLD AUTO: 1 K/UL — HIGH (ref 0–0.9)
MONOCYTES NFR BLD AUTO: 6 % — SIGNIFICANT CHANGE UP (ref 2–14)
NEUTROPHILS # BLD AUTO: 11.3 K/UL — HIGH (ref 1.8–7.4)
NEUTROPHILS NFR BLD AUTO: 83 % — HIGH (ref 43–77)
NEUTS BAND # BLD: 5 % — SIGNIFICANT CHANGE UP (ref 0–8)
NITRITE UR-MCNC: POSITIVE
PH UR: 5 — SIGNIFICANT CHANGE UP (ref 5–8)
PHOSPHATE SERPL-MCNC: 3.5 MG/DL — SIGNIFICANT CHANGE UP (ref 2.5–4.5)
PLAT MORPH BLD: NORMAL — SIGNIFICANT CHANGE UP
PLATELET # BLD AUTO: 293 K/UL — SIGNIFICANT CHANGE UP (ref 150–400)
PLATELET # BLD AUTO: 297 K/UL — SIGNIFICANT CHANGE UP (ref 150–400)
POTASSIUM SERPL-MCNC: 3.8 MMOL/L — SIGNIFICANT CHANGE UP (ref 3.5–5.3)
POTASSIUM SERPL-MCNC: 4.3 MMOL/L — SIGNIFICANT CHANGE UP (ref 3.5–5.3)
POTASSIUM SERPL-SCNC: 3.8 MMOL/L — SIGNIFICANT CHANGE UP (ref 3.5–5.3)
POTASSIUM SERPL-SCNC: 4.3 MMOL/L — SIGNIFICANT CHANGE UP (ref 3.5–5.3)
PROT SERPL-MCNC: 7.8 G/DL — SIGNIFICANT CHANGE UP (ref 6–8.3)
PROT UR-MCNC: 100 MG/DL
PROTHROM AB SERPL-ACNC: 18 SEC — HIGH (ref 9.8–12.7)
RBC # BLD: 4.92 M/UL — SIGNIFICANT CHANGE UP (ref 4.2–5.8)
RBC # BLD: 5.14 M/UL — SIGNIFICANT CHANGE UP (ref 4.2–5.8)
RBC # FLD: 14.7 % — HIGH (ref 10.3–14.5)
RBC # FLD: 15.7 % — HIGH (ref 10.3–14.5)
RBC BLD AUTO: SIGNIFICANT CHANGE UP
SODIUM SERPL-SCNC: 144 MMOL/L — SIGNIFICANT CHANGE UP (ref 135–145)
SODIUM SERPL-SCNC: 144 MMOL/L — SIGNIFICANT CHANGE UP (ref 135–145)
SP GR SPEC: 1.02 — SIGNIFICANT CHANGE UP (ref 1.01–1.02)
UROBILINOGEN FLD QL: 1 MG/DL — SIGNIFICANT CHANGE UP
WBC # BLD: 13.1 K/UL — HIGH (ref 3.8–10.5)
WBC # BLD: 16.35 K/UL — HIGH (ref 3.8–10.5)
WBC # FLD AUTO: 13.1 K/UL — HIGH (ref 3.8–10.5)
WBC # FLD AUTO: 16.35 K/UL — HIGH (ref 3.8–10.5)

## 2017-11-22 PROCEDURE — 71250 CT THORAX DX C-: CPT | Mod: 26

## 2017-11-22 PROCEDURE — 93010 ELECTROCARDIOGRAM REPORT: CPT

## 2017-11-22 PROCEDURE — 71010: CPT | Mod: 26

## 2017-11-22 PROCEDURE — 99233 SBSQ HOSP IP/OBS HIGH 50: CPT

## 2017-11-22 PROCEDURE — 99232 SBSQ HOSP IP/OBS MODERATE 35: CPT

## 2017-11-22 PROCEDURE — 99222 1ST HOSP IP/OBS MODERATE 55: CPT

## 2017-11-22 PROCEDURE — 71010: CPT | Mod: 26,77

## 2017-11-22 RX ORDER — SODIUM CHLORIDE 9 MG/ML
1000 INJECTION INTRAMUSCULAR; INTRAVENOUS; SUBCUTANEOUS
Qty: 0 | Refills: 0 | Status: DISCONTINUED | OUTPATIENT
Start: 2017-11-22 | End: 2017-11-24

## 2017-11-22 RX ORDER — VANCOMYCIN HCL 1 G
1000 VIAL (EA) INTRAVENOUS ONCE
Qty: 0 | Refills: 0 | Status: COMPLETED | OUTPATIENT
Start: 2017-11-22 | End: 2017-11-22

## 2017-11-22 RX ORDER — BACLOFEN 100 %
10 POWDER (GRAM) MISCELLANEOUS THREE TIMES A DAY
Qty: 0 | Refills: 0 | Status: DISCONTINUED | OUTPATIENT
Start: 2017-11-22 | End: 2017-12-02

## 2017-11-22 RX ORDER — ACETAMINOPHEN 500 MG
1000 TABLET ORAL ONCE
Qty: 0 | Refills: 0 | Status: COMPLETED | OUTPATIENT
Start: 2017-11-22 | End: 2017-11-22

## 2017-11-22 RX ORDER — SODIUM CHLORIDE 9 MG/ML
1000 INJECTION INTRAMUSCULAR; INTRAVENOUS; SUBCUTANEOUS ONCE
Qty: 0 | Refills: 0 | Status: COMPLETED | OUTPATIENT
Start: 2017-11-22 | End: 2017-11-22

## 2017-11-22 RX ORDER — IPRATROPIUM/ALBUTEROL SULFATE 18-103MCG
3 AEROSOL WITH ADAPTER (GRAM) INHALATION EVERY 6 HOURS
Qty: 0 | Refills: 0 | Status: DISCONTINUED | OUTPATIENT
Start: 2017-11-22 | End: 2017-12-04

## 2017-11-22 RX ORDER — PIPERACILLIN AND TAZOBACTAM 4; .5 G/20ML; G/20ML
3.38 INJECTION, POWDER, LYOPHILIZED, FOR SOLUTION INTRAVENOUS EVERY 8 HOURS
Qty: 0 | Refills: 0 | Status: DISCONTINUED | OUTPATIENT
Start: 2017-11-22 | End: 2017-11-28

## 2017-11-22 RX ADMIN — Medication 12.5 MILLIGRAM(S): at 05:24

## 2017-11-22 RX ADMIN — Medication 81 MILLIGRAM(S): at 13:27

## 2017-11-22 RX ADMIN — GABAPENTIN 600 MILLIGRAM(S): 400 CAPSULE ORAL at 05:24

## 2017-11-22 RX ADMIN — Medication 3 MILLILITER(S): at 18:56

## 2017-11-22 RX ADMIN — SENNA PLUS 1 TABLET(S): 8.6 TABLET ORAL at 21:32

## 2017-11-22 RX ADMIN — GABAPENTIN 600 MILLIGRAM(S): 400 CAPSULE ORAL at 21:32

## 2017-11-22 RX ADMIN — Medication 10 MILLIGRAM(S): at 21:32

## 2017-11-22 RX ADMIN — Medication 5 MILLIGRAM(S): at 05:22

## 2017-11-22 RX ADMIN — ATORVASTATIN CALCIUM 80 MILLIGRAM(S): 80 TABLET, FILM COATED ORAL at 21:32

## 2017-11-22 RX ADMIN — Medication 2 MILLIGRAM(S): at 21:32

## 2017-11-22 RX ADMIN — Medication 250 MILLIGRAM(S): at 13:22

## 2017-11-22 RX ADMIN — ENOXAPARIN SODIUM 40 MILLIGRAM(S): 100 INJECTION SUBCUTANEOUS at 17:14

## 2017-11-22 RX ADMIN — PIPERACILLIN AND TAZOBACTAM 25 GRAM(S): 4; .5 INJECTION, POWDER, LYOPHILIZED, FOR SOLUTION INTRAVENOUS at 15:22

## 2017-11-22 RX ADMIN — Medication 400 MILLIGRAM(S): at 18:56

## 2017-11-22 RX ADMIN — LOSARTAN POTASSIUM 100 MILLIGRAM(S): 100 TABLET, FILM COATED ORAL at 05:24

## 2017-11-22 RX ADMIN — SODIUM CHLORIDE 2000 MILLILITER(S): 9 INJECTION INTRAMUSCULAR; INTRAVENOUS; SUBCUTANEOUS at 18:56

## 2017-11-22 NOTE — CONSULT NOTE ADULT - SUBJECTIVE AND OBJECTIVE BOX
HPI:  56 y/o man PMH CVA with residual L-sided weakness, CAD s/p CABG, HTN presents s/p stroke on . Called by primary team to evaluate the patient for urinary retention. Pt was unable to void after stroke on  and Haro catheter was placed. Pt with a history of BPH and pt was started on Cardura on 11/15 (via PEG). Haro was removed on  by primary team, however, pt failed trial of void and haro was replaced. Ongoing Haro management and recommendations have been requested.    PAST MEDICAL & SURGICAL HISTORY:  Stented coronary artery  CVA (cerebral vascular accident)  HTN (hypertension)  No significant past surgical history    FAMILY HISTORY:  No pertinent family history in first degree relatives    SOCIAL HISTORY:   Tobacco hx:    MEDICATIONS  (STANDING):  aspirin  chewable 81 milliGRAM(s) Oral daily  atorvastatin 80 milliGRAM(s) Oral at bedtime  baclofen 10 milliGRAM(s) Oral three times a day  docusate sodium Liquid 100 milliGRAM(s) Oral daily  doxazosin 2 milliGRAM(s) Oral at bedtime  enoxaparin Injectable 40 milliGRAM(s) SubCutaneous daily  gabapentin   Solution 600 milliGRAM(s) Oral three times a day  hydrochlorothiazide 12.5 milliGRAM(s) Oral daily  influenza   Vaccine 0.5 milliLiter(s) IntraMuscular once  losartan 100 milliGRAM(s) Oral daily  piperacillin/tazobactam IVPB. 3.375 Gram(s) IV Intermittent every 8 hours  senna 1 Tablet(s) Oral at bedtime  sodium chloride 0.9%. 1000 milliLiter(s) (75 mL/Hr) IV Continuous <Continuous>    MEDICATIONS  (PRN):  acetaminophen   Tablet. 650 milliGRAM(s) Oral every 6 hours PRN Mild Pain (1 - 3)  acetaminophen  Suppository 650 milliGRAM(s) Rectal every 6 hours PRN For Temp greater than 38.5 C (101.3 F)  guaiFENesin    Syrup 200 milliGRAM(s) Oral every 6 hours PRN Cough  hydrALAZINE Injectable 10 milliGRAM(s) IV Push two times a day PRN BP > 180  simethicone 80 milliGRAM(s) Chew daily PRN bloating    Allergies    No Known Allergies    Intolerances        REVIEW OF SYSTEMS: Pertinent positives and negatives as stated in HPI, otherwise negative    Vital signs  T(C): 37.1 (17 @ 13:20), Max: 37.4 (17 @ 05:28)  HR: 103 (17 @ 13:20)  BP: 108/73 (17 @ 13:20)  SpO2: 92% (17 @ 13:20)  Wt(kg): --    Output  I&O's Summary    2017 07:01  -  2017 07:00  --------------------------------------------------------  IN: 0 mL / OUT: 1200 mL / NET: -1200 mL      UOP    Physical Exam  Gen: NAD  Pulm: No respiratory distress, no subcostal retractions  CV: RRR, no JVD  Abd: Soft, NT, ND  : Haro in place draining dark nora urine. Clear red urine in bag.   MSK: No edema present    LABS:           @ 07:50    WBC 16.35 / Hct 39.7  / SCr --        @ 07:48    WBC --    / Hct --    / SCr 1.45         144  |  101  |  39<H>  ----------------------------<  136<H>  3.8   |  25  |  1.45<H>    Ca    9.4      2017 07:48        Urinalysis Basic - ( 2017 15:30 )    Color: Orange / Appearance: Slightly Turbid / S.017 / pH: x  Gluc: x / Ketone: Negative  / Bili: Small / Urobili: 1.0 mg/dL   Blood: x / Protein: 100 mg/dL / Nitrite: Negative   Leuk Esterase: Moderate / RBC: >720 /HPF /  /HPF   Sq Epi: x / Non Sq Epi: 1 /HPF / Bacteria: Occasional        Urine Cx:   Blood Cx:    RADIOLOGY:

## 2017-11-22 NOTE — CONSULT NOTE ADULT - PROBLEM SELECTOR RECOMMENDATION 9
f/u all cx from 11/21  await CT chest r/o aspiration pna  begin empiric abx vanco/zosyn  ID consult f/u all cx from 11/21  + pyuria on UA  await CT chest r/o aspiration pna  begin empiric abx vanco/zosyn (vanco 1gm iv x1, zozyn 3.375gm iv q8h)  ID consult  s/u neuro np

## 2017-11-22 NOTE — CONSULT NOTE ADULT - SUBJECTIVE AND OBJECTIVE BOX
HPI:  57M R-handed [place] man PMH CVA with residual L-sided weakness, CAD s/p CABG, HTN - admitted  with new stroke.  MRI showed a new hyperintense T2 and FLAIR signal with faint increased restricted diffusion in the right medulla extending to the inferior right brachium pontis and inferior medial right cerebellar hemisphere, new compared to 2017 consistent with evolving area of ischemic change without hemorrhagic transformation.  Since then, he failed a swallow study and had PEG placed.  Course also complicated by urinary retention requiring a haro catheter, ileus and now aspiration.  Fever, leukocytosis, hypoxia.  Vancomycin and zosyn were started.  ID asked to assist with management.      PAST MEDICAL & SURGICAL HISTORY:  Stented coronary artery  CVA (cerebral vascular accident)  HTN (hypertension)  No significant past surgical history    Allergies  No Known Allergies    ANTIMICROBIALS:   piperacillin/tazobactam IVPB. 3.375 every 8 hours [started -]  vancomycin  IVPB 1000 once [started -]    OTHER MEDS: MEDICATIONS  (STANDING):  aspirin  chewable 81 daily  atorvastatin 80 at bedtime  baclofen 5 three times a day  clopidogrel Tablet 75 daily  docusate sodium Liquid 100 daily  doxazosin 2 at bedtime  enoxaparin Injectable 40 daily  gabapentin   Solution 600 three times a day  hydrochlorothiazide 12.5 daily  influenza   Vaccine 0.5 once  losartan 100 daily  senna 1 at bedtime  simethicone 80 daily PRN    SOCIAL HISTORY:   no tobacco    FAMILY HISTORY:  No pertinent family history in first degree relatives    REVIEW OF SYSTEMS  [  ] ROS unobtainable because:    [x  ] All other systems negative except as noted below:	    Constitutional:  [x ] fever [ ] weight loss  Skin:  [ ] rash [ ] phlebitis	  Eyes: [ ] icterus [ ] inflammation	  ENMT: [ ] discharge [ ] thrush [ ] ulcers [ ] exudates  Respiratory: [ ] dyspnea [ ] hemoptysis [ x] cough [ x] sputum	  Cardiovascular:  [ ] chest pain [ ] palpitations [ ] edema	  Gastrointestinal:  [ ] nausea [ ] vomiting [ ] diarrhea [ ] constipation [ ] pain	  Genitourinary:  [ ] dysuria [ ] frequency [ ] hematuria [ ] discharge [ x] retention  Musculoskeletal:  [ ] myalgias [ ] arthralgias [ ] arthritis	  Neurological:  [ ] headache [ ] seizures	  Psychiatric:  [ ] anxiety [ ] depression	  Hematology/Lymphatics:  [ ] lymphadenopathy  Endocrine:  [ ] adrenal [ ] thyroid  Allergic/Immunologic:	 [ ] transplant [ ] seasonal    Vital Signs Last 24 Hrs  T(F): 98.8 (17 @ 13:20), Max: 100.6 (17 @ 10:42)  HR: 103 (17 @ 13:20) (91 - 112)  BP: 108/73 (17 @ 13:20) (100/66 - 143/96)  RR: 22 (17 @ 13:20)  SpO2: 92% (17 @ 13:20) (88% - 98%)  Wt(kg): --    PHYSICAL EXAM:  General: non-toxic  HEAD/EYES: anicteric  ENT:  supple  Cardiovascular:   S1, S2, tachy  Respiratory:  clear bilaterally but coughing throughout H&P  GI:  soft, non-tender, normal bowel sounds; PEG okay  :  haro with dark urine ? hematuria  Musculoskeletal:  no synovitis  Neurologic:  L weakness  Skin:  no rash  Lymph: no lymphadenopathy  Psychiatric:  appropriate affect  Vascular:  no phlebitis    WBC Count: 16.35 (17 @ 07:50)  WBC Count: 13.84 (17 @ 07:35)  WBC Count: 12.39 (17 @ 07:18)  WBC Count: 12.8 (17 @ 05:08)  WBC Count: 9.1 (17 @ 05:30)  WBC Count: 14.4 (17 @ 04:30)  WBC Count: 8.4 (17 @ 04:50)    144  |  101  |  39<H>  ----------------------------<  136<H>  3.8   |  25  |  1.45<H>    Ca    9.4      2017 07:48    Urinalysis Basic - ( 2017 15:30 )  Color: Orange / Appearance: Slightly Turbid / S.017 / pH: x  Gluc: x / Ketone: Negative  / Bili: Small / Urobili: 1.0 mg/dL   Blood: x / Protein: 100 mg/dL / Nitrite: Negative   Leuk Esterase: Moderate / RBC: >720 /HPF /  /HPF   Sq Epi: x / Non Sq Epi: 1 /HPF / Bacteria: Occasional    MICROBIOLOGY:   - BC and UC pending    .Urine Catheterized  11-10-17   No growth  --  --    .Sputum Sputum  11-10-17   Streptococcus pneumoniae  Normal Respiratory Bryanna present  --  Streptococcus pneumoniae    .Blood Blood-Peripheral  11-10-17   No growth at 5 days.  --  --    .Urine Clean Catch (Midstream)  17   No growth  --  --    RADIOLOGY:  Xray Abdomen Series Flat/Upright 2 View (17 @ 19:23)   IMPRESSION: PEG tube in left upper quadrant. Nonobstructive bowel gas pattern.    Chest 1 View AP -PORTABLE-Routine (17 @ 10:38)   IMPRESSION:  No acute process.      MR Head No Cont (17 @ 10:49)  IMPRESSION:   In comparison the prior MRI, there is new hyperintense T2 and FLAIR signal with faint increased restricted diffusion in the right medulla extending to the inferior right brachium pontis and inferior medial right   cerebellar hemisphere, new compared to 2017 consistent with evolving area of ischemic change without hemorrhagic transformation.  Redemonstration of volume loss with multiple additional areas of lacunar infarction including chronic infarcts in the right cerebellar hemisphere and right corona radiata. Decreased visualization of the flow-void within the right vertebral artery with continued irregular isointense T1 signal throughout the basilar artery. Bilateral sinus disease with bubbly secretions and air-fluid levels, greatest in the right maxillary sinus. Correlate clinically for symptoms of acute sinusitis.

## 2017-11-22 NOTE — PROGRESS NOTE ADULT - SUBJECTIVE AND OBJECTIVE BOX
Chief Complaint:  Patient is a 57y old  Male who presents with a chief complaint of stroke code (2017 08:50)      Interval Events: Patient with cough overnight that was productive of material that resembled tube feeds, and also desatted to 88% on RA. Abdominal symptoms resolved and he is continuing to have BMs.    Allergies:  No Known Allergies      Home Medications:    Hospital Medications:  acetaminophen   Tablet. 650 milliGRAM(s) Oral every 6 hours PRN  acetaminophen  Suppository 650 milliGRAM(s) Rectal every 6 hours PRN  aspirin  chewable 81 milliGRAM(s) Oral daily  atorvastatin 80 milliGRAM(s) Oral at bedtime  baclofen 5 milliGRAM(s) Oral three times a day  clopidogrel Tablet 75 milliGRAM(s) Oral daily  docusate sodium Liquid 100 milliGRAM(s) Oral daily  doxazosin 2 milliGRAM(s) Oral at bedtime  enoxaparin Injectable 40 milliGRAM(s) SubCutaneous daily  gabapentin   Solution 600 milliGRAM(s) Oral three times a day  guaiFENesin    Syrup 200 milliGRAM(s) Oral every 6 hours PRN  hydrALAZINE Injectable 10 milliGRAM(s) IV Push two times a day PRN  hydrochlorothiazide 12.5 milliGRAM(s) Oral daily  influenza   Vaccine 0.5 milliLiter(s) IntraMuscular once  losartan 100 milliGRAM(s) Oral daily  senna 1 Tablet(s) Oral at bedtime  simethicone 80 milliGRAM(s) Chew daily PRN  sodium chloride 0.9%. 1000 milliLiter(s) IV Continuous <Continuous>      PMHX/PSHX:  Stented coronary artery  CVA (cerebral vascular accident)  HTN (hypertension)  No significant past surgical history      Family history:  No pertinent family history in first degree relatives      ROS:     General:  No wt loss, fevers, chills, night sweats, fatigue,   Eyes:  diplopia  ENT:  No sore throat, pain, runny nose; +dysphagia  CV:  No pain, palpitations, hypo/hypertension  Resp:  +cough, no wheezing, no tachypnea  GI:  No pain, No nausea, No vomiting, No diarrhea, +constipation, no melena, no BRBPR  :  No pain, bleeding, incontinence, nocturia  Neuro:  AOx3, L sided weakness, L facial droop  Psych:  No fatigue, insomnia, mood problems, depression  Endocrine:  No polyuria, polydipsia, cold/heat intolerance  Heme:  No petechiae, ecchymosis, easy bruisability  Skin:  No rash, tattoos, scars, edema      PHYSICAL EXAM:   Vital Signs:  Vital Signs Last 24 Hrs  T(C): 37 (2017 08:00), Max: 37.4 (2017 05:28)  T(F): 98.6 (2017 08:00), Max: 99.4 (2017 05:28)  HR: 110 (2017 08:) (91 - 112)  BP: 100/66 (2017 08:00) (96/64 - 143/96)  BP(mean): --  RR: 22 (2017 08:01) (18 - 22)  SpO2: 93% (:) (88% - 98%)  Daily     Daily     GENERAL:  thin, no distress, coughing  HEENT:  L facial droop, conjunctivae clear and pink, sclera -anicteric  CHEST:  course breath sounds and crackles throughout bilaterally, no wheezing, no increased work of breathing.  HEART:  Regular rhythm, S1, S2, no murmur/rub/S3/S4, no abdominal bruit, no edema  ABDOMEN:  Soft, non-tender, non-distended, normoactive bowel sounds, no guarding, PEG tube in place, Wu in place.  EXTEREMITIES:  no cyanosis,clubbing or edema, L sided weakness.  SKIN:  No rash/erythema/ecchymoses/petechiae/wounds/abscess/warm/dry  NEURO:  Alert, oriented, no asterixis, no tremor, no encephalopathy    LABS:                        13.0   16.35 )-----------( 293      ( 2017 07:50 )             39.7     11-    144  |  101  |  39<H>  ----------------------------<  136<H>  3.8   |  25  |  1.45<H>    Ca    9.4      2017 07:48          Urinalysis Basic - ( 2017 15:30 )    Color: Orange / Appearance: Slightly Turbid / S.017 / pH: x  Gluc: x / Ketone: Negative  / Bili: Small / Urobili: 1.0 mg/dL   Blood: x / Protein: 100 mg/dL / Nitrite: Negative   Leuk Esterase: Moderate / RBC: >720 /HPF /  /HPF   Sq Epi: x / Non Sq Epi: 1 /HPF / Bacteria: Occasional          Imaging:  < from: Xray Abdomen Series Flat/Upright 2 View (17 @ 19:23) >  FINDINGS:  Air and stool seen within large intestine.  No dilated loops of small bowel appreciated. PEG tube is seen overlying   the left upper quadrant.    There is no evidence of organomegaly or pathologic calcification.  The visualized osseous structures demonstrate no acute pathology.    IMPRESSION:   PEG tube in left upper quadrant.  Nonobstructive bowel gas pattern.    < end of copied text >

## 2017-11-22 NOTE — CONSULT NOTE ADULT - ASSESSMENT
Patient is a 56 yo [place] male w/ PMH significant for HTN, HLD, CAD s/p CABG, previous CVA w/ residual left hemiparesis, admitted for R sided weakness, HA, diplopia, diagnosed with R lateral medullary and R cerebellar ischemic infarcts per MRI. Patient failed MBS and PEG tube was placed on 11/14 and tube feeding was initiated and pt was having problems with emesis, residuals, constipation-concern for ileus. 11/22 with coughing up feeds, green malordours secretions, hypoxia which resolved. Called to eval for asp pna/aspiration.

## 2017-11-22 NOTE — CONSULT NOTE ADULT - SUBJECTIVE AND OBJECTIVE BOX
PULMONARY CONSULT    Initial HPI on admission:  HPI:      Patient is a 58 yo [place] male w/ PMH significant for HTN, HLD, CAD s/p CABG, previous CVA w/ residual left hemiparesis, admitted for R sided weakness, HA, diplopia, diagnosed with R lateral medullary and R cerebellar ischemic infarcts per MRI. Patient failed MBS and PEG tube was placed on  and tube feeding was initiated and pt was having problems with emesis, residuals, constipation-concern for ileus.  with coughing up feeds, green malordours secretions, hypoxia which resolved. Called to eval for asp pna/aspiration. -sob,-mayers,-cp,-plueritc cp,-fever + rising wbc, + bm today, -n/-v/d tube feeds on hold, o/w ros negative. s/p bonilla cx on           PAST MEDICAL & SURGICAL HISTORY:  Stented coronary artery  CVA (cerebral vascular accident)  HTN (hypertension)  No significant past surgical history    Allergies    No Known Allergies    Intolerances      FAMILY HISTORY:  No pertinent family history in first degree relatives    Social history: non smoker    Review of Systems:  CONSTITUTIONAL: No fever, chills, or fatigue  EYES: No eye pain, visual disturbances, or discharge  ENMT:  No difficulty hearing, tinnitus, vertigo; No sinus or throat pain  NECK: No pain or stiffness  RESPIRATORY: Per above  CARDIOVASCULAR: No chest pain, palpitations, dizziness, or leg swelling  GASTROINTESTINAL: No abdominal or epigastric pain. No nausea, vomiting, or hematemesis; No diarrhea or constipation. No melena or hematochezia.  GENITOURINARY: No dysuria, frequency, hematuria, or incontinence  NEUROLOGICAL: No headaches, memory loss, loss of strength, numbness, or tremors  SKIN: No itching, burning, rashes, or lesions   MUSCULOSKELETAL: No joint pain or swelling; No muscle, back, or extremity pain  PSYCHIATRIC: No depression, anxiety, mood swings, or difficulty sleeping      Medications:  MEDICATIONS  (STANDING):  aspirin  chewable 81 milliGRAM(s) Oral daily  atorvastatin 80 milliGRAM(s) Oral at bedtime  baclofen 5 milliGRAM(s) Oral three times a day  clopidogrel Tablet 75 milliGRAM(s) Oral daily  docusate sodium Liquid 100 milliGRAM(s) Oral daily  doxazosin 2 milliGRAM(s) Oral at bedtime  enoxaparin Injectable 40 milliGRAM(s) SubCutaneous daily  gabapentin   Solution 600 milliGRAM(s) Oral three times a day  hydrochlorothiazide 12.5 milliGRAM(s) Oral daily  influenza   Vaccine 0.5 milliLiter(s) IntraMuscular once  losartan 100 milliGRAM(s) Oral daily  senna 1 Tablet(s) Oral at bedtime  sodium chloride 0.9%. 1000 milliLiter(s) (75 mL/Hr) IV Continuous <Continuous>    MEDICATIONS  (PRN):  acetaminophen   Tablet. 650 milliGRAM(s) Oral every 6 hours PRN Mild Pain (1 - 3)  acetaminophen  Suppository 650 milliGRAM(s) Rectal every 6 hours PRN For Temp greater than 38.5 C (101.3 F)  guaiFENesin    Syrup 200 milliGRAM(s) Oral every 6 hours PRN Cough  hydrALAZINE Injectable 10 milliGRAM(s) IV Push two times a day PRN BP > 180  simethicone 80 milliGRAM(s) Chew daily PRN bloating            Vital Signs Last 24 Hrs  T(C): 37 (2017 08:00), Max: 37.4 (2017 05:28)  T(F): 98.6 (2017 08:00), Max: 99.4 (2017 05:28)  HR: 110 (2017 08:00) (91 - 112)  BP: 100/66 (2017 08:00) (96/64 - 143/96)  BP(mean): --  RR: 22 (2017 08:01) (18 - 22)  SpO2: 93% (2017 08:01) (88% - 98%)             @ 07:01  -   @ 07:00  --------------------------------------------------------  IN: 0 mL / OUT: 1200 mL / NET: -1200 mL          LABS:                        13.0   16.35 )-----------( 293      ( 2017 07:50 )             39.7         144  |  101  |  39<H>  ----------------------------<  136<H>  3.8   |  25  |  1.45<H>    Ca    9.4      2017 07:48            CAPILLARY BLOOD GLUCOSE          Urinalysis Basic - ( 2017 15:30 )    Color: Orange / Appearance: Slightly Turbid / S.017 / pH: x  Gluc: x / Ketone: Negative  / Bili: Small / Urobili: 1.0 mg/dL   Blood: x / Protein: 100 mg/dL / Nitrite: Negative   Leuk Esterase: Moderate / RBC: >720 /HPF /  /HPF   Sq Epi: x / Non Sq Epi: 1 /HPF / Bacteria: Occasional                    CULTURES: Pending        Physical Examination:  a/o x3 follow all commands  General: No acute distress.      HEENT: Pupils equal, reactive to light.  Symmetric.    PULM: decreased bs bilat bilaterally, no significant sputum production    CVS: S1, S2    ABD: Soft, nondistended, nontender, normoactive bowel sounds, no masses    EXT: No edema, nontender    SKIN: Warm and well perfused, no rashes noted.    NEURO: Alert, oriented, interactive, nonfocal    RADIOLOGY REVIEWED  CXR:  clear lungs    CT chest: Pending    < from: Xray Abdomen Series Flat/Upright 2 View (17 @ 19:23) >  IMPRESSION:   PEG tube in left upper quadrant.  Nonobstructive bowel gas pattern.    < end of copied text >

## 2017-11-22 NOTE — CHART NOTE - NSCHARTNOTEFT_GEN_A_CORE
MAR RRT Note    RRT called for respiratory distress.  Briefly, this is a 57M hx of CAD s/p CABG, HTN, hx CVA with L weakness, admitted with new right medullary stroke c/b urinary retention, dysphagia s/p PEG and aspiration event.  Upon arrival to bedside, .2F rectally, HR 100s, BP 100s/60s, RR 20, SpO2 100% on NRB. Per RN at bedside, patient likely had another aspiration event and had copious secretions suctioned.  On exam patient with hiccups, on pulm exam no wheezes noted. Patient responsive to verbal and tactile stimuli, A&Ox2, but per primary team more lethargic than baseline. Patient noted to have persistent hiccups (on baclofen) and has not been tolerating tube feeds via PEG (per RN, no feeds for the last 24h). Given fever likely 2/2 aspiration pneumonitis, patient was given acetaminophen IVPB. Nasal trumpet was placed and limited secretions were suctioned. Patient was also given duonebs and started on q6h duonebs to help with secretions. Urgent CXR appears clear per my read, but patient with CT chest from yesterday showing bilateral lower lobe pneumonia. ABG within normal limits. Patient already on Vanc/Zosyn for broad spectrum abx. STAT labs (CBC, CMP, etc) drawn.  Patient also noted to have hypotension to 90s systolic, patient given 1L NS and started on 100cc NS for 10 hours.  Patient's BP improved to 100s systolic.  Patient maintained on continuous pulse ox, sating well on NRB. Primary team attempted to contact patient's family, however were not successful.       Recommendations  - Follow-up STAT labs  - Follow-up UA, urine culture and sputum cultures   - C/w broad spectrum abx   - C/w continuous pulse ox      Discussed with primary neuro team    Raquel Ling, PGY3  MAR spectra #[id number]

## 2017-11-22 NOTE — CONSULT NOTE ADULT - ASSESSMENT
57M CAD s/p CABG, HTN, hx CVA with L weakness. Admitted with new right medullary stroke.  Course complicated by urinary retention and now acute aspiration causing fever, leukocytosis and purulent sputum.  Agree with broad spectrum antibiotics.  ROSENDO  - f/u all cultures  - continue zosyn    d/w neuro at bedside

## 2017-11-22 NOTE — CONSULT NOTE ADULT - ASSESSMENT
56yo M s/p CVA with urinary retention s/p Wu placement, failed TOV x 1, Wu replaced. Intermittent hematuria, likely 2/2 to Plavix. Pt currently not medically stable and not oob/ambulating.  - Continue Cardura via PEG  -Recommend continuing Wu catheter until patient's status has improved and he is more ambulatory.  - Bowel regimen/ avoid constipation  - Pt to follow up as outpatient with Dr. Lee (urology) for trial of void and urodynamic studies.      Kathryn Sturgeon, PA

## 2017-11-22 NOTE — PROGRESS NOTE ADULT - ASSESSMENT
ASSESSMENT: 57M with HTN, HLD, CAD/CABG, past stroke with residual left hemiparesis, presents with transient right hemiparesis and diplopia, and continuous headache and N/V. CT head shows old right basal ganglia lacune, and CTA shows severe bilateral MCA stenosis, proximal BA occlusion and distal stenosis. Brain MRI show right lateral  medullary and right cerebellar ischemic infarcts. Impression: Severe intracranial atherosclerotic disease and occlusion of left VA, with a small distal right vertebral artery,  causing right  lateral medullary and right cerebellar ischemic infarcts, i.e. perhaps due to large artery intracranial atherosclerosis.    NEURO: neurologically without acute change, permissive HTN with slow titration to normotensive, ASA/Plavix and statin  for secondary stroke prevention,  titrate statin to LDL goal less than 70, pt with hiccups- titrate gabapentin- baclofen as needed,  Physical therapy/OT eval with recommendations for [facility], pt uninsured and pending re-instatement of medicaid, will provide daily PT/OT to optimize treatment.     ANTITHROMBOTIC THERAPY:  ASA  for secondary stroke prevention, Plavix on hold until cleared by urology    PULMONARY: CXR (11/09) clear, CXR 11/21 and 11/22 clear, Chest CT 11/22 with bilateral lower lobe pna, appreciate pulmonary and ID consult, now on Vanco and Zosyn, sputum culture ordered (green production and malodorous), protecting airway, monitor sats give oxygen as needed    CARDIOVASCULAR: TTE: EF 41%, mitral annular calcification, mild-moderate MR, global LV systolic dysfunction, mild stage 1diastolic dysfunction mild TR,  cardiac monitoring  no events, cardio consult appreciated: add metoprolol as tolerated                 GASTROINTESTINAL: s/p PEG placement (11/14),appreciate GI follow up, patient now suctioning up feedings, feeds on hold, will consider resuming 11/23. Continue to monitor. Bowel regimen with BM 11/21 with good results, 2 BMs.  Abd x ray negative for ileus.  Gi reconsulted.  When feedings resumed, no recommendation for residual check as per GI.     Diet: PEG/NPO except medications    RENAL: BUN/Cr without acute change but elevated-, provide hydration, good urine output, renal US: No hydronephrosis, haro reinserted for urinary retention. Continue Cardura for BPH.  Urology called for consult, haro remains in for retention, today small amounts of clots noted in urine, urine orange in color.     Na Goal: Greater than 135     Haro: re-inserted 11/17 for retention    HEMATOLOGY: H/H without acute changes, Platelets 293 no s/s of bleeding     DVT ppx: LMWH    ID: febrile on 11/09 (Tmax 39.3), febrile 11/21 with leukocytosis trending up, UA negative 11/17 for LE, WBC 2-5, will continue to monitor, blood/urine cultures NGTD, continue to monitor. Encourage incentive spirometry. With increased sputum production, green in color and malodorous called for Pulmonary consult, appreciate consult Vanco/Zosyn started and ID called in for consult as well, agrees with present treatment.  Pending sputum cx.  Chest CT confirms bilateral lower lobe PNA, most likely due to aspiration. If patient continues to deteriorate consider moving back to stroke unit.     DISPOSITION: D/C to [facility] as per PT/OT eval once stable and workup is complete, Pt uninsured, SW aware, medicaid re-instatement pending. Daily PT/OT therapy to optimize therapy and referral to [facility] for possible suman.      CORE MEASURES:        Admission NIHSS: 3     TPA:  NO      LDL/HDL: 171/57     Depression Screen: 0     Statin Therapy: Y     Dysphagia Screen: FAIL     Smoking  NO      Afib NO     Stroke Education YES ASSESSMENT: 57M with HTN, HLD, CAD/CABG, past stroke with residual left hemiparesis, presents with transient right hemiparesis and diplopia, and continuous headache and N/V. CT head shows old right basal ganglia lacune, and CTA shows severe bilateral MCA stenosis, proximal BA occlusion and distal stenosis. Brain MRI show right lateral  medullary and right cerebellar ischemic infarcts. Impression: Severe intracranial atherosclerotic disease and occlusion of left VA, with a small distal right vertebral artery,  causing right  lateral medullary and right cerebellar ischemic infarcts, i.e. perhaps due to large artery intracranial atherosclerosis.    NEURO: neurologically without acute change, permissive HTN with slow titration to normotensive, ASA/Plavix and statin  for secondary stroke prevention,  titrate statin to LDL goal less than 70, pt with hiccups- titrate gabapentin- baclofen as needed, baclofen increased to 10 mg t.i.d.;  Physical therapy/OT eval with recommendations for AR, pt uninsured and pending re-instatement of medicaid, will provide daily PT/OT to optimize treatment.     ANTITHROMBOTIC THERAPY:  ASA  for secondary stroke prevention, Plavix on hold until cleared by urology    PULMONARY: CXR (11/09) clear, CXR 11/21 and 11/22 clear, Chest CT 11/22 with bilateral lower lobe pna, appreciate pulmonary and ID consult, now on Vanco and Zosyn, sputum culture ordered (green production and malodorous), protecting airway, monitor sats give oxygen as needed    CARDIOVASCULAR: TTE: EF 41%, mitral annular calcification, mild-moderate MR, global LV systolic dysfunction, mild stage 1diastolic dysfunction mild TR,  cardiac monitoring  no events, cardio consult appreciated: add metoprolol as tolerated                 GASTROINTESTINAL: s/p PEG placement (11/14),appreciate GI follow up, patient now suctioning up feedings, feeds on hold, will consider resuming 11/23. Continue to monitor. Bowel regimen with BM 11/21 with good results, 2 BMs.  Abd x ray negative for ileus.  Gi reconsulted.  When feedings resumed, no recommendation for residual check as per GI.     Diet: PEG/NPO except medications    RENAL: BUN/Cr without acute change but elevated-, provide hydration, good urine output, renal US: No hydronephrosis, haro reinserted for urinary retention. Continue Cardura for BPH.  Urology called for consult, haro remains in for retention, today small amounts of clots noted in urine, urine orange in color.     Na Goal: Greater than 135     Haro: re-inserted 11/17 for retention    HEMATOLOGY: H/H without acute changes, Platelets 293 no s/s of bleeding     DVT ppx: LMWH    ID: febrile on 11/09 (Tmax 39.3), febrile 11/21 with leukocytosis trending up, UA negative 11/17 for LE, WBC 2-5, will continue to monitor, blood/urine cultures NGTD, continue to monitor. Encourage incentive spirometry. With increased sputum production, green in color and malodorous called for Pulmonary consult, appreciate consult Vanco/Zosyn started and ID called in for consult as well, agrees with present treatment.  Pending sputum cx.  Chest CT confirms bilateral lower lobe PNA, most likely due to aspiration. If patient continues to deteriorate consider moving back to stroke unit. His subjective complaint of bilateral  hearing loss is of uncertain etiology. Will ask ID  whether this could conceivably be due to vancomycin and whether this antibiotic should be changed.    DISPOSITION: D/C to AR as per PT/OT eval once stable and workup is complete, Pt uninsured, SW aware, medicaid re-instatement pending. Daily PT/OT therapy to optimize therapy and referral to [facility] for possible suman.      CORE MEASURES:        Admission NIHSS: 3     TPA:  NO      LDL/HDL: 171/57     Depression Screen: 0     Statin Therapy: Y     Dysphagia Screen: FAIL     Smoking  NO      Afib NO     Stroke Education YES

## 2017-11-22 NOTE — PROGRESS NOTE ADULT - SUBJECTIVE AND OBJECTIVE BOX
THE PATIENT WAS SEEN AND EXAMINED BY ME WITH THE HOUSESTAFF AND STROKE TEAM DURING MORNING ROUNDS.   HPI:  56yo R-handed [place] man PMH CVA with residual L-sided weakness, CAD s/p CABG, HTN presents as stroke code for dizziness. Pt at baseline ambulates independently and independent in ADLs. Pt was in usual state of health today at 9AM. At around 9:30AM, he began to complain of lightheadedness not associated with changes in position. Half an hour later, he began to complain of n/v. He also endorses HA, diplopia/blurry vision, R-sided weakness that began a few days ago. No c/o f/c/CP/SOB. He reports he ran out of his BP meds a week ago and has been unable to refill his medications. (07 Nov 2017 17:02)      SUBJECTIVE: Increase sputum production.  No new neurologic complaints.  c/o diminished hearing    acetaminophen   Tablet. 650 milliGRAM(s) Oral every 6 hours PRN  acetaminophen  Suppository 650 milliGRAM(s) Rectal every 6 hours PRN  aspirin  chewable 81 milliGRAM(s) Oral daily  atorvastatin 80 milliGRAM(s) Oral at bedtime  baclofen 10 milliGRAM(s) Oral three times a day  docusate sodium Liquid 100 milliGRAM(s) Oral daily  doxazosin 2 milliGRAM(s) Oral at bedtime  enoxaparin Injectable 40 milliGRAM(s) SubCutaneous daily  gabapentin   Solution 600 milliGRAM(s) Oral three times a day  guaiFENesin    Syrup 200 milliGRAM(s) Oral every 6 hours PRN  hydrALAZINE Injectable 10 milliGRAM(s) IV Push two times a day PRN  hydrochlorothiazide 12.5 milliGRAM(s) Oral daily  influenza   Vaccine 0.5 milliLiter(s) IntraMuscular once  losartan 100 milliGRAM(s) Oral daily  piperacillin/tazobactam IVPB. 3.375 Gram(s) IV Intermittent every 8 hours  senna 1 Tablet(s) Oral at bedtime  simethicone 80 milliGRAM(s) Chew daily PRN  sodium chloride 0.9%. 1000 milliLiter(s) IV Continuous <Continuous>      PHYSICAL EXAM:   Vital Signs Last 24 Hrs  T(C): 37.1 (22 Nov 2017 13:20), Max: 37.4 (22 Nov 2017 05:28)  T(F): 98.8 (22 Nov 2017 13:20), Max: 99.4 (22 Nov 2017 05:28)  HR: 103 (22 Nov 2017 13:20) (91 - 112)  BP: 108/73 (22 Nov 2017 13:20) (100/66 - 143/96)  BP(mean): --  RR: 22 (22 Nov 2017 13:20) (18 - 22)  SpO2: 92% (22 Nov 2017 13:20) (88% - 98%)    General: restless, SOB, increased secretions  HEENT: EOM intact, visual fields full, PERRLA  Abdomen: Soft, nontender, nondistended   Extremities: No edema    NEUROLOGICAL EXAM:  Mental status: Awake, alert, oriented x3, fluent speech, no neglect, able to follow commands  Cranial Nerves: left facial droop, EOMI, VFF, no nystagmus, dysarthria, slight palatal deviation to the left, recurrent hiccups, bilateral finger rub heard  Motor exam: Normal tone, no drift, RUE 5/5, RLE 5/5, drift LUE 4+/5, drift LLE 5-/5.  Sensation: decreased temperature sensation on left arm  Coordination/ Gait: LUE moderate-severe dysmetria    LABS:                        13.0   16.35 )-----------( 293      ( 22 Nov 2017 07:50 )             39.7    11-22    144  |  101  |  39<H>  ----------------------------<  136<H>  3.8   |  25  |  1.45<H>    Ca    9.4      22 Nov 2017 07:48      Hemoglobin A1C, Whole Blood: 5.8 % (11-08 @ 10:36)      IMAGING: Reviewed by me.   CT Chest No Cont (11.22.17 @ 12:10) >  Bilateral lower lobe pneumonia.    Head CT/CTA head/Neck (11/07) Right basal ganglia and corona radiata infarct without significant change since 6/16/2017. There is severe steno-occlusive disease intracranially involving the right supraclinoid internal carotid artery, A1 and M1 branches, and severe narrowing of the left M1 segment of the middle cerebral artery. The distal right vertebral artery is quite small and the proximal basilar artery is not visualized. The dominant left vertebral artery ends at the PICA. Distal basilar flow appears to be due to retrograde flow from the right posterior communicating artery.  Head CT (11/07) No acute intracranial hemorrhage, mass effect, or evidence of acute territorial infarct. Chronic right corona radiata/basal ganglia and right inferior cerebellar hemisphere infarcts.  Brain MRI (11/09) In comparison the prior MRI, there is new hyperintense T2 and FLAIR signal with faint increased restricted diffusion in the right medulla extending to the inferior right brachium pontis and inferior medial right cerebellar hemisphere, new compared to 6/17/2017 consistent with evolving area of  ischemic change without hemorrhagic transformation. Redemonstration of volume loss with multiple additional areas of lacunar infarction including chronic infarcts in the right cerebellar hemisphere and right corona radiata. Decreased visualization of the flow-void within the right vertebral artery with continued irregular isointense T1 signal throughout the basilar artery. Bilateral sinus disease with bubbly secretions and air-fluid levels, greatest in the right maxillary sinus. THE PATIENT WAS SEEN AND EXAMINED BY ME WITH THE HOUSESTAFF AND STROKE TEAM DURING MORNING ROUNDS.   HPI:  56yo R-handed [place] man PMH CVA with residual L-sided weakness, CAD s/p CABG, HTN presents as stroke code for dizziness. Pt at baseline ambulates independently and independent in ADLs. Pt was in usual state of health today at 9AM. At around 9:30AM, he began to complain of lightheadedness not associated with changes in position. Half an hour later, he began to complain of n/v. He also endorses HA, diplopia/blurry vision, R-sided weakness that began a few days ago. No c/o f/c/CP/SOB. He reports he ran out of his BP meds a week ago and has been unable to refill his medications. (07 Nov 2017 17:02)      SUBJECTIVE: Increase sputum production.  No new neurologic complaints.  c/o diminished hearing    acetaminophen   Tablet. 650 milliGRAM(s) Oral every 6 hours PRN  acetaminophen  Suppository 650 milliGRAM(s) Rectal every 6 hours PRN  aspirin  chewable 81 milliGRAM(s) Oral daily  atorvastatin 80 milliGRAM(s) Oral at bedtime  baclofen 10 milliGRAM(s) Oral three times a day  docusate sodium Liquid 100 milliGRAM(s) Oral daily  doxazosin 2 milliGRAM(s) Oral at bedtime  enoxaparin Injectable 40 milliGRAM(s) SubCutaneous daily  gabapentin   Solution 600 milliGRAM(s) Oral three times a day  guaiFENesin    Syrup 200 milliGRAM(s) Oral every 6 hours PRN  hydrALAZINE Injectable 10 milliGRAM(s) IV Push two times a day PRN  hydrochlorothiazide 12.5 milliGRAM(s) Oral daily  influenza   Vaccine 0.5 milliLiter(s) IntraMuscular once  losartan 100 milliGRAM(s) Oral daily  piperacillin/tazobactam IVPB. 3.375 Gram(s) IV Intermittent every 8 hours  senna 1 Tablet(s) Oral at bedtime  simethicone 80 milliGRAM(s) Chew daily PRN  sodium chloride 0.9%. 1000 milliLiter(s) IV Continuous <Continuous>      PHYSICAL EXAM:   Vital Signs Last 24 Hrs  T(C): 37.1 (22 Nov 2017 13:20), Max: 37.4 (22 Nov 2017 05:28)  T(F): 98.8 (22 Nov 2017 13:20), Max: 99.4 (22 Nov 2017 05:28)  HR: 103 (22 Nov 2017 13:20) (91 - 112)  BP: 108/73 (22 Nov 2017 13:20) (100/66 - 143/96)  BP(mean): --  RR: 22 (22 Nov 2017 13:20) (18 - 22)  SpO2: 92% (22 Nov 2017 13:20) (88% - 98%)    General: restless, SOB, increased secretions  HEENT: EOM intact, visual fields full, PERRLA  Abdomen: Soft, nontender, nondistended   Extremities: No edema    NEUROLOGICAL EXAM:  Mental status: Awake, alert, oriented x3, fluent speech, no neglect, able to follow commands  Cranial Nerves: left facial droop, EOMI, VFF, no nystagmus, dysarthria, slight palatal deviation to the left, recurrent hiccups, hearing intact to  bilateral finger rub   Motor exam: Normal tone, no drift, RUE 5/5, RLE 5/5, drift LUE 4+/5, drift LLE 5-/5.  Sensation: decreased temperature sensation on left arm  Coordination/ Gait: LUE moderate-severe dysmetria    LABS:                        13.0   16.35 )-----------( 293      ( 22 Nov 2017 07:50 )             39.7    11-22    144  |  101  |  39<H>  ----------------------------<  136<H>  3.8   |  25  |  1.45<H>    Ca    9.4      22 Nov 2017 07:48      Hemoglobin A1C, Whole Blood: 5.8 % (11-08 @ 10:36)      IMAGING: Reviewed by me.   CT Chest No Cont (11.22.17 @ 12:10) >  Bilateral lower lobe pneumonia.    Head CT/CTA head/Neck (11/07) Right basal ganglia and corona radiata infarct without significant change since 6/16/2017. There is severe steno-occlusive disease intracranially involving the right supraclinoid internal carotid artery, A1 and M1 branches, and severe narrowing of the left M1 segment of the middle cerebral artery. The distal right vertebral artery is quite small and the proximal basilar artery is not visualized. The dominant left vertebral artery ends at the PICA. Distal basilar flow appears to be due to retrograde flow from the right posterior communicating artery.  Head CT (11/07) No acute intracranial hemorrhage, mass effect, or evidence of acute territorial infarct. Chronic right corona radiata/basal ganglia and right inferior cerebellar hemisphere infarcts.  Brain MRI (11/09) In comparison the prior MRI, there is new hyperintense T2 and FLAIR signal with faint increased restricted diffusion in the right medulla extending to the inferior right brachium pontis and inferior medial right cerebellar hemisphere, new compared to 6/17/2017 consistent with evolving area of  ischemic change without hemorrhagic transformation. Redemonstration of volume loss with multiple additional areas of lacunar infarction including chronic infarcts in the right cerebellar hemisphere and right corona radiata. Decreased visualization of the flow-void within the right vertebral artery with continued irregular isointense T1 signal throughout the basilar artery. Bilateral sinus disease with bubbly secretions and air-fluid levels, greatest in the right maxillary sinus.

## 2017-11-22 NOTE — PROGRESS NOTE ADULT - ASSESSMENT
Patient is a 56 yo [place] male w/ PMH significant for HTN, HLD, CAD s/p CABG, previous CVA w/ residual left hemiparesis, admitted for R sided weakness, HA, diplopia, diagnosed with R lateral medullary and R cerebellar ischemic infarcts per MRI. Patient failed MBS and PEG tube was placed and tube feeds initiated. Initial concern for ileus which has resolved, now patient with possible aspiration.    Impression:  # Dysphagia s/p PEG  # Aspiration PNA vs pneumonitis  # CVA  # Constipation  # CAD s/p CABG    Plan:  Ileus resolved. Patient now having bowel movements and abd xray w/o signs of obstruction.  - Restart TF at 10 cc/hour and advance as tolerated given patient now with resolved ileus.  - Would NOT check tube feed residuals as multiple studies have demonstrated limited to no clinical utility  - Continue with aggressive bowel regimen - can stop colace, but use Miralax 2x/day and Colace 300 qhs  - Encourage ambulation, OOB chair Patient is a 56 yo [place] male w/ PMH significant for HTN, HLD, CAD s/p CABG, previous CVA w/ residual left hemiparesis, admitted for R sided weakness, HA, diplopia, diagnosed with R lateral medullary and R cerebellar ischemic infarcts per MRI. Patient failed MBS and PEG tube was placed and tube feeds initiated. Initial concern for ileus which has resolved, now patient with possible aspiration.    Impression:  # Dysphagia s/p PEG  # Aspiration PNA vs pneumonitis  # CVA  # Constipation  # CAD s/p CABG    Plan:  Ileus resolved. Patient now having bowel movements and abd xray w/o signs of obstruction.  - Restart TF at 10 cc/hour and advance as tolerated given patient now with resolved ileus.  Given possible aspiration and SIRS - reasonable to hold feeds for 24 hours if needed, but given ileus has resolved, suspect that patient will tolerate feeds well and likely any aspiration is rather from an oral source given his dysphagia  - Continue with aggressive bowel regimen - can stop colace, but use Miralax 2x/day and Colace 300 qhs  - Encourage ambulation, OOB chair

## 2017-11-23 LAB
-  AMIKACIN: SIGNIFICANT CHANGE UP
-  AMPICILLIN/SULBACTAM: SIGNIFICANT CHANGE UP
-  AMPICILLIN: SIGNIFICANT CHANGE UP
-  AZTREONAM: SIGNIFICANT CHANGE UP
-  CEFAZOLIN: SIGNIFICANT CHANGE UP
-  CEFEPIME: SIGNIFICANT CHANGE UP
-  CEFOXITIN: SIGNIFICANT CHANGE UP
-  CEFTAZIDIME: SIGNIFICANT CHANGE UP
-  CEFTRIAXONE: SIGNIFICANT CHANGE UP
-  CIPROFLOXACIN: SIGNIFICANT CHANGE UP
-  ERTAPENEM: SIGNIFICANT CHANGE UP
-  GENTAMICIN: SIGNIFICANT CHANGE UP
-  IMIPENEM: SIGNIFICANT CHANGE UP
-  LEVOFLOXACIN: SIGNIFICANT CHANGE UP
-  MEROPENEM: SIGNIFICANT CHANGE UP
-  NITROFURANTOIN: SIGNIFICANT CHANGE UP
-  PIPERACILLIN/TAZOBACTAM: SIGNIFICANT CHANGE UP
-  TOBRAMYCIN: SIGNIFICANT CHANGE UP
-  TRIMETHOPRIM/SULFAMETHOXAZOLE: SIGNIFICANT CHANGE UP
BACTERIA # UR AUTO: ABNORMAL
CULTURE RESULTS: SIGNIFICANT CHANGE UP
EPI CELLS # UR: 2 /HPF — SIGNIFICANT CHANGE UP (ref 0–5)
GRAM STN FLD: SIGNIFICANT CHANGE UP
HYALINE CASTS # UR AUTO: 0 /LPF — SIGNIFICANT CHANGE UP (ref 0–7)
METHOD TYPE: SIGNIFICANT CHANGE UP
ORGANISM # SPEC MICROSCOPIC CNT: SIGNIFICANT CHANGE UP
ORGANISM # SPEC MICROSCOPIC CNT: SIGNIFICANT CHANGE UP
RBC CASTS # UR COMP ASSIST: 368 /HPF — HIGH (ref 0–4)
SPECIMEN SOURCE: SIGNIFICANT CHANGE UP
SPECIMEN SOURCE: SIGNIFICANT CHANGE UP
WBC UR QL: 92 /HPF — HIGH (ref 0–5)

## 2017-11-23 PROCEDURE — 99221 1ST HOSP IP/OBS SF/LOW 40: CPT

## 2017-11-23 PROCEDURE — 99232 SBSQ HOSP IP/OBS MODERATE 35: CPT

## 2017-11-23 PROCEDURE — 99233 SBSQ HOSP IP/OBS HIGH 50: CPT

## 2017-11-23 RX ORDER — GABAPENTIN 400 MG/1
800 CAPSULE ORAL THREE TIMES A DAY
Qty: 0 | Refills: 0 | Status: DISCONTINUED | OUTPATIENT
Start: 2017-11-23 | End: 2017-12-03

## 2017-11-23 RX ADMIN — Medication 10 MILLIGRAM(S): at 13:36

## 2017-11-23 RX ADMIN — PIPERACILLIN AND TAZOBACTAM 25 GRAM(S): 4; .5 INJECTION, POWDER, LYOPHILIZED, FOR SOLUTION INTRAVENOUS at 17:03

## 2017-11-23 RX ADMIN — Medication 2 MILLIGRAM(S): at 22:38

## 2017-11-23 RX ADMIN — LOSARTAN POTASSIUM 100 MILLIGRAM(S): 100 TABLET, FILM COATED ORAL at 06:19

## 2017-11-23 RX ADMIN — GABAPENTIN 800 MILLIGRAM(S): 400 CAPSULE ORAL at 13:37

## 2017-11-23 RX ADMIN — Medication 3 MILLILITER(S): at 17:02

## 2017-11-23 RX ADMIN — GABAPENTIN 800 MILLIGRAM(S): 400 CAPSULE ORAL at 22:38

## 2017-11-23 RX ADMIN — Medication 10 MILLIGRAM(S): at 06:19

## 2017-11-23 RX ADMIN — Medication 10 MILLIGRAM(S): at 22:38

## 2017-11-23 RX ADMIN — ATORVASTATIN CALCIUM 80 MILLIGRAM(S): 80 TABLET, FILM COATED ORAL at 22:38

## 2017-11-23 RX ADMIN — Medication 3 MILLILITER(S): at 12:55

## 2017-11-23 RX ADMIN — Medication 3 MILLILITER(S): at 06:19

## 2017-11-23 RX ADMIN — PIPERACILLIN AND TAZOBACTAM 25 GRAM(S): 4; .5 INJECTION, POWDER, LYOPHILIZED, FOR SOLUTION INTRAVENOUS at 09:59

## 2017-11-23 RX ADMIN — Medication 12.5 MILLIGRAM(S): at 06:19

## 2017-11-23 RX ADMIN — PIPERACILLIN AND TAZOBACTAM 25 GRAM(S): 4; .5 INJECTION, POWDER, LYOPHILIZED, FOR SOLUTION INTRAVENOUS at 01:44

## 2017-11-23 RX ADMIN — ENOXAPARIN SODIUM 40 MILLIGRAM(S): 100 INJECTION SUBCUTANEOUS at 12:56

## 2017-11-23 RX ADMIN — Medication 3 MILLILITER(S): at 23:01

## 2017-11-23 RX ADMIN — Medication 81 MILLIGRAM(S): at 13:37

## 2017-11-23 RX ADMIN — Medication 3 MILLILITER(S): at 00:14

## 2017-11-23 RX ADMIN — GABAPENTIN 600 MILLIGRAM(S): 400 CAPSULE ORAL at 06:19

## 2017-11-23 NOTE — PROGRESS NOTE ADULT - SUBJECTIVE AND OBJECTIVE BOX
S: No acute events, urine draining well in haro. Patient with now 2nd stroke, still with hemiparalysis.    O:  T(F): 98 (11-23-17 @ 12:23), Max: 98 (11-23-17 @ 12:23)  HR: 74 (11-23-17 @ 12:23) (74 - 102)  BP: 117/87 (11-23-17 @ 12:23) (111/64 - 117/87)  RR: 22 (11-23-17 @ 12:23) (18 - 22)  SpO2: 99% (11-23-17 @ 12:23) (96% - 99%)  Wt(kg): --      11-22 @ 07:01  -  11-23 @ 07:00  --------------------------------------------------------  IN: 450 mL / OUT: 1700 mL / NET: -1250 mL        PE  NAD  abd benign  Haro secured draining well    Labs:  WBC 13.1   Hct 39.6  Cr 1.61      Cultures:  Ucx: 100K klebsiella  --

## 2017-11-23 NOTE — PROGRESS NOTE ADULT - ASSESSMENT
58yo M s/p CVA with urinary retention s/p Haro placement, failed TOV x 1, Haro replaced. Intermittent hematuria, likely 2/2 to Plavix. Pt currently not medically stable and not oob/ambulating. While in acute phase after CVA, urinary retention expected, may void with recovery of motor function.     - Continue Cardura via PEG  - continue Haro catheter until patient's status has improved and he is more mobile.  - Bowel regimen/ avoid constipation  - zosyn likely okay for +UCx, f/u sensitivities, treat for 7 days, and repeat UCx when voiding to test for clearance  - ToV, PVR prior to discharge, replace haro if fails  - f/u Dr. Lee # [phone]

## 2017-11-23 NOTE — PROGRESS NOTE ADULT - ASSESSMENT
57M CAD s/p CABG, HTN, hx CVA with L weakness. Admitted with new right medullary stroke.  Course complicated by urinary retention and now acute aspiration causing fever, leukocytosis and purulent sputum.  CT confirms pneumonia.  Agree with broad spectrum antibiotics.  ROSENDO.  UCx most likely colonization.    - f/u all cultures  - continue zosyn  - hold off on further vancomycin

## 2017-11-23 NOTE — PROGRESS NOTE ADULT - SUBJECTIVE AND OBJECTIVE BOX
f/u pneumonia    interval history/ROS:  CT chest with bilateral pneumonia.  afebrile today.  mostly non-verbal and unable to provide ROS.  Allergies  No Known Allergies    ANTIMICROBIALS:   piperacillin/tazobactam IVPB. 3.375 every 8 hours [started 11/22-]  vancomycin  IVPB 1000 once [started 11/22-]    MEDICATIONS  (STANDING):  ALBUTerol/ipratropium for Nebulization 3 every 6 hours  aspirin  chewable 81 daily  atorvastatin 80 at bedtime  baclofen 10 three times a day  doxazosin 2 at bedtime  enoxaparin Injectable 40 daily  gabapentin   Solution 600 three times a day  hydrochlorothiazide 12.5 daily  influenza   Vaccine 0.5 once  losartan 100 daily  simethicone 80 daily PRN    Vital Signs Last 24 Hrs  T(F): 97.5 (11-23-17 @ 05:11), Max: 100.2 (11-22-17 @ 18:00)  HR: 79 (11-23-17 @ 05:11)  BP: 111/64 (11-23-17 @ 05:11)  RR: 18 (11-23-17 @ 05:11)  SpO2: 96% (11-23-17 @ 05:11) (88% - 98%)  Wt(kg): --    PHYSICAL EXAM:  General: non-toxic  HEAD/EYES: anicteric but generally keeps eyes closed  ENT:  supple  Cardiovascular:   S1, S2  Respiratory:  b/l rhonchi  GI:  soft, non-tender, normal bowel sounds; PEG okay  :  haro with dark urine  Musculoskeletal:  no synovitis  Neurologic:  L weakness  Skin:  no rash  Lymph: no lymphadenopathy  Psychiatric:  appropriate affect  Vascular:  no phlebitis    WBC Count: 13.1 (11-22-17 @ 18:50)  WBC Count: 16.35 (11-22-17 @ 07:50)                        13.2   13.1  )-----------( 297      ( 22 Nov 2017 18:50 )             39.6 11-22    144  |  103  |  45  ----------------------------<  124  4.3   |  23  |  1.61  Ca    9.4      22 Nov 2017 18:50Phos  3.5     11-22Mg     2.6     11-22  TPro  7.8  /  Alb  3.2  /  TBili  1.3  /  DBili  x   /  AST  30  /  ALT  17  /  AlkPhos  63  11-22    MICROBIOLOGY:  Culture - Sputum . (11.23.17 @ 01:09)    Gram Stain:   Numerous polymorphonuclear leukocytes per low power field  Few Squamous epithelial cells per low power field  Numerous Gram Negative Rods per oil power field  Numerous Gram positive cocci in pairs per oil power field    Specimen Source: .Sputum Sputum    Culture Results:   >100,000 CFU/ml Klebsiella pneumoniae (11.21.17 @ 17:15)    Culture - Blood (11.21.17 @ 16:57)    Specimen Source: .Blood Blood    Culture Results:   No growth to date.    .Urine Catheterized  11-10-17   No growth  --  --    .Sputum Sputum  11-10-17   Streptococcus pneumoniae  Normal Respiratory Bryanna present  --  Streptococcus pneumoniae    .Blood Blood-Peripheral  11-10-17   No growth at 5 days.  --  --    .Urine Clean Catch (Midstream)  11-08-17   No growth  --  --    RADIOLOGY:  CT Chest No Cont (11.22.17 @ 12:10)   IMPRESSION: Bilateral lower lobe pneumonia.    Xray Abdomen Series Flat/Upright 2 View (11.21.17 @ 19:23)   IMPRESSION: PEG tube in left upper quadrant. Nonobstructive bowel gas pattern.    Chest 1 View AP -PORTABLE-Routine (11.21.17 @ 10:38)   IMPRESSION:  No acute process.    MR Head No Cont (11.09.17 @ 10:49)  IMPRESSION:   In comparison the prior MRI, there is new hyperintense T2 and FLAIR signal with faint increased restricted diffusion in the right medulla extending to the inferior right brachium pontis and inferior medial right cerebellar hemisphere, new compared to 6/17/2017 consistent with evolving area of ischemic change without hemorrhagic transformation.  Redemonstration of volume loss with multiple additional areas of lacunar infarction including chronic infarcts in the right cerebellar hemisphere and right corona radiata. Decreased visualization of the flow-void within the right vertebral artery with continued irregular isointense T1 signal throughout the basilar artery. Bilateral sinus disease with bubbly secretions and air-fluid levels, greatest in the right maxillary sinus. Correlate clinically for symptoms of acute sinusitis.

## 2017-11-23 NOTE — CHART NOTE - NSCHARTNOTEFT_GEN_A_CORE
Team called for concern of pus coming from PEG tube site.  No fevers, elevated WBC, or complaints of increasing pain from the patient.  Tube feeds were dc'ed yesterday late afternoon.  Currently patient on IV zosyn for PNA.    Examination of PEG site showing some ellsworth fluid (similar in color to PEG feeds), some mild tenderness around the site that patient says has been stable for the last few days, no erythema swelling or warmth around the site, no fluctuant mass appreciated on exam.    Recommendations  -cont IV zosyn  -would hold off on removal of PEG tube at this time  -obtain abdominal US to evaluate for possible abscess if concern for pus  -if patient spikes fever, complains of worsening pain around the PEG site, increased warmth or swelling around the peg, elevated WBC count, then please call us back and broaden abx coverage with Vancomycin  -Gi will follow

## 2017-11-23 NOTE — PROGRESS NOTE ADULT - ASSESSMENT
ASSESSMENT: 57M with HTN, HLD, CAD/CABG, past stroke with residual left hemiparesis, presents with transient right hemiparesis and diplopia, and continuous headache and N/V. CT head shows old right basal ganglia lacune, and CTA shows severe bilateral MCA stenosis, proximal BA occlusion and distal stenosis. Brain MRI show right lateral  medullary and right cerebellar ischemic infarcts. Impression: Severe intracranial atherosclerotic disease and occlusion of left VA, with a small distal right vertebral artery,  causing right  lateral medullary and right cerebellar ischemic infarcts, i.e. perhaps due to large artery intracranial atherosclerosis.    NEURO: RRT called for respiratory distress on 11/22, neurologically without acute change, permissive HTN with slow titration to normotensive, ASA/Plavix and statin  for secondary stroke prevention,  titrate statin to LDL goal less than 70, pt with hiccups- titrate gabapentin- baclofen as needed, baclofen increased to 10 mg t.i.d.;  Physical therapy/OT eval with recommendations for [facility], pt uninsured and pending re-instatement of medicaid, will provide daily PT/OT to optimize treatment.     ANTITHROMBOTIC THERAPY:  ASA  for secondary stroke prevention, Plavix on hold until cleared by urology    PULMONARY: CXR (11/09) clear, CXR 11/21 and 11/22 clear, Chest CT 11/22 with bilateral lower lobe pna, appreciate pulmonary and ID consult, now on Vanco and Zosyn, sputum culture ordered (green production and malodorous), protecting airway, monitor sats give oxygen as needed    CARDIOVASCULAR: TTE: EF 41%, mitral annular calcification, mild-moderate MR, global LV systolic dysfunction, mild stage 1diastolic dysfunction mild TR,  cardiac monitoring  no events, cardio consult appreciated: add metoprolol as tolerated                 GASTROINTESTINAL: s/p PEG placement (11/14),appreciate GI follow up, patient now suctioning up feedings, feeds on hold, will consider resuming 11/23. Continue to monitor. Bowel regimen with BM 11/21 with good results, 2 BMs.  Abd x ray negative for ileus.  Gi reconsulted.  When feedings resumed, no recommendation for residual check as per GI.     Diet: PEG/NPO except medications    RENAL: BUN/Cr without acute change but elevated-, provide hydration, good urine output, renal US: No hydronephrosis, haro reinserted for urinary retention. Continue Cardura for BPH.  Urology called for consult, haro remains in for retention, today small amounts of clots noted in urine, urine orange in color.     Na Goal: Greater than 135     Haro: re-inserted 11/17 for retention    HEMATOLOGY: H/H without acute changes, Platelets 293 no s/s of bleeding     DVT ppx: LMWH    ID: febrile on 11/09 (Tmax 39.3), febrile 11/21 with leukocytosis trending up, UA negative 11/17 for LE, WBC 2-5, will continue to monitor, blood/urine cultures NGTD, continue to monitor. Encourage incentive spirometry. With increased sputum production, green in color and malodorous called for Pulmonary consult, appreciate consult Vanco/Zosyn started and ID called in for consult as well, agrees with present treatment.  Pending sputum cx.  Chest CT confirms bilateral lower lobe PNA, most likely due to aspiration. If patient continues to deteriorate consider moving back to stroke unit. His subjective complaint of bilateral  hearing loss is of uncertain etiology. Will ask ID  whether this could conceivably be due to vancomycin and whether this antibiotic should be changed.    DISPOSITION: D/C to [facility] as per PT/OT eval once stable and workup is complete, Pt uninsured, SW aware, medicaid re-instatement pending. Daily PT/OT therapy to optimize therapy and referral to [facility] for possible suman.      CORE MEASURES:        Admission NIHSS: 3     TPA:  NO      LDL/HDL: 171/57     Depression Screen: 0     Statin Therapy: Y     Dysphagia Screen: FAIL     Smoking  NO      Afib NO     Stroke Education YES ASSESSMENT: 57M with HTN, HLD, CAD/CABG, past stroke with residual left hemiparesis, presents with transient right hemiparesis and diplopia, and continuous headache and N/V. CT head shows old right basal ganglia lacune, and CTA shows severe bilateral MCA stenosis, proximal BA occlusion and distal stenosis. Brain MRI show right lateral  medullary and right cerebellar ischemic infarcts. Impression: Severe intracranial atherosclerotic disease and occlusion of left VA, with a small distal right vertebral artery,  causing right  lateral medullary and right cerebellar ischemic infarcts, i.e. perhaps due to large artery intracranial atherosclerosis.    NEURO: RRT called for respiratory distress and hypotension on 11/22, neurologically without acute change, permissive HTN with slow titration to normotensive, ASA/Plavix and statin  for secondary stroke prevention,  titrate statin to LDL goal less than 70, pt with hiccups- will increase gabapentin to 800mg TID- baclofen as needed, Physical therapy/OT eval with recommendations for [facility], pt uninsured and pending re-instatement of medicaid, will provide daily PT/OT to optimize treatment.     ANTITHROMBOTIC THERAPY:  ASA  for secondary stroke prevention, Plavix on hold until cleared by urology    PULMONARY:  Chest CT (11/22) bilateral lower lobe pna,  continue Zosyn , sputum culture: numerous gram positive cooci, and negative rods, appreciate pulmonary and ID consult, protecting airway, 1Liter NC, encourage Incentive spirometer, monitor sats give oxygen as needed    CARDIOVASCULAR: TTE: EF 41%, mitral annular calcification, mild-moderate MR, global LV systolic dysfunction, mild stage 1diastolic dysfunction mild TR,  cardiac monitoring  no events, cardio consult appreciated: add metoprolol as tolerated                 GASTROINTESTINAL: s/p PEG placement (11/14), patient suctioning up feedings on 11/22, as per RN purulent discharge at PEG site, GI made aware, will obtain US abdomen to r/o abscess, feeds on hold as per GI recommednations,  Continue to monitor. Bowel regimen with BM 11/21 with good results, 2 BMs.  Abd x ray negative for ileus.       Diet: PEG/NPO except medications    RENAL: BUN/Cr without acute change but elevated on previous lab-, will continue to provide hydration, good urine output, renal US: No hydronephrosis, haro reinserted for urinary retention. Continue Cardura for BPH, and continue with Haro catheter until patient's status has improved and he is more ambulatory, small amounts of clots noted in urine on 11/22, as per urology this is likely due to plavix and      Na Goal: Greater than 135     Haro: re-inserted 11/17 for retention    HEMATOLOGY: H/H Platelets without acute changes, no s/s of bleeding     DVT ppx: LMWH    ID: febrile 11/22 tmax 37.9,  with leukocytosis trending down, UA negative 11/17 for LE, WBC 2-5, will continue to monitor, blood cx; NGTD, urine cultures: klebsiella pnaeumoniae, sputum culture: numerous gram positive cooci, and negative rods, as per ID eval continue with zosyn for aspiration pna,     DISPOSITION: D/C to [facility] as per PT/OT eval once stable and workup is complete, Pt uninsured, SW aware, medicaid re-instatement pending. Daily PT/OT therapy to optimize therapy and referral to [facility] for possible suman.      CORE MEASURES:        Admission NIHSS: 3     TPA:  NO      LDL/HDL: 171/57     Depression Screen: 0     Statin Therapy: Y     Dysphagia Screen: FAIL     Smoking  NO      Afib NO     Stroke Education YES ASSESSMENT: 57M with HTN, HLD, CAD/CABG, past stroke with residual left hemiparesis, presents with transient right hemiparesis and diplopia, and continuous headache and N/V. CT head shows old right basal ganglia lacune, and CTA shows severe bilateral MCA stenosis, proximal BA occlusion and distal stenosis. Brain MRI show right lateral  medullary and right cerebellar ischemic infarcts. Impression: Severe intracranial atherosclerotic disease and occlusion of left VA, with a small distal right vertebral artery,  causing right  lateral medullary and right cerebellar ischemic infarcts, i.e. perhaps due to large artery intracranial atherosclerosis.    NEURO: RRT called for respiratory distress and hypotension on 11/22, neurologically without acute change, permissive HTN with slow titration to normotensive, ASA/Plavix and statin  for secondary stroke prevention,  titrate statin to LDL goal less than 70, pt with intractable hiccups- will increase gabapentin to 800mg TID- baclofen 10 mg TID, Physical therapy/OT eval with recommendations for [facility], pt uninsured and pending re-instatement of medicaid, will provide daily PT/OT to optimize treatment.     ANTITHROMBOTIC THERAPY:  ASA  for secondary stroke prevention, Plavix on hold until cleared by urology    PULMONARY:  Chest CT (11/22) bilateral lower lobe pna,  continue Zosyn , sputum culture: numerous gram positive cooci, and negative rods, appreciate pulmonary and ID consult, protecting airway, 1Liter NC, encourage Incentive spirometer, monitor sats give oxygen as needed    CARDIOVASCULAR: TTE: EF 41%, mitral annular calcification, mild-moderate MR, global LV systolic dysfunction, mild stage 1diastolic dysfunction mild TR,  cardiac monitoring  no events, cardio consult appreciated: add metoprolol as tolerated                 GASTROINTESTINAL: s/p PEG placement (11/14), patient suctioning up feedings on 11/22, as per RN purulent discharge at PEG site, GI made aware and doubts infection, per GI suggestion will obtain US abdomen to r/o abscess, feeds on hold as per GI recommendations  Continue to monitor. Bowel regimen with BM 11/21 with good results, 2 BMs.  Abd x ray negative for ileus.       Diet: PEG/NPO except medications    RENAL: BUN/Cr without acute change but elevated on previous lab-, will continue to provide hydration, good urine output, renal US: No hydronephrosis, haro reinserted for urinary retention. Continue Cardura for BPH, and continue with Haro catheter until patient's status has improved and he is more ambulatory, small amounts of clots noted in urine on 11/22, as per urology this is likely due to plavix   Na Goal: Greater than 135     Haro: re-inserted 11/17 for retention    HEMATOLOGY: H/H Platelets without acute changes, no s/s of bleeding     DVT ppx: LMWH    ID: febrile 11/22 tmax 37.9,  with leukocytosis trending down, UA negative 11/17 for LE, WBC 2-5, will continue to monitor, blood cx; NGTD, urine cultures: klebsiella pnaeumoniae, sputum culture: numerous gram positive cooci, and negative rods, as per ID eval continue with zosyn for aspiration pna,     DISPOSITION: D/C to [facility] as per PT/OT eval once stable and workup is complete, Pt uninsured, SW aware, medicaid re-instatement pending. Daily PT/OT therapy to optimize therapy and referral to [facility] for possible suman.      CORE MEASURES:        Admission NIHSS: 3     TPA:  NO      LDL/HDL: 171/57     Depression Screen: 0     Statin Therapy: Y     Dysphagia Screen: FAIL     Smoking  NO      Afib NO     Stroke Education YES

## 2017-11-23 NOTE — PROGRESS NOTE ADULT - SUBJECTIVE AND OBJECTIVE BOX
THE PATIENT WAS SEEN AND EXAMINED BY ME WITH THE HOUSESTAFF AND STROKE TEAM DURING MORNING ROUNDS.   HPI:  56 y/o R-handed Chinese man PMH CVA with residual L-sided weakness, CAD s/p CABG, HTN presents as stroke code for dizziness. Pt at baseline ambulates independently and independent in ADLs. Pt was in usual state of health on 11/07 at 9AM. At around 9:30AM, he began to complain of lightheadedness not associated with changes in position. Half an hour later, he began to complain of n/v. He also endorses HA, diplopia/blurry vision, R-sided weakness that began a few days prior to admission.  He reports he ran out of his BP meds a week prior to admission and has been unable to refill his medications      SUBJECTIVE: RRT overnight due to SOB/desat.  No new neurologic complaints.      acetaminophen   Tablet. 650 milliGRAM(s) Oral every 6 hours PRN  acetaminophen  Suppository 650 milliGRAM(s) Rectal every 6 hours PRN  ALBUTerol/ipratropium for Nebulization 3 milliLiter(s) Nebulizer every 6 hours  aspirin  chewable 81 milliGRAM(s) Oral daily  atorvastatin 80 milliGRAM(s) Oral at bedtime  baclofen 10 milliGRAM(s) Oral three times a day  doxazosin 2 milliGRAM(s) Oral at bedtime  enoxaparin Injectable 40 milliGRAM(s) SubCutaneous daily  gabapentin   Solution 800 milliGRAM(s) Oral three times a day  guaiFENesin    Syrup 200 milliGRAM(s) Oral every 6 hours PRN  hydrALAZINE Injectable 10 milliGRAM(s) IV Push two times a day PRN  hydrochlorothiazide 12.5 milliGRAM(s) Oral daily  influenza   Vaccine 0.5 milliLiter(s) IntraMuscular once  losartan 100 milliGRAM(s) Oral daily  piperacillin/tazobactam IVPB. 3.375 Gram(s) IV Intermittent every 8 hours  simethicone 80 milliGRAM(s) Chew daily PRN  sodium chloride 0.9%. 1000 milliLiter(s) IV Continuous <Continuous>      PHYSICAL EXAM:   Vital Signs Last 24 Hrs  T(C): 36.7 (23 Nov 2017 12:23), Max: 37.9 (22 Nov 2017 18:00)  T(F): 98 (23 Nov 2017 12:23), Max: 100.2 (22 Nov 2017 18:00)  HR: 74 (23 Nov 2017 12:23) (74 - 102)  BP: 117/87 (23 Nov 2017 12:23) (109/68 - 126/73)  BP(mean): --  RR: 22 (23 Nov 2017 12:23) (18 - 22)  SpO2: 99% (23 Nov 2017 12:23) (94% - 99%)    General: restless, SOB, increased secretions  HEENT: EOM intact, visual fields full, PERRLA  Abdomen: Soft, nontender, nondistended   Extremities: No edema    NEUROLOGICAL EXAM:  Mental status: Awake, alert, oriented x3, fluent speech, no neglect, able to follow commands  Cranial Nerves: left facial droop, EOMI, VFF, no nystagmus, dysarthria, slight palatal deviation to the left, recurrent hiccups, hearing intact to  bilateral finger rub   Motor exam: Normal tone, no drift, RUE 5/5, RLE 5/5, drift LUE 4+/5, drift LLE 5-/5.  Sensation: decreased temperature sensation on left arm  Coordination/ Gait: LUE moderate-severe dysmetria    LABS:                        13.2   13.1  )-----------( 297      ( 22 Nov 2017 18:50 )             39.6    11-22    144  |  103  |  45<H>  ----------------------------<  124<H>  4.3   |  23  |  1.61<H>    Ca    9.4      22 Nov 2017 18:50  Phos  3.5     11-22  Mg     2.6     11-22    TPro  7.8  /  Alb  3.2<L>  /  TBili  1.3<H>  /  DBili  x   /  AST  30  /  ALT  17  /  AlkPhos  63  11-22  PT/INR - ( 22 Nov 2017 18:50 )   PT: 18.0 sec;   INR: 1.65 ratio           Hemoglobin A1C, Whole Blood: 5.8 % (11-08 @ 10:36)      IMAGING: Reviewed by me.   CT Chest No Cont (11.22.17) Bilateral lower lobe pneumonia.    Head CT/CTA head/Neck (11/07) Right basal ganglia and corona radiata infarct without significant change since 6/16/2017. There is severe steno-occlusive disease intracranially involving the right supraclinoid internal carotid artery, A1 and M1 branches, and severe narrowing of the left M1 segment of the middle cerebral artery. The distal right vertebral artery is quite small and the proximal basilar artery is not visualized. The dominant left vertebral artery ends at the PICA. Distal basilar flow appears to be due to retrograde flow from the right posterior communicating artery.  Head CT (11/07) No acute intracranial hemorrhage, mass effect, or evidence of acute territorial infarct. Chronic right corona radiata/basal ganglia and right inferior cerebellar hemisphere infarcts.  Brain MRI (11/09) In comparison the prior MRI, there is new hyperintense T2 and FLAIR signal with faint increased restricted diffusion in the right medulla extending to the inferior right brachium pontis and inferior medial right cerebellar hemisphere, new compared to 6/17/2017 consistent with evolving area of  ischemic change without hemorrhagic transformation. Redemonstration of volume loss with multiple additional areas of lacunar infarction including chronic infarcts in the right cerebellar hemisphere and right corona radiata. Decreased visualization of the flow-void within the right vertebral artery with continued irregular isointense T1 signal throughout the basilar artery. Bilateral sinus disease with bubbly secretions and air-fluid levels, greatest in the right maxillary sinus. THE PATIENT WAS SEEN AND EXAMINED BY ME WITH THE HOUSESTAFF AND STROKE TEAM DURING MORNING ROUNDS.   HPI:  58 y/o R-handed [place] man PMH CVA with residual L-sided weakness, CAD s/p CABG, HTN presents as stroke code for dizziness. Pt at baseline ambulates independently and independent in ADLs. Pt was in usual state of health on 11/07 at 9AM. At around 9:30AM, he began to complain of lightheadedness not associated with changes in position. Half an hour later, he began to complain of n/v. He also endorses HA, diplopia/blurry vision, R-sided weakness that began a few days prior to admission.  He reports he ran out of his BP meds a week prior to admission and has been unable to refill his medications      SUBJECTIVE: RRT overnight due to SOB/desat.  No new neurologic complaints.      acetaminophen   Tablet. 650 milliGRAM(s) Oral every 6 hours PRN  acetaminophen  Suppository 650 milliGRAM(s) Rectal every 6 hours PRN  ALBUTerol/ipratropium for Nebulization 3 milliLiter(s) Nebulizer every 6 hours  aspirin  chewable 81 milliGRAM(s) Oral daily  atorvastatin 80 milliGRAM(s) Oral at bedtime  baclofen 10 milliGRAM(s) Oral three times a day  doxazosin 2 milliGRAM(s) Oral at bedtime  enoxaparin Injectable 40 milliGRAM(s) SubCutaneous daily  gabapentin   Solution 800 milliGRAM(s) Oral three times a day  guaiFENesin    Syrup 200 milliGRAM(s) Oral every 6 hours PRN  hydrALAZINE Injectable 10 milliGRAM(s) IV Push two times a day PRN  hydrochlorothiazide 12.5 milliGRAM(s) Oral daily  influenza   Vaccine 0.5 milliLiter(s) IntraMuscular once  losartan 100 milliGRAM(s) Oral daily  piperacillin/tazobactam IVPB. 3.375 Gram(s) IV Intermittent every 8 hours  simethicone 80 milliGRAM(s) Chew daily PRN  sodium chloride 0.9%. 1000 milliLiter(s) IV Continuous <Continuous>      PHYSICAL EXAM:   Vital Signs Last 24 Hrs  T(C): 36.7 (23 Nov 2017 12:23), Max: 37.9 (22 Nov 2017 18:00)  T(F): 98 (23 Nov 2017 12:23), Max: 100.2 (22 Nov 2017 18:00)  HR: 74 (23 Nov 2017 12:23) (74 - 102)  BP: 117/87 (23 Nov 2017 12:23) (109/68 - 126/73)  BP(mean): --  RR: 22 (23 Nov 2017 12:23) (18 - 22)  SpO2: 99% (23 Nov 2017 12:23) (94% - 99%)    General: restless, SOB, increased secretions  HEENT: EOM intact, visual fields full, PERRLA  Abdomen: Soft, mild tenderness at PEG site, no erythema, nondistended   Extremities: No edema    NEUROLOGICAL EXAM:  Mental status: Awake, alert, oriented x3, fluent speech, no neglect, able to follow commands  Cranial Nerves: left facial droop, EOMI, VFF, no nystagmus, dysarthria, slight palatal deviation to the left, recurrent hiccups, hearing intact to  bilateral finger rub   Motor exam: Normal tone, no drift, RUE 5/5, RLE 5/5, drift LUE 4/5, drift LLE 5-/5.  Sensation: decreased temperature sensation on left arm  Coordination/ Gait: LUE moderate-severe dysmetria    LABS:                        13.2   13.1  )-----------( 297      ( 22 Nov 2017 18:50 )             39.6    11-22    144  |  103  |  45<H>  ----------------------------<  124<H>  4.3   |  23  |  1.61<H>    Ca    9.4      22 Nov 2017 18:50  Phos  3.5     11-22  Mg     2.6     11-22    TPro  7.8  /  Alb  3.2<L>  /  TBili  1.3<H>  /  DBili  x   /  AST  30  /  ALT  17  /  AlkPhos  63  11-22  PT/INR - ( 22 Nov 2017 18:50 )   PT: 18.0 sec;   INR: 1.65 ratio           Hemoglobin A1C, Whole Blood: 5.8 % (11-08 @ 10:36)      IMAGING: Reviewed by me.   CT Chest No Cont (11.22.17) Bilateral lower lobe pneumonia.    Head CT/CTA head/Neck (11/07) Right basal ganglia and corona radiata infarct without significant change since 6/16/2017. There is severe steno-occlusive disease intracranially involving the right supraclinoid internal carotid artery, A1 and M1 branches, and severe narrowing of the left M1 segment of the middle cerebral artery. The distal right vertebral artery is quite small and the proximal basilar artery is not visualized. The dominant left vertebral artery ends at the PICA. Distal basilar flow appears to be due to retrograde flow from the right posterior communicating artery.  Head CT (11/07) No acute intracranial hemorrhage, mass effect, or evidence of acute territorial infarct. Chronic right corona radiata/basal ganglia and right inferior cerebellar hemisphere infarcts.  Brain MRI (11/09) In comparison the prior MRI, there is new hyperintense T2 and FLAIR signal with faint increased restricted diffusion in the right medulla extending to the inferior right brachium pontis and inferior medial right cerebellar hemisphere, new compared to 6/17/2017 consistent with evolving area of  ischemic change without hemorrhagic transformation. Redemonstration of volume loss with multiple additional areas of lacunar infarction including chronic infarcts in the right cerebellar hemisphere and right corona radiata. Decreased visualization of the flow-void within the right vertebral artery with continued irregular isointense T1 signal throughout the basilar artery. Bilateral sinus disease with bubbly secretions and air-fluid levels, greatest in the right maxillary sinus. THE PATIENT WAS SEEN AND EXAMINED BY ME WITH THE HOUSESTAFF AND STROKE TEAM DURING MORNING ROUNDS.   HPI:  56 y/o R-handed [place] Islander man PMH CVA with residual L-sided weakness, CAD s/p CABG, HTN presents as stroke code for dizziness. Pt at baseline ambulates independently and independent in ADLs. Pt was in usual state of health on 11/07 at 9AM. At around 9:30AM, he began to complain of lightheadedness not associated with changes in position. Half an hour later, he began to complain of n/v. He also endorses HA, diplopia/blurry vision, R-sided weakness that began a few days prior to admission.  He reports he ran out of his BP meds a week prior to admission and has been unable to refill his medications      SUBJECTIVE: RRT overnight due to SOB/desat.  No new neurologic complaints.      acetaminophen   Tablet. 650 milliGRAM(s) Oral every 6 hours PRN  acetaminophen  Suppository 650 milliGRAM(s) Rectal every 6 hours PRN  ALBUTerol/ipratropium for Nebulization 3 milliLiter(s) Nebulizer every 6 hours  aspirin  chewable 81 milliGRAM(s) Oral daily  atorvastatin 80 milliGRAM(s) Oral at bedtime  baclofen 10 milliGRAM(s) Oral three times a day  doxazosin 2 milliGRAM(s) Oral at bedtime   enoxaparin Injectable 40 milliGRAM(s) SubCutaneous daily4  gabapentin   Solution 800 milliGRAM(s) Oral three times a day  guaiFENesin    Syrup 200 milliGRAM(s) Oral every 6 hours PRN  hydrALAZINE Injectable 10 milliGRAM(s) IV Push two times a day PRN  hydrochlorothiazide 12.5 milliGRAM(s) Oral daily  influenza   Vaccine 0.5 milliLiter(s) IntraMuscular once  losartan 100 milliGRAM(s) Oral daily  piperacillin/tazobactam IVPB. 3.375 Gram(s) IV Intermittent every 8 hours  simethicone 80 milliGRAM(s) Chew daily PRN  sodium chloride 0.9%. 1000 milliLiter(s) IV Continuous <Continuous>      PHYSICAL EXAM:   Vital Signs Last 24 Hrs  T(C): 36.7 (23 Nov 2017 12:23), Max: 37.9 (22 Nov 2017 18:00)  T(F): 98 (23 Nov 2017 12:23), Max: 100.2 (22 Nov 2017 18:00)  HR: 74 (23 Nov 2017 12:23) (74 - 102)  BP: 117/87 (23 Nov 2017 12:23) (109/68 - 126/73)  BP(mean): --  RR: 22 (23 Nov 2017 12:23) (18 - 22)  SpO2: 99% (23 Nov 2017 12:23) (94% - 99%)    General: restless, SOB, increased secretions  HEENT: EOM intact, visual fields full, PERRLA  Abdomen: Soft, mild tenderness at PEG site, no erythema, nondistended   Extremities: No edema    NEUROLOGICAL EXAM:  Mental status: Awake, alert, oriented x3, fluent speech, no neglect, able to follow commands  Cranial Nerves: left facial droop, EOMI, VFF, no nystagmus, dysarthria, slight palatal deviation to the left, recurrent hiccups, hearing intact to  bilateral finger rub   Motor exam: Normal tone, no drift, RUE 5/5, RLE 5/5, drift LUE 4/5, drift LLE 5-/5.  Sensation: decreased temperature sensation on left arm  Coordination/ Gait: LUE moderate-severe dysmetria    LABS:                        13.2   13.1  )-----------( 297      ( 22 Nov 2017 18:50 )             39.6    11-22    144  |  103  |  45<H>  ----------------------------<  124<H>  4.3   |  23  |  1.61<H>    Ca    9.4      22 Nov 2017 18:50  Phos  3.5     11-22  Mg     2.6     11-22    TPro  7.8  /  Alb  3.2<L>  /  TBili  1.3<H>  /  DBili  x   /  AST  30  /  ALT  17  /  AlkPhos  63  11-22  PT/INR - ( 22 Nov 2017 18:50 )   PT: 18.0 sec;   INR: 1.65 ratio           Hemoglobin A1C, Whole Blood: 5.8 % (11-08 @ 10:36)      IMAGING: Reviewed by me.   CT Chest No Cont (11.22.17) Bilateral lower lobe pneumonia.    Head CT/CTA head/Neck (11/07) Right basal ganglia and corona radiata infarct without significant change since 6/16/2017. There is severe steno-occlusive disease intracranially involving the right supraclinoid internal carotid artery, A1 and M1 branches, and severe narrowing of the left M1 segment of the middle cerebral artery. The distal right vertebral artery is quite small and the proximal basilar artery is not visualized. The dominant left vertebral artery ends at the PICA. Distal basilar flow appears to be due to retrograde flow from the right posterior communicating artery.  Head CT (11/07) No acute intracranial hemorrhage, mass effect, or evidence of acute territorial infarct. Chronic right corona radiata/basal ganglia and right inferior cerebellar hemisphere infarcts.  Brain MRI (11/09) In comparison the prior MRI, there is new hyperintense T2 and FLAIR signal with faint increased restricted diffusion in the right medulla extending to the inferior right brachium pontis and inferior medial right cerebellar hemisphere, new compared to 6/17/2017 consistent with evolving area of  ischemic change without hemorrhagic transformation. Redemonstration of volume loss with multiple additional areas of lacunar infarction including chronic infarcts in the right cerebellar hemisphere and right corona radiata. Decreased visualization of the flow-void within the right vertebral artery with continued irregular isointense T1 signal throughout the basilar artery. Bilateral sinus disease with bubbly secretions and air-fluid levels, greatest in the right maxillary sinus.

## 2017-11-24 DIAGNOSIS — N39.0 URINARY TRACT INFECTION, SITE NOT SPECIFIED: ICD-10-CM

## 2017-11-24 LAB
-  AMIKACIN: SIGNIFICANT CHANGE UP
-  AMPICILLIN/SULBACTAM: SIGNIFICANT CHANGE UP
-  AMPICILLIN: SIGNIFICANT CHANGE UP
-  AZTREONAM: SIGNIFICANT CHANGE UP
-  CEFAZOLIN: SIGNIFICANT CHANGE UP
-  CEFEPIME: SIGNIFICANT CHANGE UP
-  CEFOXITIN: SIGNIFICANT CHANGE UP
-  CEFTAZIDIME: SIGNIFICANT CHANGE UP
-  CEFTRIAXONE: SIGNIFICANT CHANGE UP
-  CIPROFLOXACIN: SIGNIFICANT CHANGE UP
-  ERTAPENEM: SIGNIFICANT CHANGE UP
-  GENTAMICIN: SIGNIFICANT CHANGE UP
-  IMIPENEM: SIGNIFICANT CHANGE UP
-  LEVOFLOXACIN: SIGNIFICANT CHANGE UP
-  MEROPENEM: SIGNIFICANT CHANGE UP
-  PIPERACILLIN/TAZOBACTAM: SIGNIFICANT CHANGE UP
-  TOBRAMYCIN: SIGNIFICANT CHANGE UP
-  TRIMETHOPRIM/SULFAMETHOXAZOLE: SIGNIFICANT CHANGE UP
ANION GAP SERPL CALC-SCNC: 12 MMOL/L — SIGNIFICANT CHANGE UP (ref 5–17)
BUN SERPL-MCNC: 22 MG/DL — SIGNIFICANT CHANGE UP (ref 7–23)
CALCIUM SERPL-MCNC: 8.6 MG/DL — SIGNIFICANT CHANGE UP (ref 8.4–10.5)
CHLORIDE SERPL-SCNC: 108 MMOL/L — SIGNIFICANT CHANGE UP (ref 96–108)
CO2 SERPL-SCNC: 27 MMOL/L — SIGNIFICANT CHANGE UP (ref 22–31)
CREAT SERPL-MCNC: 1.2 MG/DL — SIGNIFICANT CHANGE UP (ref 0.5–1.3)
CULTURE RESULTS: SIGNIFICANT CHANGE UP
GLUCOSE SERPL-MCNC: 97 MG/DL — SIGNIFICANT CHANGE UP (ref 70–99)
HCT VFR BLD CALC: 33.9 % — LOW (ref 39–50)
HGB BLD-MCNC: 11.1 G/DL — LOW (ref 13–17)
MCHC RBC-ENTMCNC: 25.4 PG — LOW (ref 27–34)
MCHC RBC-ENTMCNC: 32.7 GM/DL — SIGNIFICANT CHANGE UP (ref 32–36)
MCV RBC AUTO: 77.6 FL — LOW (ref 80–100)
METHOD TYPE: SIGNIFICANT CHANGE UP
ORGANISM # SPEC MICROSCOPIC CNT: SIGNIFICANT CHANGE UP
ORGANISM # SPEC MICROSCOPIC CNT: SIGNIFICANT CHANGE UP
PLATELET # BLD AUTO: 305 K/UL — SIGNIFICANT CHANGE UP (ref 150–400)
POTASSIUM SERPL-MCNC: 3.4 MMOL/L — LOW (ref 3.5–5.3)
POTASSIUM SERPL-SCNC: 3.4 MMOL/L — LOW (ref 3.5–5.3)
RBC # BLD: 4.37 M/UL — SIGNIFICANT CHANGE UP (ref 4.2–5.8)
RBC # FLD: 15.8 % — HIGH (ref 10.3–14.5)
SODIUM SERPL-SCNC: 147 MMOL/L — HIGH (ref 135–145)
SPECIMEN SOURCE: SIGNIFICANT CHANGE UP
WBC # BLD: 7.23 K/UL — SIGNIFICANT CHANGE UP (ref 3.8–10.5)
WBC # FLD AUTO: 7.23 K/UL — SIGNIFICANT CHANGE UP (ref 3.8–10.5)

## 2017-11-24 PROCEDURE — 76705 ECHO EXAM OF ABDOMEN: CPT | Mod: 26

## 2017-11-24 PROCEDURE — 99232 SBSQ HOSP IP/OBS MODERATE 35: CPT

## 2017-11-24 PROCEDURE — 99233 SBSQ HOSP IP/OBS HIGH 50: CPT

## 2017-11-24 RX ORDER — CHLORPROMAZINE HCL 10 MG
25 TABLET ORAL THREE TIMES A DAY
Qty: 0 | Refills: 0 | Status: DISCONTINUED | OUTPATIENT
Start: 2017-11-24 | End: 2017-11-27

## 2017-11-24 RX ORDER — SODIUM CHLORIDE 9 MG/ML
1000 INJECTION INTRAMUSCULAR; INTRAVENOUS; SUBCUTANEOUS
Qty: 0 | Refills: 0 | Status: DISCONTINUED | OUTPATIENT
Start: 2017-11-24 | End: 2017-11-26

## 2017-11-24 RX ORDER — POTASSIUM CHLORIDE 20 MEQ
20 PACKET (EA) ORAL ONCE
Qty: 0 | Refills: 0 | Status: COMPLETED | OUTPATIENT
Start: 2017-11-24 | End: 2017-11-24

## 2017-11-24 RX ADMIN — Medication 25 MILLIGRAM(S): at 13:47

## 2017-11-24 RX ADMIN — Medication 81 MILLIGRAM(S): at 11:28

## 2017-11-24 RX ADMIN — Medication 10 MILLIGRAM(S): at 05:07

## 2017-11-24 RX ADMIN — Medication 25 MILLIGRAM(S): at 21:24

## 2017-11-24 RX ADMIN — ATORVASTATIN CALCIUM 80 MILLIGRAM(S): 80 TABLET, FILM COATED ORAL at 21:24

## 2017-11-24 RX ADMIN — Medication 10 MILLIGRAM(S): at 13:48

## 2017-11-24 RX ADMIN — Medication 12.5 MILLIGRAM(S): at 05:07

## 2017-11-24 RX ADMIN — PIPERACILLIN AND TAZOBACTAM 25 GRAM(S): 4; .5 INJECTION, POWDER, LYOPHILIZED, FOR SOLUTION INTRAVENOUS at 01:14

## 2017-11-24 RX ADMIN — Medication 3 MILLILITER(S): at 23:50

## 2017-11-24 RX ADMIN — GABAPENTIN 800 MILLIGRAM(S): 400 CAPSULE ORAL at 05:07

## 2017-11-24 RX ADMIN — Medication 3 MILLILITER(S): at 11:32

## 2017-11-24 RX ADMIN — PIPERACILLIN AND TAZOBACTAM 25 GRAM(S): 4; .5 INJECTION, POWDER, LYOPHILIZED, FOR SOLUTION INTRAVENOUS at 17:18

## 2017-11-24 RX ADMIN — Medication 3 MILLILITER(S): at 05:07

## 2017-11-24 RX ADMIN — Medication 10 MILLIGRAM(S): at 21:24

## 2017-11-24 RX ADMIN — PIPERACILLIN AND TAZOBACTAM 25 GRAM(S): 4; .5 INJECTION, POWDER, LYOPHILIZED, FOR SOLUTION INTRAVENOUS at 09:25

## 2017-11-24 RX ADMIN — ENOXAPARIN SODIUM 40 MILLIGRAM(S): 100 INJECTION SUBCUTANEOUS at 11:32

## 2017-11-24 RX ADMIN — Medication 20 MILLIEQUIVALENT(S): at 17:26

## 2017-11-24 RX ADMIN — GABAPENTIN 800 MILLIGRAM(S): 400 CAPSULE ORAL at 13:48

## 2017-11-24 RX ADMIN — LOSARTAN POTASSIUM 100 MILLIGRAM(S): 100 TABLET, FILM COATED ORAL at 05:07

## 2017-11-24 RX ADMIN — GABAPENTIN 800 MILLIGRAM(S): 400 CAPSULE ORAL at 21:24

## 2017-11-24 RX ADMIN — SODIUM CHLORIDE 100 MILLILITER(S): 9 INJECTION INTRAMUSCULAR; INTRAVENOUS; SUBCUTANEOUS at 05:07

## 2017-11-24 RX ADMIN — Medication 2 MILLIGRAM(S): at 21:24

## 2017-11-24 NOTE — PROGRESS NOTE ADULT - ASSESSMENT
Patient is a 56 yo [place] male w/ PMH significant for HTN, HLD, CAD s/p CABG, previous CVA w/ residual left hemiparesis, admitted for R sided weakness, HA, diplopia, diagnosed with R lateral medullary and R cerebellar ischemic infarcts per MRI. Patient failed MBS and PEG tube was placed on 11/14 and tube feeding was initiated and pt was having problems with emesis, residuals, constipation-concern for ileus. 11/22 with coughing up feeds, green malordours secretions, hypoxia which resolved. found to have bilat PNA-asp, UTI--kleb,

## 2017-11-24 NOTE — PROGRESS NOTE ADULT - ASSESSMENT
ASSESSMENT: 57M with HTN, HLD, CAD/CABG, past stroke with residual left hemiparesis, presents with transient right hemiparesis and diplopia, and continuous headache and N/V. CT head shows old right basal ganglia lacune, and CTA shows severe bilateral MCA stenosis, proximal BA occlusion and distal stenosis. Brain MRI show right lateral  medullary and right cerebellar ischemic infarcts. Impression: Severe intracranial atherosclerotic disease and occlusion of left VA, with a small distal right vertebral artery,  causing right  lateral medullary and right cerebellar ischemic infarcts, i.e. perhaps due to large artery intracranial atherosclerosis.    NEURO: RRT called for respiratory distress and hypotension on 11/22, neurologically without acute change, permissive HTN with slow titration to normotensive, ASA/Plavix and statin  for secondary stroke prevention,  titrate statin to LDL goal less than 70, pt with intractable hiccups- will increase gabapentin to 800mg TID- baclofen 10 mg TID, Physical therapy/OT eval with recommendations for [facility], pt uninsured and pending re-instatement of medicaid, will provide daily PT/OT to optimize treatment.     ANTITHROMBOTIC THERAPY:  ASA  for secondary stroke prevention, Plavix on hold until cleared by urology    PULMONARY:  Chest CT (11/22) bilateral lower lobe pna,  continue Zosyn , sputum culture: numerous gram positive cooci, and negative rods, appreciate pulmonary and ID consult, protecting airway, 1Liter NC, encourage Incentive spirometer, monitor sats give oxygen as needed    CARDIOVASCULAR: TTE: EF 41%, mitral annular calcification, mild-moderate MR, global LV systolic dysfunction, mild stage 1diastolic dysfunction mild TR,  cardiac monitoring  no events, cardio consult appreciated: add metoprolol as tolerated                 GASTROINTESTINAL: s/p PEG placement (11/14), patient suctioning up feedings on 11/22, as per RN purulent discharge at PEG site, GI made aware and doubts infection, per GI suggestion will obtain US abdomen to r/o abscess, feeds on hold as per GI recommendations  Continue to monitor. Bowel regimen with BM 11/21 with good results, 2 BMs.  Abd x ray negative for ileus.       Diet: PEG/NPO except medications    RENAL: BUN/Cr without acute change but elevated on previous lab-, will continue to provide hydration, good urine output, renal US: No hydronephrosis, haro reinserted for urinary retention. Continue Cardura for BPH, and continue with Haro catheter until patient's status has improved and he is more ambulatory, small amounts of clots noted in urine on 11/22, as per urology this is likely due to plavix   Na Goal: Greater than 135     Haro: re-inserted 11/17 for retention    HEMATOLOGY: H/H Platelets without acute changes, no s/s of bleeding     DVT ppx: LMWH    ID: febrile 11/22 tmax 37.9,  with leukocytosis trending down, blood cx; NGTD, urine cultures: klebsiella pnaeumoniae, sputum culture: numerous gram positive cooci, and negative rods, as per ID eval continue with zosyn for aspiration pna/UTI,     DISPOSITION: D/C to [facility] as per PT/OT eval once stable and workup is complete, Pt uninsured, SW aware, medicaid re-instatement pending. Daily PT/OT therapy to optimize therapy and referral to [facility] for possible suman.      CORE MEASURES:        Admission NIHSS: 3     TPA:  NO      LDL/HDL: 171/57     Depression Screen: 0     Statin Therapy: Y     Dysphagia Screen: FAIL     Smoking  NO      Afib NO     Stroke Education YES ASSESSMENT: 57M with HTN, HLD, CAD/CABG, past stroke with residual left hemiparesis, presents with transient right hemiparesis and diplopia, and continuous headache and N/V. CT head shows old right basal ganglia lacune, and CTA shows severe bilateral MCA stenosis, proximal BA occlusion and distal stenosis. Brain MRI show right lateral  medullary and right cerebellar ischemic infarcts. Impression: Severe intracranial atherosclerotic disease and occlusion of left VA, with a small distal right vertebral artery,  causing right  lateral medullary and right cerebellar ischemic infarcts, i.e. perhaps due to large artery intracranial atherosclerosis.    NEURO: neurologically without acute change, RRT called for respiratory distress and hypotension on 11/22, , permissive HTN with slow titration to normotensive, ASA and statin  for secondary stroke prevention,  titrate statin to LDL goal less than 70, pt still with intractable hiccups- will continue gabapentin 800mg TID, D/C baclofen, and will start Thorazine 25mg TID, Physical therapy/OT eval with recommendations for [facility], pt uninsured and pending re-instatement of medicaid, will provide daily PT/OT to optimize treatment.     ANTITHROMBOTIC THERAPY:  ASA  for secondary stroke prevention, Plavix on hold until cleared by urology    PULMONARY:  Chest CT (11/22) bilateral lower lobe pna,  continue Zosyn , sputum culture: numerous gram positive cooci, and negative rods, appreciate pulmonary and ID consult, protecting airway, 1Liter NC, encourage Incentive spirometer, monitor sats give oxygen as needed    CARDIOVASCULAR: TTE: EF 41%, mitral annular calcification, mild-moderate MR, global LV systolic dysfunction, mild stage 1diastolic dysfunction mild TR,  cardiac monitoring  no events, cardio consult appreciated: add metoprolol as tolerated                 GASTROINTESTINAL: s/p PEG placement (11/14), patient suctioning up feedings on 11/22, as per RN purulent discharge at PEG site, GI made aware and doubts infection, per GI suggestion will obtain US abdomen to r/o abscess, feeds on hold as per GI recommendations  Continue to monitor. diarrhea X 2 on 11/24, stool sample to r/o C.diff.  Abd x ray negative for ileus.       Diet: PEG/NPO except medications    RENAL: BUN/Cr without acute change but elevated on previous lab-, will continue to provide hydration, good urine output, renal US: No hydronephrosis, haro reinserted for urinary retention. Continue Cardura for BPH, and continue with Haro catheter until patient's status has improved and he is more ambulatory, small amounts of clots noted in urine on 11/22, as per urology this is likely due to plavix, hypernatremia, will increase IVF to 125ml while NPO,  hypokalemia: will replete potassium  Na Goal: Greater than 135     Haro: re-inserted 11/17 for retention    HEMATOLOGY: H/H Platelets without acute changes, no s/s of bleeding     DVT ppx: LMWH    ID: febrile 11/22 tmax 37.9,  leukocytosis resolved, blood cx; NGTD, urine and sputum cultures: klebsiella pneumoniae, pt with diarrhea on 11/24, ID made aware,  as per ID eval continue with zosyn for aspiration pna/UTI,     DISPOSITION: D/C to [facility] as per PT/OT eval once stable and workup is complete, Pt uninsured, SW aware, medicaid re-instatement pending. Daily PT/OT therapy to optimize therapy and referral to [facility] for possible suman.      CORE MEASURES:        Admission NIHSS: 3     TPA:  NO      LDL/HDL: 171/57     Depression Screen: 0     Statin Therapy: Y     Dysphagia Screen: FAIL     Smoking  NO      Afib NO     Stroke Education YES

## 2017-11-24 NOTE — PROGRESS NOTE ADULT - PROBLEM SELECTOR PLAN 1
kleb PNA  keep o2 sat>=92% w/ prn o2  asp precautions  hob 45-90 degree  pulm sherita  c/w zosyn  ID on board

## 2017-11-24 NOTE — PROGRESS NOTE ADULT - SUBJECTIVE AND OBJECTIVE BOX
Chief Complaint:  Patient is a 57y old  Male who presents with a chief complaint of stroke code (2017 08:50)      Interval Events:   WBC count trending down today. No fevers overnight.    Allergies:  No Known Allergies      Home Medications:    Hospital Medications:  acetaminophen   Tablet. 650 milliGRAM(s) Oral every 6 hours PRN  acetaminophen  Suppository 650 milliGRAM(s) Rectal every 6 hours PRN  ALBUTerol/ipratropium for Nebulization 3 milliLiter(s) Nebulizer every 6 hours  aspirin  chewable 81 milliGRAM(s) Oral daily  atorvastatin 80 milliGRAM(s) Oral at bedtime  baclofen 10 milliGRAM(s) Oral three times a day  doxazosin 2 milliGRAM(s) Oral at bedtime  enoxaparin Injectable 40 milliGRAM(s) SubCutaneous daily  gabapentin   Solution 800 milliGRAM(s) Oral three times a day  guaiFENesin    Syrup 200 milliGRAM(s) Oral every 6 hours PRN  hydrALAZINE Injectable 10 milliGRAM(s) IV Push two times a day PRN  hydrochlorothiazide 12.5 milliGRAM(s) Oral daily  influenza   Vaccine 0.5 milliLiter(s) IntraMuscular once  losartan 100 milliGRAM(s) Oral daily  piperacillin/tazobactam IVPB. 3.375 Gram(s) IV Intermittent every 8 hours  simethicone 80 milliGRAM(s) Chew daily PRN  sodium chloride 0.9%. 1000 milliLiter(s) IV Continuous <Continuous>      PMHX/PSHX:  Stented coronary artery  CVA (cerebral vascular accident)  HTN (hypertension)  No significant past surgical history      Family history:  No pertinent family history in first degree relatives      ROS:     General:  No wt loss, fevers, chills, night sweats, fatigue,   Eyes:  Good vision, no reported pain  ENT:  No sore throat, pain, runny nose, dysphagia  CV:  No pain, palpitations, hypo/hypertension  Resp:  No dyspnea, cough, tachypnea, wheezing  GI:  No pain, No nausea, No vomiting, No diarrhea, No constipation, No weight loss, No fever, No pruritis, No rectal bleeding, No tarry stools, No dysphagia,  :  No pain, bleeding, incontinence, nocturia  Muscle:  No pain, weakness  Neuro:  No weakness, tingling, memory problems  Psych:  No fatigue, insomnia, mood problems, depression  Endocrine:  No polyuria, polydipsia, cold/heat intolerance  Heme:  No petechiae, ecchymosis, easy bruisability  Skin:  No rash, tattoos, scars, edema      PHYSICAL EXAM:   Vital Signs:  Vital Signs Last 24 Hrs  T(C): 36.7 (2017 05:06), Max: 37.2 (2017 00:14)  T(F): 98 (2017 05:06), Max: 99 (2017 00:14)  HR: 77 (2017 05:06) (74 - 102)  BP: 161/78 (2017 05:06) (116/78 - 161/78)  BP(mean): --  RR: 19 (2017 05:06) (18 - 22)  SpO2: 93% (:) (93% - 99%)  Daily     Daily     GENERAL:  thin, no distress, coughing  HEENT:  L facial droop, conjunctivae clear and pink, sclera -anicteric  CHEST:  course breath sounds and crackles throughout bilaterally, no wheezing, no increased work of breathing.  HEART:  Regular rhythm, S1, S2, no murmur/rub/S3/S4, no abdominal bruit, no edema  ABDOMEN:  Soft, non-tender, non-distended, normoactive bowel sounds, no guarding, PEG tube in place, Wu in place.  EXTEREMITIES:  no cyanosis,clubbing or edema, L sided weakness.  SKIN:  No rash/erythema/ecchymoses/petechiae/wounds/abscess/warm/dry  NEURO:  Alert, oriented, no asterixis, no tremor, no encephalopathy    LABS:                        13.2   13.1  )-----------( 297      ( 2017 18:50 )             39.6     11-    144  |  103  |  45<H>  ----------------------------<  124<H>  4.3   |  23  |  1.61<H>    Ca    9.4      2017 18:50  Phos  3.5     11-  Mg     2.6     -    TPro  7.8  /  Alb  3.2<L>  /  TBili  1.3<H>  /  DBili  x   /  AST  30  /  ALT  17  /  AlkPhos  63  11-22    LIVER FUNCTIONS - ( 2017 18:50 )  Alb: 3.2 g/dL / Pro: 7.8 g/dL / ALK PHOS: 63 U/L / ALT: 17 U/L RC / AST: 30 U/L / GGT: x           PT/INR - ( 2017 18:50 )   PT: 18.0 sec;   INR: 1.65 ratio           Urinalysis Basic - ( 2017 16:03 )    Color: Yellow / Appearance: Turbid / S.022 / pH: x  Gluc: x / Ketone: Negative  / Bili: Negative / Urobili: 1.0 mg/dL   Blood: x / Protein: 100 mg/dL / Nitrite: Positive   Leuk Esterase: Moderate / RBC: 368 /HPF / WBC 92 /HPF   Sq Epi: x / Non Sq Epi: 2 /HPF / Bacteria: TNTC          Imaging: Chief Complaint:  Patient is a 57y old  Male who presents with a chief complaint of stroke code (2017 08:50)      Interval Events:   WBC count trending down today. No fevers overnight.  Nursing reported no pus from PEG site today      Allergies:  No Known Allergies      Home Medications:    Hospital Medications:  acetaminophen   Tablet. 650 milliGRAM(s) Oral every 6 hours PRN  acetaminophen  Suppository 650 milliGRAM(s) Rectal every 6 hours PRN  ALBUTerol/ipratropium for Nebulization 3 milliLiter(s) Nebulizer every 6 hours  aspirin  chewable 81 milliGRAM(s) Oral daily  atorvastatin 80 milliGRAM(s) Oral at bedtime  baclofen 10 milliGRAM(s) Oral three times a day  doxazosin 2 milliGRAM(s) Oral at bedtime  enoxaparin Injectable 40 milliGRAM(s) SubCutaneous daily  gabapentin   Solution 800 milliGRAM(s) Oral three times a day  guaiFENesin    Syrup 200 milliGRAM(s) Oral every 6 hours PRN  hydrALAZINE Injectable 10 milliGRAM(s) IV Push two times a day PRN  hydrochlorothiazide 12.5 milliGRAM(s) Oral daily  influenza   Vaccine 0.5 milliLiter(s) IntraMuscular once  losartan 100 milliGRAM(s) Oral daily  piperacillin/tazobactam IVPB. 3.375 Gram(s) IV Intermittent every 8 hours  simethicone 80 milliGRAM(s) Chew daily PRN  sodium chloride 0.9%. 1000 milliLiter(s) IV Continuous <Continuous>      PMHX/PSHX:  Stented coronary artery  CVA (cerebral vascular accident)  HTN (hypertension)  No significant past surgical history      Family history:  No pertinent family history in first degree relatives      ROS:     General:  No wt loss, fevers, chills, night sweats, fatigue,   Eyes:  Good vision, no reported pain  ENT:  No sore throat, pain, runny nose, dysphagia  CV:  No pain, palpitations, hypo/hypertension  Resp:  No dyspnea, cough, tachypnea, wheezing  GI:  No pain, No nausea, No vomiting, No diarrhea, No constipation, No weight loss, No fever, No pruritis, No rectal bleeding, No tarry stools, No dysphagia,  :  No pain, bleeding, incontinence, nocturia  Muscle:  No pain, weakness  Neuro:  No weakness, tingling, memory problems  Psych:  No fatigue, insomnia, mood problems, depression  Endocrine:  No polyuria, polydipsia, cold/heat intolerance  Heme:  No petechiae, ecchymosis, easy bruisability  Skin:  No rash, tattoos, scars, edema      PHYSICAL EXAM:   Vital Signs:  Vital Signs Last 24 Hrs  T(C): 36.7 (2017 05:06), Max: 37.2 (2017 00:14)  T(F): 98 (2017 05:06), Max: 99 (2017 00:14)  HR: 77 (2017 05:06) (74 - 102)  BP: 161/78 (2017 05:06) (116/78 - 161/78)  BP(mean): --  RR: 19 (:) (18 - 22)  SpO2: 93% (:) (93% - 99%)  Daily     Daily     GENERAL:  thin, no distress, coughing  HEENT:  L facial droop, conjunctivae clear and pink, sclera -anicteric  CHEST:  course breath sounds and crackles throughout bilaterally, no wheezing, no increased work of breathing.  HEART:  Regular rhythm, S1, S2, no murmur/rub/S3/S4, no abdominal bruit, no edema  ABDOMEN:  Soft, non-tender, non-distended, normoactive bowel sounds, no guarding, PEG tube in place c/d/i, no erythema swellin gor warmth, Wu in place.  EXTEREMITIES:  no cyanosis,clubbing or edema, L sided weakness.  SKIN:  No rash/erythema/ecchymoses/petechiae/wounds/abscess/warm/dry  NEURO:  Alert, oriented, no asterixis, no tremor, no encephalopathy    LABS:                        13.2   13.1  )-----------( 297      ( 2017 18:50 )             39.6         144  |  103  |  45<H>  ----------------------------<  124<H>  4.3   |  23  |  1.61<H>    Ca    9.4      2017 18:50  Phos  3.5       Mg     2.6         TPro  7.8  /  Alb  3.2<L>  /  TBili  1.3<H>  /  DBili  x   /  AST  30  /  ALT  17  /  AlkPhos  63  11-22    LIVER FUNCTIONS - ( 2017 18:50 )  Alb: 3.2 g/dL / Pro: 7.8 g/dL / ALK PHOS: 63 U/L / ALT: 17 U/L RC / AST: 30 U/L / GGT: x           PT/INR - ( 2017 18:50 )   PT: 18.0 sec;   INR: 1.65 ratio           Urinalysis Basic - ( 2017 16:03 )    Color: Yellow / Appearance: Turbid / S.022 / pH: x  Gluc: x / Ketone: Negative  / Bili: Negative / Urobili: 1.0 mg/dL   Blood: x / Protein: 100 mg/dL / Nitrite: Positive   Leuk Esterase: Moderate / RBC: 368 /HPF / WBC 92 /HPF   Sq Epi: x / Non Sq Epi: 2 /HPF / Bacteria: TNTC          Imaging:

## 2017-11-24 NOTE — PROGRESS NOTE ADULT - ASSESSMENT
57M CAD s/p CABG, HTN, hx CVA with L weakness. Admitted with new right medullary stroke.  Course complicated by urinary retention and now acute aspiration causing fever, leukocytosis and purulent sputum.  CT confirms pneumonia.  Sputum with klebsiella. UC also with kpna (colonizer).  New diarrhea.    - please check Cdiff  - zosyn okay [day #3 today]    Please call the ID service [phone] with questions or concerns over the weekend  I have discussed plan of care with consulting team [[id number]]

## 2017-11-24 NOTE — PROGRESS NOTE ADULT - ASSESSMENT
Patient is a 58 yo [place] male w/ PMH significant for HTN, HLD, CAD s/p CABG, previous CVA w/ residual left hemiparesis, admitted for R sided weakness, HA, diplopia, diagnosed with R lateral medullary and R cerebellar ischemic infarcts per MRI. Patient failed MBS and PEG tube was placed and tube feeds initiated. Initial concern for ileus which has resolved, now patient with possible aspiration.    Recommendations  -cont IV zosyn  -would hold off on removal of PEG tube at this time  -obtain abdominal US to evaluate for possible abscess if concern for pus  -if patient spikes fever, complains of worsening pain around the PEG site, increased warmth or swelling around the peg, elevated WBC count, then please call us back and broaden abx coverage with Vancomycin  -if abdominal US returns normal, can resume tube feeds at 10cc/hour and titrate up  -Gi will follow. Patient is a 56 yo [place] male w/ PMH significant for HTN, HLD, CAD s/p CABG, previous CVA w/ residual left hemiparesis, admitted for R sided weakness, HA, diplopia, diagnosed with R lateral medullary and R cerebellar ischemic infarcts per MRI. Patient failed MBS and PEG tube was placed and tube feeds initiated. Initial concern for ileus which has resolved, now patient with possible aspiration.    Recommendations  -cont IV zosyn  -would hold off on removal of PEG tube at this time  -no evidence of PEG tube infection, can resume feeds at 10cc/hr and titrate up  -if patient spikes fever, complains of worsening pain around the PEG site, increased warmth or swelling around the peg, elevated WBC count, then please call us back and broaden abx coverage with Vancomycin  -Gi will follow. Patient is a 56 yo [place] male w/ PMH significant for HTN, HLD, CAD s/p CABG, previous CVA w/ residual left hemiparesis, admitted for R sided weakness, HA, diplopia, diagnosed with R lateral medullary and R cerebellar ischemic infarcts per MRI. Patient failed MBS and PEG tube was placed and tube feeds initiated. Initial concern for ileus which has resolved, now patient with possible aspiration.    Recommendations  -cont IV zosyn  -would hold off on removal of PEG tube at this time  -no evidence of PEG tube infection, can resume feeds at 10cc/hr and titrate up  -if patient spikes fever, complains of worsening pain around the PEG site, increased warmth or swelling around the peg, elevated WBC count, then please call us back and broaden abx coverage with Vancomycin

## 2017-11-24 NOTE — PROGRESS NOTE ADULT - SUBJECTIVE AND OBJECTIVE BOX
THE PATIENT WAS SEEN AND EXAMINED BY ME WITH THE HOUSESTAFF AND STROKE TEAM DURING MORNING ROUNDS.   HPI:  56 y/o R-handed Citizen of [place] man PMH CVA with residual L-sided weakness, CAD s/p CABG, HTN presents as stroke code for dizziness. Pt at baseline ambulates independently and independent in ADLs. Pt was in usual state of health on 11/07 at 9AM. At around 9:30AM, he began to complain of lightheadedness not associated with changes in position. Half an hour later, he began to complain of n/v. He also endorses HA, diplopia/blurry vision, R-sided weakness that began a few days prior to admission.  He reports he ran out of his BP meds a week prior to admission and has been unable to refill his medications      SUBJECTIVE: No events overnight.  No new neurologic complaints.      acetaminophen   Tablet. 650 milliGRAM(s) Oral every 6 hours PRN  acetaminophen  Suppository 650 milliGRAM(s) Rectal every 6 hours PRN  ALBUTerol/ipratropium for Nebulization 3 milliLiter(s) Nebulizer every 6 hours  aspirin  chewable 81 milliGRAM(s) Oral daily  atorvastatin 80 milliGRAM(s) Oral at bedtime  baclofen 10 milliGRAM(s) Oral three times a day  doxazosin 2 milliGRAM(s) Oral at bedtime  enoxaparin Injectable 40 milliGRAM(s) SubCutaneous daily  gabapentin   Solution 800 milliGRAM(s) Oral three times a day  guaiFENesin    Syrup 200 milliGRAM(s) Oral every 6 hours PRN  hydrALAZINE Injectable 10 milliGRAM(s) IV Push two times a day PRN  hydrochlorothiazide 12.5 milliGRAM(s) Oral daily  influenza   Vaccine 0.5 milliLiter(s) IntraMuscular once  losartan 100 milliGRAM(s) Oral daily  piperacillin/tazobactam IVPB. 3.375 Gram(s) IV Intermittent every 8 hours  simethicone 80 milliGRAM(s) Chew daily PRN  sodium chloride 0.9%. 1000 milliLiter(s) IV Continuous <Continuous>      PHYSICAL EXAM:   Vital Signs Last 24 Hrs  T(C): 36.7 (24 Nov 2017 05:06), Max: 37.2 (24 Nov 2017 00:14)  T(F): 98 (24 Nov 2017 05:06), Max: 99 (24 Nov 2017 00:14)  HR: 77 (24 Nov 2017 05:06) (74 - 96)  BP: 161/78 (24 Nov 2017 05:06) (117/87 - 161/78)  BP(mean): --  RR: 19 (24 Nov 2017 05:06) (18 - 22)  SpO2: 93% (24 Nov 2017 05:06) (93% - 99%)    General: restless, SOB, increased secretions  HEENT: EOM intact, visual fields full, PERRLA  Abdomen: Soft, mild tenderness at PEG site, no erythema, nondistended   Extremities: No edema    NEUROLOGICAL EXAM:  Mental status: Awake, alert, oriented x3, fluent speech, no neglect, able to follow commands  Cranial Nerves: left facial droop, EOMI, VFF, no nystagmus, dysarthria, slight palatal deviation to the left, recurrent hiccups, hearing intact to  bilateral finger rub   Motor exam: Normal tone, no drift, RUE 5/5, RLE 5/5, drift LUE 4/5, drift LLE 5-/5.  Sensation: decreased temperature sensation on left arm  Coordination/ Gait: LUE moderate-severe dysmetria  LABS:                        13.2   13.1  )-----------( 297      ( 22 Nov 2017 18:50 )             39.6    11-22    144  |  103  |  45<H>  ----------------------------<  124<H>  4.3   |  23  |  1.61<H>    Ca    9.4      22 Nov 2017 18:50  Phos  3.5     11-22  Mg     2.6     11-22    TPro  7.8  /  Alb  3.2<L>  /  TBili  1.3<H>  /  DBili  x   /  AST  30  /  ALT  17  /  AlkPhos  63  11-22  PT/INR - ( 22 Nov 2017 18:50 )   PT: 18.0 sec;   INR: 1.65 ratio           Hemoglobin A1C, Whole Blood: 5.8 % (11-08 @ 10:36)      IMAGING: Reviewed by me.   CT Chest No Cont (11.22.17) Bilateral lower lobe pneumonia.    Head CT/CTA head/Neck (11/07) Right basal ganglia and corona radiata infarct without significant change since 6/16/2017. There is severe steno-occlusive disease intracranially involving the right supraclinoid internal carotid artery, A1 and M1 branches, and severe narrowing of the left M1 segment of the middle cerebral artery. The distal right vertebral artery is quite small and the proximal basilar artery is not visualized. The dominant left vertebral artery ends at the PICA. Distal basilar flow appears to be due to retrograde flow from the right posterior communicating artery.  Head CT (11/07) No acute intracranial hemorrhage, mass effect, or evidence of acute territorial infarct. Chronic right corona radiata/basal ganglia and right inferior cerebellar hemisphere infarcts.  Brain MRI (11/09) In comparison the prior MRI, there is new hyperintense T2 and FLAIR signal with faint increased restricted diffusion in the right medulla extending to the inferior right brachium pontis and inferior medial right cerebellar hemisphere, new compared to 6/17/2017 consistent with evolving area of  ischemic change without hemorrhagic transformation. Redemonstration of volume loss with multiple additional areas of lacunar infarction including chronic infarcts in the right cerebellar hemisphere and right corona radiata. Decreased visualization of the flow-void within the right vertebral artery with continued irregular isointense T1 signal throughout the basilar artery. Bilateral sinus disease with bubbly secretions and air-fluid levels, greatest in the right maxillary sinus. THE PATIENT WAS SEEN AND EXAMINED BY ME WITH THE HOUSESTAFF AND STROKE TEAM DURING MORNING ROUNDS.   HPI:  58 y/o R-handed [place] man PMH CVA with residual L-sided weakness, CAD s/p CABG, HTN presents as stroke code for dizziness. Pt at baseline ambulates independently and independent in ADLs. Pt was in usual state of health on 11/07 at 9AM. At around 9:30AM, he began to complain of lightheadedness not associated with changes in position. Half an hour later, he began to complain of n/v. He also endorses HA, diplopia/blurry vision, R-sided weakness that began a few days prior to admission.  He reports he ran out of his BP meds a week prior to admission and has been unable to refill his medications      SUBJECTIVE: No events overnight.  No new neurologic complaints.  Complains of Persistent hiccups.    acetaminophen   Tablet. 650 milliGRAM(s) Oral every 6 hours PRN  acetaminophen  Suppository 650 milliGRAM(s) Rectal every 6 hours PRN  ALBUTerol/ipratropium for Nebulization 3 milliLiter(s) Nebulizer every 6 hours  aspirin  chewable 81 milliGRAM(s) Oral daily  atorvastatin 80 milliGRAM(s) Oral at bedtime  baclofen 10 milliGRAM(s) Oral three times a day  doxazosin 2 milliGRAM(s) Oral at bedtime  enoxaparin Injectable 40 milliGRAM(s) SubCutaneous daily  gabapentin   Solution 800 milliGRAM(s) Oral three times a day  guaiFENesin    Syrup 200 milliGRAM(s) Oral every 6 hours PRN  hydrALAZINE Injectable 10 milliGRAM(s) IV Push two times a day PRN  hydrochlorothiazide 12.5 milliGRAM(s) Oral daily  influenza   Vaccine 0.5 milliLiter(s) IntraMuscular once  losartan 100 milliGRAM(s) Oral daily  piperacillin/tazobactam IVPB. 3.375 Gram(s) IV Intermittent every 8 hours  simethicone 80 milliGRAM(s) Chew daily PRN  sodium chloride 0.9%. 1000 milliLiter(s) IV Continuous <Continuous>      PHYSICAL EXAM:   Vital Signs Last 24 Hrs  T(C): 36.7 (24 Nov 2017 05:06), Max: 37.2 (24 Nov 2017 00:14)  T(F): 98 (24 Nov 2017 05:06), Max: 99 (24 Nov 2017 00:14)  HR: 77 (24 Nov 2017 05:06) (74 - 96)  BP: 161/78 (24 Nov 2017 05:06) (117/87 - 161/78)  BP(mean): --  RR: 19 (24 Nov 2017 05:06) (18 - 22)  SpO2: 93% (24 Nov 2017 05:06) (93% - 99%)    General: restless, SOB, increased secretions  HEENT: EOM intact, visual fields full, PERRLA  Abdomen: Soft, mild tenderness at PEG site, no erythema, nondistended   Extremities: No edema    NEUROLOGICAL EXAM:  Mental status: Awake, alert, oriented x3, fluent speech, no neglect, able to follow commands  Cranial Nerves: left facial droop, EOMI, VFF, no nystagmus, dysarthria, slight palatal deviation to the left, recurrent hiccups, hearing intact to  bilateral finger rub   Motor exam: Normal tone, no drift, RUE 5/5, RLE 5/5, drift LUE 4/5, drift LLE 5-/5.  Sensation: decreased temperature sensation on left arm  Coordination/ Gait: LUE moderate-severe dysmetria  LABS:                        13.2   13.1  )-----------( 297      ( 22 Nov 2017 18:50 )             39.6    11-22    144  |  103  |  45<H>  ----------------------------<  124<H>  4.3   |  23  |  1.61<H>    Ca    9.4      22 Nov 2017 18:50  Phos  3.5     11-22  Mg     2.6     11-22    TPro  7.8  /  Alb  3.2<L>  /  TBili  1.3<H>  /  DBili  x   /  AST  30  /  ALT  17  /  AlkPhos  63  11-22  PT/INR - ( 22 Nov 2017 18:50 )   PT: 18.0 sec;   INR: 1.65 ratio           Hemoglobin A1C, Whole Blood: 5.8 % (11-08 @ 10:36)      IMAGING: Reviewed by me.   CT Chest No Cont (11.22.17) Bilateral lower lobe pneumonia.    Head CT/CTA head/Neck (11/07) Right basal ganglia and corona radiata infarct without significant change since 6/16/2017. There is severe steno-occlusive disease intracranially involving the right supraclinoid internal carotid artery, A1 and M1 branches, and severe narrowing of the left M1 segment of the middle cerebral artery. The distal right vertebral artery is quite small and the proximal basilar artery is not visualized. The dominant left vertebral artery ends at the PICA. Distal basilar flow appears to be due to retrograde flow from the right posterior communicating artery.  Head CT (11/07) No acute intracranial hemorrhage, mass effect, or evidence of acute territorial infarct. Chronic right corona radiata/basal ganglia and right inferior cerebellar hemisphere infarcts.  Brain MRI (11/09) In comparison the prior MRI, there is new hyperintense T2 and FLAIR signal with faint increased restricted diffusion in the right medulla extending to the inferior right brachium pontis and inferior medial right cerebellar hemisphere, new compared to 6/17/2017 consistent with evolving area of  ischemic change without hemorrhagic transformation. Redemonstration of volume loss with multiple additional areas of lacunar infarction including chronic infarcts in the right cerebellar hemisphere and right corona radiata. Decreased visualization of the flow-void within the right vertebral artery with continued irregular isointense T1 signal throughout the basilar artery. Bilateral sinus disease with bubbly secretions and air-fluid levels, greatest in the right maxillary sinus.

## 2017-11-24 NOTE — PROGRESS NOTE ADULT - SUBJECTIVE AND OBJECTIVE BOX
Follow-up Pulm Progress Note    No new respiratory events overnight.  Denies SOB/CP. o2 sat 97% on room air. still with thick copious secretions--improved    Medications:  MEDICATIONS  (STANDING):  ALBUTerol/ipratropium for Nebulization 3 milliLiter(s) Nebulizer every 6 hours  aspirin  chewable 81 milliGRAM(s) Oral daily  atorvastatin 80 milliGRAM(s) Oral at bedtime  baclofen 10 milliGRAM(s) Oral three times a day  doxazosin 2 milliGRAM(s) Oral at bedtime  enoxaparin Injectable 40 milliGRAM(s) SubCutaneous daily  gabapentin   Solution 800 milliGRAM(s) Oral three times a day  hydrochlorothiazide 12.5 milliGRAM(s) Oral daily  influenza   Vaccine 0.5 milliLiter(s) IntraMuscular once  losartan 100 milliGRAM(s) Oral daily  piperacillin/tazobactam IVPB. 3.375 Gram(s) IV Intermittent every 8 hours  sodium chloride 0.9%. 1000 milliLiter(s) (100 mL/Hr) IV Continuous <Continuous>    MEDICATIONS  (PRN):  acetaminophen   Tablet. 650 milliGRAM(s) Oral every 6 hours PRN Mild Pain (1 - 3)  acetaminophen  Suppository 650 milliGRAM(s) Rectal every 6 hours PRN For Temp greater than 38.5 C (101.3 F)  guaiFENesin    Syrup 200 milliGRAM(s) Oral every 6 hours PRN Cough  hydrALAZINE Injectable 10 milliGRAM(s) IV Push two times a day PRN BP > 180  simethicone 80 milliGRAM(s) Chew daily PRN bloating          Vital Signs Last 24 Hrs  T(C): 36.6 (2017 08:28), Max: 37.2 (2017 00:14)  T(F): 97.8 (2017 08:28), Max: 99 (2017 00:14)  HR: 74 (2017 08:28) (74 - 96)  BP: 151/85 (2017 08:28) (117/87 - 161/78)  BP(mean): --  RR: 22 (2017 08:28) (18 - 22)  SpO2: 98% (2017 08:28) (93% - 99%)    ABG - ( 2017 18:23 )  pH: 7.43  /  pCO2: 42    /  pO2: 101   / HCO3: 27    / Base Excess: 3.3   /  SaO2: 98                     @ 07:01  -   @ 07:00  --------------------------------------------------------  IN: 1420 mL / OUT: 1050 mL / NET: 370 mL          LABS:                        13.2   13.1  )-----------( 297      ( 2017 18:50 )             39.6         144  |  103  |  45<H>  ----------------------------<  124<H>  4.3   |  23  |  1.61<H>    Ca    9.4      2017 18:50  Phos  3.5       Mg     2.6         TPro  7.8  /  Alb  3.2<L>  /  TBili  1.3<H>  /  DBili  x   /  AST  30  /  ALT  17  /  AlkPhos  63            CAPILLARY BLOOD GLUCOSE      POCT Blood Glucose.: 108 mg/dL (2017 18:15)    PT/INR - ( 2017 18:50 )   PT: 18.0 sec;   INR: 1.65 ratio           Urinalysis Basic - ( 2017 16:03 )    Color: Yellow / Appearance: Turbid / S.022 / pH: x  Gluc: x / Ketone: Negative  / Bili: Negative / Urobili: 1.0 mg/dL   Blood: x / Protein: 100 mg/dL / Nitrite: Positive   Leuk Esterase: Moderate / RBC: 368 /HPF / WBC 92 /HPF   Sq Epi: x / Non Sq Epi: 2 /HPF / Bacteria: TNTC      CULTURE  Culture - Sputum . (17 @ 01:09)    Gram Stain:   Numerous polymorphonuclear leukocytes per low power field  Few Squamous epithelial cells per low power field  Numerous Gram Negative Rods per oil power field  Numerous Gram positive cocci in pairs per oil power field    Specimen Source: .Sputum Sputum    Culture Results:   Moderate Klebsiella pneumoniae    Culture - Urine (17 @ 17:15)    Culture Results:   >100,000 CFU/ml Klebsiella pneumoniae    Organism Identification: Klebsiella pneumoniae    Organism: Klebsiella pneumoniae    Method Type: EMERSON    Specimen Source: .Blood Blood (17 @ 16:57)    Culture - Blood (17 @ 16:57)    Specimen Source: .Blood Blood    Culture Results:   No growth to date.    Culture - Blood (17 @ 16:56)    Specimen Source: .Blood Blood    Culture Results:   No growth to date.                Physical Examination:  PULM: decreased  bilaterally bilat-ronchi, no significant sputum production  CVS: S1, S2 heard    RADIOLOGY REVIEWED  CXR:     CT chest:< from: CT Chest No Cont (17 @ 12:10) >  IMPRESSION: Bilateral lower lobe pneumonia.    < end of copied text >      TTE:

## 2017-11-24 NOTE — PROGRESS NOTE ADULT - SUBJECTIVE AND OBJECTIVE BOX
f/u pneumonia    interval history/ROS:  much less cough. hiccups. diarrhea.  Rest of ROS otherwise negative.    Allergies  No Known Allergies    ANTIMICROBIALS:   piperacillin/tazobactam IVPB. 3.375 every 8 hours [started 11/22-]  vancomycin  IVPB 1000 once [started 11/22-]    MEDICATIONS  (STANDING):  acetaminophen   Tablet. 650 every 6 hours PRN  acetaminophen  Suppository 650 every 6 hours PRN  ALBUTerol/ipratropium for Nebulization 3 every 6 hours  aspirin  chewable 81 daily  atorvastatin 80 at bedtime  baclofen 10 three times a day  chlorproMAZINE    Tablet 25 three times a day  doxazosin 2 at bedtime  enoxaparin Injectable 40 daily  gabapentin   Solution 800 three times a day  guaiFENesin    Syrup 200 every 6 hours PRN  hydrALAZINE Injectable 10 two times a day PRN  hydrochlorothiazide 12.5 daily  influenza   Vaccine 0.5 once  losartan 100 daily  simethicone 80 daily PRN    Vital Signs Last 24 Hrs  T(F): 98.5 (11-24-17 @ 12:25), Max: 99 (11-24-17 @ 00:14)  HR: 88 (11-24-17 @ 12:25)  BP: 158/70 (11-24-17 @ 12:25)  RR: 20 (11-24-17 @ 12:25)  SpO2: 96% (11-24-17 @ 12:25) (93% - 98%)  Wt(kg): --    PHYSICAL EXAM:  General: non-toxic; sitting in chair  HEAD/EYES: anicteric but generally keeps eyes closed  ENT:  supple  Cardiovascular:   S1, S2  Respiratory:  more clear today  GI:  soft, non-tender, normal bowel sounds; PEG okay  :  haro with dark urine  Musculoskeletal:  no synovitis  Neurologic:  L weakness  Skin:  no rash  Lymph: no lymphadenopathy  Psychiatric:  appropriate affect  Vascular:  no phlebitis    WBC Count: 7.23 (11-24-17 @ 07:47)  WBC Count: 13.1 (11-22-17 @ 18:50)  WBC Count: 16.35 (11-22-17 @ 07:50)                        11.1   7.23  )-----------( 305      ( 24 Nov 2017 07:47 )             33.9 11-24    147  |  108  |  22  ----------------------------<  97  3.4   |  27  |  1.20  Ca    8.6      24 Nov 2017 07:41Phos  3.5     11-22Mg     2.6     11-22  TPro  7.8  /  Alb  3.2  /  TBili  1.3  /  DBili  x   /  AST  30  /  ALT  17  /  AlkPhos  63  11-22    MICROBIOLOGY:  Culture - Sputum . (11.23.17 @ 01:09)  Moderate Klebsiella pneumoniae    Culture - Sputum . (11.23.17 @ 01:09)    Gram Stain:   Numerous polymorphonuclear leukocytes per low power field  Few Squamous epithelial cells per low power field  Numerous Gram Negative Rods per oil power field  Numerous Gram positive cocci in pairs per oil power field    Specimen Source: .Sputum Sputum    Culture Results:   >100,000 CFU/ml Klebsiella pneumoniae (11.21.17 @ 17:15)    Culture - Blood (11.21.17 @ 16:57)    Specimen Source: .Blood Blood    Culture Results:   No growth to date.    .Urine Catheterized  11-10-17   No growth  --  --    .Sputum Sputum  11-10-17   Streptococcus pneumoniae  Normal Respiratory Bryanna present  --  Streptococcus pneumoniae    .Blood Blood-Peripheral  11-10-17   No growth at 5 days.  --  --    .Urine Clean Catch (Midstream)  11-08-17   No growth  --  --    RADIOLOGY:  CT Chest No Cont (11.22.17 @ 12:10)   IMPRESSION: Bilateral lower lobe pneumonia.    Xray Abdomen Series Flat/Upright 2 View (11.21.17 @ 19:23)   IMPRESSION: PEG tube in left upper quadrant. Nonobstructive bowel gas pattern.    Chest 1 View AP -PORTABLE-Routine (11.21.17 @ 10:38)   IMPRESSION:  No acute process.    MR Head No Cont (11.09.17 @ 10:49)  IMPRESSION:   In comparison the prior MRI, there is new hyperintense T2 and FLAIR signal with faint increased restricted diffusion in the right medulla extending to the inferior right brachium pontis and inferior medial right cerebellar hemisphere, new compared to 6/17/2017 consistent with evolving area of ischemic change without hemorrhagic transformation.  Redemonstration of volume loss with multiple additional areas of lacunar infarction including chronic infarcts in the right cerebellar hemisphere and right corona radiata. Decreased visualization of the flow-void within the right vertebral artery with continued irregular isointense T1 signal throughout the basilar artery. Bilateral sinus disease with bubbly secretions and air-fluid levels, greatest in the right maxillary sinus. Correlate clinically for symptoms of acute sinusitis.

## 2017-11-25 LAB
ALBUMIN SERPL ELPH-MCNC: 2.4 G/DL — LOW (ref 3.3–5)
ALP SERPL-CCNC: 62 U/L — SIGNIFICANT CHANGE UP (ref 40–120)
ALT FLD-CCNC: 46 U/L — HIGH (ref 10–45)
ANION GAP SERPL CALC-SCNC: 12 MMOL/L — SIGNIFICANT CHANGE UP (ref 5–17)
AST SERPL-CCNC: 62 U/L — HIGH (ref 10–40)
BILIRUB SERPL-MCNC: 0.5 MG/DL — SIGNIFICANT CHANGE UP (ref 0.2–1.2)
BUN SERPL-MCNC: 17 MG/DL — SIGNIFICANT CHANGE UP (ref 7–23)
C DIFF GDH STL QL: NEGATIVE — SIGNIFICANT CHANGE UP
C DIFF GDH STL QL: SIGNIFICANT CHANGE UP
CALCIUM SERPL-MCNC: 8.7 MG/DL — SIGNIFICANT CHANGE UP (ref 8.4–10.5)
CHLORIDE SERPL-SCNC: 106 MMOL/L — SIGNIFICANT CHANGE UP (ref 96–108)
CO2 SERPL-SCNC: 25 MMOL/L — SIGNIFICANT CHANGE UP (ref 22–31)
CREAT SERPL-MCNC: 1.2 MG/DL — SIGNIFICANT CHANGE UP (ref 0.5–1.3)
GLUCOSE SERPL-MCNC: 115 MG/DL — HIGH (ref 70–99)
HCT VFR BLD CALC: 35.8 % — LOW (ref 39–50)
HGB BLD-MCNC: 11.5 G/DL — LOW (ref 13–17)
MCHC RBC-ENTMCNC: 25.1 PG — LOW (ref 27–34)
MCHC RBC-ENTMCNC: 32.1 GM/DL — SIGNIFICANT CHANGE UP (ref 32–36)
MCV RBC AUTO: 78.2 FL — LOW (ref 80–100)
PLATELET # BLD AUTO: 284 K/UL — SIGNIFICANT CHANGE UP (ref 150–400)
POTASSIUM SERPL-MCNC: 3.2 MMOL/L — LOW (ref 3.5–5.3)
POTASSIUM SERPL-SCNC: 3.2 MMOL/L — LOW (ref 3.5–5.3)
PROT SERPL-MCNC: 6.8 G/DL — SIGNIFICANT CHANGE UP (ref 6–8.3)
RBC # BLD: 4.58 M/UL — SIGNIFICANT CHANGE UP (ref 4.2–5.8)
RBC # FLD: 15.9 % — HIGH (ref 10.3–14.5)
SODIUM SERPL-SCNC: 143 MMOL/L — SIGNIFICANT CHANGE UP (ref 135–145)
WBC # BLD: 5.23 K/UL — SIGNIFICANT CHANGE UP (ref 3.8–10.5)
WBC # FLD AUTO: 5.23 K/UL — SIGNIFICANT CHANGE UP (ref 3.8–10.5)

## 2017-11-25 PROCEDURE — 99233 SBSQ HOSP IP/OBS HIGH 50: CPT

## 2017-11-25 PROCEDURE — 93010 ELECTROCARDIOGRAM REPORT: CPT

## 2017-11-25 PROCEDURE — 99232 SBSQ HOSP IP/OBS MODERATE 35: CPT

## 2017-11-25 RX ORDER — POTASSIUM CHLORIDE 20 MEQ
20 PACKET (EA) ORAL
Qty: 0 | Refills: 0 | Status: COMPLETED | OUTPATIENT
Start: 2017-11-25 | End: 2017-11-26

## 2017-11-25 RX ORDER — CLOPIDOGREL BISULFATE 75 MG/1
75 TABLET, FILM COATED ORAL DAILY
Qty: 0 | Refills: 0 | Status: DISCONTINUED | OUTPATIENT
Start: 2017-11-25 | End: 2017-12-11

## 2017-11-25 RX ADMIN — Medication 2 MILLIGRAM(S): at 23:25

## 2017-11-25 RX ADMIN — PIPERACILLIN AND TAZOBACTAM 25 GRAM(S): 4; .5 INJECTION, POWDER, LYOPHILIZED, FOR SOLUTION INTRAVENOUS at 02:00

## 2017-11-25 RX ADMIN — Medication 3 MILLILITER(S): at 23:26

## 2017-11-25 RX ADMIN — SODIUM CHLORIDE 125 MILLILITER(S): 9 INJECTION INTRAMUSCULAR; INTRAVENOUS; SUBCUTANEOUS at 08:40

## 2017-11-25 RX ADMIN — LOSARTAN POTASSIUM 100 MILLIGRAM(S): 100 TABLET, FILM COATED ORAL at 05:38

## 2017-11-25 RX ADMIN — Medication 10 MILLIGRAM(S): at 14:15

## 2017-11-25 RX ADMIN — Medication 10 MILLIGRAM(S): at 23:25

## 2017-11-25 RX ADMIN — Medication 10 MILLIGRAM(S): at 05:38

## 2017-11-25 RX ADMIN — GABAPENTIN 800 MILLIGRAM(S): 400 CAPSULE ORAL at 17:32

## 2017-11-25 RX ADMIN — Medication 12.5 MILLIGRAM(S): at 05:38

## 2017-11-25 RX ADMIN — ATORVASTATIN CALCIUM 80 MILLIGRAM(S): 80 TABLET, FILM COATED ORAL at 23:25

## 2017-11-25 RX ADMIN — GABAPENTIN 800 MILLIGRAM(S): 400 CAPSULE ORAL at 23:26

## 2017-11-25 RX ADMIN — Medication 25 MILLIGRAM(S): at 23:25

## 2017-11-25 RX ADMIN — Medication 3 MILLILITER(S): at 05:38

## 2017-11-25 RX ADMIN — Medication 25 MILLIGRAM(S): at 05:37

## 2017-11-25 RX ADMIN — GABAPENTIN 800 MILLIGRAM(S): 400 CAPSULE ORAL at 05:37

## 2017-11-25 RX ADMIN — Medication 81 MILLIGRAM(S): at 14:16

## 2017-11-25 RX ADMIN — PIPERACILLIN AND TAZOBACTAM 25 GRAM(S): 4; .5 INJECTION, POWDER, LYOPHILIZED, FOR SOLUTION INTRAVENOUS at 17:30

## 2017-11-25 RX ADMIN — Medication 25 MILLIGRAM(S): at 14:15

## 2017-11-25 RX ADMIN — ENOXAPARIN SODIUM 40 MILLIGRAM(S): 100 INJECTION SUBCUTANEOUS at 14:16

## 2017-11-25 RX ADMIN — PIPERACILLIN AND TAZOBACTAM 25 GRAM(S): 4; .5 INJECTION, POWDER, LYOPHILIZED, FOR SOLUTION INTRAVENOUS at 11:00

## 2017-11-25 RX ADMIN — Medication 3 MILLILITER(S): at 17:29

## 2017-11-25 RX ADMIN — Medication 20 MILLIEQUIVALENT(S): at 17:29

## 2017-11-25 RX ADMIN — CLOPIDOGREL BISULFATE 75 MILLIGRAM(S): 75 TABLET, FILM COATED ORAL at 17:29

## 2017-11-25 RX ADMIN — Medication 3 MILLILITER(S): at 12:15

## 2017-11-25 NOTE — PROGRESS NOTE ADULT - SUBJECTIVE AND OBJECTIVE BOX
f/u pneumonia    interval history/ROS:  less cough. hiccups resolved. diarrhea.  Rest of ROS otherwise negative.    Allergies  No Known Allergies    ANTIMICROBIALS:   piperacillin/tazobactam IVPB. 3.375 every 8 hours [started 11/22-]  vancomycin  IVPB 1000 once 11/22    MEDICATIONS  (STANDING):  acetaminophen   Tablet. 650 every 6 hours PRN  acetaminophen  Suppository 650 every 6 hours PRN  ALBUTerol/ipratropium for Nebulization 3 every 6 hours  aspirin  chewable 81 daily  atorvastatin 80 at bedtime  baclofen 10 three times a day  chlorproMAZINE    Tablet 25 three times a day  doxazosin 2 at bedtime  enoxaparin Injectable 40 daily  gabapentin   Solution 800 three times a day  guaiFENesin    Syrup 200 every 6 hours PRN  hydrALAZINE Injectable 10 two times a day PRN  hydrochlorothiazide 12.5 daily  influenza   Vaccine 0.5 once  losartan 100 daily  simethicone 80 daily PRN    Vital Signs Last 24 Hrs  T(F): 97.6 (11-25-17 @ 07:08), Max: 98.6 (11-25-17 @ 06:00)  HR: 70 (11-25-17 @ 07:08)  BP: 169/85 (11-25-17 @ 07:08)  RR: 18 (11-25-17 @ 07:08)  SpO2: 95% (11-25-17 @ 07:08) (95% - 100%)  Wt(kg): --    PHYSICAL EXAM:  General: non-toxic; sitting in chair  HEAD/EYES: anicteric but generally keeps eyes closed  ENT:  supple  Cardiovascular:   S1, S2  Respiratory:  more clear today  GI:  soft, non-tender, normal bowel sounds; PEG okay  :  haro  Musculoskeletal:  no synovitis  Neurologic:  L weakness  Skin:  no rash  Lymph: no lymphadenopathy  Psychiatric:  appropriate affect  Vascular:  no phlebitis    WBC Count: 5.23 (11-25-17 @ 08:42)  WBC Count: 7.23 (11-24-17 @ 07:47)  WBC Count: 13.1 (11-22-17 @ 18:50)  WBC Count: 16.35 (11-22-17 @ 07:50)    MICROBIOLOGY:  Culture - Sputum . (11.23.17 @ 01:09)  Moderate Klebsiella pneumoniae    Culture - Sputum . (11.23.17 @ 01:09)    Gram Stain:   Numerous polymorphonuclear leukocytes per low power field  Few Squamous epithelial cells per low power field  Numerous Gram Negative Rods per oil power field  Numerous Gram positive cocci in pairs per oil power field    Specimen Source: .Sputum Sputum    Culture Results:   >100,000 CFU/ml Klebsiella pneumoniae (11.21.17 @ 17:15)    Culture - Blood (11.21.17 @ 16:57)    Specimen Source: .Blood Blood    Culture Results:   No growth to date.    .Urine Catheterized  11-10-17   No growth  --  --    .Sputum Sputum  11-10-17   Streptococcus pneumoniae  Normal Respiratory Bryanna present  --  Streptococcus pneumoniae    .Blood Blood-Peripheral  11-10-17   No growth at 5 days.  --  --    .Urine Clean Catch (Midstream)  11-08-17   No growth  --  --    RADIOLOGY:  CT Chest No Cont (11.22.17 @ 12:10)   IMPRESSION: Bilateral lower lobe pneumonia.    Xray Abdomen Series Flat/Upright 2 View (11.21.17 @ 19:23)   IMPRESSION: PEG tube in left upper quadrant. Nonobstructive bowel gas pattern.    Chest 1 View AP -PORTABLE-Routine (11.21.17 @ 10:38)   IMPRESSION:  No acute process.    MR Head No Cont (11.09.17 @ 10:49)  IMPRESSION:   In comparison the prior MRI, there is new hyperintense T2 and FLAIR signal with faint increased restricted diffusion in the right medulla extending to the inferior right brachium pontis and inferior medial right cerebellar hemisphere, new compared to 6/17/2017 consistent with evolving area of ischemic change without hemorrhagic transformation.  Redemonstration of volume loss with multiple additional areas of lacunar infarction including chronic infarcts in the right cerebellar hemisphere and right corona radiata. Decreased visualization of the flow-void within the right vertebral artery with continued irregular isointense T1 signal throughout the basilar artery. Bilateral sinus disease with bubbly secretions and air-fluid levels, greatest in the right maxillary sinus. Correlate clinically for symptoms of acute sinusitis.

## 2017-11-25 NOTE — PROGRESS NOTE ADULT - SUBJECTIVE AND OBJECTIVE BOX
THE PATIENT WAS SEEN AND EXAMINED BY ME WITH THE HOUSESTAFF AND STROKE TEAM DURING MORNING ROUNDS.   HPI:  58 y/o R-handed [place] man PMH CVA with residual L-sided weakness, CAD s/p CABG, HTN presents as stroke code for dizziness. Pt at baseline ambulates independently and independent in ADLs. Pt was in usual state of health on 11/07 at 9AM. At around 9:30AM, he began to complain of lightheadedness not associated with changes in position. Half an hour later, he began to complain of n/v. He also endorses HA, diplopia/blurry vision, R-sided weakness that began a few days prior to admission.  He reports he ran out of his BP meds a week prior to admission and has been unable to refill his medications          SUBJECTIVE: No events overnight.  No new neurologic complaints.      acetaminophen   Tablet. 650 milliGRAM(s) Oral every 6 hours PRN  acetaminophen  Suppository 650 milliGRAM(s) Rectal every 6 hours PRN  ALBUTerol/ipratropium for Nebulization 3 milliLiter(s) Nebulizer every 6 hours  aspirin  chewable 81 milliGRAM(s) Oral daily  atorvastatin 80 milliGRAM(s) Oral at bedtime  baclofen 10 milliGRAM(s) Oral three times a day  chlorproMAZINE    Tablet 25 milliGRAM(s) Oral three times a day  clopidogrel Tablet 75 milliGRAM(s) Oral daily  doxazosin 2 milliGRAM(s) Oral at bedtime  enoxaparin Injectable 40 milliGRAM(s) SubCutaneous daily  gabapentin   Solution 800 milliGRAM(s) Oral three times a day  guaiFENesin    Syrup 200 milliGRAM(s) Oral every 6 hours PRN  hydrALAZINE Injectable 10 milliGRAM(s) IV Push two times a day PRN  hydrochlorothiazide 12.5 milliGRAM(s) Oral daily  influenza   Vaccine 0.5 milliLiter(s) IntraMuscular once  losartan 100 milliGRAM(s) Oral daily  piperacillin/tazobactam IVPB. 3.375 Gram(s) IV Intermittent every 8 hours  simethicone 80 milliGRAM(s) Chew daily PRN  sodium chloride 0.9%. 1000 milliLiter(s) IV Continuous <Continuous>      PHYSICAL EXAM:   Vital Signs Last 24 Hrs  T(C): 36.9 (25 Nov 2017 12:10), Max: 37 (25 Nov 2017 06:00)  T(F): 98.4 (25 Nov 2017 12:10), Max: 98.6 (25 Nov 2017 06:00)  HR: 57 (25 Nov 2017 12:10) (57 - 82)  BP: 177/89 (25 Nov 2017 12:10) (145/84 - 177/89)  BP(mean): --  RR: 18 (25 Nov 2017 12:10) (18 - 18)  SpO2: 94% (25 Nov 2017 12:10) (94% - 100%)    General: restless, SOB, increased secretions  HEENT: EOM intact, visual fields full, PERRLA  Abdomen: Soft, mild tenderness at PEG site, no erythema, nondistended   Extremities: No edema    NEUROLOGICAL EXAM:  Mental status: Awake, alert, oriented x3, fluent speech, no neglect, able to follow commands  Cranial Nerves: left facial droop, EOMI, VFF, no nystagmus, hoarseness, dysarthria, slight palatal deviation to the left, recurrent hiccups, hearing intact to  bilateral finger rub   Motor exam: Normal tone, no drift, RUE 5/5, RLE 5/5, drift LUE 4/5, drift LLE 5-/5.  Sensation: decreased sensation to pinprick on left arm  Coordination/ Gait: LUE moderate-severe dysmetria      LABS:                        11.5   5.23  )-----------( 284      ( 25 Nov 2017 08:42 )             35.8    11-25    143  |  106  |  17  ----------------------------<  115<H>  3.2<L>   |  25  |  1.20    Ca    8.7      25 Nov 2017 08:20    TPro  6.8  /  Alb  2.4<L>  /  TBili  0.5  /  DBili  x   /  AST  62<H>  /  ALT  46<H>  /  AlkPhos  62  11-25    Hemoglobin A1C, Whole Blood: 5.8 % (11-08 @ 10:36)      IMAGING: Reviewed by me.   CT Chest No Cont (11.22.17) Bilateral lower lobe pneumonia.    Head CT/CTA head/Neck (11/07) Right basal ganglia and corona radiata infarct without significant change since 6/16/2017. There is severe steno-occlusive disease intracranially involving the right supraclinoid internal carotid artery, A1 and M1 branches, and severe narrowing of the left M1 segment of the middle cerebral artery. The distal right vertebral artery is quite small and the proximal basilar artery is not visualized. The dominant left vertebral artery ends at the PICA. Distal basilar flow appears to be due to retrograde flow from the right posterior communicating artery.  Head CT (11/07) No acute intracranial hemorrhage, mass effect, or evidence of acute territorial infarct. Chronic right corona radiata/basal ganglia and right inferior cerebellar hemisphere infarcts.  Brain MRI (11/09) In comparison the prior MRI, there is new hyperintense T2 and FLAIR signal with faint increased restricted diffusion in the right medulla extending to the inferior right brachium pontis and inferior medial right cerebellar hemisphere, new compared to 6/17/2017 consistent with evolving area of  ischemic change without hemorrhagic transformation. Redemonstration of volume loss with multiple additional areas of lacunar infarction including chronic infarcts in the right cerebellar hemisphere and right corona radiata. Decreased visualization of the flow-void within the right vertebral artery with continued irregular isointense T1 signal throughout the basilar artery. Bilateral sinus disease with bubbly secretions and air-fluid levels, greatest in the right maxillary sinus. THE PATIENT WAS SEEN AND EXAMINED BY ME WITH THE HOUSESTAFF AND STROKE TEAM DURING MORNING ROUNDS.   HPI:  56 y/o R-handed [place] man PMH CVA with residual L-sided weakness, CAD s/p CABG, HTN presents as stroke code for dizziness. Pt at baseline ambulates independently and independent in ADLs. Pt was in usual state of health on 11/07 at 9AM. At around 9:30AM, he began to complain of lightheadedness not associated with changes in position. Half an hour later, he began to complain of n/v. He also endorses HA, diplopia/blurry vision, R-sided weakness that began a few days prior to admission.  He reports he ran out of his BP meds a week prior to admission and has been unable to refill his medications          SUBJECTIVE: No events overnight.  No new neurologic complaints.  States His Hiccups Have Improved.    acetaminophen   Tablet. 650 milliGRAM(s) Oral every 6 hours PRN  acetaminophen  Suppository 650 milliGRAM(s) Rectal every 6 hours PRN  ALBUTerol/ipratropium for Nebulization 3 milliLiter(s) Nebulizer every 6 hours  aspirin  chewable 81 milliGRAM(s) Oral daily  atorvastatin 80 milliGRAM(s) Oral at bedtime  baclofen 10 milliGRAM(s) Oral three times a day  chlorproMAZINE    Tablet 25 milliGRAM(s) Oral three times a day  clopidogrel Tablet 75 milliGRAM(s) Oral daily  doxazosin 2 milliGRAM(s) Oral at bedtime  enoxaparin Injectable 40 milliGRAM(s) SubCutaneous daily  gabapentin   Solution 800 milliGRAM(s) Oral three times a day  guaiFENesin    Syrup 200 milliGRAM(s) Oral every 6 hours PRN  hydrALAZINE Injectable 10 milliGRAM(s) IV Push two times a day PRN  hydrochlorothiazide 12.5 milliGRAM(s) Oral daily  influenza   Vaccine 0.5 milliLiter(s) IntraMuscular once  losartan 100 milliGRAM(s) Oral daily  piperacillin/tazobactam IVPB. 3.375 Gram(s) IV Intermittent every 8 hours  simethicone 80 milliGRAM(s) Chew daily PRN  sodium chloride 0.9%. 1000 milliLiter(s) IV Continuous <Continuous>      PHYSICAL EXAM:   Vital Signs Last 24 Hrs  T(C): 36.9 (25 Nov 2017 12:10), Max: 37 (25 Nov 2017 06:00)  T(F): 98.4 (25 Nov 2017 12:10), Max: 98.6 (25 Nov 2017 06:00)  HR: 57 (25 Nov 2017 12:10) (57 - 82)  BP: 177/89 (25 Nov 2017 12:10) (145/84 - 177/89)  BP(mean): --  RR: 18 (25 Nov 2017 12:10) (18 - 18)  SpO2: 94% (25 Nov 2017 12:10) (94% - 100%)    General: restless, SOB, increased secretions  HEENT: EOM intact, visual fields full, PERRLA  Abdomen: Soft, mild tenderness at PEG site, no erythema, nondistended   Extremities: No edema    NEUROLOGICAL EXAM:  Mental status: Awake, alert, oriented x3, fluent speech, no neglect, able to follow commands  Cranial Nerves: left facial droop, EOMI, VFF, no nystagmus, hoarseness, dysarthria, slight palatal deviation to the left, no hiccups, hearing intact to  bilateral finger rub   Motor exam: Normal tone, no drift, RUE 5/5, RLE 5/5, drift LUE 4/5, drift LLE 5-/5.  Sensation: decreased sensation to pinprick on left arm  Coordination/ Gait: LUE moderate-severe dysmetria      LABS:                        11.5   5.23  )-----------( 284      ( 25 Nov 2017 08:42 )             35.8    11-25    143  |  106  |  17  ----------------------------<  115<H>  3.2<L>   |  25  |  1.20    Ca    8.7      25 Nov 2017 08:20    TPro  6.8  /  Alb  2.4<L>  /  TBili  0.5  /  DBili  x   /  AST  62<H>  /  ALT  46<H>  /  AlkPhos  62  11-25    Hemoglobin A1C, Whole Blood: 5.8 % (11-08 @ 10:36)      IMAGING: Reviewed by me.   CT Chest No Cont (11.22.17) Bilateral lower lobe pneumonia.    Head CT/CTA head/Neck (11/07) Right basal ganglia and corona radiata infarct without significant change since 6/16/2017. There is severe steno-occlusive disease intracranially involving the right supraclinoid internal carotid artery, A1 and M1 branches, and severe narrowing of the left M1 segment of the middle cerebral artery. The distal right vertebral artery is quite small and the proximal basilar artery is not visualized. The dominant left vertebral artery ends at the PICA. Distal basilar flow appears to be due to retrograde flow from the right posterior communicating artery.  Head CT (11/07) No acute intracranial hemorrhage, mass effect, or evidence of acute territorial infarct. Chronic right corona radiata/basal ganglia and right inferior cerebellar hemisphere infarcts.  Brain MRI (11/09) In comparison the prior MRI, there is new hyperintense T2 and FLAIR signal with faint increased restricted diffusion in the right medulla extending to the inferior right brachium pontis and inferior medial right cerebellar hemisphere, new compared to 6/17/2017 consistent with evolving area of  ischemic change without hemorrhagic transformation. Redemonstration of volume loss with multiple additional areas of lacunar infarction including chronic infarcts in the right cerebellar hemisphere and right corona radiata. Decreased visualization of the flow-void within the right vertebral artery with continued irregular isointense T1 signal throughout the basilar artery. Bilateral sinus disease with bubbly secretions and air-fluid levels, greatest in the right maxillary sinus.

## 2017-11-25 NOTE — PROGRESS NOTE ADULT - ASSESSMENT
57M CAD s/p CABG, HTN, hx CVA with L weakness. Admitted with new right medullary stroke.  Course complicated by urinary retention and now acute aspiration causing fever, leukocytosis and purulent sputum.  CT confirms pneumonia.  Sputum with klebsiella. UC also with kpna (colonizer).     - zosyn okay [day #4 today] hope to stop early next week    Please call the ID service [phone] with questions or concerns over the weekend

## 2017-11-25 NOTE — PROGRESS NOTE ADULT - ASSESSMENT
ASSESSMENT: 57M with HTN, HLD, CAD/CABG, past stroke with residual left hemiparesis, presents with transient right hemiparesis and diplopia, and continuous headache and N/V. CT head shows old right basal ganglia lacune, and CTA shows severe bilateral MCA stenosis, proximal BA occlusion and distal stenosis. Brain MRI show right lateral  medullary and right cerebellar ischemic infarcts. Impression: Severe intracranial atherosclerotic disease and occlusion of left VA, with a small distal right vertebral artery,  causing right  lateral medullary and right cerebellar ischemic infarcts, i.e. perhaps due to large artery intracranial atherosclerosis.    NEURO: neurologically without acute change, RRT called for respiratory distress and hypotension on 11/22, , permissive HTN with slow titration to normotensive, ASA/plavix  and statin  for secondary stroke prevention,  titrate statin to LDL goal less than 70, pt still with intractable hiccups- will continue gabapentin 800mg TID nathan start tappering med iin 2 days Monday 11/27as hiccups improved, , D/C baclofen, and will start Thorazine 25mg TID, Physical therapy/OT eval with recommendations for [facility], pt uninsured and pending re-instatement of medicaid, will provide daily PT/OT to optimize treatment.     ANTITHROMBOTIC THERAPY:  ASA  for secondary stroke prevention, and will start Plavix     PULMONARY:  Chest CT (11/22) bilateral lower lobe pna,  continue Zosyn , sputum culture: numerous gram positive cooci, and negative rods, appreciate pulmonary and ID consult, protecting airway, 1Liter NC, encourage Incentive spirometer, monitor sats give oxygen as needed    CARDIOVASCULAR: TTE: EF 41%, mitral annular calcification, mild-moderate MR, global LV systolic dysfunction, mild stage 1diastolic dysfunction mild TR,  cardiac monitoring  no events, cardio consult appreciated: add metoprolol as tolerated                 GASTROINTESTINAL: s/p PEG placement (11/14), patient suctioning up feedings on 11/22, as per RN purulent discharge at PEG site, GI made aware and doubts infection, per GI suggestion will obtain US abdomen to r/o abscess, feeds on hold as per GI recommendations  Continue to monitor. diarrhea X 2 on 11/24, stool sample to r/o C.diff.  Abd x ray negative for ileus.       Diet: PEG/NPO except medications    RENAL: BUN/Cr without acute change but elevated on previous lab-, will continue to provide hydration, good urine output, renal US: No hydronephrosis, haro reinserted for urinary retention. Continue Cardura for BPH, and continue with Haro catheter until patient's status has improved and he is more ambulatory, small amounts of clots noted in urine on 11/22, as per urology this is likely due to plavix, hypernatremia, will increase IVF to 125ml while NPO,  hypokalemia: will replete potassium  Na Goal: Greater than 135     Haro: re-inserted 11/17 for retention    HEMATOLOGY: H/H Platelets without acute changes, no s/s of bleeding     DVT ppx: LMWH    ID: febrile 11/22 tmax 37.9,  leukocytosis resolved, blood cx; NGTD, urine and sputum cultures: klebsiella pneumoniae, pt with diarrhea on 11/24, ID made aware,  as per ID eval continue with zosyn for aspiration pna/UTI,     DISPOSITION: D/C to [facility] as per PT/OT eval once stable and workup is complete, Pt uninsured, SW aware, medicaid re-instatement pending. Daily PT/OT therapy to optimize therapy and referral to [facility] for possible suman.      CORE MEASURES:        Admission NIHSS: 3     TPA:  NO      LDL/HDL: 171/57     Depression Screen: 0     Statin Therapy: Y     Dysphagia Screen: FAIL     Smoking  NO      Afib NO     Stroke Education YES ASSESSMENT: 57M with HTN, HLD, CAD/CABG, past stroke with residual left hemiparesis, presents with transient right hemiparesis and diplopia, and continuous headache and N/V. CT head shows old right basal ganglia lacune, and CTA shows severe bilateral MCA stenosis, proximal BA occlusion and distal stenosis. Brain MRI show right lateral  medullary and right cerebellar ischemic infarcts. Impression: Severe intracranial atherosclerotic disease and occlusion of left VA, with a small distal right vertebral artery,  causing right  lateral medullary and right cerebellar ischemic infarcts, i.e. perhaps due to large artery intracranial atherosclerosis.    NEURO: neurologically without acute change, RRT called for respiratory distress and hypotension on 11/22, , permissive HTN with slow titration to normotensive, ASA/plavix  and statin  for secondary stroke prevention,  titrate statin to LDL goal less than 70,  hiccups -will continue gabapentin 800mg TID, Thorazine 25 mg t.i.d., then may start tapering med in 2 days Monday 11/27 as hiccups have  improved, , D/C'd  baclofen, Physical therapy/OT eval with recommendations for [facility], pt uninsured and pending re-instatement of medicaid, will provide daily PT/OT to optimize treatment.     ANTITHROMBOTIC THERAPY:  ASA  for secondary stroke prevention, and will restart Plavix     PULMONARY:  Chest CT (11/22) bilateral lower lobe pna,  continue Zosyn , sputum culture: numerous gram positive cooci, and negative rods, appreciate pulmonary and ID consult, protecting airway, 1Liter NC, encourage Incentive spirometer, monitor sats give oxygen as needed    CARDIOVASCULAR: TTE: EF 41%, mitral annular calcification, mild-moderate MR, global LV systolic dysfunction, mild stage 1diastolic dysfunction mild TR,  cardiac monitoring  no events, cardio consult appreciated: add metoprolol as tolerated                 GASTROINTESTINAL: s/p PEG placement (11/14), patient suctioning up feedings on 11/22, as per RN purulent discharge at PEG site, GI made aware and doubts infection, per GI suggestion will obtain US abdomen to r/o abscess, feeds on hold as per GI recommendations  Continue to monitor. diarrhea X 2 on 11/24, stool sample to r/o C.diff.  Abd x ray negative for ileus.       Diet: PEG/NPO except medications    RENAL: BUN/Cr without acute change but elevated on previous lab-, will continue to provide hydration, good urine output, renal US: No hydronephrosis, haro reinserted for urinary retention. Continue Cardura for BPH, and continue with Haro catheter until patient's status has improved and he is more ambulatory, small amounts of clots noted in urine on 11/22, as per urology this is likely due to plavix, hypernatremia, will increase IVF to 125ml while NPO,  hypokalemia: will replete potassium  Na Goal: Greater than 135     Haro: re-inserted 11/17 for retention    HEMATOLOGY: H/H Platelets without acute changes, no s/s of bleeding     DVT ppx: LMWH    ID: febrile 11/22 tmax 37.9,  leukocytosis resolved, blood cx; NGTD, urine and sputum cultures: klebsiella pneumoniae, pt with diarrhea on 11/24, ID made aware,  as per ID eval continue with zosyn for aspiration pna/UTI,     DISPOSITION: D/C to [facility] as per PT/OT eval once stable and workup is complete, Pt uninsured, SW aware, medicaid re-instatement pending. Daily PT/OT therapy to optimize therapy and referral to [facility] for possible suman.      CORE MEASURES:        Admission NIHSS: 3     TPA:  NO      LDL/HDL: 171/57     Depression Screen: 0     Statin Therapy: Y     Dysphagia Screen: FAIL     Smoking  NO      Afib NO     Stroke Education YES

## 2017-11-26 LAB
ANION GAP SERPL CALC-SCNC: 12 MMOL/L — SIGNIFICANT CHANGE UP (ref 5–17)
BUN SERPL-MCNC: 13 MG/DL — SIGNIFICANT CHANGE UP (ref 7–23)
CALCIUM SERPL-MCNC: 8.8 MG/DL — SIGNIFICANT CHANGE UP (ref 8.4–10.5)
CHLORIDE SERPL-SCNC: 103 MMOL/L — SIGNIFICANT CHANGE UP (ref 96–108)
CO2 SERPL-SCNC: 26 MMOL/L — SIGNIFICANT CHANGE UP (ref 22–31)
CREAT SERPL-MCNC: 1.06 MG/DL — SIGNIFICANT CHANGE UP (ref 0.5–1.3)
CULTURE RESULTS: SIGNIFICANT CHANGE UP
CULTURE RESULTS: SIGNIFICANT CHANGE UP
GLUCOSE SERPL-MCNC: 109 MG/DL — HIGH (ref 70–99)
HCT VFR BLD CALC: 34 % — LOW (ref 39–50)
HGB BLD-MCNC: 11.2 G/DL — LOW (ref 13–17)
MCHC RBC-ENTMCNC: 25.2 PG — LOW (ref 27–34)
MCHC RBC-ENTMCNC: 32.9 GM/DL — SIGNIFICANT CHANGE UP (ref 32–36)
MCV RBC AUTO: 76.6 FL — LOW (ref 80–100)
PLATELET # BLD AUTO: 260 K/UL — SIGNIFICANT CHANGE UP (ref 150–400)
POTASSIUM SERPL-MCNC: 3.2 MMOL/L — LOW (ref 3.5–5.3)
POTASSIUM SERPL-SCNC: 3.2 MMOL/L — LOW (ref 3.5–5.3)
RBC # BLD: 4.44 M/UL — SIGNIFICANT CHANGE UP (ref 4.2–5.8)
RBC # FLD: 16.2 % — HIGH (ref 10.3–14.5)
SODIUM SERPL-SCNC: 141 MMOL/L — SIGNIFICANT CHANGE UP (ref 135–145)
SPECIMEN SOURCE: SIGNIFICANT CHANGE UP
SPECIMEN SOURCE: SIGNIFICANT CHANGE UP
WBC # BLD: 4.58 K/UL — SIGNIFICANT CHANGE UP (ref 3.8–10.5)
WBC # FLD AUTO: 4.58 K/UL — SIGNIFICANT CHANGE UP (ref 3.8–10.5)

## 2017-11-26 PROCEDURE — 99233 SBSQ HOSP IP/OBS HIGH 50: CPT

## 2017-11-26 RX ORDER — POTASSIUM CHLORIDE 20 MEQ
20 PACKET (EA) ORAL THREE TIMES A DAY
Qty: 0 | Refills: 0 | Status: COMPLETED | OUTPATIENT
Start: 2017-11-26 | End: 2017-11-27

## 2017-11-26 RX ADMIN — Medication 25 MILLIGRAM(S): at 21:15

## 2017-11-26 RX ADMIN — ATORVASTATIN CALCIUM 80 MILLIGRAM(S): 80 TABLET, FILM COATED ORAL at 21:14

## 2017-11-26 RX ADMIN — Medication 20 MILLIEQUIVALENT(S): at 06:26

## 2017-11-26 RX ADMIN — Medication 81 MILLIGRAM(S): at 12:37

## 2017-11-26 RX ADMIN — PIPERACILLIN AND TAZOBACTAM 25 GRAM(S): 4; .5 INJECTION, POWDER, LYOPHILIZED, FOR SOLUTION INTRAVENOUS at 02:30

## 2017-11-26 RX ADMIN — Medication 10 MILLIGRAM(S): at 21:14

## 2017-11-26 RX ADMIN — PIPERACILLIN AND TAZOBACTAM 25 GRAM(S): 4; .5 INJECTION, POWDER, LYOPHILIZED, FOR SOLUTION INTRAVENOUS at 11:00

## 2017-11-26 RX ADMIN — Medication 25 MILLIGRAM(S): at 06:27

## 2017-11-26 RX ADMIN — LOSARTAN POTASSIUM 100 MILLIGRAM(S): 100 TABLET, FILM COATED ORAL at 06:27

## 2017-11-26 RX ADMIN — CLOPIDOGREL BISULFATE 75 MILLIGRAM(S): 75 TABLET, FILM COATED ORAL at 12:37

## 2017-11-26 RX ADMIN — Medication 20 MILLIEQUIVALENT(S): at 21:14

## 2017-11-26 RX ADMIN — GABAPENTIN 800 MILLIGRAM(S): 400 CAPSULE ORAL at 06:26

## 2017-11-26 RX ADMIN — GABAPENTIN 800 MILLIGRAM(S): 400 CAPSULE ORAL at 14:46

## 2017-11-26 RX ADMIN — Medication 12.5 MILLIGRAM(S): at 06:27

## 2017-11-26 RX ADMIN — Medication 3 MILLILITER(S): at 12:00

## 2017-11-26 RX ADMIN — PIPERACILLIN AND TAZOBACTAM 25 GRAM(S): 4; .5 INJECTION, POWDER, LYOPHILIZED, FOR SOLUTION INTRAVENOUS at 18:11

## 2017-11-26 RX ADMIN — Medication 3 MILLILITER(S): at 18:06

## 2017-11-26 RX ADMIN — Medication 25 MILLIGRAM(S): at 14:46

## 2017-11-26 RX ADMIN — Medication 2 MILLIGRAM(S): at 21:14

## 2017-11-26 RX ADMIN — Medication 3 MILLILITER(S): at 06:27

## 2017-11-26 RX ADMIN — GABAPENTIN 800 MILLIGRAM(S): 400 CAPSULE ORAL at 21:14

## 2017-11-26 RX ADMIN — Medication 10 MILLIGRAM(S): at 14:47

## 2017-11-26 RX ADMIN — Medication 20 MILLIEQUIVALENT(S): at 14:47

## 2017-11-26 RX ADMIN — Medication 10 MILLIGRAM(S): at 06:27

## 2017-11-26 RX ADMIN — ENOXAPARIN SODIUM 40 MILLIGRAM(S): 100 INJECTION SUBCUTANEOUS at 12:37

## 2017-11-26 NOTE — PROGRESS NOTE ADULT - SUBJECTIVE AND OBJECTIVE BOX
THE PATIENT WAS SEEN AND EXAMINED BY ME WITH THE HOUSESTAFF AND STROKE TEAM DURING MORNING ROUNDS.   HPI:  56 y/o R-handed [place] man PMH CVA with residual L-sided weakness, CAD s/p CABG, HTN presents as stroke code for dizziness. Pt at baseline ambulates independently and independent in ADLs. Pt was in usual state of health on 11/07 at 9AM. At around 9:30AM, he began to complain of lightheadedness not associated with changes in position. Half an hour later, he began to complain of n/v. He also endorses HA, diplopia/blurry vision, R-sided weakness that began a few days prior to admission.  He reports he ran out of his BP meds a week prior to admission and has been unable to refill his medications      SUBJECTIVE: No events overnight.  No new neurologic complaints.      acetaminophen   Tablet. 650 milliGRAM(s) Oral every 6 hours PRN  acetaminophen  Suppository 650 milliGRAM(s) Rectal every 6 hours PRN  ALBUTerol/ipratropium for Nebulization 3 milliLiter(s) Nebulizer every 6 hours  aspirin  chewable 81 milliGRAM(s) Oral daily  atorvastatin 80 milliGRAM(s) Oral at bedtime  baclofen 10 milliGRAM(s) Oral three times a day  chlorproMAZINE    Tablet 25 milliGRAM(s) Oral three times a day  clopidogrel Tablet 75 milliGRAM(s) Oral daily  doxazosin 2 milliGRAM(s) Oral at bedtime  enoxaparin Injectable 40 milliGRAM(s) SubCutaneous daily  gabapentin   Solution 800 milliGRAM(s) Oral three times a day  guaiFENesin    Syrup 200 milliGRAM(s) Oral every 6 hours PRN  hydrALAZINE Injectable 10 milliGRAM(s) IV Push two times a day PRN  hydrochlorothiazide 12.5 milliGRAM(s) Oral daily  influenza   Vaccine 0.5 milliLiter(s) IntraMuscular once  losartan 100 milliGRAM(s) Oral daily  piperacillin/tazobactam IVPB. 3.375 Gram(s) IV Intermittent every 8 hours  simethicone 80 milliGRAM(s) Chew daily PRN  sodium chloride 0.9%. 1000 milliLiter(s) IV Continuous <Continuous>      PHYSICAL EXAM:   Vital Signs Last 24 Hrs  T(C): 36.9 (26 Nov 2017 08:02), Max: 36.9 (25 Nov 2017 12:10)  T(F): 98.4 (26 Nov 2017 08:02), Max: 98.5 (26 Nov 2017 00:16)  HR: 71 (26 Nov 2017 08:02) (57 - 75)  BP: 159/86 (26 Nov 2017 08:02) (159/86 - 177/89)  BP(mean): --  RR: 18 (26 Nov 2017 08:02) (16 - 18)  SpO2: 92% (26 Nov 2017 08:02) (92% - 100%)    General: restless, SOB, increased secretions  HEENT: EOM intact, visual fields full, PERRLA  Abdomen: Soft, mild tenderness at PEG site, no erythema, nondistended   Extremities: No edema    NEUROLOGICAL EXAM:  Mental status: Awake, alert, oriented x3, fluent speech, no neglect, able to follow commands  Cranial Nerves: left facial droop, EOMI, VFF, no nystagmus, hoarseness, dysarthria, slight palatal deviation to the left, no hiccups, hearing intact to  bilateral finger rub   Motor exam: Normal tone, no drift, RUE 5/5, RLE 5/5, drift LUE 4/5, drift LLE 5-/5.  Sensation: decreased sensation to pinprick on left arm  Coordination/ Gait: LUE moderate-severe dysmetria      LABS:                        11.2   4.58  )-----------( 260      ( 26 Nov 2017 08:48 )             34.0    11-26    141  |  103  |  13  ----------------------------<  109<H>  3.2<L>   |  26  |  1.06    Ca    8.8      26 Nov 2017 08:39    TPro  6.8  /  Alb  2.4<L>  /  TBili  0.5  /  DBili  x   /  AST  62<H>  /  ALT  46<H>  /  AlkPhos  62  11-25    Hemoglobin A1C, Whole Blood: 5.8 % (11-08 @ 10:36)      IMAGING: Reviewed by me.     CT Chest No Cont (11.22.17) Bilateral lower lobe pneumonia.    Head CT/CTA head/Neck (11/07) Right basal ganglia and corona radiata infarct without significant change since 6/16/2017. There is severe steno-occlusive disease intracranially involving the right supraclinoid internal carotid artery, A1 and M1 branches, and severe narrowing of the left M1 segment of the middle cerebral artery. The distal right vertebral artery is quite small and the proximal basilar artery is not visualized. The dominant left vertebral artery ends at the PICA. Distal basilar flow appears to be due to retrograde flow from the right posterior communicating artery.  Head CT (11/07) No acute intracranial hemorrhage, mass effect, or evidence of acute territorial infarct. Chronic right corona radiata/basal ganglia and right inferior cerebellar hemisphere infarcts.  Brain MRI (11/09) In comparison the prior MRI, there is new hyperintense T2 and FLAIR signal with faint increased restricted diffusion in the right medulla extending to the inferior right brachium pontis and inferior medial right cerebellar hemisphere, new compared to 6/17/2017 consistent with evolving area of  ischemic change without hemorrhagic transformation. Redemonstration of volume loss with multiple additional areas of lacunar infarction including chronic infarcts in the right cerebellar hemisphere and right corona radiata. Decreased visualization of the flow-void within the right vertebral artery with continued irregular isointense T1 signal throughout the basilar artery. Bilateral sinus disease with bubbly secretions and air-fluid levels, greatest in the right maxillary sinus THE PATIENT WAS SEEN AND EXAMINED BY ME WITH THE HOUSESTAFF AND STROKE TEAM DURING MORNING ROUNDS.   HPI:  56 y/o R-handed [place] man PMH CVA with residual L-sided weakness, CAD s/p CABG, HTN presents as stroke code for dizziness. Pt at baseline ambulates independently and independent in ADLs. Pt was in usual state of health on 11/07 at 9AM. At around 9:30AM, he began to complain of lightheadedness not associated with changes in position. Half an hour later, he began to complain of n/v. He also endorses HA, diplopia/blurry vision, R-sided weakness that began a few days prior to admission.  He reports he ran out of his BP meds a week prior to admission and has been unable to refill his medications      SUBJECTIVE: states he feels better today vs yesterday.  No new neurologic complaints.      acetaminophen   Tablet. 650 milliGRAM(s) Oral every 6 hours PRN  acetaminophen  Suppository 650 milliGRAM(s) Rectal every 6 hours PRN  ALBUTerol/ipratropium for Nebulization 3 milliLiter(s) Nebulizer every 6 hours  aspirin  chewable 81 milliGRAM(s) Oral daily  atorvastatin 80 milliGRAM(s) Oral at bedtime  baclofen 10 milliGRAM(s) Oral three times a day  chlorproMAZINE    Tablet 25 milliGRAM(s) Oral three times a day  clopidogrel Tablet 75 milliGRAM(s) Oral daily  doxazosin 2 milliGRAM(s) Oral at bedtime  enoxaparin Injectable 40 milliGRAM(s) SubCutaneous daily  gabapentin   Solution 800 milliGRAM(s) Oral three times a day  guaiFENesin    Syrup 200 milliGRAM(s) Oral every 6 hours PRN  hydrALAZINE Injectable 10 milliGRAM(s) IV Push two times a day PRN  hydrochlorothiazide 12.5 milliGRAM(s) Oral daily  influenza   Vaccine 0.5 milliLiter(s) IntraMuscular once  losartan 100 milliGRAM(s) Oral daily  piperacillin/tazobactam IVPB. 3.375 Gram(s) IV Intermittent every 8 hours  simethicone 80 milliGRAM(s) Chew daily PRN  sodium chloride 0.9%. 1000 milliLiter(s) IV Continuous <Continuous>      PHYSICAL EXAM:   Vital Signs Last 24 Hrs  T(C): 36.9 (26 Nov 2017 08:02), Max: 36.9 (25 Nov 2017 12:10)  T(F): 98.4 (26 Nov 2017 08:02), Max: 98.5 (26 Nov 2017 00:16)  HR: 71 (26 Nov 2017 08:02) (57 - 75)  BP: 159/86 (26 Nov 2017 08:02) (159/86 - 177/89)  BP(mean): --  RR: 18 (26 Nov 2017 08:02) (16 - 18)  SpO2: 92% (26 Nov 2017 08:02) (92% - 100%)    General: restless, SOB, increased secretions  HEENT: EOM intact, visual fields full, PERRLA  Abdomen: Soft, mild tenderness at PEG site, no erythema, nondistended   Extremities: No edema    NEUROLOGICAL EXAM:  Mental status: Awake, alert, oriented x3, fluent speech, no neglect, able to follow commands  Cranial Nerves: Subtle left facial droop, EOMI, VFF, no nystagmus, hoarseness, dysarthria, slight palatal deviation to the left, no hiccups, hearing intact to  bilateral finger rub   Motor exam: Normal tone, no drift, RUE 5/5, RLE 5/5, drift LUE 4-/5, drift LLE 5-/5.  Sensation: decreased sensation to pinprick on left arm  Coordination/ Gait: LUE moderate-severe dysmetria      LABS:                        11.2   4.58  )-----------( 260      ( 26 Nov 2017 08:48 )             34.0    11-26    141  |  103  |  13  ----------------------------<  109<H>  3.2<L>   |  26  |  1.06    Ca    8.8      26 Nov 2017 08:39    TPro  6.8  /  Alb  2.4<L>  /  TBili  0.5  /  DBili  x   /  AST  62<H>  /  ALT  46<H>  /  AlkPhos  62  11-25    Hemoglobin A1C, Whole Blood: 5.8 % (11-08 @ 10:36)      IMAGING: Reviewed by me.     CT Chest No Cont (11.22.17) Bilateral lower lobe pneumonia.    Head CT/CTA head/Neck (11/07) Right basal ganglia and corona radiata infarct without significant change since 6/16/2017. There is severe steno-occlusive disease intracranially involving the right supraclinoid internal carotid artery, A1 and M1 branches, and severe narrowing of the left M1 segment of the middle cerebral artery. The distal right vertebral artery is quite small and the proximal basilar artery is not visualized. The dominant left vertebral artery ends at the PICA. Distal basilar flow appears to be due to retrograde flow from the right posterior communicating artery.  Head CT (11/07) No acute intracranial hemorrhage, mass effect, or evidence of acute territorial infarct. Chronic right corona radiata/basal ganglia and right inferior cerebellar hemisphere infarcts.  Brain MRI (11/09) In comparison the prior MRI, there is new hyperintense T2 and FLAIR signal with faint increased restricted diffusion in the right medulla extending to the inferior right brachium pontis and inferior medial right cerebellar hemisphere, new compared to 6/17/2017 consistent with evolving area of  ischemic change without hemorrhagic transformation. Redemonstration of volume loss with multiple additional areas of lacunar infarction including chronic infarcts in the right cerebellar hemisphere and right corona radiata. Decreased visualization of the flow-void within the right vertebral artery with continued irregular isointense T1 signal throughout the basilar artery. Bilateral sinus disease with bubbly secretions and air-fluid levels, greatest in the right maxillary sinus

## 2017-11-26 NOTE — PROGRESS NOTE ADULT - ASSESSMENT
ASSESSMENT: 57M with HTN, HLD, CAD/CABG, past stroke with residual left hemiparesis, presents with transient right hemiparesis and diplopia, and continuous headache and N/V. CT head shows old right basal ganglia lacune, and CTA shows severe bilateral MCA stenosis, proximal BA occlusion and distal stenosis. Brain MRI show right lateral  medullary and right cerebellar ischemic infarcts. Impression: Severe intracranial atherosclerotic disease and occlusion of left VA, with a small distal right vertebral artery,  causing right  lateral medullary and right cerebellar ischemic infarcts, i.e. perhaps due to large artery intracranial atherosclerosis.    NEURO: neurologically without acute change, RRT called for respiratory distress and hypotension on 11/22, , permissive HTN with slow titration to normotensive, ASA/plavix  and statin  for secondary stroke prevention,  titrate statin to LDL goal less than 70,  hiccups -will continue gabapentin 800mg TID, Thorazine 25 mg t.i.d., then may start tapering med in 2 days (Monday 11/27) as hiccups have  improved,  Physical therapy/OT eval with recommendations for [facility], pt uninsured and pending re-instatement of medicaid, will provide daily PT/OT to optimize treatment.     ANTITHROMBOTIC THERAPY:  ASA  for secondary stroke prevention, and will restart Plavix     PULMONARY:  Chest CT (11/22) bilateral lower lobe pna, continue Zosyn , sputum culture: numerous gram positive cooci, and negative rods, appreciate pulmonary and ID consult, protecting airway, 1Liter NC, encourage Incentive spirometer, monitor sats give oxygen as needed    CARDIOVASCULAR: TTE: EF 41%, mitral annular calcification, mild-moderate MR, global LV systolic dysfunction, mild stage 1diastolic dysfunction mild TR,  cardiac monitoring  no events, cardio consult appreciated: add metoprolol as tolerated                 GASTROINTESTINAL: s/p PEG placement (11/14), patient suctioning up feedings on 11/22, diarrhea X 2 on 11/24: negative for  C.diff.  Abd x ray negative for ileus. tolerating PEG feedings       Diet: PEG/NPO except medications    RENAL: BUN/Cr stable-, will continue to provide hydration, good urine output, renal US: No hydronephrosis, haro reinserted for urinary retention. Continue Cardura for BPH, and continue with Haro catheter until patient's status has improved and he is more ambulatory, small amounts of clots noted in urine on 11/22, now resolved, hypernatremia: resolved,  hypokalemia: will replete potassium, continue free water bolus  Na Goal: Greater than 135     Haro: re-inserted 11/17 for retention, void trial on 11/25    HEMATOLOGY: H/H Platelets without acute changes, no s/s of bleeding     DVT ppx: LMWH    ID: febrile 11/22 tmax 37.9,  afebrile in am, leukocytosis resolved, blood cx; NGTD, urine and sputum cultures: klebsiella pneumoniae, pt with diarrhea on 11/24, C diff negative, as per ID eval continue with zosyn for aspiration pna/UTI,     DISPOSITION: D/C to [facility] as per PT/OT eval once stable and workup is complete, Pt uninsured, SW aware, medicaid re-instatement pending. Daily PT/OT therapy to optimize therapy and referral to [facility] for possible suman.      CORE MEASURES:        Admission NIHSS: 3     TPA:  NO      LDL/HDL: 171/57     Depression Screen: 0     Statin Therapy: Y     Dysphagia Screen: FAIL     Smoking  NO      Afib NO     Stroke Education YES ASSESSMENT: 57M with HTN, HLD, CAD/CABG, past stroke with residual left hemiparesis, presents with transient right hemiparesis and diplopia, and continuous headache and N/V. CT head shows old right basal ganglia lacune, and CTA shows severe bilateral MCA stenosis, proximal BA occlusion and distal stenosis. Brain MRI show right lateral  medullary and right cerebellar ischemic infarcts. Impression: Severe intracranial atherosclerotic disease and occlusion of left VA, with a small distal right vertebral artery,  causing right  lateral medullary and right cerebellar ischemic infarcts, i.e. perhaps due to large artery intracranial atherosclerosis.    NEURO: neurologically without acute change, RRT called for respiratory distress and hypotension on 11/22, , permissive HTN with slow titration to normotensive, ASA/plavix  and statin  for secondary stroke prevention,  titrate statin to LDL goal less than 70,  hiccups -will continue gabapentin 800mg TID, Thorazine 25 mg t.i.d., then may start tapering med in 2 days (Monday 11/27) as hiccups have  improved,  Physical therapy/OT eval with recommendations for [facility], pt uninsured and pending re-instatement of medicaid, will provide daily PT/OT to optimize treatment.     ANTITHROMBOTIC THERAPY:  ASA  for secondary stroke prevention, and will restart Plavix     PULMONARY:  Chest CT (11/22) bilateral lower lobe pna, continue Zosyn , sputum culture: numerous gram positive cooci, and negative rods, appreciate pulmonary and ID consult, protecting airway, 1Liter NC, encourage Incentive spirometer, monitor sats give oxygen as needed    CARDIOVASCULAR: TTE: EF 41%, mitral annular calcification, mild-moderate MR, global LV systolic dysfunction, mild stage 1diastolic dysfunction mild TR,  cardiac monitoring  no events, cardio consult appreciated: add metoprolol as tolerated                 GASTROINTESTINAL: s/p PEG placement (11/14), patient suctioning up feedings on 11/22, diarrhea X 2 on 11/24: negative for  C.diff.  Abd x ray negative for ileus. tolerating PEG feedings       Diet: PEG/NPO except medications    RENAL: BUN/Cr stable-, will continue to provide hydration, good urine output, renal US: No hydronephrosis, haro reinserted for urinary retention. Continue Cardura for BPH, and continue with Haro catheter until patient's status has improved and he is more ambulatory, small amounts of clots noted in urine on 11/22, now resolved, hypernatremia: resolved,  hypokalemia: will replete potassium, continue free water bolus  Na Goal: Greater than 135     Haro: re-inserted 11/17 for retention, failed void trial on 11/25    HEMATOLOGY: H/H Platelets without acute changes, no s/s of bleeding     DVT ppx: LMWH    ID: febrile 11/22 tmax 37.9,  afebrile in am, leukocytosis resolved, blood cx; NGTD, urine and sputum cultures: klebsiella pneumoniae, pt with diarrhea on 11/24, C diff negative, as per ID eval continue with zosyn for aspiration pna/UTI,     DISPOSITION: D/C to [facility] as per PT/OT eval once stable and workup is complete, Pt uninsured, SW aware, medicaid re-instatement pending. Daily PT/OT therapy to optimize therapy and referral to [facility] for possible suman.      CORE MEASURES:        Admission NIHSS: 3     TPA:  NO      LDL/HDL: 171/57     Depression Screen: 0     Statin Therapy: Y     Dysphagia Screen: FAIL     Smoking  NO      Afib NO     Stroke Education YES ASSESSMENT: 57M with HTN, HLD, CAD/CABG, past stroke with residual left hemiparesis, presents with transient right hemiparesis and diplopia, and continuous headache and N/V. CT head shows old right basal ganglia lacune, and CTA shows severe bilateral MCA stenosis, proximal BA occlusion and distal stenosis. Brain MRI show right lateral  medullary and right cerebellar ischemic infarcts. Impression: Severe intracranial atherosclerotic disease and occlusion of left VA, with a small distal right vertebral artery,  causing right  lateral medullary and right cerebellar ischemic infarcts, i.e. perhaps due to large artery intracranial atherosclerosis.    NEURO: neurologically without acute change, RRT called for respiratory distress and hypotension on 11/22, , permissive HTN with slow titration to normotensive, ASA/plavix  and statin  for secondary stroke prevention,  titrate statin to LDL goal less than 70,  hiccups -will continue gabapentin 800mg TID, Thorazine 25 mg t.i.d., then may start tapering gabapentin to 600mg TID in 2 days (Tuesday 11/28) as hiccups have  improved,  Physical therapy/OT eval with recommendations for [facility], pt uninsured and pending re-instatement of medicaid, will provide daily PT/OT to optimize treatment.     ANTITHROMBOTIC THERAPY:  ASA  for secondary stroke prevention, and will restart Plavix     PULMONARY:  Chest CT (11/22) bilateral lower lobe pna, continue Zosyn , sputum culture: numerous gram positive cooci, and negative rods, appreciate pulmonary and ID consult, protecting airway, 1Liter NC, encourage Incentive spirometer, monitor sats give oxygen as needed    CARDIOVASCULAR: TTE: EF 41%, mitral annular calcification, mild-moderate MR, global LV systolic dysfunction, mild stage 1diastolic dysfunction mild TR,  cardiac monitoring  no events, cardio consult appreciated: add metoprolol as tolerated                 GASTROINTESTINAL: s/p PEG placement (11/14), patient suctioning up feedings on 11/22, diarrhea X 2 on 11/24: negative for  C.diff.  Abd x ray negative for ileus. tolerating PEG feedings       Diet: PEG/NPO except medications    RENAL: BUN/Cr stable-, will continue to provide hydration, good urine output, renal US: No hydronephrosis, haro reinserted for urinary retention. Continue Cardura for BPH, and continue with Haro catheter until patient's status has improved and he is more ambulatory, small amounts of clots noted in urine on 11/22, now resolved, hypernatremia: resolved,  hypokalemia: will replete potassium, continue free water bolus  Na Goal: Greater than 135     Haro: re-inserted 11/17 for retention, failed void trial on 11/25    HEMATOLOGY: H/H Platelets without acute changes, no s/s of bleeding     DVT ppx: LMWH    ID: febrile 11/22 tmax 37.9,  afebrile in am, leukocytosis resolved, blood cx; NGTD, urine and sputum cultures: klebsiella pneumoniae, pt with diarrhea on 11/24, C diff negative, as per ID eval continue with zosyn for aspiration pna/UTI,     DISPOSITION: D/C to [facility] as per PT/OT eval once stable and workup is complete, Pt uninsured, SW aware, medicaid re-instatement pending. Daily PT/OT therapy to optimize therapy and referral to [facility] for possible suman.      CORE MEASURES:        Admission NIHSS: 3     TPA:  NO      LDL/HDL: 171/57     Depression Screen: 0     Statin Therapy: Y     Dysphagia Screen: FAIL     Smoking  NO      Afib NO     Stroke Education YES

## 2017-11-27 DIAGNOSIS — R13.10 DYSPHAGIA, UNSPECIFIED: ICD-10-CM

## 2017-11-27 LAB
ANION GAP SERPL CALC-SCNC: 15 MMOL/L — SIGNIFICANT CHANGE UP (ref 5–17)
BUN SERPL-MCNC: 10 MG/DL — SIGNIFICANT CHANGE UP (ref 7–23)
CALCIUM SERPL-MCNC: 9 MG/DL — SIGNIFICANT CHANGE UP (ref 8.4–10.5)
CHLORIDE SERPL-SCNC: 100 MMOL/L — SIGNIFICANT CHANGE UP (ref 96–108)
CO2 SERPL-SCNC: 24 MMOL/L — SIGNIFICANT CHANGE UP (ref 22–31)
CREAT SERPL-MCNC: 1.01 MG/DL — SIGNIFICANT CHANGE UP (ref 0.5–1.3)
GLUCOSE SERPL-MCNC: 120 MG/DL — HIGH (ref 70–99)
HCT VFR BLD CALC: 38.1 % — LOW (ref 39–50)
HGB BLD-MCNC: 12.4 G/DL — LOW (ref 13–17)
MCHC RBC-ENTMCNC: 25.3 PG — LOW (ref 27–34)
MCHC RBC-ENTMCNC: 32.5 GM/DL — SIGNIFICANT CHANGE UP (ref 32–36)
MCV RBC AUTO: 77.8 FL — LOW (ref 80–100)
PLATELET # BLD AUTO: 249 K/UL — SIGNIFICANT CHANGE UP (ref 150–400)
POTASSIUM SERPL-MCNC: 3.7 MMOL/L — SIGNIFICANT CHANGE UP (ref 3.5–5.3)
POTASSIUM SERPL-SCNC: 3.7 MMOL/L — SIGNIFICANT CHANGE UP (ref 3.5–5.3)
RBC # BLD: 4.9 M/UL — SIGNIFICANT CHANGE UP (ref 4.2–5.8)
RBC # FLD: 15.7 % — HIGH (ref 10.3–14.5)
SODIUM SERPL-SCNC: 139 MMOL/L — SIGNIFICANT CHANGE UP (ref 135–145)
WBC # BLD: 4.96 K/UL — SIGNIFICANT CHANGE UP (ref 3.8–10.5)
WBC # FLD AUTO: 4.96 K/UL — SIGNIFICANT CHANGE UP (ref 3.8–10.5)

## 2017-11-27 PROCEDURE — 99254 IP/OBS CNSLTJ NEW/EST MOD 60: CPT | Mod: 25

## 2017-11-27 PROCEDURE — 31575 DIAGNOSTIC LARYNGOSCOPY: CPT

## 2017-11-27 PROCEDURE — 99232 SBSQ HOSP IP/OBS MODERATE 35: CPT

## 2017-11-27 PROCEDURE — 99233 SBSQ HOSP IP/OBS HIGH 50: CPT

## 2017-11-27 RX ORDER — CHLORPROMAZINE HCL 10 MG
10 TABLET ORAL THREE TIMES A DAY
Qty: 0 | Refills: 0 | Status: DISCONTINUED | OUTPATIENT
Start: 2017-11-27 | End: 2017-11-28

## 2017-11-27 RX ADMIN — GABAPENTIN 800 MILLIGRAM(S): 400 CAPSULE ORAL at 13:14

## 2017-11-27 RX ADMIN — LOSARTAN POTASSIUM 100 MILLIGRAM(S): 100 TABLET, FILM COATED ORAL at 05:44

## 2017-11-27 RX ADMIN — Medication 81 MILLIGRAM(S): at 13:13

## 2017-11-27 RX ADMIN — Medication 3 MILLILITER(S): at 17:07

## 2017-11-27 RX ADMIN — GABAPENTIN 800 MILLIGRAM(S): 400 CAPSULE ORAL at 05:42

## 2017-11-27 RX ADMIN — Medication 10 MILLIGRAM(S): at 17:08

## 2017-11-27 RX ADMIN — Medication 10 MILLIGRAM(S): at 05:43

## 2017-11-27 RX ADMIN — PIPERACILLIN AND TAZOBACTAM 25 GRAM(S): 4; .5 INJECTION, POWDER, LYOPHILIZED, FOR SOLUTION INTRAVENOUS at 17:08

## 2017-11-27 RX ADMIN — Medication 20 MILLIEQUIVALENT(S): at 05:43

## 2017-11-27 RX ADMIN — ENOXAPARIN SODIUM 40 MILLIGRAM(S): 100 INJECTION SUBCUTANEOUS at 13:13

## 2017-11-27 RX ADMIN — Medication 10 MILLIGRAM(S): at 13:13

## 2017-11-27 RX ADMIN — Medication 10 MILLIGRAM(S): at 21:48

## 2017-11-27 RX ADMIN — Medication 25 MILLIGRAM(S): at 05:43

## 2017-11-27 RX ADMIN — Medication 10 MILLIGRAM(S): at 21:36

## 2017-11-27 RX ADMIN — ATORVASTATIN CALCIUM 80 MILLIGRAM(S): 80 TABLET, FILM COATED ORAL at 21:36

## 2017-11-27 RX ADMIN — PIPERACILLIN AND TAZOBACTAM 25 GRAM(S): 4; .5 INJECTION, POWDER, LYOPHILIZED, FOR SOLUTION INTRAVENOUS at 01:39

## 2017-11-27 RX ADMIN — Medication 12.5 MILLIGRAM(S): at 05:43

## 2017-11-27 RX ADMIN — Medication 3 MILLILITER(S): at 05:40

## 2017-11-27 RX ADMIN — GABAPENTIN 800 MILLIGRAM(S): 400 CAPSULE ORAL at 21:36

## 2017-11-27 RX ADMIN — CLOPIDOGREL BISULFATE 75 MILLIGRAM(S): 75 TABLET, FILM COATED ORAL at 13:13

## 2017-11-27 RX ADMIN — Medication 3 MILLILITER(S): at 13:12

## 2017-11-27 RX ADMIN — Medication 2 MILLIGRAM(S): at 21:36

## 2017-11-27 NOTE — CONSULT NOTE ADULT - SUBJECTIVE AND OBJECTIVE BOX
CC: Dysphagia, hoarseness    HPI: Patient is a 57y old Male w PMH CVA with residual L-sided weakness, CAD s/p CABG, HTN admitted for new stroke. Pt now complaining of difficulty managing his secretions, dysphagia and hoarseness. ENT called to evaluate the patients airway and vocal cords via FOE/laryngoscopy. Pt states he began feeling these symptoms immediately after his CVA 3 weeks ago. Pt admits to odynophagia, is NPO with PEG tube feeds and has a new aspiration pnma during this stay. Pt denies cough, SOB, hemoptysis, n/v.    PAST MEDICAL & SURGICAL HISTORY:  Stented coronary artery  CVA (cerebral vascular accident)  HTN (hypertension)  No significant past surgical history    Allergies    No Known Allergies    Intolerances      MEDICATIONS  (STANDING):  ALBUTerol/ipratropium for Nebulization 3 milliLiter(s) Nebulizer every 6 hours  aspirin  chewable 81 milliGRAM(s) Oral daily  atorvastatin 80 milliGRAM(s) Oral at bedtime  baclofen 10 milliGRAM(s) Oral three times a day  chlorproMAZINE    Tablet 10 milliGRAM(s) Oral three times a day  clopidogrel Tablet 75 milliGRAM(s) Oral daily  doxazosin 2 milliGRAM(s) Oral at bedtime  enoxaparin Injectable 40 milliGRAM(s) SubCutaneous daily  gabapentin   Solution 800 milliGRAM(s) Oral three times a day  hydrochlorothiazide 12.5 milliGRAM(s) Oral daily  influenza   Vaccine 0.5 milliLiter(s) IntraMuscular once  losartan 100 milliGRAM(s) Oral daily  piperacillin/tazobactam IVPB. 3.375 Gram(s) IV Intermittent every 8 hours    MEDICATIONS  (PRN):  acetaminophen   Tablet. 650 milliGRAM(s) Oral every 6 hours PRN Mild Pain (1 - 3)  acetaminophen  Suppository 650 milliGRAM(s) Rectal every 6 hours PRN For Temp greater than 38.5 C (101.3 F)  guaiFENesin    Syrup 200 milliGRAM(s) Oral every 6 hours PRN Cough  hydrALAZINE Injectable 10 milliGRAM(s) IV Push two times a day PRN BP > 180  simethicone 80 milliGRAM(s) Chew daily PRN bloating    Social History: no tobacco, ETOH, illicit drugs    ROS: ENT, GI, , CV, Pulm, Neuro, Psych, MS, Heme, Endo, Constitutional; all negative except as noted in HPI    Vital Signs Last 24 Hrs  T(C): 36.8 (27 Nov 2017 15:02), Max: 37 (26 Nov 2017 23:49)  T(F): 98.3 (27 Nov 2017 15:02), Max: 98.6 (26 Nov 2017 23:49)  HR: 75 (27 Nov 2017 15:02) (66 - 91)  BP: 136/84 (27 Nov 2017 15:02) (136/84 - 166/97)  BP(mean): --  RR: 18 (27 Nov 2017 15:02) (18 - 18)  SpO2: 96% (27 Nov 2017 15:02) (96% - 100%)                          12.4   4.96  )-----------( 249      ( 27 Nov 2017 08:45 )             38.1    11-27    139  |  100  |  10  ----------------------------<  120<H>  3.7   |  24  |  1.01    Ca    9.0      27 Nov 2017 08:45         PHYSICAL EXAM:  Gen: NAD, well-developed, OOB in chair  Head: Normocephalic, Atraumatic  Face: no edema/erythema/fluctuance, parotid glands soft without mass  Eyes: PERRL, EOMI, no scleral injection  Nose: Nares bilaterally patent, no discharge  Mouth: Mucosa moist, tongue/uvula midline, oropharynx clear  Neck: Flat, supple, no lymphadenopathy, trachea midline, no masses  Resp: no use of accessory muscles, no stridor  CV: no peripheral edema/cyanosis    FOE/Laryngoscopy: scope #1 No bleeding in nasal pharynx or Oral pharynx.  No foreign body.  No glottic / supraglottic swelling.  Vocal cords mobile in intact b/l.  Airway patent.  *Pooling of secretions; grossly aspirating

## 2017-11-27 NOTE — PROGRESS NOTE ADULT - SUBJECTIVE AND OBJECTIVE BOX
Follow-up Pulm Progress Note    No new respiratory events overnight.  Denies SOB/CP. improved cough and decreased sputum production. 02 sat 96% on room air. c/o sore throat, on going hoarse voice    Medications:  MEDICATIONS  (STANDING):  ALBUTerol/ipratropium for Nebulization 3 milliLiter(s) Nebulizer every 6 hours  aspirin  chewable 81 milliGRAM(s) Oral daily  atorvastatin 80 milliGRAM(s) Oral at bedtime  baclofen 10 milliGRAM(s) Oral three times a day  chlorproMAZINE    Tablet 10 milliGRAM(s) Oral three times a day  clopidogrel Tablet 75 milliGRAM(s) Oral daily  doxazosin 2 milliGRAM(s) Oral at bedtime  enoxaparin Injectable 40 milliGRAM(s) SubCutaneous daily  gabapentin   Solution 800 milliGRAM(s) Oral three times a day  hydrochlorothiazide 12.5 milliGRAM(s) Oral daily  influenza   Vaccine 0.5 milliLiter(s) IntraMuscular once  losartan 100 milliGRAM(s) Oral daily  piperacillin/tazobactam IVPB. 3.375 Gram(s) IV Intermittent every 8 hours    MEDICATIONS  (PRN):  acetaminophen   Tablet. 650 milliGRAM(s) Oral every 6 hours PRN Mild Pain (1 - 3)  acetaminophen  Suppository 650 milliGRAM(s) Rectal every 6 hours PRN For Temp greater than 38.5 C (101.3 F)  guaiFENesin    Syrup 200 milliGRAM(s) Oral every 6 hours PRN Cough  hydrALAZINE Injectable 10 milliGRAM(s) IV Push two times a day PRN BP > 180  simethicone 80 milliGRAM(s) Chew daily PRN bloating          Vital Signs Last 24 Hrs  T(C): 36.4 (27 Nov 2017 07:57), Max: 37 (26 Nov 2017 23:49)  T(F): 97.6 (27 Nov 2017 07:57), Max: 98.6 (26 Nov 2017 23:49)  HR: 70 (27 Nov 2017 07:57) (66 - 91)  BP: 148/88 (27 Nov 2017 07:57) (136/85 - 166/97)  BP(mean): --  RR: 18 (27 Nov 2017 07:57) (18 - 18)  SpO2: 97% (27 Nov 2017 07:57) (96% - 97%)          11-26 @ 07:01  -  11-27 @ 07:00  --------------------------------------------------------  IN: 2335 mL / OUT: 1150 mL / NET: 1185 mL          LABS:                        12.4   4.96  )-----------( 249      ( 27 Nov 2017 08:45 )             38.1     11-27    139  |  100  |  10  ----------------------------<  120<H>  3.7   |  24  |  1.01    Ca    9.0      27 Nov 2017 08:45            CAPILLARY BLOOD GLUCOSE                            CULTURES: (if applicable)  Culture Results:   Moderate Klebsiella pneumoniae  Numerous Haemophilus influenzae  Normal Respiratory Bryanna present (11-23 @ 01:09)  Culture Results:   >100,000 CFU/ml Klebsiella pneumoniae (11-21 @ 17:15)  Culture Results:   No growth at 5 days. (11-21 @ 16:57)  Culture Results:   No growth at 5 days. (11-21 @ 16:56)    Most recent blood culture -- 11-23 @ 01:09   Klebsiella pneumoniae Klebsiella pneumoniae .Sputum Sputum 11-23 @ 01:09    Blood culture 11-23 @ 01:09  --    Numerous polymorphonuclear leukocytes per low power field  Few Squamous epithelial cells per low power field  Numerous Gram Negative Rods per oil power field  Numerous Gram positive cocci in pairs per oil power field  [facility]  Klebsiella pneumoniae  Klebsiella pneumoniae    Urine culture    -->      Physical Examination:  PULM: decreased bs bilat bilaterally, no significant sputum production  CVS: S1, S2 heard    RADIOLOGY REVIEWED  CXR: < from: Xray Chest 1 View AP-PORTABLE IMMEDIATE (11.22.17 @ 18:31) >  IMPRESSION:   Clear lungs.    < end of copied text >      CT chest:    TTE:

## 2017-11-27 NOTE — PROGRESS NOTE ADULT - ASSESSMENT
ASSESSMENT: 57M with HTN, HLD, CAD/CABG, past stroke with residual left hemiparesis, presents with transient right hemiparesis and diplopia, and continuous headache and N/V. CT head shows old right basal ganglia lacune, and CTA shows severe bilateral MCA stenosis, proximal BA occlusion and distal stenosis. Brain MRI show right lateral  medullary and right cerebellar ischemic infarcts. Impression: Severe intracranial atherosclerotic disease and occlusion of left VA, with a small distal right vertebral artery,  causing right  lateral medullary and right cerebellar ischemic infarcts, i.e. perhaps due to large artery intracranial atherosclerosis.    NEURO: neurologically without acute change, permissive HTN with slow titration to normotensive, ASA/plavix  and statin  for secondary stroke prevention,  titrate statin to LDL goal less than 70,  hiccups - Thorazine to be titrated, then may start tapering gabapentin to as hiccups have  improved,  Physical therapy/OT eval with recommendations for [facility], pt uninsured and pending re-instatement of medicaid, will provide daily PT/OT to optimize treatment.     ANTITHROMBOTIC THERAPY:  ASA  for secondary stroke prevention, and will restart Plavix     PULMONARY:  Chest CT (11/22) bilateral lower lobe pna, continue Zosyn day 6/7 , sputum culture: numerous gram positive cooci, and negative rods, appreciate pulmonary and ID consult, protecting airway, titrate off NC as tolerated, encourage Incentive spirometer, monitor sats give oxygen as needed    CARDIOVASCULAR: TTE: EF 41%, mitral annular calcification, mild-moderate MR, global LV systolic dysfunction, mild stage 1diastolic dysfunction mild TR,  cardiac monitoring  no events, cardio consult appreciated: add metoprolol as tolerated                 GASTROINTESTINAL: s/p PEG placement (11/14), patient suctioning up feedings on 11/22, diarrhea X 2 on 11/24: negative for  C.diff.  Abd x ray negative for ileus. tolerating PEG feedings       Diet: PEG/NPO except medications    RENAL: BUN/Cr without acute change, will continue to provide hydration, good urine output, renal US: No hydronephrosis, haro reinserted for urinary retention. Continue Cardura for BPH, and continue with Haro catheter until patient's status has improved and he is more ambulatory, small amounts of clots noted in urine on 11/22, now resolved, hypernatremia: resolved,  hypokalemia: resolved  Na Goal: Greater than 135     Haro: re-inserted 11/17 for retention, failed void trial on 11/25    HEMATOLOGY: H/H Platelets without acute changes, no s/s of bleeding     DVT ppx: LMWH    ID: febrile 11/22 tmax 37.9,  afebrile in am, leukocytosis resolved, blood cx; NGTD, urine and sputum cultures: klebsiella pneumoniae, pt with diarrhea on 11/24, C diff negative, as per ID eval continue with zosyn for aspiration pna/UTI day 6/7,     DISPOSITION: D/C to [facility] as per PT/OT eval once stable and workup is complete, Pt uninsured, SW aware, medicaid re-instatement pending. Daily PT/OT therapy to optimize therapy and referral to [facility] for possible suman.      CORE MEASURES:        Admission NIHSS: 3     TPA:  NO      LDL/HDL: 171/57     Depression Screen: 0     Statin Therapy: Y     Dysphagia Screen: FAIL     Smoking  NO      Afib NO     Stroke Education YES ASSESSMENT:   57M with HTN, HLD, CAD/CABG, past stroke with residual left hemiparesis, presents with transient right hemiparesis and diplopia, and continuous headache and N/V. CT head shows old right basal ganglia lacune, and CTA shows severe bilateral MCA stenosis, proximal basilar/distal vertebrals occlusion and stenosis involving mid to distal basilar artery. Brain MRI show right lateral medullary and right cerebellar ischemic infarcts.     Impression:   Severe intracranial atherosclerotic disease and occlusion of left VA, with a small distal right vertebral artery, causing right lateral medullary and right cerebellar ischemic infarcts, i.e. likely due to large artery intracranial atherosclerosis     NEURO: neurologically without acute change, permissive HTN with slow titration to normotensive, ASA/plavix  and statin  for secondary stroke prevention,  titrate statin to LDL goal less than 70,  hiccups - Thorazine to be titrated, then may start tapering gabapentin to as hiccups have  improved,  Physical therapy/OT eval with recommendations for [facility], pt uninsured and pending re-instatement of medicaid, will provide daily PT/OT to optimize treatment.     ANTITHROMBOTIC THERAPY:  ASA and clopidogrel for secondary stroke prevention, duration of dual antiplatelet therapy would likely be 3 months followed by Aggrenox for stroke prevention     PULMONARY:  Chest CT (11/22) bilateral lower lobe pna, continue Zosyn day 6/7 , sputum culture: numerous gram positive cooci, and negative rods, appreciate pulmonary and ID consult, protecting airway, titrate off NC as tolerated, encourage Incentive spirometer, monitor sats give oxygen as needed    CARDIOVASCULAR: TTE: LVEF 41%, mitral annular calcification, mild-moderate MR, global LV systolic dysfunction, mild stage 1diastolic dysfunction mild TR,  cardiac monitoring  no events, cardio consult appreciated: add metoprolol as tolerated                 GASTROINTESTINAL: s/p PEG placement (11/14), patient suctioning up feedings on 11/22, diarrhea X 2 on 11/24: negative for  C.diff.  Abd x ray negative for ileus. tolerating PEG feedings       Diet: PEG/NPO except medications    RENAL: BUN/Cr without acute change, will continue to provide hydration, good urine output, renal US: No hydronephrosis, Wu reinserted for urinary retention. Continue Cardura for BPH, and continue with Wu catheter until patient's status has improved and he is more ambulatory, small amounts of clots noted in urine on 11/22, now resolved, hypernatremia: resolved,  hypokalemia: resolved  Na Goal: Greater than 135     Wu: re-inserted 11/17 for retention, failed void trial on 11/25     HEMATOLOGY: H/H Platelets without acute changes, no s/s of bleeding     DVT ppx: LMWH    ID: febrile 11/22 Tmax 37.9,  afebrile in am, leukocytosis resolved, blood cx; NGTD, urine and sputum cultures: klebsiella pneumoniae, pt with diarrhea on 11/24, C diff negative, as per ID eval continue with zosyn for aspiration pna/UTI day 6/7    DISPOSITION: D/C to [facility] as per PT/OT eval once stable and workup is complete, Pt uninsured, SW aware, medicaid re-instatement pending. Daily PT/OT therapy to optimize therapy and referral to [facility] for possible suman.      CORE MEASURES:        Admission NIHSS: 3     TPA:  NO      LDL/HDL: 171/57     Depression Screen: 0     Statin Therapy: Y     Dysphagia Screen: FAIL     Smoking  NO      Afib NO     Stroke Education YES

## 2017-11-27 NOTE — PROGRESS NOTE ADULT - ASSESSMENT
57M CAD s/p CABG, HTN, hx CVA with L weakness. Admitted with new right medullary stroke.  Course complicated by urinary retention and now acute aspiration causing fever, leukocytosis and purulent sputum.  CT confirms pneumonia.  Sputum with klebsiella, Hflu.  UC also with kpna (colonizer).  Cdiff negative    - zosyn okay [day #6 today] - stop after tomorrow  - d/c planning to rehab    Please call Infectious Diseases if there is a change in status.  Thank you.  [phone].

## 2017-11-27 NOTE — CONSULT NOTE ADULT - ATTENDING COMMENTS
CVA with dysphagia  Plan PEG next week
Seen and examined with Dr. Stoll. Agree with her note.  56 y/o male with left hemiparesis secondary to prior CVA now s/p new ischemic infarcts in the right medulla and inferior right hemisphere with dysphagia, neurogenic bladder, functional, gait, ADL impairments. Will need acute rehab.
patient seen and examined, above noted. Suggest trial of void when he is able to ambulate better.
Patient seen and examined. Scope #2 used. Nasal passages clear. Nasopharynx/oropharynx/hypopharynx clear. Vocal cords fully mobile with no masses or lesions but grossly aspirating his secretions with markedly decreased sensation. Postcricoid area with pooled secretions. Visualized subglottis clear.     No evidence of cord paralysis and good closure. Mild irritation of cords but no lesions noted. Recommend cont NPO and speech therapy as outpatient with repeat swallow eval in future per speech recs.
Start abx for basal pna likely aspiration  consider id consult

## 2017-11-27 NOTE — PROGRESS NOTE ADULT - SUBJECTIVE AND OBJECTIVE BOX
THE PATIENT WAS SEEN AND EXAMINED BY ME WITH THE HOUSESTAFF AND STROKE TEAM DURING MORNING ROUNDS.   HPI:  56 y/o R-handed [place] man PMH CVA with residual L-sided weakness, CAD s/p CABG, HTN presents as stroke code for dizziness. Pt at baseline ambulates independently and independent in ADLs. Pt was in usual state of health on 11/07 at 9AM. At around 9:30AM, he began to complain of lightheadedness not associated with changes in position. Half an hour later, he began to complain of n/v. He also endorses HA, diplopia/blurry vision, R-sided weakness that began a few days prior to admission.  He reports he ran out of his BP meds a week prior to admission and has been unable to refill his medications    SUBJECTIVE: No events overnight.  No new neurologic complaints.      acetaminophen   Tablet. 650 milliGRAM(s) Oral every 6 hours PRN  acetaminophen  Suppository 650 milliGRAM(s) Rectal every 6 hours PRN  ALBUTerol/ipratropium for Nebulization 3 milliLiter(s) Nebulizer every 6 hours  aspirin  chewable 81 milliGRAM(s) Oral daily  atorvastatin 80 milliGRAM(s) Oral at bedtime  baclofen 10 milliGRAM(s) Oral three times a day  chlorproMAZINE    Tablet 25 milliGRAM(s) Oral three times a day  clopidogrel Tablet 75 milliGRAM(s) Oral daily  doxazosin 2 milliGRAM(s) Oral at bedtime  enoxaparin Injectable 40 milliGRAM(s) SubCutaneous daily  gabapentin   Solution 800 milliGRAM(s) Oral three times a day  guaiFENesin    Syrup 200 milliGRAM(s) Oral every 6 hours PRN  hydrALAZINE Injectable 10 milliGRAM(s) IV Push two times a day PRN  hydrochlorothiazide 12.5 milliGRAM(s) Oral daily  influenza   Vaccine 0.5 milliLiter(s) IntraMuscular once  losartan 100 milliGRAM(s) Oral daily  piperacillin/tazobactam IVPB. 3.375 Gram(s) IV Intermittent every 8 hours  simethicone 80 milliGRAM(s) Chew daily PRN      PHYSICAL EXAM:   Vital Signs Last 24 Hrs  T(C): 36.4 (27 Nov 2017 07:57), Max: 37 (26 Nov 2017 23:49)  T(F): 97.6 (27 Nov 2017 07:57), Max: 98.6 (26 Nov 2017 23:49)  HR: 70 (27 Nov 2017 07:57) (66 - 91)  BP: 148/88 (27 Nov 2017 07:57) (136/85 - 166/97)  BP(mean): --  RR: 18 (27 Nov 2017 07:57) (18 - 18)  SpO2: 97% (27 Nov 2017 07:57) (96% - 97%)    eneral: restless, SOB, increased secretions  HEENT: EOM intact, visual fields full, PERRLA  Abdomen: Soft, mild tenderness at PEG site, no erythema, nondistended   Extremities: No edema    NEUROLOGICAL EXAM:  Mental status: Awake, alert, oriented x3, fluent speech, no neglect, able to follow commands  Cranial Nerves: Subtle left facial droop, EOMI, VFF, no nystagmus, hoarseness, dysarthria, slight palatal deviation to the left, no hiccups, hearing intact to  bilateral finger rub   Motor exam: Normal tone, no drift, RUE 5/5, RLE 5/5, drift LUE 4-/5, drift LLE 5-/5.  Sensation: decreased sensation to pinprick on left arm  Coordination/ Gait: LUE moderate-severe dysmetria  LABS:                        12.4   4.96  )-----------( 249      ( 27 Nov 2017 08:45 )             38.1    11-27    139  |  100  |  10  ----------------------------<  120<H>  3.7   |  24  |  1.01    Ca    9.0      27 Nov 2017 08:45      Hemoglobin A1C, Whole Blood: 5.8 % (11-08 @ 10:36)      IMAGING: Reviewed by me.   CT Chest No Cont (11.22.17) Bilateral lower lobe pneumonia.  Head CT/CTA head/Neck (11/07) Right basal ganglia and corona radiata infarct without significant change since 6/16/2017. There is severe steno-occlusive disease intracranially involving the right supraclinoid internal carotid artery, A1 and M1 branches, and severe narrowing of the left M1 segment of the middle cerebral artery. The distal right vertebral artery is quite small and the proximal basilar artery is not visualized. The dominant left vertebral artery ends at the PICA. Distal basilar flow appears to be due to retrograde flow from the right posterior communicating artery.  Head CT (11/07) No acute intracranial hemorrhage, mass effect, or evidence of acute territorial infarct. Chronic right corona radiata/basal ganglia and right inferior cerebellar hemisphere infarcts.  Brain MRI (11/09) In comparison the prior MRI, there is new hyperintense T2 and FLAIR signal with faint increased restricted diffusion in the right medulla extending to the inferior right brachium pontis and inferior medial right cerebellar hemisphere, new compared to 6/17/2017 consistent with evolving area of  ischemic change without hemorrhagic transformation. Redemonstration of volume loss with multiple additional areas of lacunar infarction including chronic infarcts in the right cerebellar hemisphere and right corona radiata. Decreased visualization of the flow-void within the right vertebral artery with continued irregular isointense T1 signal throughout the basilar artery. Bilateral sinus disease with bubbly secretions and air-fluid levels, greatest in the right maxillary sinus THE PATIENT WAS SEEN AND EXAMINED BY ME WITH THE HOUSESTAFF AND STROKE TEAM DURING MORNING ROUNDS.     HPI:  58 y/o R-handed [place] man PMH CVA with residual L-sided weakness, CAD s/p CABG, HTN presents as stroke code for dizziness. Pt at baseline ambulates independently and independent in ADLs. Pt was in usual state of health on 11/07 at 9AM. At around 9:30AM, he began to complain of lightheadedness not associated with changes in position. Half an hour later, he began to complain of n/v. He also endorses HA, diplopia/blurry vision, R-sided weakness that began a few days prior to admission.  He reports he ran out of his BP meds a week prior to admission and has been unable to refill his medications    SUBJECTIVE: No events overnight.  No new neurologic complaints.      acetaminophen   Tablet. 650 milliGRAM(s) Oral every 6 hours PRN  acetaminophen  Suppository 650 milliGRAM(s) Rectal every 6 hours PRN  ALBUTerol/ipratropium for Nebulization 3 milliLiter(s) Nebulizer every 6 hours  aspirin  chewable 81 milliGRAM(s) Oral daily  atorvastatin 80 milliGRAM(s) Oral at bedtime  baclofen 10 milliGRAM(s) Oral three times a day  chlorproMAZINE    Tablet 25 milliGRAM(s) Oral three times a day  clopidogrel Tablet 75 milliGRAM(s) Oral daily  doxazosin 2 milliGRAM(s) Oral at bedtime  enoxaparin Injectable 40 milliGRAM(s) SubCutaneous daily  gabapentin   Solution 800 milliGRAM(s) Oral three times a day  guaiFENesin    Syrup 200 milliGRAM(s) Oral every 6 hours PRN  hydrALAZINE Injectable 10 milliGRAM(s) IV Push two times a day PRN  hydrochlorothiazide 12.5 milliGRAM(s) Oral daily  influenza   Vaccine 0.5 milliLiter(s) IntraMuscular once  losartan 100 milliGRAM(s) Oral daily  piperacillin/tazobactam IVPB. 3.375 Gram(s) IV Intermittent every 8 hours  simethicone 80 milliGRAM(s) Chew daily PRN    PHYSICAL EXAM:   Vital Signs Last 24 Hrs  T(C): 36.4 (27 Nov 2017 07:57), Max: 37 (26 Nov 2017 23:49)  T(F): 97.6 (27 Nov 2017 07:57), Max: 98.6 (26 Nov 2017 23:49)  HR: 70 (27 Nov 2017 07:57) (66 - 91)  BP: 148/88 (27 Nov 2017 07:57) (136/85 - 166/97)  BP(mean): --  RR: 18 (27 Nov 2017 07:57) (18 - 18)  SpO2: 97% (27 Nov 2017 07:57) (96% - 97%)    General: Mild SOB, increased secretions  HEENT: EOM intact, visual fields full, PERRLA  Abdomen: Soft, mild tenderness at PEG site, no erythema, nondistended   Extremities: No edema    NEUROLOGICAL EXAM:  Mental status: Awake, alert, oriented x3, fluent speech, no neglect, able to follow commands, moderate dysarthria   Cranial Nerves: Subtle UMN type left facial droop, EOMI, VFF, no nystagmus, hoarseness, slight palatal deviation to the left, no hiccups, hearing intact to  bilateral finger rub   Motor exam: Normal tone, no drift, RUE 5/5, RLE 5/5, drift LUE 4-/5, drift LLE 5-/5  Sensation: decreased sensation to pinprick on left arm  Coordination/ Gait: LUE moderate-severe dysmetria    LABS:                        12.4   4.96  )-----------( 249      ( 27 Nov 2017 08:45 )             38.1    11-27    139  |  100  |  10  ----------------------------<  120<H>  3.7   |  24  |  1.01    Ca    9.0      27 Nov 2017 08:45      Hemoglobin A1C, Whole Blood: 5.8 % (11-08 @ 10:36)      IMAGING: Reviewed by me.   CT Chest No Cont (11.22.17) Bilateral lower lobe pneumonia.  Head CT/CTA head/Neck (11/07) Right basal ganglia and corona radiata infarct without significant change since 6/16/2017. There is severe steno-occlusive disease intracranially involving the right supraclinoid internal carotid artery, A1 and M1 branches, and severe narrowing of the left M1 segment of the middle cerebral artery. The distal right vertebral artery is quite small and the proximal basilar artery is not visualized. The dominant left vertebral artery ends at the PICA. Distal basilar flow appears to be due to retrograde flow from the right posterior communicating artery.  Head CT (11/07) No acute intracranial hemorrhage, mass effect, or evidence of acute territorial infarct. Chronic right corona radiata/basal ganglia and right inferior cerebellar hemisphere infarcts.  Brain MRI (11/09) In comparison the prior MRI, there is new hyperintense T2 and FLAIR signal with faint increased restricted diffusion in the right medulla extending to the inferior right brachium pontis and inferior medial right cerebellar hemisphere, new compared to 6/17/2017 consistent with evolving area of  ischemic change without hemorrhagic transformation. Redemonstration of volume loss with multiple additional areas of lacunar infarction including chronic infarcts in the right cerebellar hemisphere and right corona radiata. Decreased visualization of the flow-void within the right vertebral artery with continued irregular isointense T1 signal throughout the basilar artery. Bilateral sinus disease with bubbly secretions and air-fluid levels, greatest in the right maxillary sinus

## 2017-11-27 NOTE — PROGRESS NOTE ADULT - SUBJECTIVE AND OBJECTIVE BOX
f/u pneumonia    interval history/ROS:  tired.  no cough.  no hiccups.  Rest of ROS otherwise negative.    Allergies  No Known Allergies    ANTIMICROBIALS:   piperacillin/tazobactam IVPB. 3.375 every 8 hours [started 11/22-]  vancomycin  IVPB 1000 once 11/22    MEDICATIONS  (STANDING):  ALBUTerol/ipratropium for Nebulization 3 every 6 hours  aspirin  chewable 81 daily  atorvastatin 80 at bedtime  baclofen 10 three times a day  chlorproMAZINE    Tablet 10 three times a day  clopidogrel Tablet 75 daily  doxazosin 2 at bedtime  enoxaparin Injectable 40 daily  gabapentin   Solution 800 three times a day  hydrochlorothiazide 12.5 daily  influenza   Vaccine 0.5 once  losartan 100 daily    Vital Signs Last 24 Hrs  T(F): 97.6 (11-27-17 @ 07:57), Max: 98.6 (11-26-17 @ 23:49)  HR: 70 (11-27-17 @ 07:57)  BP: 148/88 (11-27-17 @ 07:57)  RR: 18 (11-27-17 @ 07:57)  SpO2: 97% (11-27-17 @ 07:57) (96% - 97%)  Wt(kg): --    PHYSICAL EXAM:  General: non-toxic; sitting in chair  HEAD/EYES: anicteric  ENT:  supple  Cardiovascular:   S1, S2  Respiratory:  more clear today  GI:  soft, non-tender, normal bowel sounds; PEG okay  :  haro with clear yellow urine  Musculoskeletal:  no synovitis  Neurologic:  L weakness  Skin:  no rash  Lymph: no lymphadenopathy  Psychiatric:  appropriate affect  Vascular:  no phlebitis                        12.4   4.96  )-----------( 249      ( 27 Nov 2017 08:45 )             38.1 11-27    139  |  100  |  10  ----------------------------<  120  3.7   |  24  |  1.01  Ca    9.0      27 Nov 2017 08:45    MICROBIOLOGY:  Clostridium difficile GDH Toxins A&amp;B, EIA:   Negative (11.25.17 @ 00:02)    Culture - Sputum . (11.23.17 @ 01:09)  Moderate Klebsiella pneumoniae  Numerous Haemophilus influenzae  Normal Respiratory Bryanna present    Culture - Sputum . (11.23.17 @ 01:09)    Gram Stain:   Numerous polymorphonuclear leukocytes per low power field  Few Squamous epithelial cells per low power field  Numerous Gram Negative Rods per oil power field  Numerous Gram positive cocci in pairs per oil power field    Specimen Source: .Sputum Sputum    Culture Results:   >100,000 CFU/ml Klebsiella pneumoniae (11.21.17 @ 17:15)    Culture - Blood (11.21.17 @ 16:57)    Specimen Source: .Blood Blood    Culture Results:   No growth to date.    .Urine Catheterized  11-10-17   No growth  --  --    .Sputum Sputum  11-10-17   Streptococcus pneumoniae  Normal Respiratory Bryanna present  --  Streptococcus pneumoniae    .Blood Blood-Peripheral  11-10-17   No growth at 5 days.  --  --    .Urine Clean Catch (Midstream)  11-08-17   No growth  --  --    RADIOLOGY:  CT Chest No Cont (11.22.17 @ 12:10)   IMPRESSION: Bilateral lower lobe pneumonia.    Xray Abdomen Series Flat/Upright 2 View (11.21.17 @ 19:23)   IMPRESSION: PEG tube in left upper quadrant. Nonobstructive bowel gas pattern.    Chest 1 View AP -PORTABLE-Routine (11.21.17 @ 10:38)   IMPRESSION:  No acute process.    MR Head No Cont (11.09.17 @ 10:49)  IMPRESSION:   In comparison the prior MRI, there is new hyperintense T2 and FLAIR signal with faint increased restricted diffusion in the right medulla extending to the inferior right brachium pontis and inferior medial right cerebellar hemisphere, new compared to 6/17/2017 consistent with evolving area of ischemic change without hemorrhagic transformation.  Redemonstration of volume loss with multiple additional areas of lacunar infarction including chronic infarcts in the right cerebellar hemisphere and right corona radiata. Decreased visualization of the flow-void within the right vertebral artery with continued irregular isointense T1 signal throughout the basilar artery. Bilateral sinus disease with bubbly secretions and air-fluid levels, greatest in the right maxillary sinus. Correlate clinically for symptoms of acute sinusitis.

## 2017-11-27 NOTE — CONSULT NOTE ADULT - ASSESSMENT
56 yo male s/p new stroke and aspiration pnma evaluated by ENT via FOE/laryngoscopy for residual dysphagia, hoarseness and aspiration of secretions. Pt NPO via peg tube feeds. FOE/laryngoscopy significant for pooling of secretions and gross aspiration. No vocal cord paralysis or edema noted.

## 2017-11-27 NOTE — PROGRESS NOTE ADULT - ASSESSMENT
Patient is a 56 yo [place] male w/ PMH significant for HTN, HLD, CAD s/p CABG, previous CVA w/ residual left hemiparesis, admitted for R sided weakness, HA, diplopia, diagnosed with R lateral medullary and R cerebellar ischemic infarcts per MRI. Patient failed MBS and PEG tube was placed on 11/14 and tube feeding was initiated and pt was having problems with emesis, residuals, constipation-concern for ileus. 11/22 with coughing up feeds, green malordours secretions, hypoxia which resolved. found to have bilat PNA-asp, UTI--kleb,

## 2017-11-28 PROCEDURE — 74000: CPT | Mod: 26

## 2017-11-28 PROCEDURE — 99232 SBSQ HOSP IP/OBS MODERATE 35: CPT | Mod: GC

## 2017-11-28 PROCEDURE — 99233 SBSQ HOSP IP/OBS HIGH 50: CPT

## 2017-11-28 RX ORDER — METOCLOPRAMIDE HCL 10 MG
5 TABLET ORAL EVERY 8 HOURS
Qty: 0 | Refills: 0 | Status: DISCONTINUED | OUTPATIENT
Start: 2017-11-28 | End: 2017-11-28

## 2017-11-28 RX ORDER — SODIUM CHLORIDE 9 MG/ML
500 INJECTION INTRAMUSCULAR; INTRAVENOUS; SUBCUTANEOUS ONCE
Qty: 0 | Refills: 0 | Status: DISCONTINUED | OUTPATIENT
Start: 2017-11-28 | End: 2017-12-04

## 2017-11-28 RX ORDER — METOCLOPRAMIDE HCL 10 MG
10 TABLET ORAL EVERY 8 HOURS
Qty: 0 | Refills: 0 | Status: COMPLETED | OUTPATIENT
Start: 2017-11-28 | End: 2017-11-28

## 2017-11-28 RX ORDER — ONDANSETRON 8 MG/1
4 TABLET, FILM COATED ORAL ONCE
Qty: 0 | Refills: 0 | Status: DISCONTINUED | OUTPATIENT
Start: 2017-11-28 | End: 2017-12-04

## 2017-11-28 RX ADMIN — ATORVASTATIN CALCIUM 80 MILLIGRAM(S): 80 TABLET, FILM COATED ORAL at 21:48

## 2017-11-28 RX ADMIN — Medication 10 MILLIGRAM(S): at 05:37

## 2017-11-28 RX ADMIN — GABAPENTIN 800 MILLIGRAM(S): 400 CAPSULE ORAL at 21:48

## 2017-11-28 RX ADMIN — Medication 10 MILLIGRAM(S): at 14:18

## 2017-11-28 RX ADMIN — Medication 2 MILLIGRAM(S): at 21:48

## 2017-11-28 RX ADMIN — Medication 3 MILLILITER(S): at 05:37

## 2017-11-28 RX ADMIN — Medication 3 MILLILITER(S): at 18:18

## 2017-11-28 RX ADMIN — Medication 3 MILLILITER(S): at 00:20

## 2017-11-28 RX ADMIN — GABAPENTIN 800 MILLIGRAM(S): 400 CAPSULE ORAL at 14:18

## 2017-11-28 RX ADMIN — LOSARTAN POTASSIUM 100 MILLIGRAM(S): 100 TABLET, FILM COATED ORAL at 05:39

## 2017-11-28 RX ADMIN — GABAPENTIN 800 MILLIGRAM(S): 400 CAPSULE ORAL at 05:36

## 2017-11-28 RX ADMIN — PIPERACILLIN AND TAZOBACTAM 25 GRAM(S): 4; .5 INJECTION, POWDER, LYOPHILIZED, FOR SOLUTION INTRAVENOUS at 14:13

## 2017-11-28 RX ADMIN — Medication 3 MILLILITER(S): at 23:10

## 2017-11-28 RX ADMIN — ENOXAPARIN SODIUM 40 MILLIGRAM(S): 100 INJECTION SUBCUTANEOUS at 14:17

## 2017-11-28 RX ADMIN — Medication 10 MILLIGRAM(S): at 21:48

## 2017-11-28 RX ADMIN — Medication 10 MILLIGRAM(S): at 18:17

## 2017-11-28 RX ADMIN — Medication 81 MILLIGRAM(S): at 14:17

## 2017-11-28 RX ADMIN — Medication 3 MILLILITER(S): at 14:14

## 2017-11-28 RX ADMIN — CLOPIDOGREL BISULFATE 75 MILLIGRAM(S): 75 TABLET, FILM COATED ORAL at 14:17

## 2017-11-28 RX ADMIN — PIPERACILLIN AND TAZOBACTAM 25 GRAM(S): 4; .5 INJECTION, POWDER, LYOPHILIZED, FOR SOLUTION INTRAVENOUS at 01:01

## 2017-11-28 RX ADMIN — Medication 12.5 MILLIGRAM(S): at 05:37

## 2017-11-28 NOTE — PROGRESS NOTE ADULT - SUBJECTIVE AND OBJECTIVE BOX
Chief Complaint:  Patient is a 57y old  Male who presents with a chief complaint of stroke code (08 Nov 2017 08:50)      Interval Events:   Team reconsulting us for difficulty in uptitrating feeds.  Reports N/V. Goal feeds of 70cc/hour.  Patient having normal bowel movements.  Team does not know max feeds patient was able to tolerate    Allergies:  No Known Allergies      Home Medications:    Hospital Medications:  acetaminophen   Tablet. 650 milliGRAM(s) Oral every 6 hours PRN  acetaminophen  Suppository 650 milliGRAM(s) Rectal every 6 hours PRN  ALBUTerol/ipratropium for Nebulization 3 milliLiter(s) Nebulizer every 6 hours  aspirin  chewable 81 milliGRAM(s) Oral daily  atorvastatin 80 milliGRAM(s) Oral at bedtime  baclofen 10 milliGRAM(s) Oral three times a day  clopidogrel Tablet 75 milliGRAM(s) Oral daily  doxazosin 2 milliGRAM(s) Oral at bedtime  enoxaparin Injectable 40 milliGRAM(s) SubCutaneous daily  gabapentin   Solution 800 milliGRAM(s) Oral three times a day  guaiFENesin    Syrup 200 milliGRAM(s) Oral every 6 hours PRN  hydrALAZINE Injectable 10 milliGRAM(s) IV Push two times a day PRN  hydrochlorothiazide 12.5 milliGRAM(s) Oral daily  influenza   Vaccine 0.5 milliLiter(s) IntraMuscular once  losartan 100 milliGRAM(s) Oral daily  metoclopramide Injectable 10 milliGRAM(s) IV Push every 8 hours  ondansetron Injectable 4 milliGRAM(s) IV Push once  piperacillin/tazobactam IVPB. 3.375 Gram(s) IV Intermittent every 8 hours  simethicone 80 milliGRAM(s) Chew daily PRN  sodium chloride 0.9% Bolus 500 milliLiter(s) IV Bolus once      PMHX/PSHX:  Stented coronary artery  CVA (cerebral vascular accident)  HTN (hypertension)  No significant past surgical history      Family history:  No pertinent family history in first degree relatives      ROS:     General:  No wt loss, fevers, chills, night sweats, fatigue,   Eyes:  Good vision, no reported pain  ENT:  No sore throat, pain, runny nose, dysphagia  CV:  No pain, palpitations, hypo/hypertension  Resp:  No dyspnea, cough, tachypnea, wheezing  GI:  No pain, No nausea, No vomiting, No diarrhea, No constipation, No weight loss, No fever, No pruritis, No rectal bleeding, No tarry stools, No dysphagia,  :  No pain, bleeding, incontinence, nocturia  Muscle:  No pain, weakness  Neuro:  No weakness, tingling, memory problems  Psych:  No fatigue, insomnia, mood problems, depression  Endocrine:  No polyuria, polydipsia, cold/heat intolerance  Heme:  No petechiae, ecchymosis, easy bruisability  Skin:  No rash, tattoos, scars, edema      PHYSICAL EXAM:   Vital Signs:  Vital Signs Last 24 Hrs  T(C): 36.6 (28 Nov 2017 08:42), Max: 36.9 (27 Nov 2017 23:42)  T(F): 97.9 (28 Nov 2017 08:42), Max: 98.5 (27 Nov 2017 23:42)  HR: 82 (28 Nov 2017 08:42) (59 - 82)  BP: 97/64 (28 Nov 2017 08:42) (97/64 - 153/86)  BP(mean): --  RR: 18 (28 Nov 2017 08:42) (18 - 18)  SpO2: 97% (28 Nov 2017 08:42) (95% - 100%)  Daily     Daily     GENERAL:  NAD  CHEST:  no increased WOB  HEART:  s1 s2  ABDOMEN: PEG tube in place, C/D/I no erythema or warmth, nontender nondistedned  EXTEREMITIES:  no cyanosis,clubbing or edema  SKIN:  No rash/erythema/ecchymoses/petechiae/wounds/abscess/warm/dry  NEURO:  Alert, oriented, no asterixis, no tremor, no encephalopathy    LABS:                        12.4   4.96  )-----------( 249      ( 27 Nov 2017 08:45 )             38.1     11-27    139  |  100  |  10  ----------------------------<  120<H>  3.7   |  24  |  1.01    Ca    9.0      27 Nov 2017 08:45                Imaging:

## 2017-11-28 NOTE — PROGRESS NOTE ADULT - SUBJECTIVE AND OBJECTIVE BOX
THE PATIENT WAS SEEN AND EXAMINED BY ME WITH THE HOUSESTAFF AND STROKE TEAM DURING MORNING ROUNDS.   HPI:  56 y/o R-handed [place] man PMH CVA with residual L-sided weakness, CAD s/p CABG, HTN presents as stroke code for dizziness. Pt at baseline ambulates independently and independent in ADLs. Pt was in usual state of health on 11/07 at 9AM. At around 9:30AM, he began to complain of lightheadedness not associated with changes in position. Half an hour later, he began to complain of n/v. He also endorses HA, diplopia/blurry vision, R-sided weakness that began a few days prior to admission.  He reports he ran out of his BP meds a week prior to admission and has been unable to refill his medications    SUBJECTIVE: No events overnight.  No new neurologic complaints.      acetaminophen   Tablet. 650 milliGRAM(s) Oral every 6 hours PRN  acetaminophen  Suppository 650 milliGRAM(s) Rectal every 6 hours PRN  ALBUTerol/ipratropium for Nebulization 3 milliLiter(s) Nebulizer every 6 hours  aspirin  chewable 81 milliGRAM(s) Oral daily  atorvastatin 80 milliGRAM(s) Oral at bedtime  baclofen 10 milliGRAM(s) Oral three times a day  chlorproMAZINE    Tablet 10 milliGRAM(s) Oral three times a day  clopidogrel Tablet 75 milliGRAM(s) Oral daily  doxazosin 2 milliGRAM(s) Oral at bedtime  enoxaparin Injectable 40 milliGRAM(s) SubCutaneous daily  gabapentin   Solution 800 milliGRAM(s) Oral three times a day  guaiFENesin    Syrup 200 milliGRAM(s) Oral every 6 hours PRN  hydrALAZINE Injectable 10 milliGRAM(s) IV Push two times a day PRN  hydrochlorothiazide 12.5 milliGRAM(s) Oral daily  influenza   Vaccine 0.5 milliLiter(s) IntraMuscular once  losartan 100 milliGRAM(s) Oral daily  ondansetron Injectable 4 milliGRAM(s) IV Push once  piperacillin/tazobactam IVPB. 3.375 Gram(s) IV Intermittent every 8 hours  simethicone 80 milliGRAM(s) Chew daily PRN  sodium chloride 0.9% Bolus 500 milliLiter(s) IV Bolus once      PHYSICAL EXAM:   Vital Signs Last 24 Hrs  T(C): 36.6 (28 Nov 2017 08:42), Max: 36.9 (27 Nov 2017 23:42)  T(F): 97.9 (28 Nov 2017 08:42), Max: 98.5 (27 Nov 2017 23:42)  HR: 82 (28 Nov 2017 08:42) (59 - 82)  BP: 97/64 (28 Nov 2017 08:42) (97/64 - 153/86)  BP(mean): --  RR: 18 (28 Nov 2017 08:42) (18 - 18)  SpO2: 97% (28 Nov 2017 08:42) (95% - 100%)    General: Mild SOB, increased secretions  HEENT: EOM intact, visual fields full, PERRLA  Abdomen: Soft, mild tenderness at PEG site, no erythema, nondistended   Extremities: No edema    NEUROLOGICAL EXAM:  Mental status: Awake, alert, oriented x3, fluent speech, no neglect, able to follow commands, moderate dysarthria   Cranial Nerves: Subtle UMN type left facial droop, EOMI, VFF, no nystagmus, hoarseness, slight palatal deviation to the left, no hiccups, hearing intact to  bilateral finger rub   Motor exam: Normal tone, no drift, RUE 5/5, RLE 5/5, drift LUE 4-/5, drift LLE 5-/5  Sensation: decreased sensation to pinprick on left arm  Coordination/ Gait: LUE moderate-severe dysmetria  LABS:                        12.4   4.96  )-----------( 249      ( 27 Nov 2017 08:45 )             38.1    11-27    139  |  100  |  10  ----------------------------<  120<H>  3.7   |  24  |  1.01    Ca    9.0      27 Nov 2017 08:45      Hemoglobin A1C, Whole Blood: 5.8 % (11-08 @ 10:36)      IMAGING: Reviewed by me.   CT Chest No Cont (11.22.17) Bilateral lower lobe pneumonia.  Head CT/CTA head/Neck (11/07) Right basal ganglia and corona radiata infarct without significant change since 6/16/2017. There is severe steno-occlusive disease intracranially involving the right supraclinoid internal carotid artery, A1 and M1 branches, and severe narrowing of the left M1 segment of the middle cerebral artery. The distal right vertebral artery is quite small and the proximal basilar artery is not visualized. The dominant left vertebral artery ends at the PICA. Distal basilar flow appears to be due to retrograde flow from the right posterior communicating artery.  Head CT (11/07) No acute intracranial hemorrhage, mass effect, or evidence of acute territorial infarct. Chronic right corona radiata/basal ganglia and right inferior cerebellar hemisphere infarcts.  Brain MRI (11/09) In comparison the prior MRI, there is new hyperintense T2 and FLAIR signal with faint increased restricted diffusion in the right medulla extending to the inferior right brachium pontis and inferior medial right cerebellar hemisphere, new compared to 6/17/2017 consistent with evolving area of  ischemic change without hemorrhagic transformation. Redemonstration of volume loss with multiple additional areas of lacunar infarction including chronic infarcts in the right cerebellar hemisphere and right corona radiata. Decreased visualization of the flow-void within the right vertebral artery with continued irregular isointense T1 signal throughout the basilar artery. Bilateral sinus disease with bubbly secretions and air-fluid levels, greatest in the right maxillary sinus THE PATIENT WAS SEEN AND EXAMINED BY ME WITH THE HOUSESTAFF AND STROKE TEAM DURING MORNING ROUNDS.   HPI:  58 y/o R-handed [place] man PMH CVA with residual L-sided weakness, CAD s/p CABG, HTN presents as stroke code for dizziness. Pt at baseline ambulates independently and independent in ADLs. Pt was in usual state of health on 11/07 at 9AM. At around 9:30AM, he began to complain of lightheadedness not associated with changes in position. Half an hour later, he began to complain of n/v. He also endorses HA, diplopia/blurry vision, R-sided weakness that began a few days prior to admission.  He reports he ran out of his BP meds a week prior to admission and has been unable to refill his medications    SUBJECTIVE: Noted vomiting of TF this am.     acetaminophen   Tablet. 650 milliGRAM(s) Oral every 6 hours PRN  acetaminophen  Suppository 650 milliGRAM(s) Rectal every 6 hours PRN  ALBUTerol/ipratropium for Nebulization 3 milliLiter(s) Nebulizer every 6 hours  aspirin  chewable 81 milliGRAM(s) Oral daily  atorvastatin 80 milliGRAM(s) Oral at bedtime  baclofen 10 milliGRAM(s) Oral three times a day  chlorproMAZINE    Tablet 10 milliGRAM(s) Oral three times a day  clopidogrel Tablet 75 milliGRAM(s) Oral daily  doxazosin 2 milliGRAM(s) Oral at bedtime  enoxaparin Injectable 40 milliGRAM(s) SubCutaneous daily  gabapentin   Solution 800 milliGRAM(s) Oral three times a day  guaiFENesin    Syrup 200 milliGRAM(s) Oral every 6 hours PRN  hydrALAZINE Injectable 10 milliGRAM(s) IV Push two times a day PRN  hydrochlorothiazide 12.5 milliGRAM(s) Oral daily  influenza   Vaccine 0.5 milliLiter(s) IntraMuscular once  losartan 100 milliGRAM(s) Oral daily  ondansetron Injectable 4 milliGRAM(s) IV Push once  piperacillin/tazobactam IVPB. 3.375 Gram(s) IV Intermittent every 8 hours  simethicone 80 milliGRAM(s) Chew daily PRN  sodium chloride 0.9% Bolus 500 milliLiter(s) IV Bolus once      PHYSICAL EXAM:   Vital Signs Last 24 Hrs  T(C): 36.6 (28 Nov 2017 08:42), Max: 36.9 (27 Nov 2017 23:42)  T(F): 97.9 (28 Nov 2017 08:42), Max: 98.5 (27 Nov 2017 23:42)  HR: 82 (28 Nov 2017 08:42) (59 - 82)  BP: 97/64 (28 Nov 2017 08:42) (97/64 - 153/86)  BP(mean): --  RR: 18 (28 Nov 2017 08:42) (18 - 18)  SpO2: 97% (28 Nov 2017 08:42) (95% - 100%)    General: Mild SOB, increased secretions  HEENT: EOM intact, visual fields full, PERRLA  Abdomen: Soft, mild tenderness at PEG site, no erythema, nondistended   Extremities: No edema    NEUROLOGICAL EXAM:  Mental status: Awake, alert, oriented x3, fluent speech, no neglect, able to follow commands, moderate dysarthria   Cranial Nerves: Subtle UMN type left facial droop, EOMI, VFF, no nystagmus, hoarseness, slight palatal deviation to the left, no hiccups, hearing intact to  bilateral finger rub   Motor exam: Normal tone, no drift, RUE 5/5, RLE 5/5, drift LUE 4-/5, drift LLE 5-/5  Sensation: decreased sensation to pinprick on left arm  Coordination/ Gait: LUE moderate-severe dysmetria  LABS:                        12.4   4.96  )-----------( 249      ( 27 Nov 2017 08:45 )             38.1    11-27    139  |  100  |  10  ----------------------------<  120<H>  3.7   |  24  |  1.01    Ca    9.0      27 Nov 2017 08:45      Hemoglobin A1C, Whole Blood: 5.8 % (11-08 @ 10:36)      IMAGING: Reviewed by me.   CT Chest No Cont (11.22.17) Bilateral lower lobe pneumonia.  Head CT/CTA head/Neck (11/07) Right basal ganglia and corona radiata infarct without significant change since 6/16/2017. There is severe steno-occlusive disease intracranially involving the right supraclinoid internal carotid artery, A1 and M1 branches, and severe narrowing of the left M1 segment of the middle cerebral artery. The distal right vertebral artery is quite small and the proximal basilar artery is not visualized. The dominant left vertebral artery ends at the PICA. Distal basilar flow appears to be due to retrograde flow from the right posterior communicating artery.  Head CT (11/07) No acute intracranial hemorrhage, mass effect, or evidence of acute territorial infarct. Chronic right corona radiata/basal ganglia and right inferior cerebellar hemisphere infarcts.  Brain MRI (11/09) In comparison the prior MRI, there is new hyperintense T2 and FLAIR signal with faint increased restricted diffusion in the right medulla extending to the inferior right brachium pontis and inferior medial right cerebellar hemisphere, new compared to 6/17/2017 consistent with evolving area of  ischemic change without hemorrhagic transformation. Redemonstration of volume loss with multiple additional areas of lacunar infarction including chronic infarcts in the right cerebellar hemisphere and right corona radiata. Decreased visualization of the flow-void within the right vertebral artery with continued irregular isointense T1 signal throughout the basilar artery. Bilateral sinus disease with bubbly secretions and air-fluid levels, greatest in the right maxillary sinus THE PATIENT WAS SEEN AND EXAMINED BY ME WITH THE HOUSESTAFF AND STROKE TEAM DURING MORNING ROUNDS.     HPI:  58 y/o R-handed [place]alese man PMH CVA with residual L-sided weakness, CAD s/p CABG, HTN presents as stroke code for dizziness. Pt at baseline ambulates independently and independent in ADLs. Pt was in usual state of health on 11/07 at 9AM. At around 9:30AM, he began to complain of lightheadedness not associated with changes in position. Half an hour later, he began to complain of n/v. He also endorses HA, diplopia/blurry vision, R-sided weakness that began a few days prior to admission.  He reports he ran out of his BP meds a week prior to admission and has been unable to refill his medications    SUBJECTIVE: Noted vomiting of TF this AM.     acetaminophen   Tablet. 650 milliGRAM(s) Oral every 6 hours PRN  acetaminophen  Suppository 650 milliGRAM(s) Rectal every 6 hours PRN  ALBUTerol/ipratropium for Nebulization 3 milliLiter(s) Nebulizer every 6 hours  aspirin  chewable 81 milliGRAM(s) Oral daily  atorvastatin 80 milliGRAM(s) Oral at bedtime  baclofen 10 milliGRAM(s) Oral three times a day  chlorproMAZINE    Tablet 10 milliGRAM(s) Oral three times a day  clopidogrel Tablet 75 milliGRAM(s) Oral daily  doxazosin 2 milliGRAM(s) Oral at bedtime  enoxaparin Injectable 40 milliGRAM(s) SubCutaneous daily  gabapentin   Solution 800 milliGRAM(s) Oral three times a day  guaiFENesin    Syrup 200 milliGRAM(s) Oral every 6 hours PRN  hydrALAZINE Injectable 10 milliGRAM(s) IV Push two times a day PRN  hydrochlorothiazide 12.5 milliGRAM(s) Oral daily  influenza   Vaccine 0.5 milliLiter(s) IntraMuscular once  losartan 100 milliGRAM(s) Oral daily  ondansetron Injectable 4 milliGRAM(s) IV Push once  piperacillin/tazobactam IVPB. 3.375 Gram(s) IV Intermittent every 8 hours  simethicone 80 milliGRAM(s) Chew daily PRN  sodium chloride 0.9% Bolus 500 milliLiter(s) IV Bolus once      PHYSICAL EXAM:   Vital Signs Last 24 Hrs  T(C): 36.6 (28 Nov 2017 08:42), Max: 36.9 (27 Nov 2017 23:42)  T(F): 97.9 (28 Nov 2017 08:42), Max: 98.5 (27 Nov 2017 23:42)  HR: 82 (28 Nov 2017 08:42) (59 - 82)  BP: 97/64 (28 Nov 2017 08:42) (97/64 - 153/86)  BP(mean): --  RR: 18 (28 Nov 2017 08:42) (18 - 18)  SpO2: 97% (28 Nov 2017 08:42) (95% - 100%)    General: Mild SOB, increased secretions  HEENT: EOM intact, visual fields full, PERRLA  Abdomen: Soft, mild tenderness at PEG site, no erythema, nondistended   Extremities: No edema    NEUROLOGICAL EXAM:  Mental status: Awake, alert, oriented x3, fluent speech, no neglect, able to follow commands, moderate dysarthria   Cranial Nerves: Subtle UMN type left facial droop, EOMI, VFF, no nystagmus, hoarseness, slight palatal deviation to the left, no hiccups, hearing intact to  bilateral finger rub   Motor exam: Normal tone, no drift, RUE 5/5, RLE 5/5, drift LUE 4-/5, drift LLE 5-/5  Sensation: Decreased sensation to pinprick on left arm  Coordination/ Gait: LUE moderate-severe dysmetria  LABS:                        12.4   4.96  )-----------( 249      ( 27 Nov 2017 08:45 )             38.1    11-27    139  |  100  |  10  ----------------------------<  120<H>  3.7   |  24  |  1.01    Ca    9.0      27 Nov 2017 08:45      Hemoglobin A1C, Whole Blood: 5.8 % (11-08 @ 10:36)      IMAGING: Reviewed by me.   CT Chest No Cont (11.22.17) Bilateral lower lobe pneumonia.  Head CT/CTA head/Neck (11/07) Right basal ganglia and corona radiata infarct without significant change since 6/16/2017. There is severe steno-occlusive disease intracranially involving the right supraclinoid internal carotid artery, A1 and M1 branches, and severe narrowing of the left M1 segment of the middle cerebral artery. The distal right vertebral artery is quite small and the proximal basilar artery is not visualized. The dominant left vertebral artery ends at the PICA. Distal basilar flow appears to be due to retrograde flow from the right posterior communicating artery.  Head CT (11/07) No acute intracranial hemorrhage, mass effect, or evidence of acute territorial infarct. Chronic right corona radiata/basal ganglia and right inferior cerebellar hemisphere infarcts.  Brain MRI (11/09) In comparison the prior MRI, there is new hyperintense T2 and FLAIR signal with faint increased restricted diffusion in the right medulla extending to the inferior right brachium pontis and inferior medial right cerebellar hemisphere, new compared to 6/17/2017 consistent with evolving area of  ischemic change without hemorrhagic transformation. Redemonstration of volume loss with multiple additional areas of lacunar infarction including chronic infarcts in the right cerebellar hemisphere and right corona radiata. Decreased visualization of the flow-void within the right vertebral artery with continued irregular isointense T1 signal throughout the basilar artery. Bilateral sinus disease with bubbly secretions and air-fluid levels, greatest in the right maxillary sinus

## 2017-11-28 NOTE — PROGRESS NOTE ADULT - ASSESSMENT
ASSESSMENT:   57M with HTN, HLD, CAD/CABG, past stroke with residual left hemiparesis, presents with transient right hemiparesis and diplopia, and continuous headache and N/V. CT head shows old right basal ganglia lacune, and CTA shows severe bilateral MCA stenosis, proximal basilar/distal vertebrals occlusion and stenosis involving mid to distal basilar artery. Brain MRI show right lateral medullary and right cerebellar ischemic infarcts.     Impression:   Severe intracranial atherosclerotic disease and occlusion of left VA, with a small distal right vertebral artery, causing right lateral medullary and right cerebellar ischemic infarcts, i.e. likely due to large artery intracranial atherosclerosis     NEURO: neurologically without acute change, permissive HTN with slow titration to normotensive, ASA/plavix  and statin  for secondary stroke prevention,  titrate statin to LDL goal less than 70,  hiccups - Thorazine to be titrated off, then may start tapering gabapentin to as hiccups have  improved,  Physical therapy/OT eval with recommendations for [facility], pt uninsured and pending re-instatement of medicaid, will provide daily PT/OT to optimize treatment.     ANTITHROMBOTIC THERAPY:  ASA and clopidogrel for secondary stroke prevention, duration of dual antiplatelet therapy would likely be 3 months followed by Aggrenox for stroke prevention     PULMONARY:  Chest CT (11/22) bilateral lower lobe pna, continue Zosyn day 6/7 , sputum culture: numerous gram positive cooci, and negative rods, appreciate pulmonary and ID consult, protecting airway, titrate off NC as tolerated, encourage Incentive spirometer, monitor sats give oxygen as needed    CARDIOVASCULAR: TTE: LVEF 41%, mitral annular calcification, mild-moderate MR, global LV systolic dysfunction, mild stage 1diastolic dysfunction mild TR,  cardiac monitoring  no events, cardio consult appreciated: add metoprolol as tolerated                 GASTROINTESTINAL: s/p PEG placement (11/14), patient suctioning up feedings on 11/22, diarrhea X 2 on 11/24: negative for  C.diff.  Abd x ray negative for ileus. now vomiting TF and not tolerating PEG feedings , Abd x ray to r/o ileus, reglan, GI consult as well as nutrition may need to consider G to J tube.     Diet: PEG/NPO except medications    RENAL: BUN/Cr without acute change, will continue to provide hydration, good urine output, renal US: No hydronephrosis, Wu reinserted for urinary retention. Continue Cardura for BPH, and continue with Wu catheter until patient's status has improved and he is more ambulatory, small amounts of clots noted in urine on 11/22, now resolved, hypernatremia: resolved,    Na Goal: Greater than 135     Wu: re-inserted 11/17 for retention, failed void trial on 11/25     HEMATOLOGY:   no s/s of bleeding     DVT ppx: LMWH    ID: febrile 11/22 Tmax 37.9,  afebrile in am, leukocytosis resolved, blood cx; NGTD, urine and sputum cultures: klebsiella pneumoniae, pt with diarrhea on 11/24, C diff negative, as per ID eval continue with zosyn for aspiration pna/UTI day 7/7- but will need to follow up given vomiting of TF and possible aspiration     DISPOSITION: D/C to [facility] as per PT/OT eval once stable and workup is complete, Pt uninsured, SW aware, medicaid re-instatement pending. Daily PT/OT therapy to optimize therapy and referral to [facility] for possible suman.      CORE MEASURES:        Admission NIHSS: 3     TPA:  NO      LDL/HDL: 171/57     Depression Screen: 0     Statin Therapy: Y     Dysphagia Screen: FAIL     Smoking  NO      Afib NO     Stroke Education YES ASSESSMENT:   56 Y/O man with HTN, HLD, CAD/CABG, past stroke with residual left hemiparesis, presents with transient right hemiparesis and diplopia, continuous headache, nausea and vomiting. CT head shows old right basal ganglia lacune and CTA shows severe bilateral MCA stenosis, proximal basilar/distal vertebrals occlusion and stenosis involving mid to distal basilar artery. MRI brain show right lateral medullary and right cerebellar ischemic infarcts.     Impression:   Right PICA distribution stroke   Severe intracranial atherosclerotic disease and occlusion of left VA, with a small distal right vertebral artery, causing right lateral medullary and right cerebellar ischemic infarcts, i.e. likely due to large artery intracranial atherosclerosis     NEURO: neurologically without acute change, permissive HTN with slow titration to normotensive, ASA/plavix  and statin  for secondary stroke prevention,  titrate statin to LDL goal less than 70,  hiccups - Thorazine to be titrated off, then may start tapering gabapentin to as hiccups have  improved,  Physical therapy/OT eval with recommendations for [facility], pt uninsured and pending re-instatement of medicaid, will provide daily PT/OT to optimize treatment.     ANTITHROMBOTIC THERAPY:  ASA and clopidogrel for secondary stroke prevention, duration of dual antiplatelet therapy would likely be 3 months followed by Aggrenox for stroke prevention     PULMONARY:  Chest CT (11/22) bilateral lower lobe pna, continue Zosyn day 6/7 , sputum culture: numerous gram positive cooci, and negative rods, appreciate pulmonary and ID consult, protecting airway, titrate off NC as tolerated, encourage Incentive spirometer, monitor sats give oxygen as needed    CARDIOVASCULAR: TTE: LVEF 41%, mitral annular calcification, mild-moderate MR, global LV systolic dysfunction, mild stage 1diastolic dysfunction mild TR,  cardiac monitoring  no events, cardio consult appreciated: add metoprolol as tolerated                 GASTROINTESTINAL: s/p PEG placement (11/14), patient suctioning up feedings on 11/22, diarrhea X 2 on 11/24: negative for  C.diff.  Abd x ray negative for ileus. now vomiting TF and not tolerating PEG feedings , Abd x ray to r/o ileus, reglan, GI consult as well as nutrition may need to consider G to J tube.     Diet: PEG/NPO except medications    RENAL: BUN/Cr without acute change, will continue to provide hydration, good urine output, renal US: No hydronephrosis, Wu reinserted for urinary retention. Continue Cardura for BPH, and continue with Wu catheter until patient's status has improved and he is more ambulatory, small amounts of clots noted in urine on 11/22, now resolved, hypernatremia: resolved,    Na Goal: Greater than 135     Wu: re-inserted 11/17 for retention, failed void trial on 11/25     HEMATOLOGY:   no s/s of bleeding     DVT ppx: LMWH    ID: febrile 11/22 Tmax 37.9,  afebrile in am, leukocytosis resolved, blood cx; NGTD, urine and sputum cultures: klebsiella pneumoniae, pt with diarrhea on 11/24, C diff negative, as per ID eval continue with zosyn for aspiration pna/UTI day 7/7- but will need to follow up given vomiting of TF and possible aspiration     DISPOSITION: D/C to [facility] as per PT/OT eval once stable and workup is complete, Pt uninsured, SW aware, medicaid re-instatement pending. Daily PT/OT therapy to optimize therapy and referral to [facility] for possible suman.      CORE MEASURES:        Admission NIHSS: 3     TPA:  NO      LDL/HDL: 171/57     Depression Screen: 0     Statin Therapy: Y     Dysphagia Screen: FAIL     Smoking  NO      Afib NO     Stroke Education YES ASSESSMENT:   58 Y/O man with HTN, HLD, CAD/CABG, past stroke with residual left hemiparesis, presents with transient right hemiparesis and diplopia, continuous headache, nausea and vomiting. CT head shows old right basal ganglia lacune and CTA shows severe bilateral MCA stenosis, proximal basilar/distal vertebrals occlusion and stenosis involving mid to distal basilar artery. MRI brain show right lateral medullary and right cerebellar ischemic infarcts.     Impression:   Severe intracranial atherosclerotic disease and occlusion of left VA, with a small distal right vertebral artery, causing right lateral medullary and right cerebellar ischemic infarcts, i.e. likely due to large artery intracranial atherosclerosis the setting of medication (antiplatelet therapy) noncompliance    NEURO: neurologically without acute change, permissive HTN with slow titration to normotensive in the setting of diffuse and extensive intracranial atherosclerosis, ASA/plavix  and statin  for secondary stroke prevention,  titrate statin to LDL goal less than 70,  hiccups - Thorazine to be titrated off, then may start tapering gabapentin to as hiccups have  improved,  Physical therapy/OT eval with recommendations for [facility], pt uninsured and pending re-instatement of medicaid, will provide daily PT/OT to optimize treatment.     ANTITHROMBOTIC THERAPY:  ASA and clopidogrel for secondary stroke prevention, duration of dual antiplatelet therapy would likely be 3 months followed by Aggrenox for stroke prevention     PULMONARY:  Chest CT (11/22) bilateral lower lobe pna, continue Zosyn day 6/7 , sputum culture: numerous gram positive cooci, and negative rods, appreciate pulmonary and ID consult, protecting airway, titrate off NC as tolerated, encourage Incentive spirometer, monitor sats give oxygen as needed    CARDIOVASCULAR: TTE: LVEF 41%, mitral annular calcification, mild-moderate MR, global LV systolic dysfunction, mild stage 1 diastolic dysfunction mild TR,  cardiac monitoring no events, cardio consult appreciated: add metoprolol as tolerated                 GASTROINTESTINAL: S/p PEG placement (11/14), patient suctioning up feedings on 11/22, diarrhea X 2 on 11/24: negative for  C.diff.  Abd x ray negative for ileus. now vomiting TF and not tolerating PEG feedings , Abd x ray to r/o ileus, scheduled Reglan as gastric prokinetic agent, GI consult as well as nutrition may need to consider G to J tube.     Diet: PEG/NPO except medications  RENAL: BUN/Cr without acute change, will continue to provide hydration, good urine output, renal US: No hydronephrosis, Wu reinserted for urinary retention. Continue Cardura for BPH, and continue with Wu catheter until patient's status has improved and he is more ambulatory, small amounts of clots noted in urine on 11/22, now resolved, hypernatremia: resolved,    Na Goal: Greater than 135     Wu: re-inserted 11/17 for retention, failed void trial on 11/25     HEMATOLOGY:   no s/s of bleeding     DVT ppx: LMWH    ID: febrile 11/22 Tmax 37.9,  afebrile in am, leukocytosis resolved, blood cx; NGTD, urine and sputum cultures: klebsiella pneumoniae, pt with diarrhea on 11/24, C diff negative, as per ID eval continue with zosyn for aspiration pna/UTI day 7/7- but will need to follow up given vomiting of TF and possible aspiration     DISPOSITION: D/C to [facility] as per PT/OT eval once stable and workup is complete, Pt uninsured, SW aware, medicaid re-instatement pending. Daily PT/OT therapy to optimize therapy and referral to [facility] for possible suman.      CORE MEASURES:        Admission NIHSS: 3     TPA:  NO      LDL/HDL: 171/57     Depression Screen: 0     Statin Therapy: Y     Dysphagia Screen: FAIL     Smoking  NO      Afib NO     Stroke Education YES

## 2017-11-28 NOTE — PROGRESS NOTE ADULT - SUBJECTIVE AND OBJECTIVE BOX
Follow-up Pulm Progress Note    No new respiratory events overnight.  Denies SOB/CP. s/p ENT eval, vomited tube feeds earlier today    Medications:  MEDICATIONS  (STANDING):  ALBUTerol/ipratropium for Nebulization 3 milliLiter(s) Nebulizer every 6 hours  aspirin  chewable 81 milliGRAM(s) Oral daily  atorvastatin 80 milliGRAM(s) Oral at bedtime  baclofen 10 milliGRAM(s) Oral three times a day  clopidogrel Tablet 75 milliGRAM(s) Oral daily  doxazosin 2 milliGRAM(s) Oral at bedtime  enoxaparin Injectable 40 milliGRAM(s) SubCutaneous daily  gabapentin   Solution 800 milliGRAM(s) Oral three times a day  hydrochlorothiazide 12.5 milliGRAM(s) Oral daily  influenza   Vaccine 0.5 milliLiter(s) IntraMuscular once  losartan 100 milliGRAM(s) Oral daily  metoclopramide Injectable 10 milliGRAM(s) IV Push every 8 hours  ondansetron Injectable 4 milliGRAM(s) IV Push once  piperacillin/tazobactam IVPB. 3.375 Gram(s) IV Intermittent every 8 hours  sodium chloride 0.9% Bolus 500 milliLiter(s) IV Bolus once    MEDICATIONS  (PRN):  acetaminophen   Tablet. 650 milliGRAM(s) Oral every 6 hours PRN Mild Pain (1 - 3)  acetaminophen  Suppository 650 milliGRAM(s) Rectal every 6 hours PRN For Temp greater than 38.5 C (101.3 F)  guaiFENesin    Syrup 200 milliGRAM(s) Oral every 6 hours PRN Cough  hydrALAZINE Injectable 10 milliGRAM(s) IV Push two times a day PRN BP > 180  simethicone 80 milliGRAM(s) Chew daily PRN bloating          Vital Signs Last 24 Hrs  T(C): 36.6 (28 Nov 2017 08:42), Max: 36.9 (27 Nov 2017 23:42)  T(F): 97.9 (28 Nov 2017 08:42), Max: 98.5 (27 Nov 2017 23:42)  HR: 82 (28 Nov 2017 08:42) (59 - 82)  BP: 97/64 (28 Nov 2017 08:42) (97/64 - 153/86)  BP(mean): --  RR: 18 (28 Nov 2017 08:42) (18 - 18)  SpO2: 97% (28 Nov 2017 08:42) (95% - 97%)          11-27 @ 07:01  -  11-28 @ 07:00  --------------------------------------------------------  IN: 960 mL / OUT: 1600 mL / NET: -640 mL          LABS:                        12.4   4.96  )-----------( 249      ( 27 Nov 2017 08:45 )             38.1     11-27    139  |  100  |  10  ----------------------------<  120<H>  3.7   |  24  |  1.01    Ca    9.0      27 Nov 2017 08:45            CAPILLARY BLOOD GLUCOSE                            CULTURES: (if applicable)  Culture Results:   Moderate Klebsiella pneumoniae  Numerous Haemophilus influenzae  Normal Respiratory Bryanna present (11-23 @ 01:09)  Culture Results:   >100,000 CFU/ml Klebsiella pneumoniae (11-21 @ 17:15)  Culture Results:   No growth at 5 days. (11-21 @ 16:57)  Culture Results:   No growth at 5 days. (11-21 @ 16:56)          Physical Examination:  PULM: decreased bs bilaterally, no significant sputum production  CVS: S1, S2 heard    RADIOLOGY REVIEWED  CXR: < from: Xray Chest 1 View AP-PORTABLE IMMEDIATE (11.22.17 @ 18:31) >    IMPRESSION:   Clear lungs.    < end of copied text >      CT chest:< from: CT Chest No Cont (11.22.17 @ 12:10) >  IMPRESSION: Bilateral lower lobe pneumonia.    < end of copied text >      TTE:

## 2017-11-28 NOTE — PROGRESS NOTE ADULT - ASSESSMENT
Patient is a 58 yo [place] male w/ PMH significant for HTN, HLD, CAD s/p CABG, previous CVA w/ residual left hemiparesis, admitted for R sided weakness, HA, diplopia, diagnosed with R lateral medullary and R cerebellar ischemic infarcts per MRI. Patient failed MBS and PEG tube was placed on 11/14 and tube feeding was initiated and pt was having problems with emesis, residuals, constipation-concern for ileus. 11/22 with coughing up feeds, green malordours secretions, hypoxia which resolved. found to have bilat PNA-asp, UTI--kleb,

## 2017-11-28 NOTE — PROGRESS NOTE ADULT - ASSESSMENT
Patient is a 56 yo [place] male w/ PMH significant for HTN, HLD, CAD s/p CABG, previous CVA w/ residual left hemiparesis, admitted for R sided weakness, HA, diplopia, diagnosed with R lateral medullary and R cerebellar ischemic infarcts per MRI. Patient failed MBS and PEG tube was placed and tube feeds initiated. Initial concern for ileus which has resolved, now patient with possible aspiration. Now reconsulted for N/V when uptitrating his feeds.    Recommendations  -please obtain Xray with G tube study  -feed patient with head of bed elevated to at last 45 degrees  -if Gtube study normal, can advance feeds as tolerated starting at 10cc/hr  -no evidence of PEG tube infection, can resume feeds at 10cc/hr and titrate up

## 2017-11-29 LAB
ANION GAP SERPL CALC-SCNC: 12 MMOL/L — SIGNIFICANT CHANGE UP (ref 5–17)
BUN SERPL-MCNC: 11 MG/DL — SIGNIFICANT CHANGE UP (ref 7–23)
CALCIUM SERPL-MCNC: 9.1 MG/DL — SIGNIFICANT CHANGE UP (ref 8.4–10.5)
CHLORIDE SERPL-SCNC: 97 MMOL/L — SIGNIFICANT CHANGE UP (ref 96–108)
CO2 SERPL-SCNC: 27 MMOL/L — SIGNIFICANT CHANGE UP (ref 22–31)
CREAT SERPL-MCNC: 1.09 MG/DL — SIGNIFICANT CHANGE UP (ref 0.5–1.3)
GLUCOSE SERPL-MCNC: 97 MG/DL — SIGNIFICANT CHANGE UP (ref 70–99)
HCT VFR BLD CALC: 37 % — LOW (ref 39–50)
HGB BLD-MCNC: 11.7 G/DL — LOW (ref 13–17)
MCHC RBC-ENTMCNC: 24.6 PG — LOW (ref 27–34)
MCHC RBC-ENTMCNC: 31.6 GM/DL — LOW (ref 32–36)
MCV RBC AUTO: 77.7 FL — LOW (ref 80–100)
PLATELET # BLD AUTO: 263 K/UL — SIGNIFICANT CHANGE UP (ref 150–400)
POTASSIUM SERPL-MCNC: 4.2 MMOL/L — SIGNIFICANT CHANGE UP (ref 3.5–5.3)
POTASSIUM SERPL-SCNC: 4.2 MMOL/L — SIGNIFICANT CHANGE UP (ref 3.5–5.3)
RBC # BLD: 4.76 M/UL — SIGNIFICANT CHANGE UP (ref 4.2–5.8)
RBC # FLD: 15.9 % — HIGH (ref 10.3–14.5)
SODIUM SERPL-SCNC: 136 MMOL/L — SIGNIFICANT CHANGE UP (ref 135–145)
WBC # BLD: 5.61 K/UL — SIGNIFICANT CHANGE UP (ref 3.8–10.5)
WBC # FLD AUTO: 5.61 K/UL — SIGNIFICANT CHANGE UP (ref 3.8–10.5)

## 2017-11-29 PROCEDURE — 49465 FLUORO EXAM OF G/COLON TUBE: CPT

## 2017-11-29 PROCEDURE — 99233 SBSQ HOSP IP/OBS HIGH 50: CPT

## 2017-11-29 RX ORDER — METOCLOPRAMIDE HCL 10 MG
10 TABLET ORAL EVERY 8 HOURS
Qty: 0 | Refills: 0 | Status: DISCONTINUED | OUTPATIENT
Start: 2017-11-29 | End: 2017-11-30

## 2017-11-29 RX ADMIN — Medication 3 MILLILITER(S): at 23:56

## 2017-11-29 RX ADMIN — GABAPENTIN 800 MILLIGRAM(S): 400 CAPSULE ORAL at 13:43

## 2017-11-29 RX ADMIN — Medication 200 MILLIGRAM(S): at 21:06

## 2017-11-29 RX ADMIN — Medication 10 MILLIGRAM(S): at 21:06

## 2017-11-29 RX ADMIN — LOSARTAN POTASSIUM 100 MILLIGRAM(S): 100 TABLET, FILM COATED ORAL at 05:40

## 2017-11-29 RX ADMIN — CLOPIDOGREL BISULFATE 75 MILLIGRAM(S): 75 TABLET, FILM COATED ORAL at 13:41

## 2017-11-29 RX ADMIN — Medication 3 MILLILITER(S): at 13:41

## 2017-11-29 RX ADMIN — Medication 81 MILLIGRAM(S): at 13:42

## 2017-11-29 RX ADMIN — Medication 650 MILLIGRAM(S): at 23:55

## 2017-11-29 RX ADMIN — ENOXAPARIN SODIUM 40 MILLIGRAM(S): 100 INJECTION SUBCUTANEOUS at 13:42

## 2017-11-29 RX ADMIN — Medication 2 MILLIGRAM(S): at 21:06

## 2017-11-29 RX ADMIN — Medication 12.5 MILLIGRAM(S): at 05:40

## 2017-11-29 RX ADMIN — Medication 3 MILLILITER(S): at 05:40

## 2017-11-29 RX ADMIN — Medication 3 MILLILITER(S): at 18:15

## 2017-11-29 RX ADMIN — ATORVASTATIN CALCIUM 80 MILLIGRAM(S): 80 TABLET, FILM COATED ORAL at 21:06

## 2017-11-29 RX ADMIN — Medication 10 MILLIGRAM(S): at 05:40

## 2017-11-29 RX ADMIN — Medication 10 MILLIGRAM(S): at 13:41

## 2017-11-29 RX ADMIN — GABAPENTIN 800 MILLIGRAM(S): 400 CAPSULE ORAL at 21:06

## 2017-11-29 RX ADMIN — GABAPENTIN 800 MILLIGRAM(S): 400 CAPSULE ORAL at 05:40

## 2017-11-29 NOTE — PROGRESS NOTE ADULT - SUBJECTIVE AND OBJECTIVE BOX
THE PATIENT WAS SEEN AND EXAMINED BY ME WITH THE HOUSESTAFF AND STROKE TEAM DURING MORNING ROUNDS.   HPI:  58 y/o R-handed [place] man PMH CVA with residual L-sided weakness, CAD s/p CABG, HTN presents as stroke code for dizziness. Pt at baseline ambulates independently and independent in ADLs. Pt was in usual state of health on 11/07 at 9AM. At around 9:30AM, he began to complain of lightheadedness not associated with changes in position. Half an hour later, he began to complain of n/v. He also endorses HA, diplopia/blurry vision, R-sided weakness that began a few days prior to admission.  He reports he ran out of his BP meds a week prior to admission and has been unable to refill his medications    SUBJECTIVE: No events overnight.  No new neurologic complaints.      acetaminophen   Tablet. 650 milliGRAM(s) Oral every 6 hours PRN  acetaminophen  Suppository 650 milliGRAM(s) Rectal every 6 hours PRN  ALBUTerol/ipratropium for Nebulization 3 milliLiter(s) Nebulizer every 6 hours  aspirin  chewable 81 milliGRAM(s) Oral daily  atorvastatin 80 milliGRAM(s) Oral at bedtime  baclofen 10 milliGRAM(s) Oral three times a day  clopidogrel Tablet 75 milliGRAM(s) Oral daily  doxazosin 2 milliGRAM(s) Oral at bedtime  enoxaparin Injectable 40 milliGRAM(s) SubCutaneous daily  gabapentin   Solution 800 milliGRAM(s) Oral three times a day  guaiFENesin    Syrup 200 milliGRAM(s) Oral every 6 hours PRN  hydrALAZINE Injectable 10 milliGRAM(s) IV Push two times a day PRN  hydrochlorothiazide 12.5 milliGRAM(s) Oral daily  influenza   Vaccine 0.5 milliLiter(s) IntraMuscular once  losartan 100 milliGRAM(s) Oral daily  metoclopramide Injectable 10 milliGRAM(s) IV Push every 8 hours  ondansetron Injectable 4 milliGRAM(s) IV Push once  simethicone 80 milliGRAM(s) Chew daily PRN  sodium chloride 0.9% Bolus 500 milliLiter(s) IV Bolus once      PHYSICAL EXAM:   Vital Signs Last 24 Hrs  T(C): 36.4 (29 Nov 2017 13:00), Max: 36.9 (28 Nov 2017 23:49)  T(F): 97.6 (29 Nov 2017 13:00), Max: 98.5 (28 Nov 2017 23:49)  HR: 73 (29 Nov 2017 13:00) (73 - 91)  BP: 115/74 (29 Nov 2017 13:00) (113/67 - 125/84)  BP(mean): --  RR: 18 (29 Nov 2017 13:00) (18 - 20)  SpO2: 97% (29 Nov 2017 13:00) (94% - 99%)    General: NAD  HEENT: EOM intact, visual fields full, PERRLA  Abdomen: Soft,  no erythema, nondistended   Extremities: No edema    NEUROLOGICAL EXAM:  Mental status: Awake, alert, oriented x3, fluent speech, no neglect, able to follow commands, moderate dysarthria   Cranial Nerves: Subtle UMN type left facial droop, EOMI, VFF, no nystagmus, hoarseness, slight palatal deviation to the left, no hiccups, hearing intact to  bilateral finger rub   Motor exam: Normal tone, no drift, RUE 5/5, RLE 5/5, drift LUE 4-/5, drift LLE 5-/5  Sensation: Decreased sensation to pinprick on left arm  Coordination/ Gait: LUE moderate-severe dysmetria    LABS:                        11.7   5.61  )-----------( 263      ( 29 Nov 2017 07:12 )             37.0    11-29    136  |  97  |  11  ----------------------------<  97  4.2   |  27  |  1.09    Ca    9.1      29 Nov 2017 07:13      Hemoglobin A1C, Whole Blood: 5.8 % (11-08 @ 10:36)      IMAGING: Reviewed by me.       CT Chest No Cont (11.22.17) Bilateral lower lobe pneumonia.  Head CT/CTA head/Neck (11/07) Right basal ganglia and corona radiata infarct without significant change since 6/16/2017. There is severe steno-occlusive disease intracranially involving the right supraclinoid internal carotid artery, A1 and M1 branches, and severe narrowing of the left M1 segment of the middle cerebral artery. The distal right vertebral artery is quite small and the proximal basilar artery is not visualized. The dominant left vertebral artery ends at the PICA. Distal basilar flow appears to be due to retrograde flow from the right posterior communicating artery.  Head CT (11/07) No acute intracranial hemorrhage, mass effect, or evidence of acute territorial infarct. Chronic right corona radiata/basal ganglia and right inferior cerebellar hemisphere infarcts.  Brain MRI (11/09) In comparison the prior MRI, there is new hyperintense T2 and FLAIR signal with faint increased restricted diffusion in the right medulla extending to the inferior right brachium pontis and inferior medial right cerebellar hemisphere, new compared to 6/17/2017 consistent with evolving area of  ischemic change without hemorrhagic transformation. Redemonstration of volume loss with multiple additional areas of lacunar infarction including chronic infarcts in the right cerebellar hemisphere and right corona radiata. Decreased visualization of the flow-void within the right vertebral artery with continued irregular isointense T1 signal throughout the basilar artery. Bilateral sinus disease with bubbly secretions and air-fluid levels, greatest in the right maxillary sinus THE PATIENT WAS SEEN AND EXAMINED BY ME WITH THE HOUSESTAFF AND STROKE TEAM DURING MORNING ROUNDS.     HPI:  56 y/o R-handed [place] man PMH CVA with residual L-sided weakness, CAD s/p CABG, HTN presents as stroke code for dizziness. Pt at baseline ambulates independently and independent in ADLs. Pt was in usual state of health on 11/07 at 9 AM. At around 9:30 AM, he began to complain of lightheadedness not associated with changes in position. Half an hour later, he began to complain of n/v. He also endorses HA, diplopia/blurry vision, R-sided weakness that began a few days prior to admission.  He reports he ran out of his BP meds a week prior to admission and has been unable to refill his medications    SUBJECTIVE: No events overnight.  No new neurologic complaints.      acetaminophen   Tablet. 650 milliGRAM(s) Oral every 6 hours PRN  acetaminophen  Suppository 650 milliGRAM(s) Rectal every 6 hours PRN  ALBUTerol/ipratropium for Nebulization 3 milliLiter(s) Nebulizer every 6 hours  aspirin  chewable 81 milliGRAM(s) Oral daily  atorvastatin 80 milliGRAM(s) Oral at bedtime  baclofen 10 milliGRAM(s) Oral three times a day  clopidogrel Tablet 75 milliGRAM(s) Oral daily  doxazosin 2 milliGRAM(s) Oral at bedtime  enoxaparin Injectable 40 milliGRAM(s) SubCutaneous daily  gabapentin   Solution 800 milliGRAM(s) Oral three times a day  guaiFENesin    Syrup 200 milliGRAM(s) Oral every 6 hours PRN  hydrALAZINE Injectable 10 milliGRAM(s) IV Push two times a day PRN  hydrochlorothiazide 12.5 milliGRAM(s) Oral daily  influenza   Vaccine 0.5 milliLiter(s) IntraMuscular once  losartan 100 milliGRAM(s) Oral daily  metoclopramide Injectable 10 milliGRAM(s) IV Push every 8 hours  ondansetron Injectable 4 milliGRAM(s) IV Push once  simethicone 80 milliGRAM(s) Chew daily PRN  sodium chloride 0.9% Bolus 500 milliLiter(s) IV Bolus once      PHYSICAL EXAM:   Vital Signs Last 24 Hrs  T(C): 36.4 (29 Nov 2017 13:00), Max: 36.9 (28 Nov 2017 23:49)  T(F): 97.6 (29 Nov 2017 13:00), Max: 98.5 (28 Nov 2017 23:49)  HR: 73 (29 Nov 2017 13:00) (73 - 91)  BP: 115/74 (29 Nov 2017 13:00) (113/67 - 125/84)  BP(mean): --  RR: 18 (29 Nov 2017 13:00) (18 - 20)  SpO2: 97% (29 Nov 2017 13:00) (94% - 99%)    General: NAD  HEENT: EOM intact, visual fields full, PERRLA  Abdomen: Soft, no erythema, nondistended   Extremities: No edema    NEUROLOGICAL EXAM:  Mental status: Awake, alert, oriented x3, fluent speech, no neglect, able to follow commands, moderate dysarthria   Cranial Nerves: Subtle UMN type left facial droop, EOMI, VFF, no nystagmus, hoarseness, slight palatal deviation to the left, no hiccups, hearing intact to bilateral finger rub   Motor exam: Normal tone, no drift, RUE 5/5, RLE 5/5, drift LUE 4-/5, drift LLE 5-/5  Sensation: Decreased sensation to pinprick on left arm  Coordination/ Gait: LUE moderate-severe dysmetria    LABS:                        11.7   5.61  )-----------( 263      ( 29 Nov 2017 07:12 )             37.0    11-29    136  |  97  |  11  ----------------------------<  97  4.2   |  27  |  1.09    Ca    9.1      29 Nov 2017 07:13      Hemoglobin A1C, Whole Blood: 5.8 % (11-08 @ 10:36)      IMAGING: Reviewed by me.       CT Chest No Cont (11.22.17) Bilateral lower lobe pneumonia.  Head CT/CTA head/Neck (11/07) Right basal ganglia and corona radiata infarct without significant change since 6/16/2017. There is severe steno-occlusive disease intracranially involving the right supraclinoid internal carotid artery, A1 and M1 branches, and severe narrowing of the left M1 segment of the middle cerebral artery. The distal right vertebral artery is quite small and the proximal basilar artery is not visualized. The dominant left vertebral artery ends at the PICA. Distal basilar flow appears to be due to retrograde flow from the right posterior communicating artery.  Head CT (11/07) No acute intracranial hemorrhage, mass effect, or evidence of acute territorial infarct. Chronic right corona radiata/basal ganglia and right inferior cerebellar hemisphere infarcts.  Brain MRI (11/09) In comparison the prior MRI, there is new hyperintense T2 and FLAIR signal with faint increased restricted diffusion in the right medulla extending to the inferior right brachium pontis and inferior medial right cerebellar hemisphere, new compared to 6/17/2017 consistent with evolving area of  ischemic change without hemorrhagic transformation. Redemonstration of volume loss with multiple additional areas of lacunar infarction including chronic infarcts in the right cerebellar hemisphere and right corona radiata. Decreased visualization of the flow-void within the right vertebral artery with continued irregular isointense T1 signal throughout the basilar artery. Bilateral sinus disease with bubbly secretions and air-fluid levels, greatest in the right maxillary sinus

## 2017-11-29 NOTE — CHART NOTE - NSCHARTNOTEFT_GEN_A_CORE
NUTRITION FOLLOW UP NOTE   RD consulted for tube feeding.     Pt diagnosed with R lateral medullary and R cerebellar ischemic infarcts per MRI. Patient failed MBS and PEG tube was placed 11/15 with difficulty tolerating feeds. Initial concern for ileus which has resolved, now patient with possible aspiration. Pt seen by GI today stating Gtube study unremarkable, feed patient with head of bed elevated to at least 45 degrees, can advance feeds as tolerated starting at 10cc/hr.    Source: Patient [ x]    Family [ ]     other [x ] RN, MD, comprehensive chart review     Diet : NPO      Patient reports [ x] nausea this morning but now none  [ ] vomiting [ ] diarrhea [ ] constipation  [ ]chewing problems [ ] swallowing issues  [x ] other: spitting up mucus. Denies GI distress. Hiccups resolved since last RD visit. Per chart Pt had vomiting after receiving Jevity 1.2 at 60mL/hr for 11 hrs on 11/19, then on 11/26 Pt asked for tube feeds to be held for 6 hours due to nausea when he was at rate of 60mL/hr.     Enteral /Parenteral Nutrition: currently NPO    Weight: no new wt. Dosing Wt 89.4kg.    Pertinent Medications: MEDICATIONS  (STANDING):  ALBUTerol/ipratropium for Nebulization 3 milliLiter(s) Nebulizer every 6 hours  aspirin  chewable 81 milliGRAM(s) Oral daily  atorvastatin 80 milliGRAM(s) Oral at bedtime  baclofen 10 milliGRAM(s) Oral three times a day  clopidogrel Tablet 75 milliGRAM(s) Oral daily  doxazosin 2 milliGRAM(s) Oral at bedtime  enoxaparin Injectable 40 milliGRAM(s) SubCutaneous daily  gabapentin   Solution 800 milliGRAM(s) Oral three times a day  hydrochlorothiazide 12.5 milliGRAM(s) Oral daily  influenza   Vaccine 0.5 milliLiter(s) IntraMuscular once  losartan 100 milliGRAM(s) Oral daily  metoclopramide Injectable 10 milliGRAM(s) IV Push every 8 hours  ondansetron Injectable 4 milliGRAM(s) IV Push once  sodium chloride 0.9% Bolus 500 milliLiter(s) IV Bolus once    MEDICATIONS  (PRN):  acetaminophen   Tablet. 650 milliGRAM(s) Oral every 6 hours PRN Mild Pain (1 - 3)  acetaminophen  Suppository 650 milliGRAM(s) Rectal every 6 hours PRN For Temp greater than 38.5 C (101.3 F)  guaiFENesin    Syrup 200 milliGRAM(s) Oral every 6 hours PRN Cough  hydrALAZINE Injectable 10 milliGRAM(s) IV Push two times a day PRN BP > 180  simethicone 80 milliGRAM(s) Chew daily PRN bloating    Pertinent Labs:  11-29 Na136 mmol/L Glu 97 mg/dL K+ 4.2 mmol/L Cr  1.09 mg/dL BUN 11 mg/dL Phos n/a   Alb n/a   PAB n/a         Skin: intact. no edema noted.    Estimated Needs:   [x ] no change since previous assessment  [ ] recalculated:       Previous Nutrition Diagnosis:     [ ] Inadequate Energy Intake [ ]Inadequate Oral Intake [ ] Excessive Energy Intake     [ ] Underweight [ ] Increased Nutrient Needs [ ] Overweight/Obesity     [ ] Altered GI Function [ ] Unintended Weight Loss [ ] Food & Nutrition Related Knowledge Deficit [ ] Malnutrition [x] swallowing difficulty         Nutrition Diagnosis is [x ] ongoing, s/p PEG placement  [ ] resolved [ ] not applicable          New Nutrition Diagnosis: [ ] not applicable    [x ] Inadequate Protein Energy Intake [ ]Inadequate Oral Intake [ ] Excessive Energy Intake     [ ] Underweight [ ] Increased Nutrient Needs [ ] Overweight/Obesity     [ ] Altered GI Function [ ] Unintended Weight Loss [ ] Food & Nutrition Related Knowledge Deficit[ ] Limited Adherence to nutrition related recommendations [ ] Malnutrition  [ ] other: Free text       Related to: difficulty tolerating feeds     As evidenced by: unable to achieve goal rate of Jevity at 70cc/hr x 24hrs.     Interventions:     Recommend    [ x] Change Diet To: Consider initiating Jevity 1.5 @ 10cc/hr increase by 10cc Q6-8hrs as tolerated until goal rate of 55cc/hr x 24hrs. Goal provides 1980kcal, 84g protein (22kcal/kg, 0.9g protein/kg/dosing Wt) Will d/w stroke team.    [ ] Nutrition Supplement    [ ] Nutrition Support:     [ ] Other:        Monitoring and Evaluation:     [ ] PO intake [x ] Tolerance to diet prescription [x ] weekly weights [x ] follow up per protocol    RD to remain available for further nutritional interventions as indicated/requested by medical team/pt.   Wojciech Gutierrez, RD, CDN, CDE. Pager: [phone] NUTRITION FOLLOW UP NOTE   RD consulted for tube feeding.     Pt diagnosed with R lateral medullary and R cerebellar ischemic infarcts per MRI. Patient failed MBS and PEG tube was placed 11/15 with difficulty tolerating feeds. Initial concern for ileus which has resolved, now patient with possible aspiration. Pt seen by GI today stating Gtube study unremarkable, feed patient with head of bed elevated to at least 45 degrees, can advance feeds as tolerated starting at 10cc/hr.    Source: Patient [ x]    Family [ ]     other [x ] RN, MD, comprehensive chart review     Diet : NPO      Patient reports [ x] nausea this morning but now none  [ ] vomiting [ ] diarrhea [ ] constipation  [ ]chewing problems [ ] swallowing issues  [x ] other: spitting up mucus. Denies GI distress. Hiccups resolved since last RD visit. Per chart Pt had vomiting after receiving Jevity 1.2 at 60mL/hr for 11 hrs on 11/19, then on 11/26 Pt asked for tube feeds to be held for 6 hours due to nausea when he was at rate of 60mL/hr. Pt also states that sometimes he would lower head of bed at night when on tube feeds- discussed with MD and RN.      Enteral /Parenteral Nutrition: currently NPO    Weight: no new wt. Dosing Wt 89.4kg.    Pertinent Medications: MEDICATIONS  (STANDING):  ALBUTerol/ipratropium for Nebulization 3 milliLiter(s) Nebulizer every 6 hours  aspirin  chewable 81 milliGRAM(s) Oral daily  atorvastatin 80 milliGRAM(s) Oral at bedtime  baclofen 10 milliGRAM(s) Oral three times a day  clopidogrel Tablet 75 milliGRAM(s) Oral daily  doxazosin 2 milliGRAM(s) Oral at bedtime  enoxaparin Injectable 40 milliGRAM(s) SubCutaneous daily  gabapentin   Solution 800 milliGRAM(s) Oral three times a day  hydrochlorothiazide 12.5 milliGRAM(s) Oral daily  influenza   Vaccine 0.5 milliLiter(s) IntraMuscular once  losartan 100 milliGRAM(s) Oral daily  metoclopramide Injectable 10 milliGRAM(s) IV Push every 8 hours  ondansetron Injectable 4 milliGRAM(s) IV Push once  sodium chloride 0.9% Bolus 500 milliLiter(s) IV Bolus once    MEDICATIONS  (PRN):  acetaminophen   Tablet. 650 milliGRAM(s) Oral every 6 hours PRN Mild Pain (1 - 3)  acetaminophen  Suppository 650 milliGRAM(s) Rectal every 6 hours PRN For Temp greater than 38.5 C (101.3 F)  guaiFENesin    Syrup 200 milliGRAM(s) Oral every 6 hours PRN Cough  hydrALAZINE Injectable 10 milliGRAM(s) IV Push two times a day PRN BP > 180  simethicone 80 milliGRAM(s) Chew daily PRN bloating    Pertinent Labs:  11-29 Na136 mmol/L Glu 97 mg/dL K+ 4.2 mmol/L Cr  1.09 mg/dL BUN 11 mg/dL Phos n/a   Alb n/a   PAB n/a         Skin: intact. no edema noted.    Estimated Needs:   [x ] no change since previous assessment  [ ] recalculated:       Previous Nutrition Diagnosis:     [ ] Inadequate Energy Intake [ ]Inadequate Oral Intake [ ] Excessive Energy Intake     [ ] Underweight [ ] Increased Nutrient Needs [ ] Overweight/Obesity     [ ] Altered GI Function [ ] Unintended Weight Loss [ ] Food & Nutrition Related Knowledge Deficit [ ] Malnutrition [x] swallowing difficulty         Nutrition Diagnosis is [x ] ongoing, s/p PEG placement  [ ] resolved [ ] not applicable          New Nutrition Diagnosis: [ ] not applicable    [x ] Inadequate Protein Energy Intake [ ]Inadequate Oral Intake [ ] Excessive Energy Intake     [ ] Underweight [ ] Increased Nutrient Needs [ ] Overweight/Obesity     [ ] Altered GI Function [ ] Unintended Weight Loss [ ] Food & Nutrition Related Knowledge Deficit[ ] Limited Adherence to nutrition related recommendations [ ] Malnutrition  [ ] other: Free text       Related to: difficulty tolerating feeds     As evidenced by: unable to achieve goal rate of Jevity at 70cc/hr x 24hrs.     Interventions:     Recommend    [ x] Change Diet To: Consider initiating Jevity 1.5 @ 10cc/hr increase by 10cc Q6-8hrs as tolerated until goal rate of 55cc/hr x 24hrs. Goal provides 1980kcal, 84g protein (22kcal/kg, 0.9g protein/kg/dosing Wt) Will d/w stroke team.      [ ] Nutrition Supplement    [ ] Nutrition Support:     [ x] Other: Per GI head of bed to remain at 45 degrees- d/w RN and Pt       Monitoring and Evaluation:     [ ] PO intake [x ] Tolerance to diet prescription [x ] weekly weights [x ] follow up per protocol    RD to remain available for further nutritional interventions as indicated/requested by medical team/pt.   Wojciech Gutierrez RD, CDN, CDE. Pager: [phone]

## 2017-11-29 NOTE — PROGRESS NOTE ADULT - PROBLEM SELECTOR PLAN 1
kleb PNA  keep o2 sat>=92% w/ prn o2  asp precautions  hob 45-90 degree  pulm sherita  s/p course zosyn  ID on board

## 2017-11-29 NOTE — PROGRESS NOTE ADULT - ASSESSMENT
ASSESSMENT:   56 Y/O man with HTN, HLD, CAD/CABG, past stroke with residual left hemiparesis, presents with transient right hemiparesis and diplopia, continuous headache, nausea and vomiting. CT head shows old right basal ganglia lacune and CTA shows severe bilateral MCA stenosis, proximal basilar/distal vertebrals occlusion and stenosis involving mid to distal basilar artery. MRI brain show right lateral medullary and right cerebellar ischemic infarcts.     Impression:   Severe intracranial atherosclerotic disease and occlusion of left VA, with a small distal right vertebral artery, causing right lateral medullary and right cerebellar ischemic infarcts, i.e. likely due to large artery intracranial atherosclerosis the setting of medication (antiplatelet therapy) noncompliance    NEURO: neurologically without acute change, permissive HTN with slow titration to normotensive in the setting of diffuse and extensive intracranial atherosclerosis, ASA/plavix  and statin  for secondary stroke prevention,  titrate statin to LDL goal less than 70,   may consider tapering gabapentin to as hiccups have  improved,  Physical therapy/OT eval with recommendations for [facility], pt uninsured and pending re-instatement of medicaid, will provide daily PT/OT to optimize treatment.     ANTITHROMBOTIC THERAPY:  ASA and clopidogrel for secondary stroke prevention, duration of dual antiplatelet therapy would likely be 3 months followed by Aggrenox for stroke prevention     PULMONARY:  Chest CT (11/22) bilateral lower lobe pna, completed Zosyn x 7 days , sputum culture: numerous gram positive cooci, and negative rods, appreciate pulmonary and ID consult, protecting airway, titrate off NC as tolerated, encourage Incentive spirometer, monitor sats give oxygen as needed    CARDIOVASCULAR: TTE: LVEF 41%, mitral annular calcification, mild-moderate MR, global LV systolic dysfunction, mild stage 1 diastolic dysfunction mild TR,  cardiac monitoring no events, cardio consult appreciated                  GASTROINTESTINAL: S/p PEG placement (11/14), patient suctioning up feedings on 11/22, diarrhea X 2 on 11/24: negative for  C.diff.  Abd x ray negative for ileus. now vomiting TF 11/28  and not tolerating PEG feedings , Abd x ray to r/o ileus negative , scheduled Reglan as gastric prokinetic agent, GI consult as well as nutrition may need to consider G to J tube. Feed tube check appears in place- start TF at 10cc/hr.       Diet: PEG   RENAL: BUN/Cr without acute change, will continue to provide hydration, good urine output, renal US: No hydronephrosis, Wu reinserted for urinary retention. Continue Cardura for BPH, and continue with Wu catheter until patient's status has improved and he is more ambulatory, small amounts of clots noted in urine on 11/22, now resolved, hypernatremia: resolved,    Na Goal: Greater than 135     Wu: re-inserted 11/17 for retention, failed void trial on 11/25     HEMATOLOGY:   no s/s of bleeding     DVT ppx: LMWH    ID: febrile 11/22 Tmax 37.9,  afebrile in am, leukocytosis resolved, blood cx; NGTD, urine and sputum cultures: klebsiella pneumoniae, pt with diarrhea on 11/24, C diff negative, as per ID eval 7 day abx course of Zosyn completed     DISPOSITION: D/C to [facility] as per PT/OT eval once stable and workup is complete, Pt uninsured, SW aware, medicaid re-instatement pending. Daily PT/OT therapy to optimize therapy and referral to [facility] for possible suman.      CORE MEASURES:        Admission NIHSS: 3     TPA:  NO      LDL/HDL: 171/57     Depression Screen: 0     Statin Therapy: Y     Dysphagia Screen: FAIL     Smoking  NO      Afib NO     Stroke Education YES ASSESSMENT:   58 Y/O man with HTN, HLD, CAD/CABG, past stroke with residual left hemiparesis, presents with transient right hemiparesis and diplopia, continuous headache, nausea and vomiting. CT head shows old right basal ganglia lacune and CTA shows severe bilateral MCA stenosis, proximal basilar/distal vertebrals occlusion and stenosis involving mid to distal basilar artery. MRI brain show right lateral medullary and right cerebellar ischemic infarcts.     Impression:   Severe intracranial atherosclerotic disease and occlusion of left VA, with a small distal right vertebral artery, causing right lateral medullary and right cerebellar ischemic infarcts, i.e. likely due to large artery intracranial atherosclerosis the setting of medication (antiplatelet therapy) noncompliance    NEURO: neurologically without acute change, permissive HTN with slow titration to normotensive in the setting of diffuse and extensive intracranial atherosclerosis, ASA/plavix and statin for secondary stroke prevention, titrate statin to LDL goal less than 70, may consider tapering gabapentin to as hiccups have improved,  Physical therapy/OT eval with recommendations for [facility], pt uninsured and pending re-instatement of medicaid, will provide daily PT/OT to optimize treatment.     ANTITHROMBOTIC THERAPY:  ASA and clopidogrel for secondary stroke prevention, duration of dual antiplatelet therapy would likely be 3 months followed by Aggrenox for stroke prevention     PULMONARY:  Chest CT (11/22) bilateral lower lobe pna, completed Zosyn x 7 days, sputum culture: numerous gram positive cocci and negative rods, appreciate pulmonary and ID consult, protecting airway, titrate off NC as tolerated, encourage Incentive spirometer, monitor sats give oxygen as needed    CARDIOVASCULAR: TTE: LVEF 41%, mitral annular calcification, mild-moderate MR, global LV systolic dysfunction, mild stage 1 diastolic dysfunction mild TR,  cardiac monitoring no events, cardio consult appreciated                  GASTROINTESTINAL: S/p PEG placement (11/14), patient suctioning up feedings on 11/22, diarrhea X 2 on 11/24: negative for  C.diff.  Abd x ray negative for ileus. now vomiting TF 11/28  and not tolerating PEG feedings , Abd x ray to r/o ileus negative, scheduled Reglan as gastric prokinetic agent, GI consult as well as nutrition may need to consider G to J tube. Feed tube check appears in place- start TF at 10cc/hr.       Diet: PEG     RENAL: BUN/Cr without acute change, will continue to provide hydration, good urine output, renal US: No hydronephrosis, Wu reinserted for urinary retention. Continue Cardura for BPH, and continue with Wu catheter until patient's status has improved and he is more ambulatory, small amounts of clots noted in urine on 11/22, now resolved, hypernatremia: resolved,    	Na Goal: Greater than 135    	Wu: re-inserted 11/17 for retention, failed void trial on 11/25     HEMATOLOGY:   no si/sx of bleeding     DVT ppx: LMWH    ID: febrile 11/22 Tmax 37.9,  afebrile in am, leukocytosis resolved, blood cx; NGTD, urine and sputum cultures: klebsiella pneumoniae, pt with diarrhea on 11/24, C diff negative, as per ID eval 7 day ABx course of Zosyn completed     DISPOSITION: D/C to [facility] as per PT/OT eval once stable and workup is complete, Pt uninsured, SW aware, medicaid re-instatement pending. Daily PT/OT therapy to optimize therapy and referral to [facility] for possible suman.      CORE MEASURES:        Admission NIHSS: 3     TPA:  NO      LDL/HDL: 171/57     Depression Screen: 0     Statin Therapy: Y     Dysphagia Screen: FAIL     Smoking  NO      Afib NO     Stroke Education YES

## 2017-11-29 NOTE — PROGRESS NOTE ADULT - SUBJECTIVE AND OBJECTIVE BOX
Follow-up Pulm Progress Note    No new respiratory events overnight.  Denies SOB/CP. o2 sat96% on 2l    Medications:  MEDICATIONS  (STANDING):  ALBUTerol/ipratropium for Nebulization 3 milliLiter(s) Nebulizer every 6 hours  aspirin  chewable 81 milliGRAM(s) Oral daily  atorvastatin 80 milliGRAM(s) Oral at bedtime  baclofen 10 milliGRAM(s) Oral three times a day  clopidogrel Tablet 75 milliGRAM(s) Oral daily  doxazosin 2 milliGRAM(s) Oral at bedtime  enoxaparin Injectable 40 milliGRAM(s) SubCutaneous daily  gabapentin   Solution 800 milliGRAM(s) Oral three times a day  hydrochlorothiazide 12.5 milliGRAM(s) Oral daily  influenza   Vaccine 0.5 milliLiter(s) IntraMuscular once  losartan 100 milliGRAM(s) Oral daily  metoclopramide Injectable 10 milliGRAM(s) IV Push every 8 hours  ondansetron Injectable 4 milliGRAM(s) IV Push once  sodium chloride 0.9% Bolus 500 milliLiter(s) IV Bolus once    MEDICATIONS  (PRN):  acetaminophen   Tablet. 650 milliGRAM(s) Oral every 6 hours PRN Mild Pain (1 - 3)  acetaminophen  Suppository 650 milliGRAM(s) Rectal every 6 hours PRN For Temp greater than 38.5 C (101.3 F)  guaiFENesin    Syrup 200 milliGRAM(s) Oral every 6 hours PRN Cough  hydrALAZINE Injectable 10 milliGRAM(s) IV Push two times a day PRN BP > 180  simethicone 80 milliGRAM(s) Chew daily PRN bloating          Vital Signs Last 24 Hrs  T(C): 36.6 (29 Nov 2017 07:55), Max: 37 (28 Nov 2017 15:00)  T(F): 97.9 (29 Nov 2017 07:55), Max: 98.6 (28 Nov 2017 15:00)  HR: 74 (29 Nov 2017 07:55) (74 - 95)  BP: 113/72 (29 Nov 2017 07:55) (113/67 - 131/85)  BP(mean): --  RR: 19 (29 Nov 2017 07:55) (18 - 20)  SpO2: 99% (29 Nov 2017 07:55) (94% - 99%)          11-28 @ 07:01  -  11-29 @ 07:00  --------------------------------------------------------  IN: 0 mL / OUT: 1200 mL / NET: -1200 mL          LABS:                        11.7   5.61  )-----------( 263      ( 29 Nov 2017 07:12 )             37.0     11-29    136  |  97  |  11  ----------------------------<  97  4.2   |  27  |  1.09    Ca    9.1      29 Nov 2017 07:13            CAPILLARY BLOOD GLUCOSE                            CULTURES: (if applicable)  Culture Results:   Moderate Klebsiella pneumoniae  Numerous Haemophilus influenzae  Normal Respiratory Bryanna present (11-23 @ 01:09)          Physical Examination:  PULM: decreased bs bilaterally, no significant sputum production  CVS: S1, S2 heard    RADIOLOGY REVIEWED   < from: CT Chest No Cont (11.22.17 @ 12:10) >  IMPRESSION: Bilateral lower lobe pneumonia.

## 2017-11-29 NOTE — PROGRESS NOTE ADULT - ASSESSMENT
Patient is a 58 yo [place]riot male w/ PMH significant for HTN, HLD, CAD s/p CABG, previous CVA w/ residual left hemiparesis, admitted for R sided weakness, HA, diplopia, diagnosed with R lateral medullary and R cerebellar ischemic infarcts per MRI. Patient failed MBS and PEG tube was placed on 11/14 and tube feeding was initiated and pt was having problems with emesis, residuals, constipation-concern for ileus. 11/22 with coughing up feeds, green malordours secretions, hypoxia which resolved. found to have bilat PNA-asp, UTI--kleb,

## 2017-11-29 NOTE — PROGRESS NOTE ADULT - SUBJECTIVE AND OBJECTIVE BOX
THE PATIENT WAS SEEN AND EXAMINED BY ME WITH THE HOUSESTAFF AND STROKE TEAM DURING MORNING ROUNDS.   HPI:  58 y/o R-handed [place]ian man PMH CVA with residual L-sided weakness, CAD s/p CABG, HTN presents as stroke code for dizziness. Pt at baseline ambulates independently and independent in ADLs. Pt was in usual state of health on 11/07 at 9AM. At around 9:30AM, he began to complain of lightheadedness not associated with changes in position. Half an hour later, he began to complain of n/v. He also endorses HA, diplopia/blurry vision, R-sided weakness that began a few days prior to admission.  He reports he ran out of his BP meds a week prior to admission and has been unable to refill his medications        SUBJECTIVE: No events overnight.  No new neurologic complaints.      acetaminophen   Tablet. 650 milliGRAM(s) Oral every 6 hours PRN  acetaminophen  Suppository 650 milliGRAM(s) Rectal every 6 hours PRN  ALBUTerol/ipratropium for Nebulization 3 milliLiter(s) Nebulizer every 6 hours  aspirin  chewable 81 milliGRAM(s) Oral daily  atorvastatin 80 milliGRAM(s) Oral at bedtime  baclofen 10 milliGRAM(s) Oral three times a day  clopidogrel Tablet 75 milliGRAM(s) Oral daily  doxazosin 2 milliGRAM(s) Oral at bedtime  enoxaparin Injectable 40 milliGRAM(s) SubCutaneous daily  gabapentin   Solution 800 milliGRAM(s) Oral three times a day  guaiFENesin    Syrup 200 milliGRAM(s) Oral every 6 hours PRN  hydrALAZINE Injectable 10 milliGRAM(s) IV Push two times a day PRN  hydrochlorothiazide 12.5 milliGRAM(s) Oral daily  influenza   Vaccine 0.5 milliLiter(s) IntraMuscular once  losartan 100 milliGRAM(s) Oral daily  ondansetron Injectable 4 milliGRAM(s) IV Push once  simethicone 80 milliGRAM(s) Chew daily PRN  sodium chloride 0.9% Bolus 500 milliLiter(s) IV Bolus once      PHYSICAL EXAM:   Vital Signs Last 24 Hrs  T(C): 36.6 (29 Nov 2017 07:55), Max: 37 (28 Nov 2017 15:00)  T(F): 97.9 (29 Nov 2017 07:55), Max: 98.6 (28 Nov 2017 15:00)  HR: 74 (29 Nov 2017 07:55) (74 - 95)  BP: 113/72 (29 Nov 2017 07:55) (97/64 - 131/85)  BP(mean): --  RR: 19 (29 Nov 2017 07:55) (18 - 20)  SpO2: 99% (29 Nov 2017 07:55) (94% - 99%)    General: Mild SOB, increased secretions  HEENT: EOM intact, visual fields full, PERRLA  Abdomen: Soft, mild tenderness at PEG site, no erythema, nondistended   Extremities: No edema    NEUROLOGICAL EXAM:  Mental status: Awake, alert, oriented x3, fluent speech, no neglect, able to follow commands, moderate dysarthria   Cranial Nerves: Subtle UMN type left facial droop, EOMI, VFF, no nystagmus, hoarseness, slight palatal deviation to the left, no hiccups, hearing intact to  bilateral finger rub   Motor exam: Normal tone, no drift, RUE 5/5, RLE 5/5, drift LUE 4-/5, drift LLE 5-/5  Sensation: Decreased sensation to pinprick on left arm  Coordination/ Gait: LUE moderate-severe dysmetria    LABS:                        12.4   4.96  )-----------( 249      ( 27 Nov 2017 08:45 )             38.1    11-27    139  |  100  |  10  ----------------------------<  120<H>  3.7   |  24  |  1.01    Ca    9.0      27 Nov 2017 08:45      Hemoglobin A1C, Whole Blood: 5.8 % (11-08 @ 10:36)      IMAGING: Reviewed by me.     CT Chest No Cont (11.22.17) Bilateral lower lobe pneumonia.  Head CT/CTA head/Neck (11/07) Right basal ganglia and corona radiata infarct without significant change since 6/16/2017. There is severe steno-occlusive disease intracranially involving the right supraclinoid internal carotid artery, A1 and M1 branches, and severe narrowing of the left M1 segment of the middle cerebral artery. The distal right vertebral artery is quite small and the proximal basilar artery is not visualized. The dominant left vertebral artery ends at the PICA. Distal basilar flow appears to be due to retrograde flow from the right posterior communicating artery.  Head CT (11/07) No acute intracranial hemorrhage, mass effect, or evidence of acute territorial infarct. Chronic right corona radiata/basal ganglia and right inferior cerebellar hemisphere infarcts.  Brain MRI (11/09) In comparison the prior MRI, there is new hyperintense T2 and FLAIR signal with faint increased restricted diffusion in the right medulla extending to the inferior right brachium pontis and inferior medial right cerebellar hemisphere, new compared to 6/17/2017 consistent with evolving area of  ischemic change without hemorrhagic transformation. Redemonstration of volume loss with multiple additional areas of lacunar infarction including chronic infarcts in the right cerebellar hemisphere and right corona radiata. Decreased visualization of the flow-void within the right vertebral artery with continued irregular isointense T1 signal throughout the basilar artery. Bilateral sinus disease with bubbly secretions and air-fluid levels, greatest in the right maxillary sinus

## 2017-11-29 NOTE — CHART NOTE - NSCHARTNOTEFT_GEN_A_CORE
Patient is a 56 yo [place] male w/ PMH significant for HTN, HLD, CAD s/p CABG, previous CVA w/ residual left hemiparesis, admitted for R sided weakness, HA, diplopia, diagnosed with R lateral medullary and R cerebellar ischemic infarcts per MRI. Patient failed MBS and PEG tube was placed and tube feeds initiated. Initial concern for ileus which has resolved, now patient with possible aspiration. Now reconsulted for N/V when uptitrating his feeds.      Interval History:  Tube feed study done yesterday  FINDINGS:  Contrast opacifies percutaneous gastrostomy tube and stomach.  Bowel gas pattern is nonobstructive.  There is no pneumatosis or pneumoperitoneum.  The visualized osseous structures are unremarkable for age.  IMPRESSION:  Gastrostomy tube within the stomach. No leak.      Recommendations  -Gtube study unremarkable  -feed patient with head of bed elevated to at least 45 degrees  -can advance feeds as tolerated starting at 10cc/hr

## 2017-11-30 PROCEDURE — 99233 SBSQ HOSP IP/OBS HIGH 50: CPT

## 2017-11-30 RX ORDER — METOCLOPRAMIDE HCL 10 MG
10 TABLET ORAL EVERY 8 HOURS
Qty: 0 | Refills: 0 | Status: DISCONTINUED | OUTPATIENT
Start: 2017-11-30 | End: 2017-12-07

## 2017-11-30 RX ADMIN — Medication 10 MILLIGRAM(S): at 15:11

## 2017-11-30 RX ADMIN — Medication 10 MILLIGRAM(S): at 05:27

## 2017-11-30 RX ADMIN — GABAPENTIN 800 MILLIGRAM(S): 400 CAPSULE ORAL at 15:25

## 2017-11-30 RX ADMIN — CLOPIDOGREL BISULFATE 75 MILLIGRAM(S): 75 TABLET, FILM COATED ORAL at 12:03

## 2017-11-30 RX ADMIN — Medication 3 MILLILITER(S): at 23:25

## 2017-11-30 RX ADMIN — Medication 10 MILLIGRAM(S): at 22:12

## 2017-11-30 RX ADMIN — LOSARTAN POTASSIUM 100 MILLIGRAM(S): 100 TABLET, FILM COATED ORAL at 05:27

## 2017-11-30 RX ADMIN — Medication 81 MILLIGRAM(S): at 12:03

## 2017-11-30 RX ADMIN — Medication 12.5 MILLIGRAM(S): at 05:28

## 2017-11-30 RX ADMIN — Medication 3 MILLILITER(S): at 12:02

## 2017-11-30 RX ADMIN — ATORVASTATIN CALCIUM 80 MILLIGRAM(S): 80 TABLET, FILM COATED ORAL at 22:12

## 2017-11-30 RX ADMIN — Medication 3 MILLILITER(S): at 17:34

## 2017-11-30 RX ADMIN — GABAPENTIN 800 MILLIGRAM(S): 400 CAPSULE ORAL at 22:27

## 2017-11-30 RX ADMIN — Medication 2 MILLIGRAM(S): at 22:12

## 2017-11-30 RX ADMIN — Medication 3 MILLILITER(S): at 05:27

## 2017-11-30 RX ADMIN — GABAPENTIN 800 MILLIGRAM(S): 400 CAPSULE ORAL at 05:27

## 2017-11-30 RX ADMIN — ENOXAPARIN SODIUM 40 MILLIGRAM(S): 100 INJECTION SUBCUTANEOUS at 15:11

## 2017-11-30 NOTE — SWALLOW BEDSIDE ASSESSMENT ADULT - SWALLOW EVAL: DIAGNOSIS
Consult for repeat bedside swallow evaluation received and appreciated. Chart reviewed, events noted. Noted pt seen by ENT on 11/27/17, per results of direct laryngoscopy: Nasopharynx/oropharynx/hypopharynx clear. Vocal cords fully mobile with no masses or lesions but grossly aspirating his secretions with markedly decreased sensation. Postcricoid area with pooled secretions. ENT rx continue NPO with speech therapy as outpatient with repeat swallow eval in future. D/w Stroke service, given findings of recent ENT exam this service to defer repeat assessment at this time. Consult for repeat bedside swallow evaluation received and appreciated. Chart reviewed, events noted. Noted pt seen by ENT on 11/27/17, per results of direct laryngoscopy: Nasopharynx/oropharynx/hypopharynx clear. Vocal cords fully mobile with no masses or lesions but grossly aspirating his secretions with markedly decreased sensation. Postcricoid area with pooled secretions. ENT rx continue NPO with speech therapy as outpatient with repeat swallow eval in future. D/w ENT VALENTÍN Hernandez confirmed pt w/ marked reduced sensation and gross aspiration of secretions, agreed that swallow reassessment not warranted at this time. D/w Stroke service, given aforementioned findings this service to defer assessment at this time. MD in agreement, order to be d/te at this time.

## 2017-11-30 NOTE — PROGRESS NOTE ADULT - SUBJECTIVE AND OBJECTIVE BOX
Follow-up Pulm Progress Note    No new respiratory events overnight.  Denies SOB/CP. 02 sat 96% 0n 2lnc, sally tube feeds at 20cc/hr    Medications:  MEDICATIONS  (STANDING):  ALBUTerol/ipratropium for Nebulization 3 milliLiter(s) Nebulizer every 6 hours  aspirin  chewable 81 milliGRAM(s) Oral daily  atorvastatin 80 milliGRAM(s) Oral at bedtime  baclofen 10 milliGRAM(s) Oral three times a day  clopidogrel Tablet 75 milliGRAM(s) Oral daily  doxazosin 2 milliGRAM(s) Oral at bedtime  enoxaparin Injectable 40 milliGRAM(s) SubCutaneous daily  gabapentin   Solution 800 milliGRAM(s) Oral three times a day  hydrochlorothiazide 12.5 milliGRAM(s) Oral daily  influenza   Vaccine 0.5 milliLiter(s) IntraMuscular once  losartan 100 milliGRAM(s) Oral daily  metoclopramide 10 milliGRAM(s) Oral every 8 hours  ondansetron Injectable 4 milliGRAM(s) IV Push once  sodium chloride 0.9% Bolus 500 milliLiter(s) IV Bolus once    MEDICATIONS  (PRN):  acetaminophen   Tablet. 650 milliGRAM(s) Oral every 6 hours PRN Mild Pain (1 - 3)  acetaminophen  Suppository 650 milliGRAM(s) Rectal every 6 hours PRN For Temp greater than 38.5 C (101.3 F)  guaiFENesin    Syrup 200 milliGRAM(s) Oral every 6 hours PRN Cough  hydrALAZINE Injectable 10 milliGRAM(s) IV Push two times a day PRN BP > 180  simethicone 80 milliGRAM(s) Chew daily PRN bloating          Vital Signs Last 24 Hrs  T(C): 36.6 (30 Nov 2017 08:35), Max: 37 (29 Nov 2017 16:13)  T(F): 97.8 (30 Nov 2017 08:35), Max: 98.6 (29 Nov 2017 16:13)  HR: 93 (30 Nov 2017 08:35) (62 - 94)  BP: 131/83 (30 Nov 2017 08:35) (115/74 - 131/83)  BP(mean): --  RR: 20 (30 Nov 2017 08:35) (18 - 20)  SpO2: 97% (30 Nov 2017 08:35) (97% - 99%)          11-29 @ 07:01  -  11-30 @ 07:00  --------------------------------------------------------  IN: 0 mL / OUT: 550 mL / NET: -550 mL          LABS:                        11.7   5.61  )-----------( 263      ( 29 Nov 2017 07:12 )             37.0     11-29    136  |  97  |  11  ----------------------------<  97  4.2   |  27  |  1.09    Ca    9.1      29 Nov 2017 07:13            CAPILLARY BLOOD GLUCOSE                            CULTURES: (if applicable)          Physical Examination:  PULM: decreased bs bilaterally, no significant sputum production  CVS: S1, S2 heard    RADIOLOGY REVIEWED  CXR:     CT chest:< from: CT Chest No Cont (11.22.17 @ 12:10) >  IMPRESSION: Bilateral lower lobe pneumonia.    < end of copied text >

## 2017-11-30 NOTE — PROGRESS NOTE ADULT - PROBLEM SELECTOR PLAN 1
s/p tx for aspiration pna- kleb PNA  keep o2 sat>=92% w/ prn o2  asp precautions  hob 45-90 degree  pulm sherita  s/p course zosyn  ID on board

## 2017-11-30 NOTE — PROGRESS NOTE ADULT - ASSESSMENT
ASSESSMENT:   58 Y/O man with HTN, HLD, CAD/CABG, past stroke with residual left hemiparesis, presents with transient right hemiparesis and diplopia, continuous headache, nausea and vomiting. CT head shows old right basal ganglia lacune and CTA shows severe bilateral MCA stenosis, proximal basilar/distal vertebrals occlusion and stenosis involving mid to distal basilar artery. MRI brain show right lateral medullary and right cerebellar ischemic infarcts.     Impression:   Severe intracranial atherosclerotic disease and occlusion of left VA, with a small distal right vertebral artery, causing right lateral medullary and right cerebellar ischemic infarcts, i.e. likely due to large artery intracranial atherosclerosis the setting of medication (antiplatelet therapy) noncompliance    NEURO: neurologically without acute change, permissive HTN with slow titration to normotensive in the setting of diffuse and extensive intracranial atherosclerosis, ASA/plavix and statin for secondary stroke prevention, titrate statin to LDL goal less than 70, may consider tapering gabapentin to as hiccups have improved,  Physical therapy/OT eval with recommendations for [facility], pt uninsured and pending re-instatement of medicaid, will provide daily PT/OT to optimize treatment.     ANTITHROMBOTIC THERAPY:  ASA and clopidogrel for secondary stroke prevention, duration of dual antiplatelet therapy would likely be 3 months followed by Aggrenox for stroke prevention     PULMONARY:  Chest CT (11/22) bilateral lower lobe pna, completed Zosyn x 7 days, sputum culture: numerous gram positive cocci and negative rods, appreciate pulmonary and ID consult, protecting airway, titrate off NC as tolerated, encourage Incentive spirometer, monitor sats give oxygen as needed    CARDIOVASCULAR: TTE: LVEF 41%, mitral annular calcification, mild-moderate MR, global LV systolic dysfunction, mild stage 1 diastolic dysfunction mild TR,  cardiac monitoring no events, cardio consult appreciated                  GASTROINTESTINAL: S/p PEG placement (11/14), patient suctioning up feedings on 11/22, diarrhea X 2 on 11/24: negative for  C.diff.  Abd x ray negative for ileus. now vomiting TF 11/28  and not tolerating PEG feedings , Abd x ray to r/o ileus negative, scheduled Reglan as gastric prokinetic agent, GI consult as well as nutrition may need to consider G to J tube. Feed tube check appears in place- start TF at 10cc/hr.       Diet: PEG     RENAL: BUN/Cr without acute change, will continue to provide hydration, good urine output, renal US: No hydronephrosis, Wu reinserted for urinary retention. Continue Cardura for BPH, and continue with Wu catheter until patient's status has improved and he is more ambulatory, small amounts of clots noted in urine on 11/22, now resolved, hypernatremia: resolved,    	Na Goal: Greater than 135    	Wu: re-inserted 11/17 for retention, failed void trial on 11/25     HEMATOLOGY:   no si/sx of bleeding     DVT ppx: LMWH    ID: febrile 11/22 Tmax 37.9,  afebrile in am, leukocytosis resolved, blood cx; NGTD, urine and sputum cultures: klebsiella pneumoniae, pt with diarrhea on 11/24, C diff negative, as per ID eval 7 day ABx course of Zosyn completed     DISPOSITION: D/C to [facility] as per PT/OT eval once stable and workup is complete, Pt uninsured, SW aware, medicaid re-instatement pending. Daily PT/OT therapy to optimize therapy and referral to [facility] for possible smuan.      CORE MEASURES:        Admission NIHSS: 3     TPA:  NO      LDL/HDL: 171/57     Depression Screen: 0     Statin Therapy: Y     Dysphagia Screen: FAIL     Smoking  NO      Afib NO     Stroke Education YES ASSESSMENT:   58 Y/O man with HTN, HLD, CAD/CABG, past stroke with residual left hemiparesis, presents with transient right hemiparesis and diplopia, continuous headache, nausea and vomiting. CT head shows old right basal ganglia lacune and CTA shows severe bilateral MCA stenosis, proximal basilar/distal vertebrals occlusion and stenosis involving mid to distal basilar artery. MRI brain show right lateral medullary and right cerebellar ischemic infarcts.     Impression:   Severe intracranial atherosclerotic disease and occlusion of left VA, with a small distal right vertebral artery, causing right lateral medullary and right cerebellar ischemic infarcts, i.e. likely due to large artery intracranial atherosclerosis the setting of medication (antiplatelet therapy) noncompliance    NEURO: neurologically without acute change, neurologically tolerating normotension, ASA/plavix and statin for secondary stroke prevention, titrate statin to LDL goal less than 70, consider tapering gabapentin to as hiccups have improved,  Physical therapy/OT eval with recommendations for [facility], pt uninsured and pending re-instatement of medicaid, will provide daily PT/OT to optimize treatment.     ANTITHROMBOTIC THERAPY:  ASA and clopidogrel for secondary stroke prevention, duration of dual antiplatelet therapy would likely be 3 months followed by Aggrenox for stroke prevention     PULMONARY:  Chest CT (11/22) bilateral lower lobe pna, completed Zosyn x 7 days, sputum culture: numerous gram positive cocci and negative rods, appreciate pulmonary and ID consult, protecting airway, titrate off NC as tolerated, encourage Incentive spirometer, monitor sats give oxygen as needed to >92% per pulm    CARDIOVASCULAR: TTE: LVEF 41%, mitral annular calcification, mild-moderate MR, global LV systolic dysfunction, mild stage 1 diastolic dysfunction mild TR,  cardiac monitoring no events, cardio consult appreciated                  GASTROINTESTINAL: S/p PEG placement (11/14), patient suctioning up feedings on 11/22, diarrhea X 2 on 11/24: negative for  C.diff.  Abd x ray was negative for ileus.  vomiting TF 11/28  and not tolerating PEG feedings at goal , Abd x ray to r/o ileus negative, scheduled Reglan as gastric prokinetic agent, GI consult as well as nutrition may need to consider G to J tube. Feed tube check appears in place- started TF at 10cc/hr with planned goal of 55cc/hours, dietary consult appreciated.       Diet: PEG     RENAL:  continue to provide hydration as tolerated, good urine output, renal US: No hydronephrosis, Haro reinserted for urinary retention. Continue Cardura for BPH, and continue with Haro catheter until patient's status has improved and he is more ambulatory, small amounts of clots noted in urine on 11/22, now resolved, urology consult appreciated from 11/23, avoid constipation, TOV/PVR prior to discharge, replace haro if fails.   	Na Goal: Greater than 135    	Haro: y, retention- see above    HEMATOLOGY:   no si/sx of bleeding     DVT ppx: LMWH    ID: febrile 11/22,  afebrile currently, leukocytosis resolved, blood cx; NGTD, urine and sputum cultures: klebsiella pneumoniae, pt with diarrhea on 11/24, C diff negative, as per ID eval 7 day ABx course of Zosyn completed     DISPOSITION: D/C to [facility] as per PT/OT eval once stable and workup is complete, Pt uninsured, SW aware, medicaid re-instatement pending. Daily PT/OT therapy to optimize therapy and referral to [facility] for possible suman.      CORE MEASURES:        Admission NIHSS: 3     TPA:  NO      LDL/HDL: 171/57     Depression Screen: 0     Statin Therapy: Y     Dysphagia Screen: FAIL     Smoking  NO      Afib NO     Stroke Education YES ASSESSMENT:   56 Y/O man with HTN, HLD, CAD/CABG, past stroke with residual left hemiparesis, presents with transient right hemiparesis and diplopia, continuous headache, nausea and vomiting. CT head shows old right basal ganglia lacune and CTA shows severe bilateral MCA stenosis, proximal basilar/distal vertebrals occlusion and stenosis involving mid to distal basilar artery. MRI brain show right lateral medullary and right cerebellar ischemic infarcts.     Impression:   Severe intracranial atherosclerotic disease and occlusion of left VA, with a small distal right vertebral artery, causing right lateral medullary and right cerebellar ischemic infarcts, i.e. likely due to large artery intracranial atherosclerosis the setting of medication (antiplatelet therapy) noncompliance    NEURO: neurologically without acute change, neurologically tolerating normotension, ASA/plavix and statin for secondary stroke prevention, titrate statin to LDL goal less than 70, consider tapering gabapentin to as hiccups have improved,  Physical therapy/OT eval with recommendations for [facility], pt uninsured and pending re-instatement of medicaid, will provide daily PT/OT to optimize treatment.     ANTITHROMBOTIC THERAPY:  ASA and clopidogrel for secondary stroke prevention, duration of dual antiplatelet therapy would likely be 3 months followed by Aggrenox for stroke prevention     PULMONARY:  Chest CT (11/22) bilateral lower lobe pna, completed Zosyn x 7 days, sputum culture: numerous gram positive cocci and negative rods, appreciate pulmonary and ID consult, protecting airway, titrate off NC as tolerated, encourage Incentive spirometer, monitor sats give oxygen as needed to >92% per pulm    CARDIOVASCULAR: TTE: LVEF 41%, mitral annular calcification, mild-moderate MR, global LV systolic dysfunction, mild stage 1 diastolic dysfunction mild TR,  cardiac monitoring no events, cardio consult appreciated                  GASTROINTESTINAL: S/p PEG placement (11/14), patient suctioning up feedings on 11/22, diarrhea X 2 on 11/24: negative for  C.diff.  Abd x ray was negative for ileus.  vomiting TF 11/28  and not tolerating PEG feedings at goal , Abd x ray to r/o ileus negative, scheduled Reglan as gastric prokinetic agent, GI consult as well as nutrition may need to consider G to J tube. Feed tube check appears in place- started TF at 10cc/hr with planned goal of 55cc/hours, dietary consult appreciated.  Patient request for re-evaluation of swallowing, as noted on ENT note 3 days ago he is still aspirating on secretions.     Diet: PEG     RENAL:  continue to provide hydration as tolerated, good urine output, renal US: No hydronephrosis, Haro reinserted for urinary retention. Continue Cardura for BPH, and continue with Haro catheter until patient's status has improved and he is more ambulatory, small amounts of clots noted in urine on 11/22, now resolved, urology consult appreciated from 11/23, avoid constipation, TOV/PVR prior to discharge, replace haro if fails.   	Na Goal: Greater than 135    	Haro: y, retention- see above    HEMATOLOGY:   no si/sx of bleeding     DVT ppx: LMWH    ID: febrile 11/22,  afebrile currently, leukocytosis resolved, blood cx; NGTD, urine and sputum cultures: klebsiella pneumoniae, pt with diarrhea on 11/24, C diff negative, as per ID eval 7 day ABx course of Zosyn completed     Other: Left wrist cock up splint ordered as per OT.    DISPOSITION: D/C to [facility] as per PT/OT eval once stable and workup is complete, Pt uninsured, SW aware, medicaid re-instatement pending. Daily PT/OT therapy to optimize therapy and referral to [facility] for possible suman.      CORE MEASURES:        Admission NIHSS: 3     TPA:  NO      LDL/HDL: 171/57     Depression Screen: 0     Statin Therapy: Y     Dysphagia Screen: FAIL     Smoking  NO      Afib NO     Stroke Education YES ASSESSMENT:   58 Y/O man with HTN, HLD, CAD/CABG, past stroke with residual left hemiparesis, presents with transient right hemiparesis and diplopia, continuous headache, nausea and vomiting. CT head shows old right basal ganglia lacune and CTA shows severe bilateral MCA stenosis, proximal basilar/distal vertebrals occlusion and stenosis involving mid to distal basilar artery. MRI brain show right lateral medullary and right cerebellar ischemic infarcts.     Impression:   Severe intracranial atherosclerotic disease and occlusion of left VA, with a small distal right vertebral artery, causing right lateral medullary and right cerebellar ischemic infarcts, i.e. likely due to large artery intracranial atherosclerosis the setting of medication (antiplatelet therapy) noncompliance    NEURO: neurologically without acute change, neurologically tolerating normotension, ASA/plavix and statin for secondary stroke prevention, titrate statin to LDL goal less than 70, consider tapering gabapentin to as hiccups have improved,  Physical therapy/OT eval with recommendations for [facility], pt uninsured and pending re-instatement of medicaid, will provide daily PT/OT to optimize treatment.     ANTITHROMBOTIC THERAPY:  ASA and clopidogrel for secondary stroke prevention, duration of dual antiplatelet therapy would likely be 3 months followed by Aggrenox for stroke prevention     PULMONARY:  Chest CT (11/22) bilateral lower lobe pneumonia, completed Zosyn x 7 days, sputum culture: numerous gram positive cocci and negative rods, appreciate pulmonary and ID consult, protecting airway, titrate off NC as tolerated, encourage Incentive spirometer, monitor sats give oxygen as needed to >92% per pulm    CARDIOVASCULAR: TTE: LVEF 41%, mitral annular calcification, mild-moderate MR, global LV systolic dysfunction, mild stage 1 diastolic dysfunction mild TR,  cardiac monitoring no events, cardio consult appreciated, would consider 30 days event monitor for prolonged cardiac monitoring to screen for occult cardiac arrhythmias like atrial fibrillation being the cardiac source of embolism                  GASTROINTESTINAL: S/p PEG placement (11/14), patient suctioning up feedings on 11/22, diarrhea X 2 on 11/24: negative for  C.diff.  Abd x ray was negative for ileus.  vomiting TF 11/28  and not tolerating PEG feedings at goal , Abd x ray to r/o ileus negative, scheduled Reglan as gastric prokinetic agent, GI consult as well as nutrition may need to consider G to J tube. Feed tube check appears in place- started TF at 10cc/hr with planned goal of 55cc/hours, dietary consult appreciated.  Patient request for re-evaluation of swallowing, as noted on ENT note 3 days ago he is still aspirating on secretions.     Diet: PEG feeding - increase as tolerated to the goal    RENAL: continue to provide hydration as tolerated, good urine output, renal US: No hydronephrosis, Wu reinserted for urinary retention. Continue Cardura for BPH, and continue with Wu catheter until patient's status has improved and he is more ambulatory, small amounts of clots noted in urine on 11/22, now resolved, urology consult appreciated from 11/23, avoid constipation, TOV/PVR prior to discharge, replace Wu if fails.   	Na Goal: Greater than 135    	Wu: y, retention- see above    HEMATOLOGY: no si/sx of bleeding     DVT ppx: LMWH     ID: febrile on 11/22, afebrile currently, leukocytosis resolved, blood cx; NGTD, urine and sputum cultures: klebsiella pneumoniae, pt with diarrhea on 11/24, C diff negative, as per ID eval 7 day ABx course of Zosyn completed     Other: Left wrist cock up splint ordered as per OT.    DISPOSITION: D/C to [facility] as per PT/OT eval once stable and workup is complete, Pt uninsured, SW aware, medicaid re-instatement pending. Daily PT/OT therapy to optimize therapy and referral to [facility] for possible suman.      CORE MEASURES:        Admission NIHSS: 3     TPA:  NO      LDL/HDL: 171/57     Depression Screen: 0     Statin Therapy: Y     Dysphagia Screen: FAIL     Smoking  NO      Afib NO     Stroke Education YES

## 2017-11-30 NOTE — PROGRESS NOTE ADULT - ASSESSMENT
Patient is a 56 yo [place] male w/ PMH significant for HTN, HLD, CAD s/p CABG, previous CVA w/ residual left hemiparesis, admitted for R sided weakness, HA, diplopia, diagnosed with R lateral medullary and R cerebellar ischemic infarcts per MRI. Patient failed MBS and PEG tube was placed on 11/14 and tube feeding was initiated and pt was having problems with emesis, residuals, constipation-concern for ileus. 11/22 with coughing up feeds, green malordours secretions, hypoxia which resolved. found to have bilat PNA-asp, UTI--kleb,

## 2017-11-30 NOTE — PROGRESS NOTE ADULT - SUBJECTIVE AND OBJECTIVE BOX
THE PATIENT WAS SEEN AND EXAMINED BY ME WITH THE HOUSESTAFF AND STROKE TEAM DURING MORNING ROUNDS.   HPI:  56 y/o R-handed [place] man PMH CVA with residual L-sided weakness, CAD s/p CABG, HTN presented as stroke code for dizziness. Pt at baseline ambulates independently and independent in ADLs. Pt was in usual state of health on 11/07 at 9 AM. At around 9:30 AM, he began to complain of lightheadedness not associated with changes in position. Half an hour later, he began to complain of n/v. He also endorses HA, diplopia/blurry vision, R-sided weakness that began a few days prior to admission.  He reports he ran out of his BP meds a week prior to admission and has been unable to refill his medications      SUBJECTIVE: No events overnight.  No new neurologic complaints.      acetaminophen   Tablet. 650 milliGRAM(s) Oral every 6 hours PRN  acetaminophen  Suppository 650 milliGRAM(s) Rectal every 6 hours PRN  ALBUTerol/ipratropium for Nebulization 3 milliLiter(s) Nebulizer every 6 hours  aspirin  chewable 81 milliGRAM(s) Oral daily  atorvastatin 80 milliGRAM(s) Oral at bedtime  baclofen 10 milliGRAM(s) Oral three times a day  clopidogrel Tablet 75 milliGRAM(s) Oral daily  doxazosin 2 milliGRAM(s) Oral at bedtime  enoxaparin Injectable 40 milliGRAM(s) SubCutaneous daily  gabapentin   Solution 800 milliGRAM(s) Oral three times a day  guaiFENesin    Syrup 200 milliGRAM(s) Oral every 6 hours PRN  hydrALAZINE Injectable 10 milliGRAM(s) IV Push two times a day PRN  hydrochlorothiazide 12.5 milliGRAM(s) Oral daily  influenza   Vaccine 0.5 milliLiter(s) IntraMuscular once  losartan 100 milliGRAM(s) Oral daily  metoclopramide 10 milliGRAM(s) Oral every 8 hours  ondansetron Injectable 4 milliGRAM(s) IV Push once  simethicone 80 milliGRAM(s) Chew daily PRN  sodium chloride 0.9% Bolus 500 milliLiter(s) IV Bolus once      PHYSICAL EXAM:   Vital Signs Last 24 Hrs  T(C): 36.6 (30 Nov 2017 08:35), Max: 37 (29 Nov 2017 16:13)  T(F): 97.8 (30 Nov 2017 08:35), Max: 98.6 (29 Nov 2017 16:13)  HR: 93 (30 Nov 2017 08:35) (62 - 94)  BP: 131/83 (30 Nov 2017 08:35) (115/74 - 131/83)  BP(mean): --  RR: 20 (30 Nov 2017 08:35) (18 - 20)  SpO2: 97% (30 Nov 2017 08:35) (97% - 99%)    General: NAD  HEENT: EOM intact, visual fields full, PERRLA  Abdomen: Soft, no erythema, nondistended   Extremities: No edema    NEUROLOGICAL EXAM:  Mental status: Awake, alert, oriented x3, fluent speech, no neglect, able to follow commands, moderate dysarthria   Cranial Nerves: Subtle UMN type left facial droop, EOMI, VFF, no nystagmus, hoarseness, slight palatal deviation to the left, no hiccups, hearing intact to bilateral finger rub   Motor exam: Normal tone, no drift, RUE 5/5, RLE 5/5, drift LUE 4-/5, drift LLE 5-/5  Sensation: Decreased sensation to pinprick on left arm  Coordination/ Gait: LUE moderate-severe dysmetria  LABS:                        11.7   5.61  )-----------( 263      ( 29 Nov 2017 07:12 )             37.0    11-29    136  |  97  |  11  ----------------------------<  97  4.2   |  27  |  1.09    Ca    9.1      29 Nov 2017 07:13      Hemoglobin A1C, Whole Blood: 5.8 % (11-08 @ 10:36)      IMAGING: Reviewed by me.   CT Chest No Cont (11.22.17) Bilateral lower lobe pneumonia.  Head CT/CTA head/Neck (11/07) Right basal ganglia and corona radiata infarct without significant change since 6/16/2017. There is severe steno-occlusive disease intracranially involving the right supraclinoid internal carotid artery, A1 and M1 branches, and severe narrowing of the left M1 segment of the middle cerebral artery. The distal right vertebral artery is quite small and the proximal basilar artery is not visualized. The dominant left vertebral artery ends at the PICA. Distal basilar flow appears to be due to retrograde flow from the right posterior communicating artery.  Head CT (11/07) No acute intracranial hemorrhage, mass effect, or evidence of acute territorial infarct. Chronic right corona radiata/basal ganglia and right inferior cerebellar hemisphere infarcts.  Brain MRI (11/09) In comparison the prior MRI, there is new hyperintense T2 and FLAIR signal with faint increased restricted diffusion in the right medulla extending to the inferior right brachium pontis and inferior medial right cerebellar hemisphere, new compared to 6/17/2017 consistent with evolving area of  ischemic change without hemorrhagic transformation. Redemonstration of volume loss with multiple additional areas of lacunar infarction including chronic infarcts in the right cerebellar hemisphere and right corona radiata. Decreased visualization of the flow-void within the right vertebral artery with continued irregular isointense T1 signal throughout the basilar artery. Bilateral sinus disease with bubbly secretions and air-fluid levels, greatest in the right maxillary sinus THE PATIENT WAS SEEN AND EXAMINED BY ME WITH THE HOUSESTAFF AND STROKE TEAM DURING MORNING ROUNDS.   HPI:  56 y/o R-handed [place] man PMH CVA with residual L-sided weakness, CAD s/p CABG, HTN presented as stroke code for dizziness. Pt at baseline ambulates independently and independent in ADLs. Pt was in usual state of health on 11/07 at 9 AM. At around 9:30 AM, he began to complain of lightheadedness not associated with changes in position. Half an hour later, he began to complain of n/v. He also endorses HA, diplopia/blurry vision, R-sided weakness that began a few days prior to admission.  He reports he ran out of his BP meds a week prior to admission and has been unable to refill his medications    SUBJECTIVE: No events overnight.  No new neurologic complaints.      acetaminophen   Tablet. 650 milliGRAM(s) Oral every 6 hours PRN  acetaminophen  Suppository 650 milliGRAM(s) Rectal every 6 hours PRN  ALBUTerol/ipratropium for Nebulization 3 milliLiter(s) Nebulizer every 6 hours  aspirin  chewable 81 milliGRAM(s) Oral daily  atorvastatin 80 milliGRAM(s) Oral at bedtime  baclofen 10 milliGRAM(s) Oral three times a day  clopidogrel Tablet 75 milliGRAM(s) Oral daily  doxazosin 2 milliGRAM(s) Oral at bedtime  enoxaparin Injectable 40 milliGRAM(s) SubCutaneous daily  gabapentin   Solution 800 milliGRAM(s) Oral three times a day  guaiFENesin    Syrup 200 milliGRAM(s) Oral every 6 hours PRN  hydrALAZINE Injectable 10 milliGRAM(s) IV Push two times a day PRN  hydrochlorothiazide 12.5 milliGRAM(s) Oral daily  influenza   Vaccine 0.5 milliLiter(s) IntraMuscular once  losartan 100 milliGRAM(s) Oral daily  metoclopramide 10 milliGRAM(s) Oral every 8 hours  ondansetron Injectable 4 milliGRAM(s) IV Push once  simethicone 80 milliGRAM(s) Chew daily PRN  sodium chloride 0.9% Bolus 500 milliLiter(s) IV Bolus once      PHYSICAL EXAM:   Vital Signs Last 24 Hrs  T(C): 36.6 (30 Nov 2017 08:35), Max: 37 (29 Nov 2017 16:13)  T(F): 97.8 (30 Nov 2017 08:35), Max: 98.6 (29 Nov 2017 16:13)  HR: 93 (30 Nov 2017 08:35) (62 - 94)  BP: 131/83 (30 Nov 2017 08:35) (115/74 - 131/83)  BP(mean): --  RR: 20 (30 Nov 2017 08:35) (18 - 20)  SpO2: 97% (30 Nov 2017 08:35) (97% - 99%)    General: NAD  HEENT: EOM intact, visual fields full, PERRLA  Abdomen: Soft, no erythema, nondistended   Extremities: No edema    NEUROLOGICAL EXAM:  Mental status: Awake, alert, oriented x3, fluent speech, no neglect, able to follow commands, moderate dysarthria   Cranial Nerves: Subtle UMN type left facial droop, EOMI, VFF, no nystagmus, hoarseness, slight palatal deviation to the left, no hiccups, hearing intact to bilateral finger rub   Motor exam: Normal tone, no drift, RUE 5/5, RLE 5/5, drift LUE 4-/5, drift LLE 5-/5, increase rigidity of left wrist  Sensation: Decreased sensation to pinprick on left arm  Coordination/ Gait: LUE moderate-severe dysmetria  LABS:                        11.7   5.61  )-----------( 263      ( 29 Nov 2017 07:12 )             37.0    11-29    136  |  97  |  11  ----------------------------<  97  4.2   |  27  |  1.09    Ca    9.1      29 Nov 2017 07:13      Hemoglobin A1C, Whole Blood: 5.8 % (11-08 @ 10:36)      IMAGING: Reviewed by me.   CT Chest No Cont (11.22.17) Bilateral lower lobe pneumonia.  Head CT/CTA head/Neck (11/07) Right basal ganglia and corona radiata infarct without significant change since 6/16/2017. There is severe steno-occlusive disease intracranially involving the right supraclinoid internal carotid artery, A1 and M1 branches, and severe narrowing of the left M1 segment of the middle cerebral artery. The distal right vertebral artery is quite small and the proximal basilar artery is not visualized. The dominant left vertebral artery ends at the PICA. Distal basilar flow appears to be due to retrograde flow from the right posterior communicating artery.  Head CT (11/07) No acute intracranial hemorrhage, mass effect, or evidence of acute territorial infarct. Chronic right corona radiata/basal ganglia and right inferior cerebellar hemisphere infarcts.  Brain MRI (11/09) In comparison the prior MRI, there is new hyperintense T2 and FLAIR signal with faint increased restricted diffusion in the right medulla extending to the inferior right brachium pontis and inferior medial right cerebellar hemisphere, new compared to 6/17/2017 consistent with evolving area of  ischemic change without hemorrhagic transformation. Redemonstration of volume loss with multiple additional areas of lacunar infarction including chronic infarcts in the right cerebellar hemisphere and right corona radiata. Decreased visualization of the flow-void within the right vertebral artery with continued irregular isointense T1 signal throughout the basilar artery. Bilateral sinus disease with bubbly secretions and air-fluid levels, greatest in the right maxillary sinus THE PATIENT WAS SEEN AND EXAMINED BY ME WITH THE HOUSESTAFF AND STROKE TEAM DURING MORNING ROUNDS.     HPI:  58 y/o R-handed Wall[place]ian man PMH CVA with residual L-sided weakness, CAD s/p CABG, HTN presented as stroke code for dizziness. Pt at baseline ambulates independently and independent in ADLs. Pt was in usual state of health on 11/07 at 9 AM. At around 9:30 AM, he began to complain of lightheadedness not associated with changes in position. Half an hour later, he began to complain of n/v. He also endorses HA, diplopia/blurry vision, R-sided weakness that began a few days prior to admission.  He reports he ran out of his BP meds a week prior to admission and has been unable to refill his medications    SUBJECTIVE: No events overnight.  No new neurologic complaints.      acetaminophen   Tablet. 650 milliGRAM(s) Oral every 6 hours PRN  acetaminophen  Suppository 650 milliGRAM(s) Rectal every 6 hours PRN  ALBUTerol/ipratropium for Nebulization 3 milliLiter(s) Nebulizer every 6 hours  aspirin  chewable 81 milliGRAM(s) Oral daily  atorvastatin 80 milliGRAM(s) Oral at bedtime  baclofen 10 milliGRAM(s) Oral three times a day  clopidogrel Tablet 75 milliGRAM(s) Oral daily  doxazosin 2 milliGRAM(s) Oral at bedtime  enoxaparin Injectable 40 milliGRAM(s) SubCutaneous daily  gabapentin   Solution 800 milliGRAM(s) Oral three times a day  guaiFENesin    Syrup 200 milliGRAM(s) Oral every 6 hours PRN  hydrALAZINE Injectable 10 milliGRAM(s) IV Push two times a day PRN  hydrochlorothiazide 12.5 milliGRAM(s) Oral daily  influenza   Vaccine 0.5 milliLiter(s) IntraMuscular once  losartan 100 milliGRAM(s) Oral daily  metoclopramide 10 milliGRAM(s) Oral every 8 hours  ondansetron Injectable 4 milliGRAM(s) IV Push once  simethicone 80 milliGRAM(s) Chew daily PRN  sodium chloride 0.9% Bolus 500 milliLiter(s) IV Bolus once      PHYSICAL EXAM:   Vital Signs Last 24 Hrs  T(C): 36.6 (30 Nov 2017 08:35), Max: 37 (29 Nov 2017 16:13)  T(F): 97.8 (30 Nov 2017 08:35), Max: 98.6 (29 Nov 2017 16:13)  HR: 93 (30 Nov 2017 08:35) (62 - 94)  BP: 131/83 (30 Nov 2017 08:35) (115/74 - 131/83)  BP(mean): --  RR: 20 (30 Nov 2017 08:35) (18 - 20)  SpO2: 97% (30 Nov 2017 08:35) (97% - 99%)    General: NAD  HEENT: EOM intact, visual fields full, PERRLA  Abdomen: Soft, no erythema, nondistended   Extremities: No edema    NEUROLOGICAL EXAM:  Mental status: Awake, alert, oriented x3, fluent speech, no neglect, able to follow commands, moderate dysarthria   Cranial Nerves: Subtle UMN type left facial droop, EOMI, VFF, no nystagmus, hoarseness, slight palatal deviation to the left, no hiccups, hearing intact to bilateral finger rub   Motor exam: Normal tone, no drift, RUE 5/5, RLE 5/5, drift LUE 4-/5, drift LLE 5-/5, increase rigidity of left wrist  Sensation: Decreased sensation to pinprick on left arm  Coordination/ Gait: LUE moderate-severe dysmetria    LABS:                        11.7   5.61  )-----------( 263      ( 29 Nov 2017 07:12 )             37.0    11-29    136  |  97  |  11  ----------------------------<  97  4.2   |  27  |  1.09    Ca    9.1      29 Nov 2017 07:13      Hemoglobin A1C, Whole Blood: 5.8 % (11-08 @ 10:36)      IMAGING: Reviewed by me.   CT Chest No Cont (11.22.17) Bilateral lower lobe pneumonia.  Head CT/CTA head/Neck (11/07) Right basal ganglia and corona radiata infarct without significant change since 6/16/2017. There is severe steno-occlusive disease intracranially involving the right supraclinoid internal carotid artery, A1 and M1 branches, and severe narrowing of the left M1 segment of the middle cerebral artery. The distal right vertebral artery is quite small and the proximal basilar artery is not visualized. The dominant left vertebral artery ends at the PICA. Distal basilar flow appears to be due to retrograde flow from the right posterior communicating artery.  Head CT (11/07) No acute intracranial hemorrhage, mass effect, or evidence of acute territorial infarct. Chronic right corona radiata/basal ganglia and right inferior cerebellar hemisphere infarcts.  Brain MRI (11/09) In comparison the prior MRI, there is new hyperintense T2 and FLAIR signal with faint increased restricted diffusion in the right medulla extending to the inferior right brachium pontis and inferior medial right cerebellar hemisphere, new compared to 6/17/2017 consistent with evolving area of  ischemic change without hemorrhagic transformation. Redemonstration of volume loss with multiple additional areas of lacunar infarction including chronic infarcts in the right cerebellar hemisphere and right corona radiata. Decreased visualization of the flow-void within the right vertebral artery with continued irregular isointense T1 signal throughout the basilar artery. Bilateral sinus disease with bubbly secretions and air-fluid levels, greatest in the right maxillary sinus

## 2017-12-01 PROCEDURE — 99233 SBSQ HOSP IP/OBS HIGH 50: CPT

## 2017-12-01 RX ADMIN — GABAPENTIN 800 MILLIGRAM(S): 400 CAPSULE ORAL at 22:35

## 2017-12-01 RX ADMIN — Medication 2 MILLIGRAM(S): at 22:34

## 2017-12-01 RX ADMIN — Medication 3 MILLILITER(S): at 12:00

## 2017-12-01 RX ADMIN — Medication 10 MILLIGRAM(S): at 13:05

## 2017-12-01 RX ADMIN — ENOXAPARIN SODIUM 40 MILLIGRAM(S): 100 INJECTION SUBCUTANEOUS at 13:05

## 2017-12-01 RX ADMIN — GABAPENTIN 800 MILLIGRAM(S): 400 CAPSULE ORAL at 18:39

## 2017-12-01 RX ADMIN — Medication 3 MILLILITER(S): at 05:26

## 2017-12-01 RX ADMIN — Medication 10 MILLIGRAM(S): at 22:34

## 2017-12-01 RX ADMIN — LOSARTAN POTASSIUM 100 MILLIGRAM(S): 100 TABLET, FILM COATED ORAL at 06:08

## 2017-12-01 RX ADMIN — Medication 12.5 MILLIGRAM(S): at 06:08

## 2017-12-01 RX ADMIN — GABAPENTIN 800 MILLIGRAM(S): 400 CAPSULE ORAL at 06:09

## 2017-12-01 RX ADMIN — Medication 10 MILLIGRAM(S): at 06:08

## 2017-12-01 RX ADMIN — CLOPIDOGREL BISULFATE 75 MILLIGRAM(S): 75 TABLET, FILM COATED ORAL at 13:05

## 2017-12-01 RX ADMIN — ATORVASTATIN CALCIUM 80 MILLIGRAM(S): 80 TABLET, FILM COATED ORAL at 22:34

## 2017-12-01 RX ADMIN — Medication 3 MILLILITER(S): at 18:12

## 2017-12-01 RX ADMIN — Medication 81 MILLIGRAM(S): at 13:05

## 2017-12-01 RX ADMIN — Medication 10 MILLIGRAM(S): at 18:30

## 2017-12-01 NOTE — PROGRESS NOTE ADULT - SUBJECTIVE AND OBJECTIVE BOX
THE PATIENT WAS SEEN AND EXAMINED BY ME WITH THE HOUSESTAFF AND STROKE TEAM DURING MORNING ROUNDS.   HPI:  56 y/o R-handed Pakistani man PMH CVA with residual L-sided weakness, CAD s/p CABG, HTN presented as stroke code for dizziness. Pt at baseline ambulates independently and independent in ADLs. Pt was in usual state of health on 11/07 at 9 AM. At around 9:30 AM, he began to complain of lightheadedness not associated with changes in position. Half an hour later, he began to complain of n/v. He also endorses HA, diplopia/blurry vision, R-sided weakness that began a few days prior to admission.  He reports he ran out of his BP meds a week prior to admission and has been unable to refill his medications    SUBJECTIVE: No events overnight.  No new neurologic complaints.      acetaminophen   Tablet. 650 milliGRAM(s) Oral every 6 hours PRN  acetaminophen  Suppository 650 milliGRAM(s) Rectal every 6 hours PRN  ALBUTerol/ipratropium for Nebulization 3 milliLiter(s) Nebulizer every 6 hours  aspirin  chewable 81 milliGRAM(s) Oral daily  atorvastatin 80 milliGRAM(s) Oral at bedtime  baclofen 10 milliGRAM(s) Oral three times a day  clopidogrel Tablet 75 milliGRAM(s) Oral daily  doxazosin 2 milliGRAM(s) Oral at bedtime  enoxaparin Injectable 40 milliGRAM(s) SubCutaneous daily  gabapentin   Solution 800 milliGRAM(s) Oral three times a day  guaiFENesin    Syrup 200 milliGRAM(s) Oral every 6 hours PRN  hydrALAZINE Injectable 10 milliGRAM(s) IV Push two times a day PRN  hydrochlorothiazide 12.5 milliGRAM(s) Oral daily  influenza   Vaccine 0.5 milliLiter(s) IntraMuscular once  losartan 100 milliGRAM(s) Oral daily  metoclopramide 10 milliGRAM(s) Oral every 8 hours  ondansetron Injectable 4 milliGRAM(s) IV Push once  simethicone 80 milliGRAM(s) Chew daily PRN  sodium chloride 0.9% Bolus 500 milliLiter(s) IV Bolus once      PHYSICAL EXAM:   Vital Signs Last 24 Hrs  T(C): 36.6 (01 Dec 2017 08:02), Max: 36.9 (01 Dec 2017 05:04)  T(F): 97.8 (01 Dec 2017 08:02), Max: 98.4 (01 Dec 2017 05:04)  HR: 94 (01 Dec 2017 08:02) (71 - 94)  BP: 97/65 (01 Dec 2017 08:02) (97/65 - 148/87)  BP(mean): --  RR: 18 (01 Dec 2017 08:02) (18 - 19)  SpO2: 90% (01 Dec 2017 08:02) (90% - 100%)    General: NAD  HEENT: EOM intact, visual fields full, PERRLA  Abdomen: Soft, no erythema, nondistended   Extremities: No edema    NEUROLOGICAL EXAM:  Mental status: Awake, alert, oriented x3, fluent speech, no neglect, able to follow commands, moderate dysarthria   Cranial Nerves: Subtle UMN type left facial droop, EOMI, VFF, no nystagmus, hoarseness, slight palatal deviation to the left, no hiccups, hearing intact to bilateral finger rub   Motor exam: Normal tone, no drift, RUE 5/5, RLE 5/5, drift LUE 4-/5, drift LLE 5-/5, increase rigidity of left wrist  Sensation: Decreased sensation to pinprick on left arm  Coordination/ Gait: LUE moderate-severe dysmetria    LABS:       Hemoglobin A1C, Whole Blood: 5.8 % (11-08 @ 10:36)      IMAGING: Reviewed by me.   CT Chest No Cont (11.22.17) Bilateral lower lobe pneumonia.  Head CT/CTA head/Neck (11/07) Right basal ganglia and corona radiata infarct without significant change since 6/16/2017. There is severe steno-occlusive disease intracranially involving the right supraclinoid internal carotid artery, A1 and M1 branches, and severe narrowing of the left M1 segment of the middle cerebral artery. The distal right vertebral artery is quite small and the proximal basilar artery is not visualized. The dominant left vertebral artery ends at the PICA. Distal basilar flow appears to be due to retrograde flow from the right posterior communicating artery.  Head CT (11/07) No acute intracranial hemorrhage, mass effect, or evidence of acute territorial infarct. Chronic right corona radiata/basal ganglia and right inferior cerebellar hemisphere infarcts.  Brain MRI (11/09) In comparison the prior MRI, there is new hyperintense T2 and FLAIR signal with faint increased restricted diffusion in the right medulla extending to the inferior right brachium pontis and inferior medial right cerebellar hemisphere, new compared to 6/17/2017 consistent with evolving area of  ischemic change without hemorrhagic transformation. Redemonstration of volume loss with multiple additional areas of lacunar infarction including chronic infarcts in the right cerebellar hemisphere and right corona radiata. Decreased visualization of the flow-void within the right vertebral artery with continued irregular isointense T1 signal throughout the basilar artery. Bilateral sinus disease with bubbly secretions and air-fluid levels, greatest in the right maxillary sinus THE PATIENT WAS SEEN AND EXAMINED BY ME WITH THE HOUSESTAFF AND STROKE TEAM DURING MORNING ROUNDS.     HPI:  56 y/o R-handed [place]ese man PMH CVA with residual L-sided weakness, CAD s/p CABG, HTN presented as stroke code for dizziness. Pt at baseline ambulates independently and independent in ADLs. Pt was in usual state of health on 11/07 at 9 AM. At around 9:30 AM, he began to complain of lightheadedness not associated with changes in position. Half an hour later, he began to complain of n/v. He also endorses HA, diplopia/blurry vision, R-sided weakness that began a few days prior to admission.  He reports he ran out of his BP meds a week prior to admission and has been unable to refill his medications    SUBJECTIVE: No events overnight.  No new neurologic complaints.      acetaminophen   Tablet. 650 milliGRAM(s) Oral every 6 hours PRN  acetaminophen  Suppository 650 milliGRAM(s) Rectal every 6 hours PRN  ALBUTerol/ipratropium for Nebulization 3 milliLiter(s) Nebulizer every 6 hours  aspirin  chewable 81 milliGRAM(s) Oral daily  atorvastatin 80 milliGRAM(s) Oral at bedtime  baclofen 10 milliGRAM(s) Oral three times a day  clopidogrel Tablet 75 milliGRAM(s) Oral daily  doxazosin 2 milliGRAM(s) Oral at bedtime  enoxaparin Injectable 40 milliGRAM(s) SubCutaneous daily  gabapentin   Solution 800 milliGRAM(s) Oral three times a day  guaiFENesin    Syrup 200 milliGRAM(s) Oral every 6 hours PRN  hydrALAZINE Injectable 10 milliGRAM(s) IV Push two times a day PRN  hydrochlorothiazide 12.5 milliGRAM(s) Oral daily  influenza   Vaccine 0.5 milliLiter(s) IntraMuscular once  losartan 100 milliGRAM(s) Oral daily  metoclopramide 10 milliGRAM(s) Oral every 8 hours  ondansetron Injectable 4 milliGRAM(s) IV Push once  simethicone 80 milliGRAM(s) Chew daily PRN  sodium chloride 0.9% Bolus 500 milliLiter(s) IV Bolus once    PHYSICAL EXAM:   Vital Signs Last 24 Hrs  T(C): 36.6 (01 Dec 2017 08:02), Max: 36.9 (01 Dec 2017 05:04)  T(F): 97.8 (01 Dec 2017 08:02), Max: 98.4 (01 Dec 2017 05:04)  HR: 94 (01 Dec 2017 08:02) (71 - 94)  BP: 97/65 (01 Dec 2017 08:02) (97/65 - 148/87)  BP(mean): --  RR: 18 (01 Dec 2017 08:02) (18 - 19)  SpO2: 90% (01 Dec 2017 08:02) (90% - 100%)    General: NAD  HEENT: EOM intact, visual fields full   Abdomen: Soft, no erythema, nondistended   Extremities: No edema    NEUROLOGICAL EXAM:  Mental status: Awake, alert, oriented x3, fluent speech, no neglect, able to follow commands, moderate dysarthria   Cranial Nerves: Subtle UMN type left facial droop, EOMI, VFF, no nystagmus, hoarseness, slight palatal deviation to the left, no hiccups, hearing intact to bilateral finger rub   Motor exam: Normal tone, no drift, RUE 5/5, RLE 5/5, drift LUE 4-/5, drift LLE 5-/5, increase rigidity of left wrist  Sensation: Decreased sensation to pinprick on left arm  Coordination/ Gait: LUE moderate-severe dysmetria    LABS:       Hemoglobin A1C, Whole Blood: 5.8 % (11-08 @ 10:36)      IMAGING: Reviewed by me.   CT Chest No Cont (11.22.17) Bilateral lower lobe pneumonia.  Head CT/CTA head/Neck (11/07) Right basal ganglia and corona radiata infarct without significant change since 6/16/2017. There is severe steno-occlusive disease intracranially involving the right supraclinoid internal carotid artery, A1 and M1 branches, and severe narrowing of the left M1 segment of the middle cerebral artery. The distal right vertebral artery is quite small and the proximal basilar artery is not visualized. The dominant left vertebral artery ends at the PICA. Distal basilar flow appears to be due to retrograde flow from the right posterior communicating artery.  Head CT (11/07) No acute intracranial hemorrhage, mass effect, or evidence of acute territorial infarct. Chronic right corona radiata/basal ganglia and right inferior cerebellar hemisphere infarcts.  Brain MRI (11/09) In comparison the prior MRI, there is new hyperintense T2 and FLAIR signal with faint increased restricted diffusion in the right medulla extending to the inferior right brachium pontis and inferior medial right cerebellar hemisphere, new compared to 6/17/2017 consistent with evolving area of  ischemic change without hemorrhagic transformation. Redemonstration of volume loss with multiple additional areas of lacunar infarction including chronic infarcts in the right cerebellar hemisphere and right corona radiata. Decreased visualization of the flow-void within the right vertebral artery with continued irregular isointense T1 signal throughout the basilar artery. Bilateral sinus disease with bubbly secretions and air-fluid levels, greatest in the right maxillary sinus

## 2017-12-01 NOTE — PROGRESS NOTE ADULT - ASSESSMENT
ASSESSMENT:   56 Y/O man with HTN, HLD, CAD/CABG, past stroke with residual left hemiparesis, presents with transient right hemiparesis and diplopia, continuous headache, nausea and vomiting. CT head shows old right basal ganglia lacune and CTA shows severe bilateral MCA stenosis, proximal basilar/distal vertebrals occlusion and stenosis involving mid to distal basilar artery. MRI brain show right lateral medullary and right cerebellar ischemic infarcts.     Impression:   Severe intracranial atherosclerotic disease and occlusion of left VA, with a small distal right vertebral artery, causing right lateral medullary and right cerebellar ischemic infarcts, i.e. likely due to large artery intracranial atherosclerosis the setting of medication (antiplatelet therapy) noncompliance    NEURO: neurologically without acute change, neurologically tolerating normotension, ASA/plavix and statin for secondary stroke prevention, titrate statin to LDL goal less than 70, consider tapering gabapentin to as hiccups have improved,  Physical therapy/OT eval with recommendations for [facility], pt uninsured and pending re-instatement of medicaid, will provide daily PT/OT to optimize treatment.     ANTITHROMBOTIC THERAPY:  ASA and clopidogrel for secondary stroke prevention, duration of dual antiplatelet therapy would likely be 3 months followed by Aggrenox for stroke prevention     PULMONARY:  Chest CT (11/22) bilateral lower lobe pneumonia, completed Zosyn x 7 days, sputum culture: numerous gram positive cocci and negative rods, appreciate pulmonary and ID consult, protecting airway, titrate off NC as tolerated, encourage Incentive spirometer, monitor sats give oxygen as needed to >92% per pulm    CARDIOVASCULAR: TTE: LVEF 41%, mitral annular calcification, mild-moderate MR, global LV systolic dysfunction, mild stage 1 diastolic dysfunction mild TR,  cardiac monitoring no events, cardio consult appreciated, would consider 30 days event monitor for prolonged cardiac monitoring to screen for occult cardiac arrhythmias like atrial fibrillation being the cardiac source of embolism                  GASTROINTESTINAL: S/p PEG placement (11/14), patient suctioning up feedings on 11/22, diarrhea X 2 on 11/24: negative for  C.diff.  Abd x ray was negative for ileus.  vomiting TF 11/28  and not tolerating PEG feedings at goal , Abd x ray to r/o ileus negative, scheduled Reglan as gastric prokinetic agent, GI consult as well as nutrition may need to consider G to J tube. Feed tube check appears in place- started Jevity 1.5 (as per dietician) TF at 10cc/hr with planned goal of 55cc/hours, dietary consult appreciated.  Patient request for re-evaluation of swallowing, as noted on ENT note 3 days ago he is still aspirating on secretions, s/s therapist came to assess and agrees that repeat evaluation is not appropriate at this time.     Diet: PEG feeding - increase as tolerated to the goal    RENAL: continue to provide hydration as tolerated, good urine output, renal US: No hydronephrosis, Wu reinserted for urinary retention. Continue Cardura for BPH, and continue with Wu catheter until patient's status has improved and he is more ambulatory, small amounts of clots noted in urine on 11/22, now resolved, urology consult appreciated from 11/23, avoid constipation, TOV/PVR prior to discharge, replace Wu if fails.   	Na Goal: Greater than 135    	Wu: y, retention- see above    HEMATOLOGY: no si/sx of bleeding     DVT ppx: LMWH     ID: febrile on 11/22, afebrile currently, leukocytosis resolved, blood cx; NGTD, urine and sputum cultures: klebsiella pneumoniae, pt with diarrhea on 11/24, C diff negative, as per ID eval 7 day ABx course of Zosyn completed     Other: Left wrist cock up splint ordered as per OT.    DISPOSITION: D/C to [facility] as per PT/OT eval once stable and workup is complete, Pt uninsured, SW aware, medicaid re-instatement pending. Daily PT/OT therapy to optimize therapy and referral to [facility] for possible suman.      CORE MEASURES:        Admission NIHSS: 3     TPA:  NO      LDL/HDL: 171/57     Depression Screen: 0     Statin Therapy: Y     Dysphagia Screen: FAIL     Smoking  NO      Afib NO     Stroke Education YES ASSESSMENT:   58 Y/O man with HTN, HLD, CAD/CABG, past stroke with residual left hemiparesis, presents with transient right hemiparesis and diplopia, continuous headache, nausea and vomiting. CT head shows old right basal ganglia lacune and CTA shows severe bilateral MCA stenosis, proximal basilar/distal vertebrals occlusion and stenosis involving mid to distal basilar artery. MRI brain show right lateral medullary and right cerebellar ischemic infarcts.     Impression:   Severe intracranial atherosclerotic disease and occlusion of left VA, with a small distal right vertebral artery, causing right lateral medullary and right cerebellar ischemic infarcts, i.e. likely due to large artery intracranial atherosclerosis the setting of medication (antiplatelet therapy) noncompliance    NEURO: neurologically without acute change, neurologically tolerating normotension, ASA/plavix and statin for secondary stroke prevention, titrate statin to LDL goal less than 70, consider tapering gabapentin to as hiccups have improved,  Physical therapy/OT eval with recommendations for [facility], pt uninsured and pending re-instatement of medicaid, will provide daily PT/OT to optimize treatment.     ANTITHROMBOTIC THERAPY:  ASA and clopidogrel for secondary stroke prevention, duration of dual antiplatelet therapy would likely be 3 months followed by Aggrenox for stroke prevention     PULMONARY:  Chest CT (11/22) bilateral lower lobe pneumonia, completed Zosyn x 7 days, sputum culture: numerous gram positive cocci and negative rods, appreciate pulmonary and ID consult, protecting airway, titrate off NC as tolerated, encourage Incentive spirometer, monitor sats give oxygen as needed to >92% per pulm    CARDIOVASCULAR: TTE: LVEF 41%, mitral annular calcification, mild-moderate MR, global LV systolic dysfunction, mild stage 1 diastolic dysfunction mild TR,  cardiac monitoring no events, cardio consult appreciated, would consider 30 days event monitor for prolonged cardiac monitoring to screen for occult cardiac arrhythmias like atrial fibrillation being the cardiac source of embolism                  GASTROINTESTINAL: S/p PEG placement (11/14), patient suctioning up feedings on 11/22, diarrhea X 2 on 11/24: negative for  C.diff.  Abd x ray was negative for ileus.  vomiting TF 11/28  and not tolerating PEG feedings at goal , Abd x ray to r/o ileus negative, scheduled Reglan as gastric prokinetic agent, GI consult as well as nutrition may need to consider G to J tube. Feed tube check appears in place- started Jevity 1.5 (as per dietician) TF at 10cc/hr with planned goal of 55cc/hours, dietary consult appreciated.  He continues to be unable to tolerate feeds, called GI for re-consult for possible J tube and they recommended to call IR. Dr. Echeverria approved placement of J tube in IR, will try to do today, if not it will be done on Monday.  Patient request for re-evaluation of swallowing yesterday, as noted on ENT note 3 days prior he is still aspirating on secretions, s/s therapist came to assess and agrees that repeat evaluation is not appropriate at this time.     Diet: PEG feeding - increase as tolerated to the goal    RENAL: continue to provide hydration as tolerated, good urine output, renal US: No hydronephrosis, Wu reinserted for urinary retention. Continue Cardura for BPH, and continue with Wu catheter until patient's status has improved and he is more ambulatory, small amounts of clots noted in urine on 11/22, now resolved, urology consult appreciated from 11/23, avoid constipation, TOV/PVR prior to discharge, replace Wu if fails.   	Na Goal: Greater than 135    	Wu: y, retention- see above    HEMATOLOGY: no si/sx of bleeding     DVT ppx: LMWH     ID: febrile on 11/22, afebrile currently, leukocytosis resolved, blood cx; NGTD, urine and sputum cultures: klebsiella pneumoniae, pt with diarrhea on 11/24, C diff negative, as per ID eval 7 day ABx course of Zosyn completed     Other: Left wrist cock up splint ordered as per OT.    DISPOSITION: D/C to [facility] as per PT/OT eval once stable and workup is complete, Pt uninsured, SW aware, medicaid re-instatement pending. Daily PT/OT therapy to optimize therapy and referral to [facility] for possible suman.      CORE MEASURES:        Admission NIHSS: 3     TPA:  NO      LDL/HDL: 171/57     Depression Screen: 0     Statin Therapy: Y     Dysphagia Screen: FAIL     Smoking  NO      Afib NO     Stroke Education YES ASSESSMENT:   58 Y/O man with HTN, HLD, CAD/CABG, past stroke with residual left hemiparesis, presents with transient right hemiparesis and diplopia, continuous headache, nausea and vomiting. CT head shows old right basal ganglia lacune and CTA shows severe bilateral MCA stenosis, proximal basilar/distal vertebrals occlusion and stenosis involving mid to distal basilar artery. MRI brain show right lateral medullary and right cerebellar ischemic infarcts.     Impression:   Severe intracranial atherosclerotic disease and occlusion of left VA, with a small distal right vertebral artery, causing right lateral medullary and right cerebellar ischemic infarcts, i.e. likely due to large artery intracranial atherosclerosis the setting of medication (antiplatelet therapy) noncompliance    NEURO: neurologically without acute change, neurologically tolerating normotension, ASA/plavix and statin for secondary stroke prevention, titrate statin to LDL goal less than 70 considering likely atheroembolic etiology of his stroke, consider tapering gabapentin to as hiccups have improved, Physical therapy/OT eval with recommendations for [facility], pt uninsured and pending re-instatement of medicaid, will provide daily PT/OT to optimize treatment.     ANTITHROMBOTIC THERAPY:  ASA and clopidogrel for 3 months followed by Aggrenox one capsule twice a day for secondary stroke prevention     PULMONARY:  Chest CT (11/22) bilateral lower lobe pneumonia, completed Zosyn x 7 days, sputum culture: numerous gram positive cocci and negative rods, appreciate pulmonary and ID consult, protecting airway, titrate off NC as tolerated, encourage Incentive spirometer, monitor sats give oxygen as needed to >92% per pulm    CARDIOVASCULAR: TTE: LVEF 41%, mitral annular calcification, mild-moderate MR, global LV systolic dysfunction, mild stage 1 diastolic dysfunction mild TR,  cardiac monitoring no events, cardio consult appreciated, would consider 30 days event monitor for prolonged cardiac monitoring to screen for occult cardiac arrhythmias like atrial fibrillation being the cardiac source of embolism                  GASTROINTESTINAL: S/p PEG placement (11/14), patient suctioning up feedings on 11/22, diarrhea X 2 on 11/24: negative for  C.diff.  Abd x ray was negative for ileus.  vomiting TF 11/28  and not tolerating PEG feedings at goal , Abd x ray to r/o ileus negative, scheduled Reglan as gastric prokinetic agent, GI consult as well as nutrition may need to consider G to J tube. Feed tube check appears in place- started Jevity 1.5 (as per dietician) TF at 10cc/hr with planned goal of 55cc/hours, dietary consult appreciated.  He continues to be unable to tolerate feeds, called GI for re-consult for possible J tube and they recommended to call IR. Dr. Echeverria approved placement of J tube in IR, will try to do today, if not it will be done on Monday. Patient request for re-evaluation of swallowing yesterday, as noted on ENT note 3 days prior he is still aspirating on secretions, s/s therapist came to assess and agrees that repeat evaluation is not appropriate at this time.     Diet: PEG feeding - increase as tolerated to the goal    RENAL: continue to provide hydration as tolerated, good urine output, renal US: No hydronephrosis, Wu reinserted for urinary retention. Continue Cardura for BPH, and continue with Wu catheter until patient's status has improved and he is more ambulatory, small amounts of clots noted in urine on 11/22, now resolved, urology consult appreciated from 11/23, avoid constipation, TOV/PVR prior to discharge, replace Wu if fails.   	Na Goal: Greater than 135    	Wu: y, retention- see above    HEMATOLOGY: no si/sx of bleeding     DVT ppx: LMWH     ID: febrile on 11/22, afebrile currently, leukocytosis resolved, blood cx; NGTD, urine and sputum cultures: klebsiella pneumoniae, pt with diarrhea on 11/24, C diff negative, as per ID eval 7 day ABx course of Zosyn completed     Other: Left wrist cock up splint ordered as per OT.    DISPOSITION: D/C to [facility] as per PT/OT eval once stable and workup is complete, Pt uninsured, SW aware, medicaid re-instatement pending. Daily PT/OT therapy to optimize therapy and referral to [facility] for possible suman.      CORE MEASURES:        Admission NIHSS: 3     TPA:  NO      LDL/HDL: 171/57     Depression Screen: 0     Statin Therapy: Y     Dysphagia Screen: FAIL     Smoking  NO      Afib NO     Stroke Education YES

## 2017-12-02 PROCEDURE — 99233 SBSQ HOSP IP/OBS HIGH 50: CPT

## 2017-12-02 RX ORDER — BACLOFEN 100 %
5 POWDER (GRAM) MISCELLANEOUS THREE TIMES A DAY
Qty: 0 | Refills: 0 | Status: DISCONTINUED | OUTPATIENT
Start: 2017-12-02 | End: 2017-12-03

## 2017-12-02 RX ORDER — SODIUM CHLORIDE 9 MG/ML
1000 INJECTION INTRAMUSCULAR; INTRAVENOUS; SUBCUTANEOUS
Qty: 0 | Refills: 0 | Status: DISCONTINUED | OUTPATIENT
Start: 2017-12-02 | End: 2017-12-08

## 2017-12-02 RX ADMIN — Medication 10 MILLIGRAM(S): at 21:21

## 2017-12-02 RX ADMIN — Medication 5 MILLIGRAM(S): at 12:10

## 2017-12-02 RX ADMIN — Medication 10 MILLIGRAM(S): at 12:10

## 2017-12-02 RX ADMIN — Medication 5 MILLIGRAM(S): at 21:21

## 2017-12-02 RX ADMIN — Medication 3 MILLILITER(S): at 05:23

## 2017-12-02 RX ADMIN — GABAPENTIN 800 MILLIGRAM(S): 400 CAPSULE ORAL at 12:11

## 2017-12-02 RX ADMIN — Medication 10 MILLIGRAM(S): at 05:23

## 2017-12-02 RX ADMIN — Medication 12.5 MILLIGRAM(S): at 05:23

## 2017-12-02 RX ADMIN — ENOXAPARIN SODIUM 40 MILLIGRAM(S): 100 INJECTION SUBCUTANEOUS at 12:09

## 2017-12-02 RX ADMIN — ATORVASTATIN CALCIUM 80 MILLIGRAM(S): 80 TABLET, FILM COATED ORAL at 21:20

## 2017-12-02 RX ADMIN — CLOPIDOGREL BISULFATE 75 MILLIGRAM(S): 75 TABLET, FILM COATED ORAL at 12:09

## 2017-12-02 RX ADMIN — LOSARTAN POTASSIUM 100 MILLIGRAM(S): 100 TABLET, FILM COATED ORAL at 05:23

## 2017-12-02 RX ADMIN — Medication 2 MILLIGRAM(S): at 21:20

## 2017-12-02 RX ADMIN — SODIUM CHLORIDE 75 MILLILITER(S): 9 INJECTION INTRAMUSCULAR; INTRAVENOUS; SUBCUTANEOUS at 12:02

## 2017-12-02 RX ADMIN — Medication 3 MILLILITER(S): at 01:08

## 2017-12-02 RX ADMIN — GABAPENTIN 800 MILLIGRAM(S): 400 CAPSULE ORAL at 05:23

## 2017-12-02 RX ADMIN — Medication 200 MILLIGRAM(S): at 21:20

## 2017-12-02 RX ADMIN — Medication 3 MILLILITER(S): at 17:07

## 2017-12-02 RX ADMIN — GABAPENTIN 800 MILLIGRAM(S): 400 CAPSULE ORAL at 21:21

## 2017-12-02 RX ADMIN — SODIUM CHLORIDE 75 MILLILITER(S): 9 INJECTION INTRAMUSCULAR; INTRAVENOUS; SUBCUTANEOUS at 21:20

## 2017-12-02 RX ADMIN — Medication 3 MILLILITER(S): at 23:28

## 2017-12-02 RX ADMIN — Medication 3 MILLILITER(S): at 12:08

## 2017-12-02 RX ADMIN — Medication 81 MILLIGRAM(S): at 12:09

## 2017-12-02 NOTE — PROGRESS NOTE ADULT - SUBJECTIVE AND OBJECTIVE BOX
Follow-up Pulm Progress Note    No new respiratory events overnight.  Denies SOB/CP. coughing up secretions w/ feeds-small amounts    Medications:  MEDICATIONS  (STANDING):  ALBUTerol/ipratropium for Nebulization 3 milliLiter(s) Nebulizer every 6 hours  aspirin  chewable 81 milliGRAM(s) Oral daily  atorvastatin 80 milliGRAM(s) Oral at bedtime  baclofen 5 milliGRAM(s) Oral three times a day  clopidogrel Tablet 75 milliGRAM(s) Oral daily  doxazosin 2 milliGRAM(s) Oral at bedtime  enoxaparin Injectable 40 milliGRAM(s) SubCutaneous daily  gabapentin   Solution 800 milliGRAM(s) Oral three times a day  hydrochlorothiazide 12.5 milliGRAM(s) Oral daily  influenza   Vaccine 0.5 milliLiter(s) IntraMuscular once  losartan 100 milliGRAM(s) Oral daily  metoclopramide 10 milliGRAM(s) Oral every 8 hours  ondansetron Injectable 4 milliGRAM(s) IV Push once  sodium chloride 0.9% Bolus 500 milliLiter(s) IV Bolus once  sodium chloride 0.9%. 1000 milliLiter(s) (75 mL/Hr) IV Continuous <Continuous>    MEDICATIONS  (PRN):  acetaminophen   Tablet. 650 milliGRAM(s) Oral every 6 hours PRN Mild Pain (1 - 3)  acetaminophen  Suppository 650 milliGRAM(s) Rectal every 6 hours PRN For Temp greater than 38.5 C (101.3 F)  guaiFENesin    Syrup 200 milliGRAM(s) Oral every 6 hours PRN Cough  hydrALAZINE Injectable 10 milliGRAM(s) IV Push two times a day PRN BP > 180  simethicone 80 milliGRAM(s) Chew daily PRN bloating          Vital Signs Last 24 Hrs  T(C): 36.4 (02 Dec 2017 12:18), Max: 36.6 (01 Dec 2017 16:25)  T(F): 97.5 (02 Dec 2017 12:18), Max: 97.9 (01 Dec 2017 16:25)  HR: 67 (02 Dec 2017 12:18) (67 - 106)  BP: 96/61 (02 Dec 2017 12:18) (96/61 - 122/82)  BP(mean): --  RR: 17 (02 Dec 2017 12:18) (17 - 20)  SpO2: 100% (02 Dec 2017 12:18) (95% - 100%)          12-01 @ 07:01  -  12-02 @ 07:00  --------------------------------------------------------  IN: 0 mL / OUT: 450 mL / NET: -450 mL          LABS:                CAPILLARY BLOOD GLUCOSE                            CULTURES: (if applicable)          Physical Examination:  PULM: decreased bs bilat, no significant sputum production  CVS: S1, S2 heard    RADIOLOGY REVIEWED  CXR: < from: Xray Chest 1 View AP-PORTABLE IMMEDIATE (11.22.17 @ 18:31) >    IMPRESSION:   Clear lungs    < end of copied text >

## 2017-12-02 NOTE — PROGRESS NOTE ADULT - ASSESSMENT
Patient is a 56 yo Citizen of [place] male w/ PMH significant for HTN, HLD, CAD s/p CABG, previous CVA w/ residual left hemiparesis, admitted for R sided weakness, HA, diplopia, diagnosed with R lateral medullary and R cerebellar ischemic infarcts per MRI. Patient failed MBS and PEG tube was placed on 11/14 and tube feeding was initiated and pt was having problems with emesis, residuals, constipation-concern for ileus. 11/22 with coughing up feeds, green malordours secretions, hypoxia which resolved. found to have bilat PNA-asp, UTI--kleb,

## 2017-12-02 NOTE — PROGRESS NOTE ADULT - ASSESSMENT
ASSESSMENT:   56 Y/O man with HTN, HLD, CAD/CABG, past stroke with residual left hemiparesis, presents with transient right hemiparesis and diplopia, continuous headache, nausea and vomiting. CT head shows old right basal ganglia lacune and CTA shows severe bilateral MCA stenosis, proximal basilar/distal vertebrals occlusion and stenosis involving mid to distal basilar artery. MRI brain show right lateral medullary and right cerebellar ischemic infarcts.     Impression:   Severe intracranial atherosclerotic disease and occlusion of left VA, with a small distal right vertebral artery, causing right lateral medullary and right cerebellar ischemic infarcts, i.e. likely due to large artery intracranial atherosclerosis the setting of medication (antiplatelet therapy) noncompliance    NEURO: neurologically without acute change, neurologically tolerating normotension, ASA/plavix and statin for secondary stroke prevention, titrate statin to LDL goal less than 70 considering likely atheroembolic etiology of his stroke, consider tapering gabapentin to as hiccups have improved, Physical therapy/OT eval with recommendations for [facility], pt uninsured and pending re-instatement of medicaid, will provide daily PT/OT to optimize treatment.     ANTITHROMBOTIC THERAPY:  ASA and clopidogrel for 3 months followed by Aggrenox one capsule twice a day for secondary stroke prevention     PULMONARY:  Chest CT (11/22) bilateral lower lobe pneumonia, completed Zosyn x 7 days, sputum culture: numerous gram positive cocci and negative rods, appreciate pulmonary and ID consult, protecting airway, titrate off NC as tolerated, encourage Incentive spirometer, monitor sats give oxygen as needed to >92% per pulm    CARDIOVASCULAR: TTE: LVEF 41%, mitral annular calcification, mild-moderate MR, global LV systolic dysfunction, mild stage 1 diastolic dysfunction mild TR,  cardiac monitoring no events, cardio consult appreciated, would consider 30 days event monitor for prolonged cardiac monitoring to screen for occult cardiac arrhythmias like atrial fibrillation being the cardiac source of embolism                  GASTROINTESTINAL: S/p PEG placement (11/14), patient suctioning up feedings on 11/22, diarrhea X 2 on 11/24: negative for  C.diff.  Abd x ray was negative for ileus.  vomiting TF 11/28  and not tolerating PEG feedings at goal , Abd x ray: negative for ileus, scheduled Reglan as gastric prokinetic agent, pt was on Jevity 1.5 (as per dietician) TF at 10cc/hr with planned goal of 55cc/hours,  He continues to be unable to tolerate feeds, called GI for re-consult for possible J tube and they recommended to call IR. Dr. Echeverria approved placement of J tube in IR, schedule for Monday 12/04. Pas per  ENT eval, pt is still aspirating on secretions, s/s therapist came to assess and agrees that repeat evaluation is not appropriate at this time.     Diet: PEG feeding -on hold, pt unable to tolerate feedings.     RENAL: continue to provide hydration as tolerated, good urine output, renal US: No hydronephrosis, Wu reinserted for urinary retention. Continue Cardura for BPH, and continue with Wu catheter until patient's status has improved and he is more ambulatory, small amounts of clots noted in urine on 11/22, now resolved, urology consult appreciated, TOV 11/25: failed, nathan attempt  TOV/PVR prior to discharge, replace Wu if fails.   	Na Goal: Greater than 135    	Wu: y, retention- see above    HEMATOLOGY: no si/sx of bleeding     DVT ppx: LMWH     ID: afebrile in am, no leukocytosis, blood cx; NGTD, urine and sputum cultures: klebsiella pneumoniae, pt with diarrhea on 11/24, C diff negative, as per ID eval 7 day ABx course of Zosyn completed for pna    Other: Left wrist cock up splint in place.    DISPOSITION: D/C to [facility] as per PT/OT eval once stable and workup is complete, Pt uninsured, SW aware, medicaid re-instatement pending. Daily PT/OT therapy to optimize therapy and referral to [facility] for possible suman.      CORE MEASURES:        Admission NIHSS: 3     TPA:  NO      LDL/HDL: 171/57     Depression Screen: 0     Statin Therapy: Y     Dysphagia Screen: FAIL     Smoking  NO      Afib NO     Stroke Education YES ASSESSMENT:   56 Y/O man with HTN, HLD, CAD/CABG, past stroke with residual left hemiparesis, presents with transient right hemiparesis and diplopia, continuous headache, nausea and vomiting. CT head shows old right basal ganglia lacune and CTA shows severe bilateral MCA stenosis, proximal basilar/distal vertebrals occlusion and stenosis involving mid to distal basilar artery. MRI brain show right lateral medullary and right cerebellar ischemic infarcts.     Impression:   Severe intracranial atherosclerotic disease and occlusion of left VA, with a small distal right vertebral artery, causing right lateral medullary and right cerebellar ischemic infarcts, i.e. likely due to large artery intracranial atherosclerosis the setting of medication (antiplatelet therapy) noncompliance    NEURO: neurologically without acute change, neurologically tolerating normotension, ASA/plavix and statin for secondary stroke prevention, titrate statin to LDL goal less than 70 considering likely atheroembolic etiology of his stroke, will decrease baclofen to 5mg TID, and will start tapering gabapentin to as hiccups have improved, Physical therapy/OT eval with recommendations for [facility], pt uninsured and pending re-instatement of medicaid, will provide daily PT/OT to optimize treatment.     ANTITHROMBOTIC THERAPY:  ASA and clopidogrel for 3 months followed by Aggrenox one capsule twice a day for secondary stroke prevention     PULMONARY:  Chest CT (11/22) bilateral lower lobe pneumonia, completed Zosyn x 7 days, sputum culture: numerous gram positive cocci and negative rods, appreciate pulmonary and ID consult, protecting airway, titrate off NC as tolerated, encourage Incentive spirometer, monitor sats give oxygen as needed to >92% per pulm    CARDIOVASCULAR: TTE: LVEF 41%, mitral annular calcification, mild-moderate MR, global LV systolic dysfunction, mild stage 1 diastolic dysfunction mild TR,  cardiac monitoring no events, cardio consult appreciated, would consider 30 days event monitor for prolonged cardiac monitoring to screen for occult cardiac arrhythmias like atrial fibrillation being the cardiac source of embolism                  GASTROINTESTINAL: S/p PEG placement (11/14), patient suctioning up feedings on 11/22, diarrhea X 2 on 11/24: negative for  C.diff.  Abd x ray was negative for ileus.  vomiting TF 11/28  and not tolerating PEG feedings at goal , Abd x ray: negative for ileus, scheduled Reglan as gastric prokinetic agent, pt was on Jevity 1.5 (as per dietician) TF at 10cc/hr with planned goal of 55cc/hours,  He was unable to tolerate feeds, schedule for J tube  on Monday 12/04  Dr. Echeverria approved placement of J tube in IR, As per  ENT eval, pt is still aspirating on secretions, s/s therapist came to assess and agrees that repeat evaluation is not appropriate at this time. will re-start PEG Jevity 2.0 feedings  today with a rate/goal of 10ml to provide some nutrutuin and will start IVF      Diet: PEG feeding low rate.     RENAL: continue to provide hydration as tolerated, good urine output, renal US: No hydronephrosis, Wu reinserted for urinary retention. Continue Cardura for BPH, and continue with Wu catheter until patient's status has improved and he is more ambulatory, small amounts of clots noted in urine on 11/22, now resolved, urology consult appreciated, TOV 11/25: failed, nathan attempt  TOV/PVR prior to discharge, replace Wu if fails.   	Na Goal: Greater than 135    	Wu: y, retention- see above    HEMATOLOGY: no si/sx of bleeding     DVT ppx: LMWH     ID: afebrile in am, no leukocytosis, blood cx; NGTD, urine and sputum cultures: klebsiella pneumoniae, pt with diarrhea on 11/24, C diff negative, as per ID eval 7 day ABx course of Zosyn completed for pna    Other: Left wrist cock up splint in place.    DISPOSITION: D/C to [facility] as per PT/OT eval once stable and workup is complete, Pt uninsured, SW aware, medicaid re-instatement pending. Daily PT/OT therapy to optimize therapy and referral to [facility] for possible suman.      CORE MEASURES:        Admission NIHSS: 3     TPA:  NO      LDL/HDL: 171/57     Depression Screen: 0     Statin Therapy: Y     Dysphagia Screen: FAIL     Smoking  NO      Afib NO     Stroke Education YES ASSESSMENT:   58 Y/O man with HTN, HLD, CAD/CABG, past stroke with residual left hemiparesis, presents with transient right hemiparesis and diplopia, continuous headache, nausea and vomiting. CT head shows old right basal ganglia lacune and CTA shows severe bilateral MCA stenosis, proximal basilar/distal vertebrals occlusion and stenosis involving mid to distal basilar artery. MRI brain show right lateral medullary and right cerebellar ischemic infarcts.     Impression:   Severe intracranial atherosclerotic disease and occlusion of left VA, with a small distal right vertebral artery, causing right lateral medullary and right cerebellar ischemic infarcts - likely due to large vessel disease i.e. symptomatic intracranial atherosclerosis the setting of medication (antiplatelet therapy) noncompliance    NEURO: neurologically without acute change, neurologically tolerating normotension, ASA/plavix and statin for secondary stroke prevention, titrate statin to LDL goal less than 70 considering likely atheroembolic etiology of his stroke, will decrease baclofen to 5mg TID, and will start tapering gabapentin to as hiccups have improved, Physical therapy/OT eval with recommendations for [facility], pt uninsured and pending re-instatement of medicaid, will provide daily PT/OT to optimize treatment.     ANTITHROMBOTIC THERAPY:  ASA and clopidogrel for 3 months followed by Aggrenox one capsule twice a day for secondary stroke prevention     PULMONARY:  Chest CT (11/22) bilateral lower lobe pneumonia, completed Zosyn x 7 days, sputum culture: numerous gram positive cocci and negative rods, appreciate pulmonary and ID consult, protecting airway, titrate off NC as tolerated, encourage Incentive spirometer, monitor sats give oxygen as needed to >92% per pulm    CARDIOVASCULAR: TTE: LVEF 41%, mitral annular calcification, mild-moderate MR, global LV systolic dysfunction, mild stage 1 diastolic dysfunction mild TR,  cardiac monitoring no events, cardio consult appreciated, would consider 30 days event monitor for prolonged cardiac monitoring to screen for occult cardiac arrhythmias like atrial fibrillation being the cardiac source of embolism                  GASTROINTESTINAL: S/p PEG placement (11/14), patient suctioning up feedings on 11/22, diarrhea X 2 on 11/24: negative for  C.diff.  Abd x ray was negative for ileus.  vomiting TF 11/28  and not tolerating PEG feedings at goal , Abd x ray: negative for ileus, scheduled Reglan as gastric prokinetic agent, pt was on Jevity 1.5 (as per dietician) TF at 10cc/hr with planned goal of 55cc/hours,  He was unable to tolerate feeds, schedule for J tube  on Monday 12/04  Dr. Echeverria approved placement of J tube in IR, As per  ENT eval, pt is still aspirating on secretions, s/s therapist came to assess and agrees that repeat evaluation is not appropriate at this time. Will re-start PEG Jevity 2.0 feedings  today with a rate/goal of 10ml to provide some nutrition and will start IVF      Diet: PEG feeding low rate.     RENAL: continue to provide hydration as tolerated, good urine output, renal US: No hydronephrosis, Wu reinserted for urinary retention. Continue Cardura for BPH, and continue with Wu catheter until patient's status has improved and he is more ambulatory, small amounts of clots noted in urine on 11/22, now resolved, urology consult appreciated, TOV 11/25: failed, nathan attempt  TOV/PVR prior to discharge, replace Wu if fails.   	Na Goal: Greater than 135    	Wu: y, retention- see above    HEMATOLOGY: no si/sx of bleeding     DVT ppx: LMWH     ID: afebrile in am, no leukocytosis, blood cx; NGTD, urine and sputum cultures: klebsiella pneumoniae, pt with diarrhea on 11/24, C diff negative, as per ID eval 7 day ABx course of Zosyn completed for pna    Other: Left wrist cock up splint in place.    DISPOSITION: D/C to [facility] as per PT/OT eval once stable and workup is complete, Pt uninsured, SW aware, medicaid re-instatement pending. Daily PT/OT therapy to optimize therapy and referral to [facility] for possible suman.      CORE MEASURES:        Admission NIHSS: 3     TPA:  NO      LDL/HDL: 171/57     Depression Screen: 0     Statin Therapy: Y     Dysphagia Screen: FAIL     Smoking  NO      Afib NO     Stroke Education YES

## 2017-12-02 NOTE — PROGRESS NOTE ADULT - SUBJECTIVE AND OBJECTIVE BOX
THE PATIENT WAS SEEN AND EXAMINED BY ME WITH THE HOUSESTAFF AND STROKE TEAM DURING MORNING ROUNDS.   HPI:  58 y/o R-handed [place] man PMH CVA with residual L-sided weakness, CAD s/p CABG, HTN presented as stroke code for dizziness. Pt at baseline ambulates independently and independent in ADLs. Pt was in usual state of health on 11/07 at 9 AM. At around 9:30 AM, he began to complain of lightheadedness not associated with changes in position. Half an hour later, he began to complain of n/v. He also endorses HA, diplopia/blurry vision, R-sided weakness that began a few days prior to admission.  He reports he ran out of his BP meds a week prior to admission and has been unable to refill his medications      SUBJECTIVE: No events overnight.  No new neurologic complaints.      acetaminophen   Tablet. 650 milliGRAM(s) Oral every 6 hours PRN  acetaminophen  Suppository 650 milliGRAM(s) Rectal every 6 hours PRN  ALBUTerol/ipratropium for Nebulization 3 milliLiter(s) Nebulizer every 6 hours  aspirin  chewable 81 milliGRAM(s) Oral daily  atorvastatin 80 milliGRAM(s) Oral at bedtime  baclofen 10 milliGRAM(s) Oral three times a day  clopidogrel Tablet 75 milliGRAM(s) Oral daily  doxazosin 2 milliGRAM(s) Oral at bedtime  enoxaparin Injectable 40 milliGRAM(s) SubCutaneous daily  gabapentin   Solution 800 milliGRAM(s) Oral three times a day  guaiFENesin    Syrup 200 milliGRAM(s) Oral every 6 hours PRN  hydrALAZINE Injectable 10 milliGRAM(s) IV Push two times a day PRN  hydrochlorothiazide 12.5 milliGRAM(s) Oral daily  influenza   Vaccine 0.5 milliLiter(s) IntraMuscular once  losartan 100 milliGRAM(s) Oral daily  metoclopramide 10 milliGRAM(s) Oral every 8 hours  ondansetron Injectable 4 milliGRAM(s) IV Push once  simethicone 80 milliGRAM(s) Chew daily PRN  sodium chloride 0.9% Bolus 500 milliLiter(s) IV Bolus once      PHYSICAL EXAM:   Vital Signs Last 24 Hrs  T(C): 36.4 (02 Dec 2017 08:57), Max: 36.6 (01 Dec 2017 13:00)  T(F): 97.5 (02 Dec 2017 08:57), Max: 97.9 (01 Dec 2017 16:25)  HR: 84 (02 Dec 2017 08:57) (69 - 106)  BP: 100/65 (02 Dec 2017 08:57) (98/65 - 122/82)  BP(mean): --  RR: 20 (02 Dec 2017 08:57) (18 - 20)  SpO2: 99% (02 Dec 2017 08:57) (95% - 99%)    General: NAD  HEENT: EOM intact, visual fields full   Abdomen: Soft, no erythema, nondistended   Extremities: No edema    NEUROLOGICAL EXAM:  Mental status: Awake, alert, oriented x3, fluent speech, no neglect, able to follow commands, moderate dysarthria   Cranial Nerves: Subtle UMN type left facial droop, EOMI, VFF, no nystagmus, hoarseness, slight palatal deviation to the left, no hiccups, hearing intact to bilateral finger rub   Motor exam: Normal tone, no drift, RUE 5/5, RLE 5/5, drift LUE 4-/5, drift LLE 5-/5, increase rigidity of left wrist  Sensation: Decreased sensation to pinprick on left arm  Coordination/ Gait: LUE moderate-severe dysmetria  LABS:         Hemoglobin A1C, Whole Blood: 5.8 % (11-08 @ 10:36)      IMAGING: Reviewed by me.     CT Chest No Cont (11.22.17) Bilateral lower lobe pneumonia.  Head CT/CTA head/Neck (11/07) Right basal ganglia and corona radiata infarct without significant change since 6/16/2017. There is severe steno-occlusive disease intracranially involving the right supraclinoid internal carotid artery, A1 and M1 branches, and severe narrowing of the left M1 segment of the middle cerebral artery. The distal right vertebral artery is quite small and the proximal basilar artery is not visualized. The dominant left vertebral artery ends at the PICA. Distal basilar flow appears to be due to retrograde flow from the right posterior communicating artery.  Head CT (11/07) No acute intracranial hemorrhage, mass effect, or evidence of acute territorial infarct. Chronic right corona radiata/basal ganglia and right inferior cerebellar hemisphere infarcts.  Brain MRI (11/09) In comparison the prior MRI, there is new hyperintense T2 and FLAIR signal with faint increased restricted diffusion in the right medulla extending to the inferior right brachium pontis and inferior medial right cerebellar hemisphere, new compared to 6/17/2017 consistent with evolving area of  ischemic change without hemorrhagic transformation. Redemonstration of volume loss with multiple additional areas of lacunar infarction including chronic infarcts in the right cerebellar hemisphere and right corona radiata. Decreased visualization of the flow-void within the right vertebral artery with continued irregular isointense T1 signal throughout the basilar artery. Bilateral sinus disease with bubbly secretions and air-fluid levels, greatest in the right maxillary sinus THE PATIENT WAS SEEN AND EXAMINED BY ME WITH THE HOUSESTAFF AND STROKE TEAM DURING MORNING ROUNDS.     HPI:  58 y/o R-handed [place] man PMH CVA with residual L-sided weakness, CAD s/p CABG, HTN presented as stroke code for dizziness. Pt at baseline ambulates independently and independent in ADLs. Pt was in usual state of health on 11/07 at 9 AM. At around 9:30 AM, he began to complain of lightheadedness not associated with changes in position. Half an hour later, he began to complain of n/v. He also endorses HA, diplopia/blurry vision, R-sided weakness that began a few days prior to admission.  He reports he ran out of his BP meds a week prior to admission and has been unable to refill his medications    SUBJECTIVE: No events overnight.  No new neurologic complaints.      acetaminophen   Tablet. 650 milliGRAM(s) Oral every 6 hours PRN  acetaminophen  Suppository 650 milliGRAM(s) Rectal every 6 hours PRN  ALBUTerol/ipratropium for Nebulization 3 milliLiter(s) Nebulizer every 6 hours  aspirin  chewable 81 milliGRAM(s) Oral daily  atorvastatin 80 milliGRAM(s) Oral at bedtime  baclofen 10 milliGRAM(s) Oral three times a day  clopidogrel Tablet 75 milliGRAM(s) Oral daily  doxazosin 2 milliGRAM(s) Oral at bedtime  enoxaparin Injectable 40 milliGRAM(s) SubCutaneous daily  gabapentin   Solution 800 milliGRAM(s) Oral three times a day  guaiFENesin    Syrup 200 milliGRAM(s) Oral every 6 hours PRN  hydrALAZINE Injectable 10 milliGRAM(s) IV Push two times a day PRN  hydrochlorothiazide 12.5 milliGRAM(s) Oral daily  influenza   Vaccine 0.5 milliLiter(s) IntraMuscular once  losartan 100 milliGRAM(s) Oral daily  metoclopramide 10 milliGRAM(s) Oral every 8 hours  ondansetron Injectable 4 milliGRAM(s) IV Push once  simethicone 80 milliGRAM(s) Chew daily PRN  sodium chloride 0.9% Bolus 500 milliLiter(s) IV Bolus once    PHYSICAL EXAM:   Vital Signs Last 24 Hrs  T(C): 36.4 (02 Dec 2017 08:57), Max: 36.6 (01 Dec 2017 13:00)  T(F): 97.5 (02 Dec 2017 08:57), Max: 97.9 (01 Dec 2017 16:25)  HR: 84 (02 Dec 2017 08:57) (69 - 106)  BP: 100/65 (02 Dec 2017 08:57) (98/65 - 122/82)  BP(mean): --  RR: 20 (02 Dec 2017 08:57) (18 - 20)  SpO2: 99% (02 Dec 2017 08:57) (95% - 99%)    General: NAD  HEENT: EOM intact, visual fields full   Abdomen: Soft, no erythema, nondistended   Extremities: No edema    NEUROLOGICAL EXAM:  Mental status: Awake, alert, oriented x3, fluent speech, no neglect, able to follow commands, moderate dysarthria   Cranial Nerves: Subtle UMN type left facial droop, EOMI, VFF, no nystagmus, hoarseness, slight palatal deviation to the left, no hiccups, hearing intact to bilateral finger rub   Motor exam: Normal tone, no drift, RUE 5/5, RLE 5/5, drift LUE 4-/5, drift LLE 5-/5, increase rigidity of left wrist  Sensation: Decreased sensation to pinprick on left arm  Coordination/ Gait: LUE moderate-severe dysmetria    LABS:  Hemoglobin A1C, Whole Blood: 5.8 % (11-08 @ 10:36)    IMAGING: Reviewed by me.     CT Chest No Cont (11.22.17) Bilateral lower lobe pneumonia.  Head CT/CTA head/Neck (11/07) Right basal ganglia and corona radiata infarct without significant change since 6/16/2017. There is severe steno-occlusive disease intracranially involving the right supraclinoid internal carotid artery, A1 and M1 branches, and severe narrowing of the left M1 segment of the middle cerebral artery. The distal right vertebral artery is quite small and the proximal basilar artery is not visualized. The dominant left vertebral artery ends at the PICA. Distal basilar flow appears to be due to retrograde flow from the right posterior communicating artery.  Head CT (11/07) No acute intracranial hemorrhage, mass effect, or evidence of acute territorial infarct. Chronic right corona radiata/basal ganglia and right inferior cerebellar hemisphere infarcts.  Brain MRI (11/09) In comparison the prior MRI, there is new hyperintense T2 and FLAIR signal with faint increased restricted diffusion in the right medulla extending to the inferior right brachium pontis and inferior medial right cerebellar hemisphere, new compared to 6/17/2017 consistent with evolving area of  ischemic change without hemorrhagic transformation. Redemonstration of volume loss with multiple additional areas of lacunar infarction including chronic infarcts in the right cerebellar hemisphere and right corona radiata. Decreased visualization of the flow-void within the right vertebral artery with continued irregular isointense T1 signal throughout the basilar artery. Bilateral sinus disease with bubbly secretions and air-fluid levels, greatest in the right maxillary sinus

## 2017-12-03 LAB
ANION GAP SERPL CALC-SCNC: 12 MMOL/L — SIGNIFICANT CHANGE UP (ref 5–17)
BUN SERPL-MCNC: 26 MG/DL — HIGH (ref 7–23)
CALCIUM SERPL-MCNC: 9.5 MG/DL — SIGNIFICANT CHANGE UP (ref 8.4–10.5)
CHLORIDE SERPL-SCNC: 103 MMOL/L — SIGNIFICANT CHANGE UP (ref 96–108)
CO2 SERPL-SCNC: 27 MMOL/L — SIGNIFICANT CHANGE UP (ref 22–31)
CREAT SERPL-MCNC: 1.58 MG/DL — HIGH (ref 0.5–1.3)
GLUCOSE SERPL-MCNC: 105 MG/DL — HIGH (ref 70–99)
HCT VFR BLD CALC: 40.4 % — SIGNIFICANT CHANGE UP (ref 39–50)
HGB BLD-MCNC: 12.9 G/DL — LOW (ref 13–17)
MCHC RBC-ENTMCNC: 25.1 PG — LOW (ref 27–34)
MCHC RBC-ENTMCNC: 31.9 GM/DL — LOW (ref 32–36)
MCV RBC AUTO: 78.6 FL — LOW (ref 80–100)
PLATELET # BLD AUTO: 238 K/UL — SIGNIFICANT CHANGE UP (ref 150–400)
POTASSIUM SERPL-MCNC: 3.8 MMOL/L — SIGNIFICANT CHANGE UP (ref 3.5–5.3)
POTASSIUM SERPL-SCNC: 3.8 MMOL/L — SIGNIFICANT CHANGE UP (ref 3.5–5.3)
RBC # BLD: 5.14 M/UL — SIGNIFICANT CHANGE UP (ref 4.2–5.8)
RBC # FLD: 16.2 % — HIGH (ref 10.3–14.5)
SODIUM SERPL-SCNC: 142 MMOL/L — SIGNIFICANT CHANGE UP (ref 135–145)
WBC # BLD: 4.77 K/UL — SIGNIFICANT CHANGE UP (ref 3.8–10.5)
WBC # FLD AUTO: 4.77 K/UL — SIGNIFICANT CHANGE UP (ref 3.8–10.5)

## 2017-12-03 PROCEDURE — 99233 SBSQ HOSP IP/OBS HIGH 50: CPT

## 2017-12-03 RX ORDER — GABAPENTIN 400 MG/1
600 CAPSULE ORAL THREE TIMES A DAY
Qty: 0 | Refills: 0 | Status: DISCONTINUED | OUTPATIENT
Start: 2017-12-03 | End: 2017-12-11

## 2017-12-03 RX ADMIN — Medication 12.5 MILLIGRAM(S): at 05:34

## 2017-12-03 RX ADMIN — Medication 3 MILLILITER(S): at 17:39

## 2017-12-03 RX ADMIN — Medication 10 MILLIGRAM(S): at 21:26

## 2017-12-03 RX ADMIN — GABAPENTIN 800 MILLIGRAM(S): 400 CAPSULE ORAL at 05:33

## 2017-12-03 RX ADMIN — Medication 81 MILLIGRAM(S): at 12:55

## 2017-12-03 RX ADMIN — GABAPENTIN 600 MILLIGRAM(S): 400 CAPSULE ORAL at 21:27

## 2017-12-03 RX ADMIN — ATORVASTATIN CALCIUM 80 MILLIGRAM(S): 80 TABLET, FILM COATED ORAL at 21:26

## 2017-12-03 RX ADMIN — Medication 2 MILLIGRAM(S): at 21:26

## 2017-12-03 RX ADMIN — LOSARTAN POTASSIUM 100 MILLIGRAM(S): 100 TABLET, FILM COATED ORAL at 05:33

## 2017-12-03 RX ADMIN — Medication 5 MILLIGRAM(S): at 05:33

## 2017-12-03 RX ADMIN — ENOXAPARIN SODIUM 40 MILLIGRAM(S): 100 INJECTION SUBCUTANEOUS at 12:56

## 2017-12-03 RX ADMIN — Medication 3 MILLILITER(S): at 05:33

## 2017-12-03 RX ADMIN — Medication 3 MILLILITER(S): at 12:56

## 2017-12-03 RX ADMIN — Medication 10 MILLIGRAM(S): at 05:33

## 2017-12-03 RX ADMIN — GABAPENTIN 600 MILLIGRAM(S): 400 CAPSULE ORAL at 14:54

## 2017-12-03 RX ADMIN — Medication 10 MILLIGRAM(S): at 14:55

## 2017-12-03 RX ADMIN — Medication 200 MILLIGRAM(S): at 05:33

## 2017-12-03 RX ADMIN — CLOPIDOGREL BISULFATE 75 MILLIGRAM(S): 75 TABLET, FILM COATED ORAL at 12:56

## 2017-12-03 NOTE — PROGRESS NOTE ADULT - SUBJECTIVE AND OBJECTIVE BOX
THE PATIENT WAS SEEN AND EXAMINED BY ME WITH THE HOUSESTAFF AND STROKE TEAM DURING MORNING ROUNDS.   HPI:  58 y/o R-handed [place] man PMH CVA with residual L-sided weakness, CAD s/p CABG, HTN presented as stroke code for dizziness. Pt at baseline ambulates independently and independent in ADLs. Pt was in usual state of health on 11/07 at 9 AM. At around 9:30 AM, he began to complain of lightheadedness not associated with changes in position. Half an hour later, he began to complain of n/v. He also endorses HA, diplopia/blurry vision, R-sided weakness that began a few days prior to admission.  He reports he ran out of his BP meds a week prior to admission and has been unable to refill his medications    SUBJECTIVE: No events overnight.  No new neurologic complaints.      acetaminophen   Tablet. 650 milliGRAM(s) Oral every 6 hours PRN  acetaminophen  Suppository 650 milliGRAM(s) Rectal every 6 hours PRN  ALBUTerol/ipratropium for Nebulization 3 milliLiter(s) Nebulizer every 6 hours  aspirin  chewable 81 milliGRAM(s) Oral daily  atorvastatin 80 milliGRAM(s) Oral at bedtime  clopidogrel Tablet 75 milliGRAM(s) Oral daily  doxazosin 2 milliGRAM(s) Oral at bedtime  enoxaparin Injectable 40 milliGRAM(s) SubCutaneous daily  gabapentin   Solution 600 milliGRAM(s) Oral three times a day  guaiFENesin    Syrup 200 milliGRAM(s) Oral every 6 hours PRN  hydrALAZINE Injectable 10 milliGRAM(s) IV Push two times a day PRN  hydrochlorothiazide 12.5 milliGRAM(s) Oral daily  influenza   Vaccine 0.5 milliLiter(s) IntraMuscular once  losartan 100 milliGRAM(s) Oral daily  metoclopramide 10 milliGRAM(s) Oral every 8 hours  ondansetron Injectable 4 milliGRAM(s) IV Push once  simethicone 80 milliGRAM(s) Chew daily PRN  sodium chloride 0.9% Bolus 500 milliLiter(s) IV Bolus once  sodium chloride 0.9%. 1000 milliLiter(s) IV Continuous <Continuous>      PHYSICAL EXAM:   Vital Signs Last 24 Hrs  T(C): 36.3 (03 Dec 2017 07:44), Max: 36.5 (02 Dec 2017 15:40)  T(F): 97.4 (03 Dec 2017 07:44), Max: 97.7 (02 Dec 2017 15:40)  HR: 83 (03 Dec 2017 07:44) (67 - 83)  BP: 121/83 (03 Dec 2017 07:44) (96/61 - 138/91)  BP(mean): --  RR: 20 (03 Dec 2017 07:44) (17 - 20)  SpO2: 98% (03 Dec 2017 07:44) (95% - 100%)    General: NAD  HEENT: EOM intact, visual fields full   Abdomen: Soft, no erythema, nondistended   Extremities: No edema    NEUROLOGICAL EXAM:  Mental status: Awake, alert, oriented x3, fluent speech, no neglect, able to follow commands, moderate dysarthria   Cranial Nerves: Subtle UMN type left facial droop, EOMI, VFF, no nystagmus, hoarseness, slight palatal deviation to the left, no hiccups, hearing intact to bilateral finger rub   Motor exam: Normal tone, no drift, RUE 5/5, RLE 5/5, drift LUE 4-/5, drift LLE 5-/5, increase rigidity of left wrist  Sensation: Decreased sensation to pinprick on left arm  Coordination/ Gait: LUE moderate-severe dysmetria    LABS:                        12.9   4.77  )-----------( 238      ( 03 Dec 2017 08:22 )             40.4    12-03    142  |  103  |  26<H>  ----------------------------<  105<H>  3.8   |  27  |  1.58<H>    Ca    9.5      03 Dec 2017 08:43      Hemoglobin A1C, Whole Blood: 5.8 % (11-08 @ 10:36)      IMAGING: Reviewed by me.     CT Chest No Cont (11.22.17) Bilateral lower lobe pneumonia.  Head CT/CTA head/Neck (11/07) Right basal ganglia and corona radiata infarct without significant change since 6/16/2017. There is severe steno-occlusive disease intracranially involving the right supraclinoid internal carotid artery, A1 and M1 branches, and severe narrowing of the left M1 segment of the middle cerebral artery. The distal right vertebral artery is quite small and the proximal basilar artery is not visualized. The dominant left vertebral artery ends at the PICA. Distal basilar flow appears to be due to retrograde flow from the right posterior communicating artery.  Head CT (11/07) No acute intracranial hemorrhage, mass effect, or evidence of acute territorial infarct. Chronic right corona radiata/basal ganglia and right inferior cerebellar hemisphere infarcts.  Brain MRI (11/09) In comparison the prior MRI, there is new hyperintense T2 and FLAIR signal with faint increased restricted diffusion in the right medulla extending to the inferior right brachium pontis and inferior medial right cerebellar hemisphere, new compared to 6/17/2017 consistent with evolving area of ischemic change without hemorrhagic transformation. Redemonstration of volume loss with multiple additional areas of lacunar infarction including chronic infarcts in the right cerebellar hemisphere and right corona radiata. Decreased visualization of the flow-void within the right vertebral artery with continued irregular isointense T1 signal throughout the basilar artery. Bilateral sinus disease with bubbly secretions and air-fluid levels, greatest in the right maxillary sinus THE PATIENT WAS SEEN AND EXAMINED BY ME WITH THE HOUSESTAFF AND STROKE TEAM DURING MORNING ROUNDS.     HPI:  56 y/o R-handed [place] man PMH CVA with residual L-sided weakness, CAD s/p CABG, HTN presented as stroke code for dizziness. Pt at baseline ambulates independently and independent in ADLs. Pt was in usual state of health on 11/07 at 9 AM. At around 9:30 AM, he began to complain of lightheadedness not associated with changes in position. Half an hour later, he began to complain of n/v. He also endorses HA, diplopia/blurry vision, R-sided weakness that began a few days prior to admission.  He reports he ran out of his BP meds a week prior to admission and has been unable to refill his medications    SUBJECTIVE: No events overnight.  No new neurologic complaints.      acetaminophen   Tablet. 650 milliGRAM(s) Oral every 6 hours PRN  acetaminophen  Suppository 650 milliGRAM(s) Rectal every 6 hours PRN  ALBUTerol/ipratropium for Nebulization 3 milliLiter(s) Nebulizer every 6 hours  aspirin  chewable 81 milliGRAM(s) Oral daily  atorvastatin 80 milliGRAM(s) Oral at bedtime  clopidogrel Tablet 75 milliGRAM(s) Oral daily  doxazosin 2 milliGRAM(s) Oral at bedtime  enoxaparin Injectable 40 milliGRAM(s) SubCutaneous daily  gabapentin   Solution 600 milliGRAM(s) Oral three times a day  guaiFENesin    Syrup 200 milliGRAM(s) Oral every 6 hours PRN  hydrALAZINE Injectable 10 milliGRAM(s) IV Push two times a day PRN  hydrochlorothiazide 12.5 milliGRAM(s) Oral daily  influenza   Vaccine 0.5 milliLiter(s) IntraMuscular once  losartan 100 milliGRAM(s) Oral daily  metoclopramide 10 milliGRAM(s) Oral every 8 hours  ondansetron Injectable 4 milliGRAM(s) IV Push once  simethicone 80 milliGRAM(s) Chew daily PRN  sodium chloride 0.9% Bolus 500 milliLiter(s) IV Bolus once  sodium chloride 0.9%. 1000 milliLiter(s) IV Continuous <Continuous>    PHYSICAL EXAM:   Vital Signs Last 24 Hrs  T(C): 36.3 (03 Dec 2017 07:44), Max: 36.5 (02 Dec 2017 15:40)  T(F): 97.4 (03 Dec 2017 07:44), Max: 97.7 (02 Dec 2017 15:40)  HR: 83 (03 Dec 2017 07:44) (67 - 83)  BP: 121/83 (03 Dec 2017 07:44) (96/61 - 138/91)  BP(mean): --  RR: 20 (03 Dec 2017 07:44) (17 - 20)  SpO2: 98% (03 Dec 2017 07:44) (95% - 100%)    General: NAD  HEENT: EOM intact, visual fields full   Abdomen: Soft, no erythema, nondistended   Extremities: No edema    NEUROLOGICAL EXAM:  Mental status: Awake, alert, oriented x3, fluent speech, no neglect, able to follow commands, moderate dysarthria   Cranial Nerves: Subtle UMN type left facial droop, EOMI, VFF, no nystagmus, hoarseness, slight palatal deviation to the left, no hiccups, hearing intact to bilateral finger rub   Motor exam: Normal tone, no drift, RUE 5/5, RLE 5/5, drift LUE 4-/5, drift LLE 5-/5, increase rigidity of left wrist  Sensation: Decreased sensation to pinprick on left arm  Coordination/ Gait: LUE moderate-severe dysmetria    LABS:                        12.9   4.77  )-----------( 238      ( 03 Dec 2017 08:22 )             40.4    12-03    142  |  103  |  26<H>  ----------------------------<  105<H>  3.8   |  27  |  1.58<H>    Ca    9.5      03 Dec 2017 08:43      Hemoglobin A1C, Whole Blood: 5.8 % (11-08 @ 10:36)      IMAGING: Reviewed by me.     CT Chest No Cont (11.22.17) Bilateral lower lobe pneumonia.  Head CT/CTA head/Neck (11/07) Right basal ganglia and corona radiata infarct without significant change since 6/16/2017. There is severe steno-occlusive disease intracranially involving the right supraclinoid internal carotid artery, A1 and M1 branches, and severe narrowing of the left M1 segment of the middle cerebral artery. The distal right vertebral artery is quite small and the proximal basilar artery is not visualized. The dominant left vertebral artery ends at the PICA. Distal basilar flow appears to be due to retrograde flow from the right posterior communicating artery.  Head CT (11/07) No acute intracranial hemorrhage, mass effect, or evidence of acute territorial infarct. Chronic right corona radiata/basal ganglia and right inferior cerebellar hemisphere infarcts.  Brain MRI (11/09) In comparison the prior MRI, there is new hyperintense T2 and FLAIR signal with faint increased restricted diffusion in the right medulla extending to the inferior right brachium pontis and inferior medial right cerebellar hemisphere, new compared to 6/17/2017 consistent with evolving area of ischemic change without hemorrhagic transformation. Redemonstration of volume loss with multiple additional areas of lacunar infarction including chronic infarcts in the right cerebellar hemisphere and right corona radiata. Decreased visualization of the flow-void within the right vertebral artery with continued irregular isointense T1 signal throughout the basilar artery. Bilateral sinus disease with bubbly secretions and air-fluid levels, greatest in the right maxillary sinus

## 2017-12-03 NOTE — PROGRESS NOTE ADULT - ASSESSMENT
ASSESSMENT:   56 Y/O man with HTN, HLD, CAD/CABG, past stroke with residual left hemiparesis, presents with transient right hemiparesis and diplopia, continuous headache, nausea and vomiting. CT head shows old right basal ganglia lacune and CTA shows severe bilateral MCA stenosis, proximal basilar/distal vertebrals occlusion and stenosis involving mid to distal basilar artery. MRI brain show right lateral medullary and right cerebellar ischemic infarcts.     Impression:   Severe intracranial atherosclerotic disease and occlusion of left VA, with a small distal right vertebral artery, causing right lateral medullary and right cerebellar ischemic infarcts - likely due to large vessel disease i.e. symptomatic intracranial atherosclerosis the setting of medication (antiplatelet therapy) noncompliance    NEURO: neurologically without acute change, neurologically tolerating normotension, ASA/plavix and statin for secondary stroke prevention, titrate statin to LDL goal less than 70 considering likely atheroembolic etiology of his stroke, will decrease baclofen to 5mg TID, and will start tapering gabapentin to as hiccups have improved, Physical therapy/OT eval with recommendations for [facility], pt uninsured and pending re-instatement of medicaid, will provide daily PT/OT to optimize treatment.     ANTITHROMBOTIC THERAPY:  ASA and clopidogrel for 3 months followed by Aggrenox one capsule twice a day for secondary stroke prevention     PULMONARY:  Chest CT (11/22) bilateral lower lobe pneumonia, completed Zosyn x 7 days, sputum culture: numerous gram positive cocci and negative rods, appreciate pulmonary and ID consult, protecting airway, titrate off NC as tolerated, encourage Incentive spirometer, monitor sats give oxygen as needed to >92% per pulm    CARDIOVASCULAR: TTE: LVEF 41%, mitral annular calcification, mild-moderate MR, global LV systolic dysfunction, mild stage 1 diastolic dysfunction mild TR,  cardiac monitoring no events, cardio consult appreciated, would consider 30 days event monitor for prolonged cardiac monitoring to screen for occult cardiac arrhythmias like atrial fibrillation being the cardiac source of embolism                  GASTROINTESTINAL: S/p PEG placement (11/14), patient suctioning up feedings on 11/22, diarrhea X 2 on 11/24: negative for  C.diff.  Abd x ray was negative for ileus.  vomiting TF 11/28  and not tolerating PEG feedings at goal , Abd x ray: negative for ileus, scheduled Reglan as gastric prokinetic agent, pt was on Jevity 1.5 (as per dietician) TF at 10cc/hr with planned goal of 55cc/hours,  He was unable to tolerate feeds, schedule for J tube  on Monday 12/04  Dr. Echeverria approved placement of J tube in IR, As per  ENT eval, pt is still aspirating on secretions, s/s therapist came to assess and agrees that repeat evaluation is not appropriate at this time. Will re-start PEG Jevity 2.0 feedings  today with a rate/goal of 10ml to provide some nutrition and will start IVF      Diet: PEG feeding low rate. NPO at midnight    RENAL: continue to provide hydration as tolerated, good urine output, renal US: No hydronephrosis, Wu reinserted for urinary retention. Continue Cardura for BPH, and continue with Wu catheter until patient's status has improved and he is more ambulatory, small amounts of clots noted in urine on 11/22, now resolved, urology consult appreciated, TOV 11/25: failed, nathan attempt  TOV/PVR prior to discharge, replace Wu if fails.   	Na Goal: Greater than 135    	Wu: y, retention- see above    HEMATOLOGY: no si/sx of bleeding     DVT ppx: LMWH     ID: afebrile in am, no leukocytosis, blood cx; NGTD, urine and sputum cultures: klebsiella pneumoniae, pt with diarrhea on 11/24, C diff negative, as per ID eval 7 day ABx course of Zosyn completed for pna    Other: Left wrist cock up splint in place.    DISPOSITION: D/C to [facility] as per PT/OT eval once stable and workup is complete, Pt uninsured, SW aware, medicaid re-instatement pending. Daily PT/OT therapy to optimize therapy and referral to [facility] for possible suman.      CORE MEASURES:        Admission NIHSS: 3     TPA:  NO      LDL/HDL: 171/57     Depression Screen: 0     Statin Therapy: Y     Dysphagia Screen: FAIL     Smoking  NO      Afib NO     Stroke Education YES ASSESSMENT:   58 Y/O man with HTN, HLD, CAD/CABG, past stroke with residual left hemiparesis, presents with transient right hemiparesis and diplopia, continuous headache, nausea and vomiting. CT head shows old right basal ganglia lacune and CTA shows severe bilateral MCA stenosis, proximal basilar/distal vertebrals occlusion and stenosis involving mid to distal basilar artery. MRI brain show right lateral medullary and right cerebellar ischemic infarcts.     Impression:   Severe intracranial atherosclerotic disease and occlusion of left VA, with a small distal right vertebral artery, causing right lateral medullary and right cerebellar ischemic infarcts - likely due to large vessel disease i.e. symptomatic intracranial atherosclerosis the setting of medication (antiplatelet therapy) noncompliance    NEURO: neurologically without acute change, neurologically tolerating normotension, ASA/plavix and statin for secondary stroke prevention, titrate statin to LDL goal less than 70 considering likely atheroembolic etiology of his stroke, will decrease baclofen to 5mg TID, and will start tapering gabapentin to as hiccups have improved, Physical therapy/OT eval with recommendations for [facility], pt uninsured and pending re-instatement of medicaid, will provide daily PT/OT to optimize treatment.     ANTITHROMBOTIC THERAPY:  ASA and clopidogrel for 3 months followed by Aggrenox one capsule twice a day for secondary stroke prevention     PULMONARY:  Chest CT (11/22) bilateral lower lobe pneumonia, completed Zosyn x 7 days, sputum culture: numerous gram positive cocci and negative rods, appreciate pulmonary and ID consult, protecting airway, titrate off NC as tolerated, encourage Incentive spirometer, monitor sats give oxygen as needed to >92% per pulm    CARDIOVASCULAR: TTE: LVEF 41%, mitral annular calcification, mild-moderate MR, global LV systolic dysfunction, mild stage 1 diastolic dysfunction mild TR,  cardiac monitoring no events, cardio consult appreciated, would consider 30 days event monitor for prolonged cardiac monitoring to screen for occult cardiac arrhythmias like atrial fibrillation being the cardiac source of embolism                  GASTROINTESTINAL: S/p PEG placement (11/14), patient suctioning up feedings on 11/22, diarrhea X 2 on 11/24: negative for  C.diff.  Abd x ray was negative for ileus.  vomiting TF 11/28  and not tolerating PEG feedings at goal , Abd x ray: negative for ileus, scheduled Reglan as gastric prokinetic agent, pt was on Jevity 1.5 (as per dietician) TF at 10cc/hr with planned goal of 55cc/hours,  He was unable to tolerate feeds, will change feedings to VITAL AF 1.2 and will monitor, if pt is unable to tolerate feedings today (12/03)  then will proceed with schedule for J tube  on Monday 12/04  Dr. Echeverria approved placement of J tube in IR, As per  ENT eval, pt is still aspirating on secretions, s/s therapist came to assess and agrees that repeat evaluation is not appropriate at this time. Will re-start PEG Jevity 2.0 feedings  today with a rate/goal of 10ml to provide some nutrition and will start IVF      Diet: PEG feeding low rate. NPO at midnight    RENAL: continue to provide hydration as tolerated, good urine output, renal US: No hydronephrosis, Wu reinserted for urinary retention. Continue Cardura for BPH, and continue with Wu catheter until patient's status has improved and he is more ambulatory, small amounts of clots noted in urine on 11/22, now resolved, urology consult appreciated, TOV 11/25: failed, nathan attempt  TOV/PVR prior to discharge, replace Wu if fails.   	Na Goal: Greater than 135    	Wu: y, retention- see above    HEMATOLOGY: no si/sx of bleeding     DVT ppx: LMWH     ID: afebrile in am, no leukocytosis, blood cx; NGTD, urine and sputum cultures: klebsiella pneumoniae, pt with diarrhea on 11/24, C diff negative, as per ID eval 7 day ABx course of Zosyn completed for pna    Other: Left wrist cock up splint in place.    DISPOSITION: D/C to [facility] as per PT/OT eval once stable and workup is complete, Pt uninsured, SW aware, medicaid re-instatement pending. Daily PT/OT therapy to optimize therapy and referral to [facility] for possible suman.      CORE MEASURES:        Admission NIHSS: 3     TPA:  NO      LDL/HDL: 171/57     Depression Screen: 0     Statin Therapy: Y     Dysphagia Screen: FAIL     Smoking  NO      Afib NO     Stroke Education YES ASSESSMENT:   58 Y/O man with HTN, HLD, CAD/CABG, past stroke with residual left hemiparesis, presents with transient right hemiparesis and diplopia, continuous headache, nausea and vomiting. CT head shows old right basal ganglia lacune and CTA shows severe bilateral MCA stenosis, proximal basilar/distal vertebrals occlusion and stenosis involving mid to distal basilar artery. MRI brain show right lateral medullary and right cerebellar ischemic infarcts.     Impression:   Severe intracranial atherosclerotic disease and occlusion of left VA, with a small distal right vertebral artery, causing right lateral medullary and right cerebellar ischemic infarcts - likely due to large vessel disease i.e. symptomatic intracranial atherosclerosis the setting of medication (antiplatelet therapy) noncompliance    NEURO: neurologically without acute change, neurologically tolerating normotension, ASA/plavix and statin for secondary stroke prevention, titrate statin to LDL goal less than 70 considering likely atheroembolic etiology of his stroke, d/c baclofen and taper gabapentin to 600mg TID as hiccups have improved, Physical therapy/OT eval with recommendations for [facility], pt uninsured and pending re-instatement of medicaid, will provide daily PT/OT to optimize treatment.     ANTITHROMBOTIC THERAPY:  ASA and clopidogrel for 3 months followed by Aggrenox one capsule twice a day for secondary stroke prevention     PULMONARY:  Chest CT (11/22) bilateral lower lobe pneumonia, completed Zosyn x 7 days, sputum culture: numerous gram positive cocci and negative rods, appreciate pulmonary and ID consult, protecting airway, titrate off NC as tolerated, encourage Incentive spirometer, monitor sats give oxygen as needed to >92% per pulm    CARDIOVASCULAR: TTE: LVEF 41%, mitral annular calcification, mild-moderate MR, global LV systolic dysfunction, mild stage 1 diastolic dysfunction mild TR,  cardiac monitoring no events, cardio consult appreciated, would consider 30 days event monitor for prolonged cardiac monitoring to screen for occult cardiac arrhythmias like atrial fibrillation being the cardiac source of embolism                  GASTROINTESTINAL: S/p PEG placement (11/14), patient suctioning up feedings on 11/22, diarrhea X 2 on 11/24: negative for  C.diff.  Abd x ray was negative for ileus.  vomiting TF 11/28  and not tolerating PEG feedings at goal , Abd x ray: negative for ileus, scheduled Reglan as gastric prokinetic agent, pt was on Jevity 1.5 (as per dietician) TF at 10cc/hr with planned goal of 55cc/hours,  He was unable to tolerate feeds, will change feedings to VITAL AF 1.2 and will monitor, if pt is unable to tolerate feedings today (12/03)  then will proceed with schedule for J tube  on Monday 12/04  Dr. Echeverria approved placement of J tube in IR, As per  ENT eval, pt is still aspirating on secretions, s/s therapist came to assess and agrees that repeat evaluation is not appropriate at this time. Will re-start PEG Jevity 2.0 feedings  today with a rate/goal of 10ml to provide some nutrition and will start IVF      Diet: PEG feeding low rate. NPO at midnight    RENAL: continue to provide hydration as tolerated, good urine output, renal US: No hydronephrosis, Wu reinserted for urinary retention. Continue Cardura for BPH, and continue with Wu catheter until patient's status has improved and he is more ambulatory, small amounts of clots noted in urine on 11/22, now resolved, urology consult appreciated, TOV 11/25: failed, nathan attempt  TOV/PVR prior to discharge, replace Wu if fails.   	Na Goal: Greater than 135    	Wu: y, retention- see above    HEMATOLOGY: no si/sx of bleeding     DVT ppx: LMWH     ID: afebrile in am, no leukocytosis, blood cx; NGTD, urine and sputum cultures: klebsiella pneumoniae, pt with diarrhea on 11/24, C diff negative, as per ID eval 7 day ABx course of Zosyn completed for pna    Other: Left wrist cock up splint in place.    DISPOSITION: D/C to [facility] as per PT/OT eval once stable and workup is complete, Pt uninsured, SW aware, medicaid re-instatement pending. Daily PT/OT therapy to optimize therapy and referral to [facility] for possible suman.      CORE MEASURES:        Admission NIHSS: 3     TPA:  NO      LDL/HDL: 171/57     Depression Screen: 0     Statin Therapy: Y     Dysphagia Screen: FAIL     Smoking  NO      Afib NO     Stroke Education YES ASSESSMENT:   56 Y/O man with HTN, HLD, CAD/CABG, past stroke with residual left hemiparesis, presents with transient right hemiparesis and diplopia, continuous headache, nausea and vomiting. CT head shows old right basal ganglia lacune and CTA shows severe bilateral MCA stenosis, proximal basilar/distal vertebrals occlusion and stenosis involving mid to distal basilar artery. MRI brain show right lateral medullary and right cerebellar ischemic infarcts. Of note, he reports to have discontinued his antiplatelet medications before his stroke due to financial constraints.    Impression:   Cerebral thrombosis/embolism with cerebral infarction. Right PICA distribution stroke involving  cerebellum and right lateral medulla - likely etiology being  large vessel disease i.e. symptomatic intracranial atherosclerosis in the setting of medication (antiplatelet therapy) noncompliance    NEURO: neurologically without acute change, neurologically tolerating normotension, ASA/plavix and statin for secondary stroke prevention, titrate statin to LDL goal less than 70 considering likely atheroembolic etiology of his stroke, D/c baclofen and taper gabapentin to 600mg TID as hiccups have improved, Physical therapy/OT eval with recommendations for [facility], pt uninsured and pending re-instatement of medicaid, will provide daily PT/OT to optimize treatment.     ANTITHROMBOTIC THERAPY:  ASA and clopidogrel for 3 months followed by Aggrenox one capsule twice a day for secondary stroke prevention     PULMONARY:  Chest CT (11/22) bilateral lower lobe pneumonia, completed Zosyn x 7 days, sputum culture: numerous gram positive cocci and negative rods, appreciate pulmonary and ID consult, protecting airway, titrate off NC as tolerated, encourage Incentive spirometer, monitor sats give oxygen as needed to >92% per pulm    CARDIOVASCULAR: TTE: LVEF 41%, mitral annular calcification, mild-moderate MR, global LV systolic dysfunction, mild stage 1 diastolic dysfunction mild TR,  cardiac monitoring no events, cardio consult appreciated, would consider 30 days event monitor for prolonged cardiac monitoring to screen for occult cardiac arrhythmias like atrial fibrillation being the cardiac source of embolism                  GASTROINTESTINAL: S/p PEG placement (11/14), patient suctioning up feedings on 11/22, diarrhea X 2 on 11/24: negative for  C.diff.  Abd x ray was negative for ileus.  vomiting TF 11/28  and not tolerating PEG feedings at goal , Abd x ray: negative for ileus, scheduled Reglan as gastric prokinetic agent, pt was on Jevity 1.5 (as per dietician) TF at 10cc/hr with planned goal of 55cc/hours,  He was unable to tolerate feeds, will change feedings to VITAL AF 1.2 and will monitor, if pt is unable to tolerate feedings today (12/03)  then will proceed with schedule for J tube  on Monday 12/04  Dr. Echeverria approved placement of J tube in IR, As per  ENT eval, pt is still aspirating on secretions, s/s therapist came to assess and agrees that repeat evaluation is not appropriate at this time. Will re-start PEG Jevity 2.0 feedings  today with a rate/goal of 10ml to provide some nutrition and will start IVF      Diet: PEG feeding low rate. NPO at midnight for possible G to J tube revision tomorrow     RENAL: continue to provide hydration as tolerated, good urine output, renal US: No hydronephrosis, Wu reinserted for urinary retention. Continue Cardura for BPH, and continue with Wu catheter until patient's status has improved and he is more ambulatory, small amounts of clots noted in urine on 11/22, now resolved, urology consult appreciated, TOV 11/25: failed, would attempt TOV/PVR prior to discharge, replace Wu if fails.   	Na Goal: Greater than 135    	Wu: y, retention - see above    HEMATOLOGY: no si/sx of bleeding     DVT ppx: LMWH     ID: afebrile in am, no leukocytosis, blood cx; NGTD, urine and sputum cultures: klebsiella pneumoniae, pt with diarrhea on 11/24, C diff negative, as per ID eval 7 day ABx course of Zosyn completed for pna    Other: Left wrist cock up splint in place.    DISPOSITION: D/C to [facility] as per PT/OT eval once stable and workup is complete, Pt uninsured, SW aware, medicaid re-instatement pending. Daily PT/OT therapy to optimize therapy and referral to [facility] for possible suman.      CORE MEASURES:        Admission NIHSS: 3     TPA:  NO      LDL/HDL: 171/57     Depression Screen: 0     Statin Therapy: Y     Dysphagia Screen: FAIL     Smoking  NO      Afib NO     Stroke Education YES

## 2017-12-03 NOTE — PROGRESS NOTE ADULT - NSHPATTENDINGPLANDISCUSS_GEN_ALL_CORE
Neurology residents, PA and NP on stroke service
Neurology residents, PA, NP and medical students on stroke service
neurology residents and PA
Neurology residents, PA, NP and medical students on stroke service
PA and medical student on stroke service
neurology PA Crys Mackay
neurology residents and NP
neurology residents and PA
nneurology residents and PA
Neurology PA Crys Mackay
Neurology resident, PA and medical student on stroke service
PA and medical student on stroke service
neurology PA Crys Mackay
Neurology residents, PA, NP and medical students on stroke team

## 2017-12-03 NOTE — PROGRESS NOTE ADULT - SUBJECTIVE AND OBJECTIVE BOX
Follow-up Pulm Progress Note    No new respiratory events overnight.  Denies SOB/CP. reports minimal secretions, no wbc or elevated temp    Medications:  MEDICATIONS  (STANDING):  ALBUTerol/ipratropium for Nebulization 3 milliLiter(s) Nebulizer every 6 hours  aspirin  chewable 81 milliGRAM(s) Oral daily  atorvastatin 80 milliGRAM(s) Oral at bedtime  baclofen 5 milliGRAM(s) Oral three times a day  clopidogrel Tablet 75 milliGRAM(s) Oral daily  doxazosin 2 milliGRAM(s) Oral at bedtime  enoxaparin Injectable 40 milliGRAM(s) SubCutaneous daily  gabapentin   Solution 800 milliGRAM(s) Oral three times a day  hydrochlorothiazide 12.5 milliGRAM(s) Oral daily  influenza   Vaccine 0.5 milliLiter(s) IntraMuscular once  losartan 100 milliGRAM(s) Oral daily  metoclopramide 10 milliGRAM(s) Oral every 8 hours  ondansetron Injectable 4 milliGRAM(s) IV Push once  sodium chloride 0.9% Bolus 500 milliLiter(s) IV Bolus once  sodium chloride 0.9%. 1000 milliLiter(s) (75 mL/Hr) IV Continuous <Continuous>    MEDICATIONS  (PRN):  acetaminophen   Tablet. 650 milliGRAM(s) Oral every 6 hours PRN Mild Pain (1 - 3)  acetaminophen  Suppository 650 milliGRAM(s) Rectal every 6 hours PRN For Temp greater than 38.5 C (101.3 F)  guaiFENesin    Syrup 200 milliGRAM(s) Oral every 6 hours PRN Cough  hydrALAZINE Injectable 10 milliGRAM(s) IV Push two times a day PRN BP > 180  simethicone 80 milliGRAM(s) Chew daily PRN bloating          Vital Signs Last 24 Hrs  T(C): 36.3 (03 Dec 2017 07:44), Max: 36.5 (02 Dec 2017 15:40)  T(F): 97.4 (03 Dec 2017 07:44), Max: 97.7 (02 Dec 2017 15:40)  HR: 83 (03 Dec 2017 07:44) (67 - 83)  BP: 121/83 (03 Dec 2017 07:44) (96/61 - 138/91)  BP(mean): --  RR: 20 (03 Dec 2017 07:44) (17 - 20)  SpO2: 98% (03 Dec 2017 07:44) (95% - 100%)          12-02 @ 07:01  -  12-03 @ 07:00  --------------------------------------------------------  IN: 1175 mL / OUT: 675 mL / NET: 500 mL          LABS:                        12.9   4.77  )-----------( 238      ( 03 Dec 2017 08:22 )             40.4                 CAPILLARY BLOOD GLUCOSE                            CULTURES: (if applicable)          Physical Examination:  PULM: decreased bs bilaterally, no significant sputum production  CVS: S1, S2 heard    RADIOLOGY REVIEWED  CXR: < from: Xray Chest 1 View AP-PORTABLE IMMEDIATE (11.22.17 @ 18:31) >  IMPRESSION:   Clear lungs.        < end of copied text >

## 2017-12-03 NOTE — PROGRESS NOTE ADULT - ASSESSMENT
Patient is a 56 yo [place] male w/ PMH significant for HTN, HLD, CAD s/p CABG, previous CVA w/ residual left hemiparesis, admitted for R sided weakness, HA, diplopia, diagnosed with R lateral medullary and R cerebellar ischemic infarcts per MRI. Patient failed MBS and PEG tube was placed on 11/14 and tube feeding was initiated and pt was having problems with emesis, residuals, constipation-concern for ileus. 11/22 with coughing up feeds, green malordours secretions, hypoxia which resolved. found to have bilat PNA-asp, UTI--kleb,

## 2017-12-04 PROCEDURE — 99233 SBSQ HOSP IP/OBS HIGH 50: CPT

## 2017-12-04 RX ADMIN — SODIUM CHLORIDE 75 MILLILITER(S): 9 INJECTION INTRAMUSCULAR; INTRAVENOUS; SUBCUTANEOUS at 22:09

## 2017-12-04 RX ADMIN — Medication 3 MILLILITER(S): at 00:05

## 2017-12-04 RX ADMIN — Medication 10 MILLIGRAM(S): at 21:23

## 2017-12-04 RX ADMIN — GABAPENTIN 600 MILLIGRAM(S): 400 CAPSULE ORAL at 12:34

## 2017-12-04 RX ADMIN — ATORVASTATIN CALCIUM 80 MILLIGRAM(S): 80 TABLET, FILM COATED ORAL at 21:22

## 2017-12-04 RX ADMIN — ENOXAPARIN SODIUM 40 MILLIGRAM(S): 100 INJECTION SUBCUTANEOUS at 12:29

## 2017-12-04 RX ADMIN — Medication 81 MILLIGRAM(S): at 12:29

## 2017-12-04 RX ADMIN — Medication 3 MILLILITER(S): at 05:59

## 2017-12-04 RX ADMIN — Medication 3 MILLILITER(S): at 12:28

## 2017-12-04 RX ADMIN — Medication 10 MILLIGRAM(S): at 12:31

## 2017-12-04 RX ADMIN — CLOPIDOGREL BISULFATE 75 MILLIGRAM(S): 75 TABLET, FILM COATED ORAL at 12:28

## 2017-12-04 RX ADMIN — Medication 2 MILLIGRAM(S): at 21:22

## 2017-12-04 RX ADMIN — GABAPENTIN 600 MILLIGRAM(S): 400 CAPSULE ORAL at 21:22

## 2017-12-04 RX ADMIN — SIMETHICONE 80 MILLIGRAM(S): 80 TABLET, CHEWABLE ORAL at 12:28

## 2017-12-04 NOTE — PROGRESS NOTE ADULT - SUBJECTIVE AND OBJECTIVE BOX
THE PATIENT WAS SEEN AND EXAMINED BY ME WITH THE HOUSESTAFF AND STROKE TEAM DURING MORNING ROUNDS.   HPI:  58 y/o R-handed Indian man PMH CVA with residual L-sided weakness, CAD s/p CABG, HTN presented as stroke code for dizziness. Pt at baseline ambulates independently and independent in ADLs. Pt was in usual state of health on 11/07 at 9 AM. At around 9:30 AM, he began to complain of lightheadedness not associated with changes in position. Half an hour later, he began to complain of n/v. He also endorses HA, diplopia/blurry vision, R-sided weakness that began a few days prior to admission.  He reports he ran out of his BP meds a week prior to admission and has been unable to refill his medications      SUBJECTIVE: No events overnight.  No new neurologic complaints.      acetaminophen   Tablet. 650 milliGRAM(s) Oral every 6 hours PRN  acetaminophen  Suppository 650 milliGRAM(s) Rectal every 6 hours PRN  ALBUTerol/ipratropium for Nebulization 3 milliLiter(s) Nebulizer every 6 hours  aspirin  chewable 81 milliGRAM(s) Oral daily  atorvastatin 80 milliGRAM(s) Oral at bedtime  clopidogrel Tablet 75 milliGRAM(s) Oral daily  doxazosin 2 milliGRAM(s) Oral at bedtime  enoxaparin Injectable 40 milliGRAM(s) SubCutaneous daily  gabapentin   Solution 600 milliGRAM(s) Oral three times a day  guaiFENesin    Syrup 200 milliGRAM(s) Oral every 6 hours PRN  hydrALAZINE Injectable 10 milliGRAM(s) IV Push two times a day PRN  hydrochlorothiazide 12.5 milliGRAM(s) Oral daily  influenza   Vaccine 0.5 milliLiter(s) IntraMuscular once  losartan 100 milliGRAM(s) Oral daily  metoclopramide 10 milliGRAM(s) Oral every 8 hours  ondansetron Injectable 4 milliGRAM(s) IV Push once  simethicone 80 milliGRAM(s) Chew daily PRN  sodium chloride 0.9% Bolus 500 milliLiter(s) IV Bolus once  sodium chloride 0.9%. 1000 milliLiter(s) IV Continuous <Continuous>      PHYSICAL EXAM:   Vital Signs Last 24 Hrs  T(C): 36.4 (04 Dec 2017 07:47), Max: 36.9 (03 Dec 2017 23:17)  T(F): 97.6 (04 Dec 2017 07:47), Max: 98.4 (03 Dec 2017 23:17)  HR: 69 (04 Dec 2017 07:47) (60 - 98)  BP: 158/84 (04 Dec 2017 07:47) (113/72 - 158/84)  BP(mean): --  RR: 18 (04 Dec 2017 07:47) (18 - 20)  SpO2: 96% (04 Dec 2017 07:47) (95% - 99%)  General: NAD  HEENT: EOM intact, visual fields full   Abdomen: Soft, no erythema, nondistended   Extremities: No edema    NEUROLOGICAL EXAM:  Mental status: Awake, alert, oriented x3, fluent speech, no neglect, able to follow commands, moderate dysarthria   Cranial Nerves: Subtle UMN type left facial droop, EOMI, VFF, no nystagmus, right eye ptosis, hoarseness, slight palatal deviation to the left, no hiccups,   Motor exam: Normal tone, no drift, RUE 5/5, RLE 5/5, drift LUE 4-/5, drift LLE 5-/5, increase rigidity of left wrist  Sensation: sensation equal to light touch  Coordination/ Gait: LUE moderate-severe dysmetria    LABS:                        12.9   4.77  )-----------( 238      ( 03 Dec 2017 08:22 )             40.4    12-03    142  |  103  |  26<H>  ----------------------------<  105<H>  3.8   |  27  |  1.58<H>    Ca    9.5      03 Dec 2017 08:43      Hemoglobin A1C, Whole Blood: 5.8 % (11-08 @ 10:36)      IMAGING: Reviewed by me.   CT Chest No Cont (11.22.17) Bilateral lower lobe pneumonia.  Head CT/CTA head/Neck (11/07) Right basal ganglia and corona radiata infarct without significant change since 6/16/2017. There is severe steno-occlusive disease intracranially involving the right supraclinoid internal carotid artery, A1 and M1 branches, and severe narrowing of the left M1 segment of the middle cerebral artery. The distal right vertebral artery is quite small and the proximal basilar artery is not visualized. The dominant left vertebral artery ends at the PICA. Distal basilar flow appears to be due to retrograde flow from the right posterior communicating artery.  Head CT (11/07) No acute intracranial hemorrhage, mass effect, or evidence of acute territorial infarct. Chronic right corona radiata/basal ganglia and right inferior cerebellar hemisphere infarcts.  Brain MRI (11/09) In comparison the prior MRI, there is new hyperintense T2 and FLAIR signal with faint increased restricted diffusion in the right medulla extending to the inferior right brachium pontis and inferior medial right cerebellar hemisphere, new compared to 6/17/2017 consistent with evolving area of ischemic change without hemorrhagic transformation. Redemonstration of volume loss with multiple additional areas of lacunar infarction including chronic infarcts in the right cerebellar hemisphere and right corona radiata. Decreased visualization of the flow-void within the right vertebral artery with continued irregular isointense T1 signal throughout the basilar artery. Bilateral sinus disease with bubbly secretions and air-fluid levels, greatest in the right maxillary sinus THE PATIENT WAS SEEN AND EXAMINED BY ME WITH THE HOUSESTAFF AND STROKE TEAM DURING MORNING ROUNDS.   HPI:  56 y/o R-handed [place] man PMH CVA with residual L-sided weakness, CAD s/p CABG, HTN presented as stroke code for dizziness. Pt at baseline ambulates independently and independent in ADLs. Pt was in usual state of health on 11/07 at 9 AM. At around 9:30 AM, he began to complain of lightheadedness not associated with changes in position. Half an hour later, he began to complain of n/v. He also endorses HA, diplopia/blurry vision, R-sided weakness that began a few days prior to admission.  He reports he ran out of his BP meds a week prior to admission and has been unable to refill his medications    SUBJECTIVE: No events overnight.  No new neurologic complaints.      acetaminophen   Tablet. 650 milliGRAM(s) Oral every 6 hours PRN  acetaminophen  Suppository 650 milliGRAM(s) Rectal every 6 hours PRN  ALBUTerol/ipratropium for Nebulization 3 milliLiter(s) Nebulizer every 6 hours  aspirin  chewable 81 milliGRAM(s) Oral daily  atorvastatin 80 milliGRAM(s) Oral at bedtime  clopidogrel Tablet 75 milliGRAM(s) Oral daily  doxazosin 2 milliGRAM(s) Oral at bedtime  enoxaparin Injectable 40 milliGRAM(s) SubCutaneous daily  gabapentin   Solution 600 milliGRAM(s) Oral three times a day  guaiFENesin    Syrup 200 milliGRAM(s) Oral every 6 hours PRN  hydrALAZINE Injectable 10 milliGRAM(s) IV Push two times a day PRN  hydrochlorothiazide 12.5 milliGRAM(s) Oral daily  influenza   Vaccine 0.5 milliLiter(s) IntraMuscular once  losartan 100 milliGRAM(s) Oral daily  metoclopramide 10 milliGRAM(s) Oral every 8 hours  ondansetron Injectable 4 milliGRAM(s) IV Push once  simethicone 80 milliGRAM(s) Chew daily PRN  sodium chloride 0.9% Bolus 500 milliLiter(s) IV Bolus once  sodium chloride 0.9%. 1000 milliLiter(s) IV Continuous <Continuous>    PHYSICAL EXAM:   Vital Signs Last 24 Hrs  T(C): 36.4 (04 Dec 2017 07:47), Max: 36.9 (03 Dec 2017 23:17)  T(F): 97.6 (04 Dec 2017 07:47), Max: 98.4 (03 Dec 2017 23:17)  HR: 69 (04 Dec 2017 07:47) (60 - 98)  BP: 158/84 (04 Dec 2017 07:47) (113/72 - 158/84)  BP(mean): --  RR: 18 (04 Dec 2017 07:47) (18 - 20)  SpO2: 96% (04 Dec 2017 07:47) (95% - 99%)  General: NAD  HEENT: EOM intact, visual fields full   Abdomen: Soft, no erythema, nondistended   Extremities: No edema    NEUROLOGICAL EXAM:  Mental status: Awake, alert, oriented x3, fluent speech, no neglect, able to follow commands, moderate dysarthria   Cranial Nerves: Subtle UMN type left facial droop, EOMI, VFF, no nystagmus, right eye ptosis, hoarseness, slight palatal deviation to the left, no hiccups,   Motor exam: Normal tone, no drift, RUE 5/5, RLE 5/5, drift LUE 4-/5, drift LLE 5-/5, increase rigidity of left wrist  Sensation: sensation equal to light touch  Coordination/ Gait: LUE moderate-severe dysmetria    LABS:                        12.9   4.77  )-----------( 238      ( 03 Dec 2017 08:22 )             40.4    12-03    142  |  103  |  26<H>  ----------------------------<  105<H>  3.8   |  27  |  1.58<H>    Ca    9.5      03 Dec 2017 08:43      Hemoglobin A1C, Whole Blood: 5.8 % (11-08 @ 10:36)      IMAGING: Reviewed by me.   CT Chest No Cont (11.22.17) Bilateral lower lobe pneumonia.  Head CT/CTA head/Neck (11/07) Right basal ganglia and corona radiata infarct without significant change since 6/16/2017. There is severe steno-occlusive disease intracranially involving the right supraclinoid internal carotid artery, A1 and M1 branches, and severe narrowing of the left M1 segment of the middle cerebral artery. The distal right vertebral artery is quite small and the proximal basilar artery is not visualized. The dominant left vertebral artery ends at the PICA. Distal basilar flow appears to be due to retrograde flow from the right posterior communicating artery.  Head CT (11/07) No acute intracranial hemorrhage, mass effect, or evidence of acute territorial infarct. Chronic right corona radiata/basal ganglia and right inferior cerebellar hemisphere infarcts.  Brain MRI (11/09) In comparison the prior MRI, there is new hyperintense T2 and FLAIR signal with faint increased restricted diffusion in the right medulla extending to the inferior right brachium pontis and inferior medial right cerebellar hemisphere, new compared to 6/17/2017 consistent with evolving area of ischemic change without hemorrhagic transformation. Redemonstration of volume loss with multiple additional areas of lacunar infarction including chronic infarcts in the right cerebellar hemisphere and right corona radiata. Decreased visualization of the flow-void within the right vertebral artery with continued irregular isointense T1 signal throughout the basilar artery. Bilateral sinus disease with bubbly secretions and air-fluid levels, greatest in the right maxillary sinus

## 2017-12-04 NOTE — PROGRESS NOTE ADULT - PROBLEM SELECTOR PLAN 1
s/p treatment of Klebsiella PNA 2nd aspiration with Zosyn  -High risk of aspiration 2nd decreased sensation from stroke. No evidence of VC dysfunction  -s/p PEG  -Aspiration Precautions  -Feeds changed to Vital AF  -CXR in AM  -Duoneb q6 s/p treatment of Klebsiella PNA 2nd aspiration with Zosyn  -High risk of aspiration 2nd decreased sensation from stroke. No evidence of VC dysfunction  -s/p PEG  -Aspiration Precautions  -Feeds changed to Vital AF  d/c nebs

## 2017-12-04 NOTE — PROGRESS NOTE ADULT - SUBJECTIVE AND OBJECTIVE BOX
Follow-up Pulm Progress Note    No new respiratory events overnight.  Denies SOB/CP.   No episodes of vomiting   96% on RA  Dark yellow, blood tinged secretions     Medications:  MEDICATIONS  (STANDING):  ALBUTerol/ipratropium for Nebulization 3 milliLiter(s) Nebulizer every 6 hours  aspirin  chewable 81 milliGRAM(s) Oral daily  atorvastatin 80 milliGRAM(s) Oral at bedtime  clopidogrel Tablet 75 milliGRAM(s) Oral daily  doxazosin 2 milliGRAM(s) Oral at bedtime  enoxaparin Injectable 40 milliGRAM(s) SubCutaneous daily  gabapentin   Solution 600 milliGRAM(s) Oral three times a day  hydrochlorothiazide 12.5 milliGRAM(s) Oral daily  influenza   Vaccine 0.5 milliLiter(s) IntraMuscular once  losartan 100 milliGRAM(s) Oral daily  metoclopramide 10 milliGRAM(s) Oral every 8 hours  ondansetron Injectable 4 milliGRAM(s) IV Push once  sodium chloride 0.9% Bolus 500 milliLiter(s) IV Bolus once  sodium chloride 0.9%. 1000 milliLiter(s) (75 mL/Hr) IV Continuous <Continuous>    MEDICATIONS  (PRN):  acetaminophen   Tablet. 650 milliGRAM(s) Oral every 6 hours PRN Mild Pain (1 - 3)  acetaminophen  Suppository 650 milliGRAM(s) Rectal every 6 hours PRN For Temp greater than 38.5 C (101.3 F)  guaiFENesin    Syrup 200 milliGRAM(s) Oral every 6 hours PRN Cough  hydrALAZINE Injectable 10 milliGRAM(s) IV Push two times a day PRN BP > 180  simethicone 80 milliGRAM(s) Chew daily PRN bloating          Vital Signs Last 24 Hrs  T(C): 36.4 (04 Dec 2017 12:43), Max: 36.9 (03 Dec 2017 23:17)  T(F): 97.6 (04 Dec 2017 12:43), Max: 98.4 (03 Dec 2017 23:17)  HR: 68 (04 Dec 2017 12:43) (63 - 98)  BP: 172/89 (04 Dec 2017 12:43) (113/72 - 172/89)  BP(mean): --  RR: 18 (04 Dec 2017 12:43) (18 - 18)  SpO2: 95% (04 Dec 2017 12:43) (95% - 98%) on RA          12-03 @ 07:01  -  12-04 @ 07:00  --------------------------------------------------------  IN: 1250 mL / OUT: 1150 mL / NET: 100 mL          LABS:                        12.9   4.77  )-----------( 238      ( 03 Dec 2017 08:22 )             40.4     12-03    142  |  103  |  26<H>  ----------------------------<  105<H>  3.8   |  27  |  1.58<H>    Ca    9.5      03 Dec 2017 08:43            CAPILLARY BLOOD GLUCOSE                            CULTURES: (if applicable)          Physical Examination:  PULM: Grossly clear, upper airway rhonchi   CVS: S1, S2 heard    RADIOLOGY REVIEWED Follow-up Pulm Progress Note    No new respiratory events overnight.  Denies SOB/CP.   No episodes of vomiting   96% on RA  Dark yellow, blood tinged secretions     Medications:  MEDICATIONS  (STANDING):  ALBUTerol/ipratropium for Nebulization 3 milliLiter(s) Nebulizer every 6 hours  aspirin  chewable 81 milliGRAM(s) Oral daily  atorvastatin 80 milliGRAM(s) Oral at bedtime  clopidogrel Tablet 75 milliGRAM(s) Oral daily  doxazosin 2 milliGRAM(s) Oral at bedtime  enoxaparin Injectable 40 milliGRAM(s) SubCutaneous daily  gabapentin   Solution 600 milliGRAM(s) Oral three times a day  hydrochlorothiazide 12.5 milliGRAM(s) Oral daily  influenza   Vaccine 0.5 milliLiter(s) IntraMuscular once  losartan 100 milliGRAM(s) Oral daily  metoclopramide 10 milliGRAM(s) Oral every 8 hours  ondansetron Injectable 4 milliGRAM(s) IV Push once  sodium chloride 0.9% Bolus 500 milliLiter(s) IV Bolus once  sodium chloride 0.9%. 1000 milliLiter(s) (75 mL/Hr) IV Continuous <Continuous>    MEDICATIONS  (PRN):  acetaminophen   Tablet. 650 milliGRAM(s) Oral every 6 hours PRN Mild Pain (1 - 3)  acetaminophen  Suppository 650 milliGRAM(s) Rectal every 6 hours PRN For Temp greater than 38.5 C (101.3 F)  guaiFENesin    Syrup 200 milliGRAM(s) Oral every 6 hours PRN Cough  hydrALAZINE Injectable 10 milliGRAM(s) IV Push two times a day PRN BP > 180  simethicone 80 milliGRAM(s) Chew daily PRN bloating    Vital Signs Last 24 Hrs  T(C): 36.4 (04 Dec 2017 12:43), Max: 36.9 (03 Dec 2017 23:17)  T(F): 97.6 (04 Dec 2017 12:43), Max: 98.4 (03 Dec 2017 23:17)  HR: 68 (04 Dec 2017 12:43) (63 - 98)  BP: 172/89 (04 Dec 2017 12:43) (113/72 - 172/89)  BP(mean): --  RR: 18 (04 Dec 2017 12:43) (18 - 18)  SpO2: 95% (04 Dec 2017 12:43) (95% - 98%) on RA    12-03 @ 07:01  -  12-04 @ 07:00  --------------------------------------------------------  IN: 1250 mL / OUT: 1150 mL / NET: 100 mL    LABS:                        12.9   4.77  )-----------( 238      ( 03 Dec 2017 08:22 )             40.4     12-03    142  |  103  |  26<H>  ----------------------------<  105<H>  3.8   |  27  |  1.58<H>    Ca    9.5      03 Dec 2017 08:43    CAPILLARY BLOOD GLUCOSE    CULTURES: (if applicable)    Physical Examination:  PULM: Grossly clear, upper airway rhonchi   CVS: S1, S2 heard    RADIOLOGY REVIEWED

## 2017-12-04 NOTE — PROGRESS NOTE ADULT - ASSESSMENT
ASSESSMENT:   58 Y/O man with HTN, HLD, CAD/CABG, past stroke with residual left hemiparesis, presents with transient right hemiparesis and diplopia, continuous headache, nausea and vomiting. CT head shows old right basal ganglia lacune and CTA shows severe bilateral MCA stenosis, proximal basilar/distal vertebrals occlusion and stenosis involving mid to distal basilar artery. MRI brain show right lateral medullary and right cerebellar ischemic infarcts. Of note, he reports to have discontinued his antiplatelet medications before his stroke due to financial constraints.Impression: Cerebral thrombosis/embolism with cerebral infarction. Right PICA distribution stroke involving  cerebellum and right lateral medulla - likely etiology being  large vessel disease i.e. symptomatic intracranial atherosclerosis in the setting of medication (antiplatelet therapy) noncompliance    NEURO: neurologically without acute change- his hiccups are improved, neurologically tolerating normotension, ASA/plavix and statin for secondary stroke prevention, titrate statin to LDL goal less than 70 considering likely atheroembolic etiology of his stroke,  gabapentin to 600mg TID as hiccups have improved with this regimen, Physical therapy/OT eval with recommendations for [facility], pt uninsured and pending re-instatement of medicaid, will provide daily PT/OT to optimize treatment.     ANTITHROMBOTIC THERAPY:  ASA and clopidogrel Aggrenox can not be administered given delay release via PEG)    PULMONARY:  Chest CT (11/22) bilateral lower lobe pneumonia, completed Zosyn x 7 days, sputum culture: numerous gram positive cocci and negative rods, appreciate pulmonary and ID consult, protecting airway, encourage Incentive spirometer, monitor sats give oxygen as needed to >92% per pulm    CARDIOVASCULAR: TTE: LVEF 41%, mitral annular calcification, mild-moderate MR, global LV systolic dysfunction, mild stage 1 diastolic dysfunction mild TR,  cardiac monitoring no events, cardio consult appreciated             GASTROINTESTINAL: S/p PEG placement (11/14), patient suctioning up feedings on 11/22, diarrhea X 2 on 11/24: negative for  C.diff.  Abd x ray was negative for ileus.  vomiting TF 11/28  and not tolerating PEG feedings at goal , Abd x ray: negative for ileus, scheduled Reglan as gastric prokinetic agent, pt was on Jevity 1.5 (as per dietician) TF at 10cc/hr with planned goal of 55cc/hours,  He was unable to tolerate feeds, changed feedings to VITAL AF 1.2 and will monitor, if pt is unable to tolerate feedings today (12/04 as he reports no new symptoms associated with intolerance since switch)  then will proceed with schedule for J tube  on Tuesday 12/05, d/w IR,  Dr. Echeverria approved placement of J tube in IR, As per  ENT eval, pt is still aspirating on secretions, s/s therapist came to assess and agrees that repeat evaluation is not appropriate at this time      Diet: PEG feeding low rate. NPO at midnight for possible G to J tube revision tomorrow     RENAL: continue to provide hydration as tolerated, good urine output, renal US: No hydronephrosis, Wu reinserted for urinary retention. Continue Cardura for BPH, and continue with Wu catheter until patient's status has improved and he is more ambulatory, small amounts of clots noted in urine on 11/22, now resolved, urology consult appreciated, TOV 11/25: failed, would attempt TOV/PVR prior to discharge, replace Wu if fails.   	Na Goal: Greater than 135    	Wu: y, retention - see above    HEMATOLOGY: no si/sx of bleeding     DVT ppx: LMWH     ID: afebrile in am, no si/sx of infection, blood cx; NGTD, urine and sputum cultures: klebsiella pneumoniae, pt with diarrhea on 11/24, C diff negative, as per ID  7 day ABx course of Zosyn completed for pna    Other: Left wrist cock up splint in place.    DISPOSITION: D/C to [facility] as per PT/OT eval once stable and workup is complete, Pt uninsured, SW aware, medicaid re-instatement pending. Daily PT/OT therapy to optimize therapy and referral to [facility] for possible suman. Anticipate once tolerating TF at goal.       CORE MEASURES:        Admission NIHSS: 3     TPA:  NO      LDL/HDL: 171/57     Depression Screen: 0     Statin Therapy: Y     Dysphagia Screen: FAIL     Smoking  NO      Afib NO     Stroke Education YES ASSESSMENT:   56 Y/O man with HTN, HLD, CAD/CABG, past stroke with residual left hemiparesis, presents with transient right hemiparesis and diplopia, continuous headache, nausea and vomiting. CT head shows old right basal ganglia lacune and CTA shows severe bilateral MCA stenosis, proximal basilar/distal vertebrals occlusion and stenosis involving mid to distal basilar artery. MRI brain show right lateral medullary and right cerebellar ischemic infarcts. Of note, he reports to have discontinued his antiplatelet medications before his stroke due to financial constraints.Impression: Cerebral thrombosis/embolism with cerebral infarction. Right PICA distribution stroke involving  cerebellum and right lateral medulla - likely etiology being  large vessel disease i.e. symptomatic intracranial atherosclerosis in the setting of medication (antiplatelet therapy) noncompliance    NEURO: neurologically without acute change- his hiccups are improved, neurologically tolerating normotension, ASA/plavix and statin for secondary stroke prevention, titrate statin to LDL goal less than 70 considering likely atheroembolic etiology of his stroke,  gabapentin to 600mg TID as hiccups have improved with this regimen, Physical therapy/OT eval with recommendations for [facility], pt uninsured and pending re-instatement of medicaid, will provide daily PT/OT to optimize treatment.     ANTITHROMBOTIC THERAPY:  ASA and clopidogrel Aggrenox can not be administered given delay release via PEG)    PULMONARY:  Chest CT (11/22) bilateral lower lobe pneumonia, completed Zosyn x 7 days, sputum culture: numerous gram positive cocci and negative rods, appreciate pulmonary and ID consult, protecting airway, encourage Incentive spirometer, monitor sats give oxygen as needed to >92% per pulm    CARDIOVASCULAR: TTE: LVEF 41%, mitral annular calcification, mild-moderate MR, global LV systolic dysfunction, mild stage 1 diastolic dysfunction mild TR,  cardiac monitoring no events, cardio consult appreciated             GASTROINTESTINAL: S/p PEG placement (11/14), patient suctioning up feedings on 11/22, diarrhea X 2 on 11/24: negative for  C.diff.  Abd x ray was negative for ileus.  vomiting TF 11/28  and not tolerating PEG feedings at goal , Abd x ray: negative for ileus, scheduled Reglan as gastric prokinetic agent, pt was on Jevity 1.5 (as per dietician) TF at 10cc/hr with planned goal of 55cc/hours,  He was unable to tolerate feeds, changed feedings to VITAL AF 1.2 and will monitor, if pt is unable to tolerate feedings today (12/04 as he reports no new symptoms associated with intolerance since switch)  then will proceed with schedule for J tube  on Tuesday 12/05, d/w IR,  Dr. Echeverria approved placement of J tube in IR, As per  ENT eval, pt is still aspirating on secretions, s/s therapist came to assess and agrees that repeat evaluation is not appropriate at this time      Diet: PEG feeding low rate. NPO at midnight for possible G to J tube revision tomorrow     RENAL: continue to provide hydration as tolerated, good urine output, renal US: No hydronephrosis, Wu reinserted for urinary retention. Continue Cardura for BPH, and continue with Wu catheter until patient's status has improved and he is more ambulatory, small amounts of clots noted in urine on 11/22, now resolved, urology consult appreciated, TOV 11/25: failed, would attempt TOV/PVR prior to discharge, replace Wu if fails.   	Na Goal: Greater than 135    	Wu: y, retention - see above    HEMATOLOGY: no si/sx of bleeding     DVT ppx: LMWH     ID: afebrile in am, no si/sx of infection, blood cx; NGTD, urine and sputum cultures: klebsiella pneumoniae, pt with diarrhea on 11/24, C diff negative, as per ID  7 day ABx course of Zosyn completed for pna    Other: Left wrist cock up splint in place.    DISPOSITION: D/C to [facility] as per PT/OT eval once stable, Pt uninsured, SW aware, medicaid re-instatement pending. Daily PT/OT therapy to optimize therapy and referral to [facility] for possible suman. Anticipate once tolerating TF at goal.       CORE MEASURES:        Admission NIHSS: 3     TPA:  NO      LDL/HDL: 171/57     Depression Screen: 0     Statin Therapy: Y     Dysphagia Screen: FAIL     Smoking  NO      Afib NO     Stroke Education YES

## 2017-12-05 LAB
ANION GAP SERPL CALC-SCNC: 13 MMOL/L — SIGNIFICANT CHANGE UP (ref 5–17)
APPEARANCE UR: ABNORMAL
BILIRUB UR-MCNC: NEGATIVE — SIGNIFICANT CHANGE UP
BUN SERPL-MCNC: 15 MG/DL — SIGNIFICANT CHANGE UP (ref 7–23)
CALCIUM SERPL-MCNC: 8.9 MG/DL — SIGNIFICANT CHANGE UP (ref 8.4–10.5)
CHLORIDE SERPL-SCNC: 104 MMOL/L — SIGNIFICANT CHANGE UP (ref 96–108)
CO2 SERPL-SCNC: 24 MMOL/L — SIGNIFICANT CHANGE UP (ref 22–31)
COLOR SPEC: ABNORMAL
CREAT SERPL-MCNC: 1.14 MG/DL — SIGNIFICANT CHANGE UP (ref 0.5–1.3)
DIFF PNL FLD: ABNORMAL
GLUCOSE SERPL-MCNC: 74 MG/DL — SIGNIFICANT CHANGE UP (ref 70–99)
GLUCOSE UR QL: NEGATIVE MG/DL — SIGNIFICANT CHANGE UP
KETONES UR-MCNC: ABNORMAL
LEUKOCYTE ESTERASE UR-ACNC: ABNORMAL
NITRITE UR-MCNC: POSITIVE
PH UR: 5.5 — SIGNIFICANT CHANGE UP (ref 5–8)
POTASSIUM SERPL-MCNC: 4.3 MMOL/L — SIGNIFICANT CHANGE UP (ref 3.5–5.3)
POTASSIUM SERPL-SCNC: 4.3 MMOL/L — SIGNIFICANT CHANGE UP (ref 3.5–5.3)
PROT UR-MCNC: 100 MG/DL
SODIUM SERPL-SCNC: 141 MMOL/L — SIGNIFICANT CHANGE UP (ref 135–145)
SP GR SPEC: 1.02 — SIGNIFICANT CHANGE UP (ref 1.01–1.02)
UROBILINOGEN FLD QL: 1 MG/DL — SIGNIFICANT CHANGE UP

## 2017-12-05 PROCEDURE — 31231 NASAL ENDOSCOPY DX: CPT

## 2017-12-05 PROCEDURE — 99233 SBSQ HOSP IP/OBS HIGH 50: CPT

## 2017-12-05 PROCEDURE — 99232 SBSQ HOSP IP/OBS MODERATE 35: CPT | Mod: 25

## 2017-12-05 PROCEDURE — 71010: CPT | Mod: 26

## 2017-12-05 RX ORDER — SODIUM CHLORIDE 0.65 %
1 AEROSOL, SPRAY (ML) NASAL EVERY 6 HOURS
Qty: 0 | Refills: 0 | Status: DISCONTINUED | OUTPATIENT
Start: 2017-12-05 | End: 2017-12-11

## 2017-12-05 RX ORDER — FLUTICASONE PROPIONATE 50 MCG
1 SPRAY, SUSPENSION NASAL
Qty: 0 | Refills: 0 | Status: DISCONTINUED | OUTPATIENT
Start: 2017-12-05 | End: 2017-12-11

## 2017-12-05 RX ADMIN — SODIUM CHLORIDE 75 MILLILITER(S): 9 INJECTION INTRAMUSCULAR; INTRAVENOUS; SUBCUTANEOUS at 09:52

## 2017-12-05 RX ADMIN — Medication 10 MILLIGRAM(S): at 14:08

## 2017-12-05 RX ADMIN — GABAPENTIN 600 MILLIGRAM(S): 400 CAPSULE ORAL at 14:08

## 2017-12-05 RX ADMIN — Medication 81 MILLIGRAM(S): at 14:09

## 2017-12-05 RX ADMIN — CLOPIDOGREL BISULFATE 75 MILLIGRAM(S): 75 TABLET, FILM COATED ORAL at 14:07

## 2017-12-05 RX ADMIN — Medication 2 MILLIGRAM(S): at 21:07

## 2017-12-05 RX ADMIN — Medication 1 SPRAY(S): at 18:38

## 2017-12-05 RX ADMIN — ATORVASTATIN CALCIUM 80 MILLIGRAM(S): 80 TABLET, FILM COATED ORAL at 21:06

## 2017-12-05 RX ADMIN — Medication 10 MILLIGRAM(S): at 21:07

## 2017-12-05 RX ADMIN — Medication 875 MILLIGRAM(S): at 20:50

## 2017-12-05 RX ADMIN — ENOXAPARIN SODIUM 40 MILLIGRAM(S): 100 INJECTION SUBCUTANEOUS at 14:08

## 2017-12-05 RX ADMIN — GABAPENTIN 600 MILLIGRAM(S): 400 CAPSULE ORAL at 21:06

## 2017-12-05 NOTE — PROVIDER CONTACT NOTE (OTHER) - BACKGROUND
Pt admitted fr new onset R-sided weakness, N/V hx of stroke.
11/7 HTN urgency. 11/9 CVA.
CVA
Pt has haro in for retention that was draining clear nora colored urine at the beginning of shift.
Pt here for hypertensive urgency; reports increased dizziness and increased weakness to R side.
Report received from the night shift RN and patient is scheduled for J-Tube placement today. Diet orders read NPO after midnight active starting December 5th. RN not notified of time of procedure.
cva / dysphasia
cva/dysphagia
hypertensive urgency
sp stoke on 11/9 with severe residual. L/R sided weakness. Dysphagia, NPO with meds and Tube feedings that were stopped. chest xray- for acute changes on 11/21 am

## 2017-12-05 NOTE — CHART NOTE - NSCHARTNOTEFT_GEN_A_CORE
Upon Nutritional Assessment by the Registered Dietitian your patient was determined to meet criteria / has evidence of the following diagnosis/diagnoses:          [ ]  Mild Protein Calorie Malnutrition        [ ]  Moderate Protein Calorie Malnutrition        [ x] Severe Protein Calorie Malnutrition        [ ] Unspecified Protein Calorie Malnutrition        [ ] Underweight / BMI <19        [ ] Morbid Obesity / BMI > 40      Findings as based on:  [x ] Comprehensive nutrition assessment   [ ] Nutrition Focused Physical Exam  [x ] Other: 43lb Wt loss since admission per bed scales, inability to tolerate tube feeds.      Nutrition Plan/Recommendations:    When medically feasible after G to J revision, initiate trial of Jevity 1.2 @ 10mL/hr increase by 10mL Q8 hrs until goal of 70mL/hr x 24hrs. Goal provides 2016kcal, 93g protein (28.8kcal/kg, 1.33g protein/kg/actual Wt).    If Pt does not tolerate Jevity can consider switching to Vital AF 1.2. RD to remain available.       PROVIDER Section:     By signing this assessment you are acknowledging and agree with the diagnosis/diagnoses assigned by the Registered Dietitian    Comments:

## 2017-12-05 NOTE — PROGRESS NOTE ADULT - PROBLEM SELECTOR PLAN 1
Blood tinged sputum x several days  -No obvious bleeding in mouth or posterior pharynx   -?2nd epistaxis   -Nasal saline q6, Flonase BID  -ENT follow up called to r/o sinusitis (bilateral maxillary sinus air fluid level on CT head in early Nov with thick secretions)  -r/o epistaxis as source of blood in sputum

## 2017-12-05 NOTE — PROGRESS NOTE ADULT - ASSESSMENT
Patient is a 56 yo [place] male w/ PMH significant for HTN, HLD, CAD s/p CABG, previous CVA w/ residual left hemiparesis, admitted for R sided weakness, HA, diplopia, diagnosed with R lateral medullary and R cerebellar ischemic infarcts per MRI. Patient failed MBS and PEG tube was placed on 11/14 and tube feeding was initiated and pt was having problems with emesis, residuals, constipation-concern for ileus. 11/22 with coughing up feeds, green malordours secretions, hypoxia which resolved. found to have bilat PNA-asp, UTI--kleb,

## 2017-12-05 NOTE — PROGRESS NOTE ADULT - SUBJECTIVE AND OBJECTIVE BOX
THE PATIENT WAS SEEN AND EXAMINED BY ME WITH THE HOUSESTAFF AND STROKE TEAM DURING MORNING ROUNDS.   HPI:  56 y/o R-handed [place] man PMH CVA with residual L-sided weakness, CAD s/p CABG, HTN presented as stroke code for dizziness. Pt at baseline ambulates independently and independent in ADLs. Pt was in usual state of health on 11/07 at 9 AM. At around 9:30 AM, he began to complain of lightheadedness not associated with changes in position. Half an hour later, he began to complain of n/v. He also endorses HA, diplopia/blurry vision, R-sided weakness that began a few days prior to admission.  He reports he ran out of his BP meds a week prior to admission and has been unable to refill his medications      SUBJECTIVE: No events overnight.  No new neurologic complaints.      acetaminophen   Tablet. 650 milliGRAM(s) Oral every 6 hours PRN  acetaminophen  Suppository 650 milliGRAM(s) Rectal every 6 hours PRN  aspirin  chewable 81 milliGRAM(s) Oral daily  atorvastatin 80 milliGRAM(s) Oral at bedtime  clopidogrel Tablet 75 milliGRAM(s) Oral daily  doxazosin 2 milliGRAM(s) Oral at bedtime  enoxaparin Injectable 40 milliGRAM(s) SubCutaneous daily  fluticasone propionate 50 MICROgram(s)/spray Nasal Spray 1 Spray(s) Both Nostrils two times a day  gabapentin   Solution 600 milliGRAM(s) Oral three times a day  guaiFENesin    Syrup 200 milliGRAM(s) Oral every 6 hours PRN  hydrALAZINE Injectable 10 milliGRAM(s) IV Push two times a day PRN  hydrochlorothiazide 12.5 milliGRAM(s) Oral daily  influenza   Vaccine 0.5 milliLiter(s) IntraMuscular once  losartan 100 milliGRAM(s) Oral daily  metoclopramide 10 milliGRAM(s) Oral every 8 hours  simethicone 80 milliGRAM(s) Chew daily PRN  sodium chloride 0.65% Nasal 1 Spray(s) Both Nostrils every 6 hours  sodium chloride 0.9%. 1000 milliLiter(s) IV Continuous <Continuous>      PHYSICAL EXAM:   Vital Signs Last 24 Hrs  T(C): 36.4 (05 Dec 2017 12:19), Max: 36.8 (04 Dec 2017 15:37)  T(F): 97.5 (05 Dec 2017 12:19), Max: 98.3 (04 Dec 2017 15:37)  HR: 67 (05 Dec 2017 12:19) (63 - 67)  BP: 154/88 (05 Dec 2017 12:19) (145/80 - 167/90)  BP(mean): --  RR: 18 (05 Dec 2017 12:19) (18 - 18)  SpO2: 98% (05 Dec 2017 12:19) (96% - 100%)    General: NAD  HEENT: EOM intact, visual fields full   Abdomen: Soft, no erythema, nondistended   Extremities: No edema    NEUROLOGICAL EXAM:  Mental status: Awake, alert, oriented x3, fluent speech, no neglect, able to follow commands, moderate dysarthria   Cranial Nerves: Subtle UMN type left facial droop, EOMI, VFF, no nystagmus, right eye ptosis, hoarseness, slight palatal deviation to the left, no hiccups,   Motor exam: Normal tone, no drift, RUE 5/5, RLE 5/5, drift LUE 4-/5, drift LLE 5-/5, increase rigidity of left wrist  Sensation: sensation equal to light touch  Coordination/ Gait: LUE moderate-severe dysmetria      LABS:   12-05    141  |  104  |  15  ----------------------------<  74  4.3   |  24  |  1.14    Ca    8.9      05 Dec 2017 11:32      Hemoglobin A1C, Whole Blood: 5.8 % (11-08 @ 10:36)      IMAGING: Reviewed by me.   CT Chest No Cont (11.22.17) Bilateral lower lobe pneumonia.  Head CT/CTA head/Neck (11/07) Right basal ganglia and corona radiata infarct without significant change since 6/16/2017. There is severe steno-occlusive disease intracranially involving the right supraclinoid internal carotid artery, A1 and M1 branches, and severe narrowing of the left M1 segment of the middle cerebral artery. The distal right vertebral artery is quite small and the proximal basilar artery is not visualized. The dominant left vertebral artery ends at the PICA. Distal basilar flow appears to be due to retrograde flow from the right posterior communicating artery.  Head CT (11/07) No acute intracranial hemorrhage, mass effect, or evidence of acute territorial infarct. Chronic right corona radiata/basal ganglia and right inferior cerebellar hemisphere infarcts.  Brain MRI (11/09) In comparison the prior MRI, there is new hyperintense T2 and FLAIR signal with faint increased restricted diffusion in the right medulla extending to the inferior right brachium pontis and inferior medial right cerebellar hemisphere, new compared to 6/17/2017 consistent with evolving area of ischemic change without hemorrhagic transformation. Redemonstration of volume loss with multiple additional areas of lacunar infarction including chronic infarcts in the right cerebellar hemisphere and right corona radiata. Decreased visualization of the flow-void within the right vertebral artery with continued irregular isointense T1 signal throughout the basilar artery. Bilateral sinus disease with bubbly secretions and air-fluid levels, greatest in the right maxillary sinus THE PATIENT WAS SEEN AND EXAMINED BY ME WITH THE HOUSESTAFF AND STROKE TEAM DURING MORNING ROUNDS.   HPI:  58 y/o R-handed [place] man PMH CVA with residual L-sided weakness, CAD s/p CABG, HTN presented as stroke code for dizziness. Pt at baseline ambulates independently and independent in ADLs. Pt was in usual state of health on 11/07 at 9 AM. At around 9:30 AM, he began to complain of lightheadedness not associated with changes in position. Half an hour later, he began to complain of n/v. He also endorses HA, diplopia/blurry vision, R-sided weakness that began a few days prior to admission.  He reports he ran out of his BP meds a week prior to admission and has been unable to refill his medications    SUBJECTIVE: No events overnight.  No new neurologic complaints.      acetaminophen   Tablet. 650 milliGRAM(s) Oral every 6 hours PRN  acetaminophen  Suppository 650 milliGRAM(s) Rectal every 6 hours PRN  aspirin  chewable 81 milliGRAM(s) Oral daily  atorvastatin 80 milliGRAM(s) Oral at bedtime  clopidogrel Tablet 75 milliGRAM(s) Oral daily  doxazosin 2 milliGRAM(s) Oral at bedtime  enoxaparin Injectable 40 milliGRAM(s) SubCutaneous daily  fluticasone propionate 50 MICROgram(s)/spray Nasal Spray 1 Spray(s) Both Nostrils two times a day  gabapentin   Solution 600 milliGRAM(s) Oral three times a day  guaiFENesin    Syrup 200 milliGRAM(s) Oral every 6 hours PRN  hydrALAZINE Injectable 10 milliGRAM(s) IV Push two times a day PRN  hydrochlorothiazide 12.5 milliGRAM(s) Oral daily  influenza   Vaccine 0.5 milliLiter(s) IntraMuscular once  losartan 100 milliGRAM(s) Oral daily  metoclopramide 10 milliGRAM(s) Oral every 8 hours  simethicone 80 milliGRAM(s) Chew daily PRN  sodium chloride 0.65% Nasal 1 Spray(s) Both Nostrils every 6 hours  sodium chloride 0.9%. 1000 milliLiter(s) IV Continuous <Continuous>      PHYSICAL EXAM:   Vital Signs Last 24 Hrs  T(C): 36.4 (05 Dec 2017 12:19), Max: 36.8 (04 Dec 2017 15:37)  T(F): 97.5 (05 Dec 2017 12:19), Max: 98.3 (04 Dec 2017 15:37)  HR: 67 (05 Dec 2017 12:19) (63 - 67)  BP: 154/88 (05 Dec 2017 12:19) (145/80 - 167/90)  BP(mean): --  RR: 18 (05 Dec 2017 12:19) (18 - 18)  SpO2: 98% (05 Dec 2017 12:19) (96% - 100%)    General: NAD  HEENT: EOM intact, visual fields full   Abdomen: Soft, no erythema, nondistended   Extremities: No edema    NEUROLOGICAL EXAM:  Mental status: Awake, alert, oriented x3, fluent speech, no neglect, able to follow commands, moderate dysarthria   Cranial Nerves: Subtle UMN type left facial droop, EOMI, VFF, no nystagmus, right eye ptosis, hoarseness, slight palatal deviation to the left, no hiccups,   Motor exam: Normal tone, no drift, RUE 5/5, RLE 5/5, drift LUE 4-/5, drift LLE 5-/5, increase rigidity of left wrist  Sensation: sensation equal to light touch  Coordination/ Gait: LUE moderate dysmetria      LABS:   12-05    141  |  104  |  15  ----------------------------<  74  4.3   |  24  |  1.14    Ca    8.9      05 Dec 2017 11:32      Hemoglobin A1C, Whole Blood: 5.8 % (11-08 @ 10:36)      IMAGING: Reviewed by me.   CT Chest No Cont (11.22.17) Bilateral lower lobe pneumonia.  Head CT/CTA head/Neck (11/07) Right basal ganglia and corona radiata infarct without significant change since 6/16/2017. There is severe steno-occlusive disease intracranially involving the right supraclinoid internal carotid artery, A1 and M1 branches, and severe narrowing of the left M1 segment of the middle cerebral artery. The distal right vertebral artery is quite small and the proximal basilar artery is not visualized. The dominant left vertebral artery ends at the PICA. Distal basilar flow appears to be due to retrograde flow from the right posterior communicating artery.  Head CT (11/07) No acute intracranial hemorrhage, mass effect, or evidence of acute territorial infarct. Chronic right corona radiata/basal ganglia and right inferior cerebellar hemisphere infarcts.  Brain MRI (11/09) In comparison the prior MRI, there is new hyperintense T2 and FLAIR signal with faint increased restricted diffusion in the right medulla extending to the inferior right brachium pontis and inferior medial right cerebellar hemisphere, new compared to 6/17/2017 consistent with evolving area of ischemic change without hemorrhagic transformation. Redemonstration of volume loss with multiple additional areas of lacunar infarction including chronic infarcts in the right cerebellar hemisphere and right corona radiata. Decreased visualization of the flow-void within the right vertebral artery with continued irregular isointense T1 signal throughout the basilar artery. Bilateral sinus disease with bubbly secretions and air-fluid levels, greatest in the right maxillary sinus

## 2017-12-05 NOTE — PROVIDER CONTACT NOTE (OTHER) - ACTION/TREATMENT ORDERED:
MD made aware, awaiting order for BP medication.
Ordering Zofran
As per provider will restart tube feed @ 11am.
MD aware, stated J-Tube placement will be scheduled for tomorrow. MD to put in orders to restart tube feeds. Order to be placed. Spoke to MD student with [facility] to remind her to change diet order.
Stroke team notified of the finding.
Tylenol suppository 650 mg administered. Labetalol 10mg IVPx1 given. Blood sent for culturex2, Urine sent for UA and culture. Chest x-ray taken Sputum sent for culture and RVP swab also sent
UA and culture
awaiting stroke team to see pt
continue to hold tube feeding . GI consult ordered
straight cath done, UA, C&S sent  EKG and labs done

## 2017-12-05 NOTE — PROVIDER CONTACT NOTE (OTHER) - DATE AND TIME:
24-Nov-2017 07:35
01-Dec-2017 09:00
01-Dec-2017 12:00
05-Dec-2017 13:00
05-Dec-2017 18:52
08-Nov-2017 04:48
08-Nov-2017 10:00
09-Nov-2017 20:00
20-Nov-2017 10:00
22-Nov-2017 08:15
07-Nov-2017 22:05

## 2017-12-05 NOTE — PROGRESS NOTE ADULT - ASSESSMENT
Pt examined by ENT via FOE/scope exam found to have right sided sinusitis with blood-tinged purulent secretions from the right maxillary sinus. CT from 11/7/17 consistent with Air-fluid levels in the bilateral maxillary sinuses, correlate for the   presence of acute sinusitis. Pooling of secretions and gross aspiration on scope exam. Pt currently afebrile.

## 2017-12-05 NOTE — PROGRESS NOTE ADULT - SUBJECTIVE AND OBJECTIVE BOX
Follow-up Pulm Progress Note    No new respiratory events overnight.  Denies SOB/CP.   Yellow with blood tinged secretions   Nasal voice with c/o maxillary sinus pain     Medications:  MEDICATIONS  (STANDING):  aspirin  chewable 81 milliGRAM(s) Oral daily  atorvastatin 80 milliGRAM(s) Oral at bedtime  clopidogrel Tablet 75 milliGRAM(s) Oral daily  doxazosin 2 milliGRAM(s) Oral at bedtime  enoxaparin Injectable 40 milliGRAM(s) SubCutaneous daily  fluticasone propionate 50 MICROgram(s)/spray Nasal Spray 1 Spray(s) Both Nostrils two times a day  gabapentin   Solution 600 milliGRAM(s) Oral three times a day  hydrochlorothiazide 12.5 milliGRAM(s) Oral daily  influenza   Vaccine 0.5 milliLiter(s) IntraMuscular once  losartan 100 milliGRAM(s) Oral daily  metoclopramide 10 milliGRAM(s) Oral every 8 hours  sodium chloride 0.65% Nasal 1 Spray(s) Both Nostrils every 6 hours  sodium chloride 0.9%. 1000 milliLiter(s) (75 mL/Hr) IV Continuous <Continuous>    MEDICATIONS  (PRN):  acetaminophen   Tablet. 650 milliGRAM(s) Oral every 6 hours PRN Mild Pain (1 - 3)  acetaminophen  Suppository 650 milliGRAM(s) Rectal every 6 hours PRN For Temp greater than 38.5 C (101.3 F)  guaiFENesin    Syrup 200 milliGRAM(s) Oral every 6 hours PRN Cough  hydrALAZINE Injectable 10 milliGRAM(s) IV Push two times a day PRN BP > 180  simethicone 80 milliGRAM(s) Chew daily PRN bloating          Vital Signs Last 24 Hrs  T(C): 36.4 (05 Dec 2017 12:19), Max: 36.8 (04 Dec 2017 15:37)  T(F): 97.5 (05 Dec 2017 12:19), Max: 98.3 (04 Dec 2017 15:37)  HR: 67 (05 Dec 2017 12:19) (63 - 67)  BP: 154/88 (05 Dec 2017 12:19) (145/80 - 167/90)  BP(mean): --  RR: 18 (05 Dec 2017 12:19) (18 - 18)  SpO2: 98% (05 Dec 2017 12:19) (96% - 100%) on RA          12-04 @ 07:01  -  12-05 @ 07:00  --------------------------------------------------------  IN: 825 mL / OUT: 800 mL / NET: 25 mL          LABS:    12-05    141  |  104  |  15  ----------------------------<  74  4.3   |  24  |  1.14    Ca    8.9      05 Dec 2017 11:32              Physical Examination:  PULM: Trace rhonchi  No obvious lesions posterior pharynx   CVS: S1, S2 heard    RADIOLOGY REVIEWED  CXR: 12/5: Clear lungs Follow-up Pulm Progress Note    No new respiratory events overnight.  Denies SOB/CP.   Yellow with blood tinged secretions   Nasal voice with c/o maxillary sinus pain     Medications:  MEDICATIONS  (STANDING):  aspirin  chewable 81 milliGRAM(s) Oral daily  atorvastatin 80 milliGRAM(s) Oral at bedtime  clopidogrel Tablet 75 milliGRAM(s) Oral daily  doxazosin 2 milliGRAM(s) Oral at bedtime  enoxaparin Injectable 40 milliGRAM(s) SubCutaneous daily  fluticasone propionate 50 MICROgram(s)/spray Nasal Spray 1 Spray(s) Both Nostrils two times a day  gabapentin   Solution 600 milliGRAM(s) Oral three times a day  hydrochlorothiazide 12.5 milliGRAM(s) Oral daily  influenza   Vaccine 0.5 milliLiter(s) IntraMuscular once  losartan 100 milliGRAM(s) Oral daily  metoclopramide 10 milliGRAM(s) Oral every 8 hours  sodium chloride 0.65% Nasal 1 Spray(s) Both Nostrils every 6 hours  sodium chloride 0.9%. 1000 milliLiter(s) (75 mL/Hr) IV Continuous <Continuous>    MEDICATIONS  (PRN):  acetaminophen   Tablet. 650 milliGRAM(s) Oral every 6 hours PRN Mild Pain (1 - 3)  acetaminophen  Suppository 650 milliGRAM(s) Rectal every 6 hours PRN For Temp greater than 38.5 C (101.3 F)  guaiFENesin    Syrup 200 milliGRAM(s) Oral every 6 hours PRN Cough  hydrALAZINE Injectable 10 milliGRAM(s) IV Push two times a day PRN BP > 180  simethicone 80 milliGRAM(s) Chew daily PRN bloating    Vital Signs Last 24 Hrs  T(C): 36.4 (05 Dec 2017 12:19), Max: 36.8 (04 Dec 2017 15:37)  T(F): 97.5 (05 Dec 2017 12:19), Max: 98.3 (04 Dec 2017 15:37)  HR: 67 (05 Dec 2017 12:19) (63 - 67)  BP: 154/88 (05 Dec 2017 12:19) (145/80 - 167/90)  BP(mean): --  RR: 18 (05 Dec 2017 12:19) (18 - 18)  SpO2: 98% (05 Dec 2017 12:19) (96% - 100%) on RA    12-04 @ 07:01  -  12-05 @ 07:00  --------------------------------------------------------  IN: 825 mL / OUT: 800 mL / NET: 25 mL    LABS:    12-05    141  |  104  |  15  ----------------------------<  74  4.3   |  24  |  1.14    Ca    8.9      05 Dec 2017 11:32      Physical Examination:  PULM: Trace rhonchi  No obvious lesions posterior pharynx   CVS: S1, S2 heard    RADIOLOGY REVIEWED  CXR: 12/5: Clear lungs

## 2017-12-05 NOTE — PROGRESS NOTE ADULT - ASSESSMENT
ASSESSMENT:   56 Y/O man with HTN, HLD, CAD/CABG, past stroke with residual left hemiparesis, presents with transient right hemiparesis and diplopia, continuous headache, nausea and vomiting. CT head shows old right basal ganglia lacune and CTA shows severe bilateral MCA stenosis, proximal basilar/distal vertebrals occlusion and stenosis involving mid to distal basilar artery. MRI brain show right lateral medullary and right cerebellar ischemic infarcts. Of note, he reports to have discontinued his antiplatelet medications before his stroke due to financial constraints.Impression: Cerebral thrombosis/embolism with cerebral infarction. Right PICA distribution stroke involving  cerebellum and right lateral medulla - likely etiology being  large vessel disease i.e. symptomatic intracranial atherosclerosis in the setting of medication (antiplatelet therapy) noncompliance    NEURO: neurologically without acute change- his hiccups are improved, neurologically tolerating normotension, ASA/plavix and statin for secondary stroke prevention, titrate statin to LDL goal less than 70 considering likely atheroembolic etiology of his stroke,  gabapentin to 600mg TID as hiccups have improved with this regimen, Physical therapy/OT eval with recommendations for [facility], pt uninsured and pending re-instatement of medicaid, will provide daily PT/OT to optimize treatment.     ANTITHROMBOTIC THERAPY:  ASA and clopidogrel      PULMONARY:  Chest CT (11/22) bilateral lower lobe pneumonia, completed Zosyn x 7 days, sputum culture: numerous gram positive cocci and negative rods, appreciate pulmonary and ID consult, protecting airway, encourage Incentive spirometer, monitor sats give oxygen as needed to >92% per pulm    CARDIOVASCULAR: TTE: LVEF 41%, mitral annular calcification, mild-moderate MR, global LV systolic dysfunction, mild stage 1 diastolic dysfunction mild TR,  cardiac monitoring no events, cardio consult appreciated             GASTROINTESTINAL: S/p PEG placement (11/14), patient suctioning up feedings on 11/22, diarrhea X 2 on 11/24: negative for  C.diff.  Abd x ray was negative for ileus.  vomiting TF 11/28  and not tolerating PEG feedings at goal , Abd x ray: negative for ileus, scheduled Reglan as gastric prokinetic agent, pt was on Jevity 1.5 (as per dietician)   He was unable to tolerate feeds, changed feedings to VITAL AF 1.2 and again attempted, per RN 12/4 in afternoon patient with high residuals and not tolerating.  D/w IR for G to J tube.     Diet: PEG feeding low rate. NPO at midnight for possible G to J tube revision tomorrow     RENAL: continue to provide hydration as tolerated, good urine output, renal US: No hydronephrosis, Wu reinserted for urinary retention. Continue Cardura for BPH, and continue with Wu catheter until patient's status has improved and he is more ambulatory, small amounts of clots noted in urine on 11/22, now resolved, urology consult appreciated, TOV 11/25: failed, would attempt TOV/PVR prior to discharge, replace Wu if fails.   	Na Goal: Greater than 135    	Wu: y, retention - see above    HEMATOLOGY: no si/sx of bleeding     DVT ppx: LMWH     ID: afebrile in am, no si/sx of infection, blood cx; NGTD, urine and sputum cultures: klebsiella pneumoniae, pt with diarrhea on 11/24, C diff negative, as per ID  7 day ABx course of Zosyn completed for pna    Other: Left wrist cock up splint in place.    DISPOSITION: D/C to [facility] as per PT/OT eval once stable, Pt uninsured, SW aware, medicaid re-instatement pending. Daily PT/OT therapy to optimize therapy and referral to [facility] for possible suman. Anticipate once tolerating TF at goal.       CORE MEASURES:        Admission NIHSS: 3     TPA:  NO      LDL/HDL: 171/57     Depression Screen: 0     Statin Therapy: Y     Dysphagia Screen: FAIL     Smoking  NO      Afib NO     Stroke Education YES ASSESSMENT:   56 Y/O man with HTN, HLD, CAD/CABG, past stroke with residual left hemiparesis, presents with transient right hemiparesis and diplopia, continuous headache, nausea and vomiting. CT head shows old right basal ganglia lacune and CTA shows severe bilateral MCA stenosis, proximal basilar/distal vertebrals occlusion and stenosis involving mid to distal basilar artery. MRI brain show right lateral medullary and right cerebellar ischemic infarcts. Of note, he reports to have discontinued his antiplatelet medications before his stroke due to financial constraints.Impression: Cerebral thrombosis/embolism with cerebral infarction. Right PICA distribution stroke involving cerebellum and right lateral medulla - likely etiology being  large vessel disease i.e. symptomatic intracranial atherosclerosis in the setting of medication (antiplatelet therapy) noncompliance.    NEURO: neurologically without acute change- his hiccups are improved, neurologically tolerating normotension, ASA/plavix and statin for secondary stroke prevention, titrate statin to LDL goal less than 70 considering likely atheroembolic etiology of his stroke,  gabapentin to 600mg TID as hiccups have improved with this regimen, Physical therapy/OT eval with recommendations for [facility], pt uninsured and pending re-instatement of medicaid, will provide daily PT/OT to optimize treatment.     ANTITHROMBOTIC THERAPY:  ASA and clopidogrel      PULMONARY:  Chest CT (11/22) bilateral lower lobe pneumonia, completed Zosyn x 7 days, sputum culture: numerous gram positive cocci and negative rods, appreciate pulmonary and ID consult, protecting airway, encourage Incentive spirometer, monitor sats give oxygen as needed to >92% per pulm    CARDIOVASCULAR: TTE: LVEF 41%, mitral annular calcification, mild-moderate MR, global LV systolic dysfunction, mild stage 1 diastolic dysfunction mild TR,  cardiac monitoring no events, cardio consult appreciated             GASTROINTESTINAL: S/p PEG placement (11/14), patient suctioning up feedings on 11/22, diarrhea X 2 on 11/24: negative for  C.diff.  Abd x ray was negative for ileus.  vomiting TF 11/28  and not tolerating PEG feedings at goal , Abd x ray: negative for ileus, scheduled Reglan as gastric prokinetic agent, pt was on Jevity 1.5 (as per dietician)   He was unable to tolerate feeds, changed feedings to VITAL AF 1.2 and again attempted, per RN 12/4 in afternoon patient with high residuals and not tolerating.  D/w IR for G to J tube.     Diet: PEG feeding low rate. NPO at midnight for possible G to J tube revision tomorrow     RENAL: continue to provide hydration as tolerated, good urine output, renal US: No hydronephrosis, Wu reinserted for urinary retention. Continue Cardura for BPH, and continue with Wu catheter until patient's status has improved and he is more ambulatory, small amounts of clots noted in urine on 11/22, now resolved, urology consult appreciated, TOV 11/25: failed, would attempt TOV/PVR prior to discharge, replace Wu if fails.   	Na Goal: Greater than 135    	Wu: y, retention - see above    HEMATOLOGY: no si/sx of bleeding     DVT ppx: LMWH     ID: afebrile in am, no si/sx of infection, blood cx; NGTD, urine and sputum cultures: klebsiella pneumoniae, pt with diarrhea on 11/24, C diff negative, as per ID  7 day ABx course of Zosyn completed for pna    Other: Left wrist cock up splint in place.    DISPOSITION: D/C to [facility] as per PT/OT eval once stable, Pt uninsured, SW aware, medicaid re-instatement pending. Daily PT/OT therapy to optimize therapy and referral to [facility] for possible suman. Anticipate once tolerating TF at goal.       CORE MEASURES:        Admission NIHSS: 3     TPA:  NO      LDL/HDL: 171/57     Depression Screen: 0     Statin Therapy: Y     Dysphagia Screen: FAIL     Smoking  NO      Afib NO     Stroke Education YES

## 2017-12-05 NOTE — CHART NOTE - NSCHARTNOTEFT_GEN_A_CORE
NUTRITION FOLLOW UP NOTE   Pt seen for length of stay.     S/p PEG placement (11/14), patient suctioning up feedings on 11/22, diarrhea X 2 on 11/24: negative for  C.diff.  Abd x ray was negative for ileus.  vomiting TF 11/28  and not tolerating PEG feedings at goal , Abd x ray: negative for ileus, scheduled Reglan as gastric prokinetic agent, pt was on Jevity 1.5.   He was unable to tolerate feeds, changed feedings to VITAL AF 1.2 and again attempted, per RN 12/4 in afternoon patient with high residuals and not tolerating. Plan for G to J revision tomorrow.      Source: Patient [x ]    Family [ ]     other [x ] comprehensive chart review, RN    Diet : Vital 1.2 @ 10mL/hr      Patient reports c/o abdominal pain yesterday.         Enteral /Parenteral Nutrition: Vital 1.2 @ 10mL/hr, NPO after midnight.    Per chart Pt was receiving Vital 1.2 @ 10mL/hr on 12/3. Feeds were held yesterday and today for G to J tube revision but on schedule for tomorrow so RN restarted enteral nutrition.    Dosing Wt 11/8: 89.4kg    Pertinent Medications: MEDICATIONS  (STANDING):  amoxicillin  120 mG/mL/clavulanate Suspension 875 milliGRAM(s) Oral two times a day  aspirin  chewable 81 milliGRAM(s) Oral daily  atorvastatin 80 milliGRAM(s) Oral at bedtime  clopidogrel Tablet 75 milliGRAM(s) Oral daily  doxazosin 2 milliGRAM(s) Oral at bedtime  enoxaparin Injectable 40 milliGRAM(s) SubCutaneous daily  fluticasone propionate 50 MICROgram(s)/spray Nasal Spray 1 Spray(s) Both Nostrils two times a day  gabapentin   Solution 600 milliGRAM(s) Oral three times a day  hydrochlorothiazide 12.5 milliGRAM(s) Oral daily  influenza   Vaccine 0.5 milliLiter(s) IntraMuscular once  losartan 100 milliGRAM(s) Oral daily  metoclopramide 10 milliGRAM(s) Oral every 8 hours  sodium chloride 0.65% Nasal 1 Spray(s) Both Nostrils every 6 hours  sodium chloride 0.9%. 1000 milliLiter(s) (75 mL/Hr) IV Continuous <Continuous>    MEDICATIONS  (PRN):  acetaminophen   Tablet. 650 milliGRAM(s) Oral every 6 hours PRN Mild Pain (1 - 3)  acetaminophen  Suppository 650 milliGRAM(s) Rectal every 6 hours PRN For Temp greater than 38.5 C (101.3 F)  guaiFENesin    Syrup 200 milliGRAM(s) Oral every 6 hours PRN Cough  hydrALAZINE Injectable 10 milliGRAM(s) IV Push two times a day PRN BP > 180  simethicone 80 milliGRAM(s) Chew daily PRN bloating    Pertinent Labs:  12-05 Na141 mmol/L Glu 74 mg/dL K+ 4.3 mmol/L Cr  1.14 mg/dL BUN 15 mg/dL Phos n/a   Alb n/a   PAB n/a         Skin: intact.  no edema noted.    Estimated Needs:   [ x] no change since previous assessment  [ ] recalculated:       Previous Nutrition Diagnosis:     [ ] Inadequate Energy Intake [ ]Inadequate Oral Intake [ ] Excessive Energy Intake     [ ] Underweight [ ] Increased Nutrient Needs [ ] Overweight/Obesity     [ ] Altered GI Function [ ] Unintended Weight Loss [ ] Food & Nutrition Related Knowledge Deficit [ ] Malnutrition [x] swallowing difficulty.         Nutrition Diagnosis is [ x] ongoing  [ ] resolved [ ] not applicable          New Nutrition Diagnosis: [ ] not applicable    [ ] Inadequate Protein Energy Intake [ ]Inadequate Oral Intake [ ] Excessive Energy Intake     [ ] Underweight [ ] Increased Nutrient Needs [ ] Overweight/Obesity     [ ] Altered GI Function [ ] Unintended Weight Loss [ ] Food & Nutrition Related Knowledge Deficit[ ] Limited Adherence to nutrition related recommendations [x ]  Malnutrition  [ ] other: Free text       Related to:      As evidenced by:      Interventions:     Recommend    [ ] Change Diet To:    [ ] Nutrition Supplement    [ ] Nutrition Support    [ ] Other:        Monitoring and Evaluation:     [ ] PO intake [ ] Tolerance to diet prescription [ ] weights [ ] follow up per protocol    [ ] other: NUTRITION FOLLOW UP NOTE   Pt seen for length of stay.     S/p PEG placement (11/14), patient suctioning up feedings on 11/22, diarrhea X 2 on 11/24: negative for  C.diff.  Abd x ray was negative for ileus.  vomiting TF 11/28  and not tolerating PEG feedings at goal , Abd x ray: negative for ileus, scheduled Reglan as gastric prokinetic agent, pt was on Jevity 1.5.   He was unable to tolerate feeds, changed feedings to VITAL AF 1.2 and again attempted, per RN 12/4 in afternoon patient with high residuals and not tolerating. Plan for G to J revision tomorrow.      Source: Patient [x ]    Family [ ]     other [x ] comprehensive chart review, RN    Diet : Vital 1.2 @ 10mL/hr      Patient reports c/o abdominal pain yesterday.         Enteral /Parenteral Nutrition: Vital 1.2 @ 10mL/hr, NPO after midnight.    Per chart Pt was receiving Vital 1.2 @ 10mL/hr on 12/3. Feeds were held yesterday and today for G to J tube revision but on schedule for tomorrow so RN restarted enteral nutrition.    Dosing Wt 11/8: 89.4kg, 12/5: 69.9kg. This clinician zero'd out bed scale and with the assistance of PCA weighted Pt in bed.    Pertinent Medications: MEDICATIONS  (STANDING):  amoxicillin  120 mG/mL/clavulanate Suspension 875 milliGRAM(s) Oral two times a day  aspirin  chewable 81 milliGRAM(s) Oral daily  atorvastatin 80 milliGRAM(s) Oral at bedtime  clopidogrel Tablet 75 milliGRAM(s) Oral daily  doxazosin 2 milliGRAM(s) Oral at bedtime  enoxaparin Injectable 40 milliGRAM(s) SubCutaneous daily  fluticasone propionate 50 MICROgram(s)/spray Nasal Spray 1 Spray(s) Both Nostrils two times a day  gabapentin   Solution 600 milliGRAM(s) Oral three times a day  hydrochlorothiazide 12.5 milliGRAM(s) Oral daily  influenza   Vaccine 0.5 milliLiter(s) IntraMuscular once  losartan 100 milliGRAM(s) Oral daily  metoclopramide 10 milliGRAM(s) Oral every 8 hours  sodium chloride 0.65% Nasal 1 Spray(s) Both Nostrils every 6 hours  sodium chloride 0.9%. 1000 milliLiter(s) (75 mL/Hr) IV Continuous <Continuous>    MEDICATIONS  (PRN):  acetaminophen   Tablet. 650 milliGRAM(s) Oral every 6 hours PRN Mild Pain (1 - 3)  acetaminophen  Suppository 650 milliGRAM(s) Rectal every 6 hours PRN For Temp greater than 38.5 C (101.3 F)  guaiFENesin    Syrup 200 milliGRAM(s) Oral every 6 hours PRN Cough  hydrALAZINE Injectable 10 milliGRAM(s) IV Push two times a day PRN BP > 180  simethicone 80 milliGRAM(s) Chew daily PRN bloating    Pertinent Labs:  12-05 Na141 mmol/L Glu 74 mg/dL K+ 4.3 mmol/L Cr  1.14 mg/dL BUN 15 mg/dL Phos n/a   Alb n/a   PAB n/a         Skin: intact.  no edema noted.    Estimated Needs:   [ x] no change since previous assessment  [ ] recalculated:       Previous Nutrition Diagnosis:     [ ] Inadequate Energy Intake [ ]Inadequate Oral Intake [ ] Excessive Energy Intake     [ ] Underweight [ ] Increased Nutrient Needs [ ] Overweight/Obesity     [ ] Altered GI Function [ ] Unintended Weight Loss [ ] Food & Nutrition Related Knowledge Deficit [ ] Malnutrition [x] swallowing difficulty.         Nutrition Diagnosis is [ x] ongoing  [ ] resolved [ ] not applicable          New Nutrition Diagnosis: [ ] not applicable    [ ] Inadequate Protein Energy Intake [ ]Inadequate Oral Intake [ ] Excessive Energy Intake     [ ] Underweight [ ] Increased Nutrient Needs [ ] Overweight/Obesity     [ ] Altered GI Function [ ] Unintended Weight Loss [ ] Food & Nutrition Related Knowledge Deficit [ ] Limited Adherence to nutrition related recommendations [x ]  Severe Malnutrition  [ ] other: Free text       Related to: altered GI function     As evidenced by: no tolerating tube feeds, significant unintentional Wt loss     Interventions:     Recommend    [ ] Change Diet To:    [ ] Nutrition Supplement    [x ] Nutrition Support: When medically feasible initiate trial of Jevity 1.2 @ 10mL/hr increase by 10mL Q8 hrs until goal of 70mL/hr x 24hrs. Goal provides 2016kcal, 93g protein (28.8kcal/kg, 1.33g protein/kg/actual Wt).    If Pt does not tolerate Jevity can consider switching to Vital AF 1.2    [ x] Other:        Monitoring and Evaluation:     [ ] PO intake [x ] Tolerance to diet prescription [ x] weights [ x] follow up per protocol    RD to remain available for further nutritional interventions as indicated/requested by medical team/pt.   Wojciech Gutierrez, RD, CDN, CDE. Pager: [phone]

## 2017-12-05 NOTE — PROVIDER CONTACT NOTE (OTHER) - NAME OF MD/NP/PA/DO NOTIFIED:
barak neuro resident
[id number] [address]
Cande GALAVIZ
Dr. Hay, Stroke
Jonathan
Stroke team VALENTÍN Bryan
VALENTÍN Willams
Vicky Rumble
stroke team
stroke team, discharge team, nurse manager
MD Emeli

## 2017-12-05 NOTE — PROVIDER CONTACT NOTE (OTHER) - ASSESSMENT
pt A&OX4, BP elevated, all other VSS.
+ BS. No nausea or vomiting. Slight distention, soft nontender.
Pt alert and oriented x4, neurologically and hemodynamically stable.
Pt neurologically and hemodynamically stable. Pt NPO and requesting feeds to be restarted.
Temperature 102.7, BP-196/98, HR-115-120s
VS: temp: 98.6 previously febrile. tachycardic to 110s. BP: 100/66 RR: 22 O2 88% on room air. 93% on 2L nc.
bladder distended, abdomen distended, bladder scan >999ml  +BS, vomited small amount partial digested contents
pt nausea and residual 60cc
tube feeding on hold

## 2017-12-05 NOTE — PROVIDER CONTACT NOTE (OTHER) - RECOMMENDATIONS
md to assess pt
pt to receive medication for BP.
Assess situation, notify RN on plan for J tube.
MD to assess
Notified NP
Provider notified.
See pt. immediately
eval/treat
eval/treat
straight cath  antiemetic requested

## 2017-12-05 NOTE — PROVIDER CONTACT NOTE (OTHER) - REASON
Pt residual PEG feed @ 8am was 100cc. Jevity running @ 20mLs overnight. PEG feed held, when should I restart it?
Spoke to IR and patient is not on the schedule for J tube placement
Temperature 102.7, BP-196/98, HR-115-120s
Wu emptied and urine is dark brown and is frothy
hypoxia, possible aspiration
multiple issues
pt having increased nausea
tube feeding
tube feeding on hold
Pt BP elevated 157/100

## 2017-12-05 NOTE — PROGRESS NOTE ADULT - SUBJECTIVE AND OBJECTIVE BOX
POD/STATUS POST/ENT ISSUE: Blood tinged secretions, maxillary facial pain    INTERVAL HPI: ENT called to evaluate pt for source of purulent blood-tinged secretions and maxillary facial pain. On arrival, pt seen sitting in his chair spitting up thick blood tinged secretions. Pt admits to facial pressure and nasal congestion. Pt denies epistaxis, odynophagia, otalgia, otorrhea, hemoptysis, SOB, fevers/chills.    Vital Signs Last 24 Hrs  T(C): 36.4 (05 Dec 2017 12:19), Max: 36.8 (04 Dec 2017 15:37)  T(F): 97.5 (05 Dec 2017 12:19), Max: 98.3 (04 Dec 2017 15:37)  HR: 67 (05 Dec 2017 12:19) (63 - 67)  BP: 154/88 (05 Dec 2017 12:19) (145/80 - 167/90)  BP(mean): --  RR: 18 (05 Dec 2017 12:19) (18 - 18)  SpO2: 98% (05 Dec 2017 12:19) (96% - 100%)    PHYSICAL EXAM:  Gen: NAD, well-developed, OOB in chair, sitting with bucket with thick blood tinged secretions  Head: Normocephalic, Atraumatic  Eyes: PERRL, EOMI, no scleral injection  Ears: Right - ear canal clear, non-erythematous, non-occluding cerumen, TM intact without effusion            Left - ear canal clear, non-erythematous, mild non-occluding cerumen TM intact without effusion  Nose: Nares bilaterally patent, purulent blood tinged discharge in the right nare  Mouth: Mucosa moist, tongue/uvula midline, oropharynx clear  Neck: Flat, supple, no lymphadenopathy, trachea midline, no masses  Resp: breathing easily, no stridor  CV: no peripheral edema/cyanosis        Head CT 11/7/17: Air-fluid levels in the bilateral maxillary sinuses, correlate for the   presence of acute sinusitis.     FOE/Sinusoscopy/laryngoscopy: Scope #2 Old blood in nasal pharynx into the Oral pharynx.  No foreign body. +pooling of secretions with gross aspiration. No glottic / supraglottic swelling.  Vocal cords mobile in intact b/l. Thick purulent discharge from the right maxillary sinus.  No secretions noted from the left maxillary sinus. POD/STATUS POST/ENT ISSUE: Blood tinged secretions, maxillary facial pain    INTERVAL HPI: ENT called to evaluate pt for source of expectorated purulent blood-tinged secretions and maxillary facial pain. On arrival, pt seen sitting in his chair spitting up thick blood tinged secretions. Pt admits to facial pressure and nasal congestion. Pt denies epistaxis, odynophagia, otalgia, otorrhea, hemoptysis, SOB, fevers/chills.    Vital Signs Last 24 Hrs  T(C): 36.4 (05 Dec 2017 12:19), Max: 36.8 (04 Dec 2017 15:37)  T(F): 97.5 (05 Dec 2017 12:19), Max: 98.3 (04 Dec 2017 15:37)  HR: 67 (05 Dec 2017 12:19) (63 - 67)  BP: 154/88 (05 Dec 2017 12:19) (145/80 - 167/90)  BP(mean): --  RR: 18 (05 Dec 2017 12:19) (18 - 18)  SpO2: 98% (05 Dec 2017 12:19) (96% - 100%)    PHYSICAL EXAM:  Gen: NAD, well-developed, OOB in chair, sitting with bucket with thick blood tinged secretions  Head: Normocephalic, Atraumatic  Eyes: PERRL, EOMI, no scleral injection  Ears: Right - ear canal clear, non-erythematous, non-occluding cerumen, TM intact without effusion            Left - ear canal clear, non-erythematous, mild non-occluding cerumen TM intact without effusion  Nose: Nares bilaterally patent, purulent blood tinged discharge in the right nare  Mouth: Mucosa moist, tongue/uvula midline, oropharynx clear  Neck: Flat, supple, no lymphadenopathy, trachea midline, no masses  Resp: breathing easily, no stridor  CV: no peripheral edema/cyanosis      Head CT 11/7/17: Air-fluid levels in the bilateral maxillary sinuses, correlate for the   presence of acute sinusitis.     FOE/Sinusoscopy/laryngoscopy: Scope #2 Old blood in nasal pharynx into the Oral pharynx.  No foreign body. +pooling of secretions with gross aspiration. No glottic / supraglottic swelling.  Vocal cords mobile in intact b/l. Thick purulent discharge from the right maxillary sinus.  No secretions noted from the left maxillary sinus.

## 2017-12-05 NOTE — PROVIDER CONTACT NOTE (OTHER) - SITUATION
Pt BP elevated, 157/100.
pt w laboredresps very congested suctioning prn, pt unable to handle own secretions
Patient c/o 1)difficulty urinating 2) nausea and vomiting 3) hiccups
Pt is having increased nausea.
Pt residual PEG feed @ 8am was 100cc. Jevity running @ 20mLs overnight. PEG feed held, when should I restart it? Pt seen by dietary, c/o no BM since admission.
Pt. noted in distress upon assessment. hypoxic, frequent cough with large amount of greenish mucus. high possibility of aspiration.
Spoke to IR and patient is not on the schedule for J tube placement
Stroke team with pt. pt tube feeding on hold , pt states nausea decreased, residual 40 cc.
Temperature 102.7, BP-196/98, HR-115-120s
Wu emptied and urine is dark brown and is frothy
pt tube feeding residual 60 cc. and pt c/o nausea stroke team made aware and feeding held

## 2017-12-05 NOTE — PROGRESS NOTE ADULT - PROBLEM SELECTOR PLAN 1
Right-sided maxillary sinusitis  Flonase b/l nares BID  NS 2 sprays b/l QID  Augmentin 875mg 7-10 days

## 2017-12-06 DIAGNOSIS — J32.9 CHRONIC SINUSITIS, UNSPECIFIED: ICD-10-CM

## 2017-12-06 LAB
HCT VFR BLD CALC: 37.8 % — LOW (ref 39–50)
HGB BLD-MCNC: 12.3 G/DL — LOW (ref 13–17)
MCHC RBC-ENTMCNC: 25.2 PG — LOW (ref 27–34)
MCHC RBC-ENTMCNC: 32.5 GM/DL — SIGNIFICANT CHANGE UP (ref 32–36)
MCV RBC AUTO: 77.5 FL — LOW (ref 80–100)
PLATELET # BLD AUTO: 183 K/UL — SIGNIFICANT CHANGE UP (ref 150–400)
RBC # BLD: 4.88 M/UL — SIGNIFICANT CHANGE UP (ref 4.2–5.8)
RBC # FLD: 15.7 % — HIGH (ref 10.3–14.5)
WBC # BLD: 4.24 K/UL — SIGNIFICANT CHANGE UP (ref 3.8–10.5)
WBC # FLD AUTO: 4.24 K/UL — SIGNIFICANT CHANGE UP (ref 3.8–10.5)

## 2017-12-06 PROCEDURE — 99232 SBSQ HOSP IP/OBS MODERATE 35: CPT

## 2017-12-06 PROCEDURE — 99233 SBSQ HOSP IP/OBS HIGH 50: CPT

## 2017-12-06 PROCEDURE — 49446 CHANGE G-TUBE TO G-J PERC: CPT

## 2017-12-06 RX ORDER — ENOXAPARIN SODIUM 100 MG/ML
40 INJECTION SUBCUTANEOUS DAILY
Qty: 0 | Refills: 0 | Status: DISCONTINUED | OUTPATIENT
Start: 2017-12-06 | End: 2017-12-11

## 2017-12-06 RX ADMIN — GABAPENTIN 600 MILLIGRAM(S): 400 CAPSULE ORAL at 23:03

## 2017-12-06 RX ADMIN — SODIUM CHLORIDE 75 MILLILITER(S): 9 INJECTION INTRAMUSCULAR; INTRAVENOUS; SUBCUTANEOUS at 23:03

## 2017-12-06 RX ADMIN — Medication 1 SPRAY(S): at 17:38

## 2017-12-06 RX ADMIN — Medication 1 SPRAY(S): at 23:03

## 2017-12-06 RX ADMIN — ENOXAPARIN SODIUM 40 MILLIGRAM(S): 100 INJECTION SUBCUTANEOUS at 23:03

## 2017-12-06 RX ADMIN — Medication 10 MILLIGRAM(S): at 06:00

## 2017-12-06 RX ADMIN — LOSARTAN POTASSIUM 100 MILLIGRAM(S): 100 TABLET, FILM COATED ORAL at 06:00

## 2017-12-06 RX ADMIN — Medication 875 MILLIGRAM(S): at 06:00

## 2017-12-06 RX ADMIN — SODIUM CHLORIDE 75 MILLILITER(S): 9 INJECTION INTRAMUSCULAR; INTRAVENOUS; SUBCUTANEOUS at 12:34

## 2017-12-06 RX ADMIN — ATORVASTATIN CALCIUM 80 MILLIGRAM(S): 80 TABLET, FILM COATED ORAL at 23:03

## 2017-12-06 RX ADMIN — GABAPENTIN 600 MILLIGRAM(S): 400 CAPSULE ORAL at 17:38

## 2017-12-06 RX ADMIN — Medication 1 SPRAY(S): at 12:48

## 2017-12-06 RX ADMIN — Medication 10 MILLIGRAM(S): at 23:03

## 2017-12-06 RX ADMIN — Medication 12.5 MILLIGRAM(S): at 06:00

## 2017-12-06 RX ADMIN — CLOPIDOGREL BISULFATE 75 MILLIGRAM(S): 75 TABLET, FILM COATED ORAL at 12:33

## 2017-12-06 RX ADMIN — Medication 10 MILLIGRAM(S): at 17:37

## 2017-12-06 RX ADMIN — GABAPENTIN 600 MILLIGRAM(S): 400 CAPSULE ORAL at 05:59

## 2017-12-06 RX ADMIN — Medication 80 MILLIGRAM(S): at 17:39

## 2017-12-06 RX ADMIN — Medication 1 SPRAY(S): at 06:00

## 2017-12-06 RX ADMIN — Medication 10 MILLIGRAM(S): at 12:34

## 2017-12-06 RX ADMIN — Medication 1 SPRAY(S): at 17:40

## 2017-12-06 RX ADMIN — Medication 81 MILLIGRAM(S): at 12:34

## 2017-12-06 RX ADMIN — Medication 2 MILLIGRAM(S): at 23:02

## 2017-12-06 RX ADMIN — SIMETHICONE 80 MILLIGRAM(S): 80 TABLET, CHEWABLE ORAL at 17:37

## 2017-12-06 NOTE — PROGRESS NOTE ADULT - PROBLEM SELECTOR PLAN 1
R maxillary sinusitis   -Appreciate ENT consult  -Epistaxis is source of hemoptysis  -c/w Nasal saline q6, Flonase BID  -Augmentin 875/125mg BID x 10 days for acute sinusitis

## 2017-12-06 NOTE — PROGRESS NOTE ADULT - ASSESSMENT
Patient is a 56 yo [place] male w/ PMH significant for HTN, HLD, CAD s/p CABG, previous CVA w/ residual left hemiparesis, admitted for R sided weakness, HA, diplopia, diagnosed with R lateral medullary and R cerebellar ischemic infarcts per MRI. Patient failed MBS and PEG tube was placed on 11/14 and tube feeding was initiated and pt was having problems with emesis, residuals, constipation-concern for ileus. 11/22 with coughing up feeds, green malordours secretions, hypoxia which resolved. found to have bilat PNA-asp, UTI--kleb,

## 2017-12-06 NOTE — PROGRESS NOTE ADULT - SUBJECTIVE AND OBJECTIVE BOX
f/u pneumonia    interval history/ROS:  ambulating better and gaining strength.  was apparently complaining of facial pain but has none now.  Was having secretions and seen by ENT who noted purulent secretions possible from sinuses.  Noted AF level of maxillary sinus on CT from a month ago.  some cough.  ID is asked to evaluate.  Rest of ROS otherwise negative.    Allergies  No Known Allergies    ANTIMICROBIALS:   piperacillin/tazobactam IVPB. 3.375 every 8 hours (11/22-28)  amoxicillin   80 mG/mL/clavulanate Suspension 875 two times a day (12/6-)    MEDICATIONS  (STANDING):  aspirin  chewable 81 daily  atorvastatin 80 at bedtime  clopidogrel Tablet 75 daily  doxazosin 2 at bedtime  gabapentin   Solution 600 three times a day  hydrochlorothiazide 12.5 daily  influenza   Vaccine 0.5 once  losartan 100 daily  metoclopramide 10 every 8 hours  simethicone 80 daily PRN    Vital Signs Last 24 Hrs  T(F): 97.6 (12-06-17 @ 15:02), Max: 98.1 (12-05-17 @ 19:11)  HR: 76 (12-06-17 @ 15:02)  BP: 146/87 (12-06-17 @ 15:02)  RR: 18 (12-06-17 @ 15:02)  SpO2: 99% (12-06-17 @ 15:02) (94% - 99%)  Wt(kg): --    PHYSICAL EXAM:  General: non-toxic; sitting in chair after ambulating with PT  HEAD/EYES: anicteric  ENT:  supple  Cardiovascular:   S1, S2  Respiratory:  some rhonchi  GI:  soft, non-tender, normal bowel sounds; PJ tube  :  haro removed  Musculoskeletal:  no synovitis  Neurologic:  L weakness  Skin:  no rash  Lymph: no lymphadenopathy  Psychiatric:  appropriate affect  Vascular:  no phlebitis                                 12.3   4.24  )-----------( 183      ( 06 Dec 2017 07:39 )             37.8 12-05    141  |  104  |  15  ----------------------------<  74  4.3   |  24  |  1.14  Ca    8.9      05 Dec 2017 11:32    MICROBIOLOGY:  Clostridium difficile GDH Toxins A&amp;B, EIA:   Negative (11.25.17 @ 00:02)    Culture - Sputum . (11.23.17 @ 01:09)  Moderate Klebsiella pneumoniae  Numerous Haemophilus influenzae  Normal Respiratory Bryanna present    Culture - Sputum . (11.23.17 @ 01:09)    Gram Stain:   Numerous polymorphonuclear leukocytes per low power field  Few Squamous epithelial cells per low power field  Numerous Gram Negative Rods per oil power field  Numerous Gram positive cocci in pairs per oil power field    Specimen Source: .Sputum Sputum    Culture Results:   >100,000 CFU/ml Klebsiella pneumoniae (11.21.17 @ 17:15)    Culture - Blood (11.21.17 @ 16:57)    Specimen Source: .Blood Blood    Culture Results:   No growth to date.    .Urine Catheterized  11-10-17   No growth  --  --    .Sputum Sputum  11-10-17   Streptococcus pneumoniae  Normal Respiratory Bryanna present  --  Streptococcus pneumoniae    .Blood Blood-Peripheral  11-10-17   No growth at 5 days.  --  --    .Urine Clean Catch (Midstream)  11-08-17   No growth  --  --    RADIOLOGY:  Xray Chest 1 View AP -PORTABLE-Routine (12.05.17 @ 09:11)  IMPRESSION:  Clear lungs.    Xray Feeding Tube Check SI (11.29.17 @ 09:56)  IMPRESSION:  Gastrostomy tube within the stomach. No leak.    CT Chest No Cont (11.22.17 @ 12:10)   IMPRESSION: Bilateral lower lobe pneumonia.    MR Head No Cont (11.09.17 @ 10:49)  IMPRESSION:   In comparison the prior MRI, there is new hyperintense T2 and FLAIR signal with faint increased restricted diffusion in the right medulla extending to the inferior right brachium pontis and inferior medial right cerebellar hemisphere, new compared to 6/17/2017 consistent with evolving area of ischemic change without hemorrhagic transformation.  Redemonstration of volume loss with multiple additional areas of lacunar infarction including chronic infarcts in the right cerebellar hemisphere and right corona radiata. Decreased visualization of the flow-void within the right vertebral artery with continued irregular isointense T1 signal throughout the basilar artery. Bilateral sinus disease with bubbly secretions and air-fluid levels, greatest in the right maxillary sinus. Correlate clinically for symptoms of acute sinusitis.

## 2017-12-06 NOTE — PROGRESS NOTE ADULT - SUBJECTIVE AND OBJECTIVE BOX
THE PATIENT WAS SEEN AND EXAMINED BY ME WITH THE HOUSESTAFF AND STROKE TEAM DURING MORNING ROUNDS.   HPI:  58 y/o R-handed [place] man PMH CVA with residual L-sided weakness, CAD s/p CABG, HTN presented as stroke code for dizziness. Pt at baseline ambulates independently and independent in ADLs. Pt was in usual state of health on 11/07 at 9 AM. At around 9:30 AM, he began to complain of lightheadedness not associated with changes in position. Half an hour later, he began to complain of n/v. He also endorses HA, diplopia/blurry vision, R-sided weakness that began a few days prior to admission.  He reports he ran out of his BP meds a week prior to admission and has been unable to refill his medications    SUBJECTIVE: No events overnight.  No new neurologic complaints.      acetaminophen   Tablet. 650 milliGRAM(s) Oral every 6 hours PRN  acetaminophen  Suppository 650 milliGRAM(s) Rectal every 6 hours PRN  amoxicillin   80 mG/mL/clavulanate Suspension 875 milliGRAM(s) Enteral Tube two times a day  aspirin  chewable 81 milliGRAM(s) Oral daily  atorvastatin 80 milliGRAM(s) Oral at bedtime  clopidogrel Tablet 75 milliGRAM(s) Oral daily  doxazosin 2 milliGRAM(s) Oral at bedtime  fluticasone propionate 50 MICROgram(s)/spray Nasal Spray 1 Spray(s) Both Nostrils two times a day  gabapentin   Solution 600 milliGRAM(s) Oral three times a day  guaiFENesin    Syrup 200 milliGRAM(s) Oral every 6 hours PRN  hydrALAZINE Injectable 10 milliGRAM(s) IV Push two times a day PRN  hydrochlorothiazide 12.5 milliGRAM(s) Oral daily  influenza   Vaccine 0.5 milliLiter(s) IntraMuscular once  losartan 100 milliGRAM(s) Oral daily  metoclopramide 10 milliGRAM(s) Oral every 8 hours  simethicone 80 milliGRAM(s) Chew daily PRN  sodium chloride 0.65% Nasal 1 Spray(s) Both Nostrils every 6 hours  sodium chloride 0.9%. 1000 milliLiter(s) IV Continuous <Continuous>      PHYSICAL EXAM:   Vital Signs Last 24 Hrs  T(C): 36.4 (06 Dec 2017 08:17), Max: 36.7 (05 Dec 2017 15:02)  T(F): 97.6 (06 Dec 2017 08:17), Max: 98.1 (05 Dec 2017 19:11)  HR: 65 (06 Dec 2017 08:17) (65 - 74)  BP: 160/80 (06 Dec 2017 08:17) (140/86 - 160/80)  BP(mean): --  RR: 20 (06 Dec 2017 08:17) (16 - 20)  SpO2: 99% (06 Dec 2017 08:17) (95% - 99%)    General: NAD  HEENT: EOM intact, visual fields full   Abdomen: Soft, no erythema, nondistended   Extremities: No edema    NEUROLOGICAL EXAM:  Mental status: Awake, alert, oriented x3, fluent speech, no neglect, able to follow commands, moderate dysarthria   Cranial Nerves: Subtle UMN type left facial droop, EOMI, VFF, no nystagmus, right eye ptosis, hoarseness, slight palatal deviation to the left, no hiccups,   Motor exam: Normal tone, no drift, RUE 5/5, RLE 5/5, drift LUE 4-/5, drift LLE 5-/5, increase rigidity of left wrist  Sensation: sensation equal to light touch  Coordination/ Gait: LUE moderate dysmetria    LABS:                        12.3   4.24  )-----------( 183      ( 06 Dec 2017 07:39 )             37.8    12-05    141  |  104  |  15  ----------------------------<  74  4.3   |  24  |  1.14    Ca    8.9      05 Dec 2017 11:32      Hemoglobin A1C, Whole Blood: 5.8 % (11-08 @ 10:36)      IMAGING: Reviewed by me.     CT Chest No Cont (11.22.17) Bilateral lower lobe pneumonia.  Head CT/CTA head/Neck (11/07) Right basal ganglia and corona radiata infarct without significant change since 6/16/2017. There is severe steno-occlusive disease intracranially involving the right supraclinoid internal carotid artery, A1 and M1 branches, and severe narrowing of the left M1 segment of the middle cerebral artery. The distal right vertebral artery is quite small and the proximal basilar artery is not visualized. The dominant left vertebral artery ends at the PICA. Distal basilar flow appears to be due to retrograde flow from the right posterior communicating artery.  Head CT (11/07) No acute intracranial hemorrhage, mass effect, or evidence of acute territorial infarct. Chronic right corona radiata/basal ganglia and right inferior cerebellar hemisphere infarcts.  Brain MRI (11/09) In comparison the prior MRI, there is new hyperintense T2 and FLAIR signal with faint increased restricted diffusion in the right medulla extending to the inferior right brachium pontis and inferior medial right cerebellar hemisphere, new compared to 6/17/2017 consistent with evolving area of ischemic change without hemorrhagic transformation. Redemonstration of volume loss with multiple additional areas of lacunar infarction including chronic infarcts in the right cerebellar hemisphere and right corona radiata. Decreased visualization of the flow-void within the right vertebral artery with continued irregular isointense T1 signal throughout the basilar artery. Bilateral sinus disease with bubbly secretions and air-fluid levels, greatest in the right maxillary sinus

## 2017-12-06 NOTE — PROGRESS NOTE ADULT - SUBJECTIVE AND OBJECTIVE BOX
Interventional Radiology  Pre-Procedure Note        HPI:   This is a 58 yo [place] Islander male w/ PMH significant for HTN, HLD, CAD s/p CABG, previous CVA w/ residual left hemiparesis, admitted for R sided weakness, HA, diplopia, diagnosed with R lateral medullary and R cerebellar ischemic infarcts per MRI. Patient failed barium swallow and PEG tube was placed on 11/14. On 11/22 pt was refluxing feeds which resolved. Pt found to have bilateral aspirational PNA which is being treated with abx . Pt presents to IR for conversion of gastrostomy to gastrojejunostomy tube per neuro team request due to the pt's high risk of continued aspiration. VSS.  Pt a & o x 3. Informed consent obtained from pt after procedural risks/ benefits/ alternatives discussed at length with pt with his comprehension confirmed. Consent placed in chart.       Last plavix & ASA 12/5 @ 2 pm. Last augmentin 6 am today.    NPO since 12/5 before midnight  Allergies: No Known Allergies    PAST MEDICAL & SURGICAL HISTORY:  Stented coronary artery  CVA (cerebral vascular accident)  HTN (hypertension)  No significant past surgical history        FAMILY HISTORY:  No pertinent family history in first degree relatives      Current Medications: acetaminophen   Tablet. 650 milliGRAM(s) Oral every 6 hours PRN  acetaminophen  Suppository 650 milliGRAM(s) Rectal every 6 hours PRN  amoxicillin   80 mG/mL/clavulanate Suspension 875 milliGRAM(s) Enteral Tube two times a day  aspirin  chewable 81 milliGRAM(s) Oral daily  atorvastatin 80 milliGRAM(s) Oral at bedtime  clopidogrel Tablet 75 milliGRAM(s) Oral daily  doxazosin 2 milliGRAM(s) Oral at bedtime  fluticasone propionate 50 MICROgram(s)/spray Nasal Spray 1 Spray(s) Both Nostrils two times a day  gabapentin   Solution 600 milliGRAM(s) Oral three times a day  guaiFENesin    Syrup 200 milliGRAM(s) Oral every 6 hours PRN  hydrALAZINE Injectable 10 milliGRAM(s) IV Push two times a day PRN  hydrochlorothiazide 12.5 milliGRAM(s) Oral daily  influenza   Vaccine 0.5 milliLiter(s) IntraMuscular once  losartan 100 milliGRAM(s) Oral daily  metoclopramide 10 milliGRAM(s) Oral every 8 hours  simethicone 80 milliGRAM(s) Chew daily PRN  sodium chloride 0.65% Nasal 1 Spray(s) Both Nostrils every 6 hours  sodium chloride 0.9%. 1000 milliLiter(s) IV Continuous <Continuous>      Labs:                         12.3   4.24  )-----------( 183      ( 06 Dec 2017 07:39 )             37.8       12-05    141  |  104  |  15  ----------------------------<  74  4.3   |  24  |  1.14    Ca    8.9      05 Dec 2017 11:32      Assessment/Plan:   This is a 57 year old male with history of CVA with residual left sided hemiparesis & right sided weakness with aspirational PNA & failed barium swallow test with gastrostomy tube placed 11/14  deemed at high risk for continued aspiration.   Patient presents to IR for conversion of G to G-J tube.    Nathaly MONTGOMERY [facility]  ext [id number]  # [phone]

## 2017-12-06 NOTE — PROGRESS NOTE ADULT - ASSESSMENT
57M CAD s/p CABG, HTN, hx CVA with L weakness. Admitted with new right medullary stroke.  Course complicated by urinary retention requiring haro which has been removed, aspiration pneumonia s/p zosyn and now sinusitis.  Agree with 10 days augmentin via PEG/J.  Clinically otherwise stable with no fever and no leukocytosis.    - d/c planning to rehab

## 2017-12-06 NOTE — PROGRESS NOTE ADULT - SUBJECTIVE AND OBJECTIVE BOX
Follow-up Pulm Progress Note    No new respiratory events overnight.  Denies SOB/CP.   Still congested   Hemoptysis resolved  99% on RA    Medications:  MEDICATIONS  (STANDING):  amoxicillin   80 mG/mL/clavulanate Suspension 875 milliGRAM(s) Enteral Tube two times a day  aspirin  chewable 81 milliGRAM(s) Oral daily  atorvastatin 80 milliGRAM(s) Oral at bedtime  clopidogrel Tablet 75 milliGRAM(s) Oral daily  doxazosin 2 milliGRAM(s) Oral at bedtime  fluticasone propionate 50 MICROgram(s)/spray Nasal Spray 1 Spray(s) Both Nostrils two times a day  gabapentin   Solution 600 milliGRAM(s) Oral three times a day  hydrochlorothiazide 12.5 milliGRAM(s) Oral daily  influenza   Vaccine 0.5 milliLiter(s) IntraMuscular once  losartan 100 milliGRAM(s) Oral daily  metoclopramide 10 milliGRAM(s) Oral every 8 hours  sodium chloride 0.65% Nasal 1 Spray(s) Both Nostrils every 6 hours  sodium chloride 0.9%. 1000 milliLiter(s) (75 mL/Hr) IV Continuous <Continuous>    MEDICATIONS  (PRN):  acetaminophen   Tablet. 650 milliGRAM(s) Oral every 6 hours PRN Mild Pain (1 - 3)  acetaminophen  Suppository 650 milliGRAM(s) Rectal every 6 hours PRN For Temp greater than 38.5 C (101.3 F)  guaiFENesin    Syrup 200 milliGRAM(s) Oral every 6 hours PRN Cough  hydrALAZINE Injectable 10 milliGRAM(s) IV Push two times a day PRN BP > 180  simethicone 80 milliGRAM(s) Chew daily PRN bloating          Vital Signs Last 24 Hrs  T(C): 36.4 (06 Dec 2017 08:17), Max: 36.7 (05 Dec 2017 15:02)  T(F): 97.6 (06 Dec 2017 08:17), Max: 98.1 (05 Dec 2017 19:11)  HR: 65 (06 Dec 2017 08:17) (65 - 74)  BP: 160/80 (06 Dec 2017 08:17) (140/86 - 160/80)  BP(mean): --  RR: 20 (06 Dec 2017 08:17) (16 - 20)  SpO2: 99% (06 Dec 2017 08:17) (95% - 99%) on RA            @ 07:01  -   @ 07:00  --------------------------------------------------------  IN: 600 mL / OUT: 1150 mL / NET: -550 mL          LABS:                        12.3   4.24  )-----------( 183      ( 06 Dec 2017 07:39 )             37.8         141  |  104  |  15  ----------------------------<  74  4.3   |  24  |  1.14    Ca    8.9      05 Dec 2017 11:32            CAPILLARY BLOOD GLUCOSE          Urinalysis Basic - ( 05 Dec 2017 22:23 )    Color: Cheri / Appearance: Slightly Turbid / S.021 / pH: x  Gluc: x / Ketone: Small  / Bili: Negative / Urobili: 1.0 mg/dL   Blood: x / Protein: 100 mg/dL / Nitrite: Positive   Leuk Esterase: Moderate / RBC: >720 /HPF / WBC 66 /HPF   Sq Epi: x / Non Sq Epi: 0 /HPF / Bacteria: Occasional          Physical Examination:  PULM: Rhonchi bilaterally   CVS: S1, S2 RRR    RADIOLOGY REVIEWED  CXR: : Clear lungs

## 2017-12-06 NOTE — PROGRESS NOTE ADULT - SUBJECTIVE AND OBJECTIVE BOX
Interventional Radiology Brief- Operative Note    Procedure: Conversion of gastrostomy to gastrojejunostomy    Operators: Juwan    Anesthesia (type): Sedation administered by an Anesth Attg    Contrast: 30cc Omni    EBL: none    Findings/Follow up Plan of Care: Successful conversion of a gastrostomy to a gastrojejunostomy with insertion of a coaxial 9 Fr feeding tube with adapter through the existing gastrostomy. Tip positioned in small bowel at region of ligament of Treitz. Ok to use. Pt tolerated procedure without difficulty. full report to follow.    Specimens Removed: none    Implants: as above    Complications: none    Condition/Disposition: stable / room    Please call Interventional Radiology x [phone] with any questions, concerns, or issues.

## 2017-12-06 NOTE — PROGRESS NOTE ADULT - ASSESSMENT
ASSESSMENT:   56 Y/O man with HTN, HLD, CAD/CABG, past stroke with residual left hemiparesis, presents with transient right hemiparesis and diplopia, continuous headache, nausea and vomiting. CT head shows old right basal ganglia lacune and CTA shows severe bilateral MCA stenosis, proximal basilar/distal vertebrals occlusion and stenosis involving mid to distal basilar artery. MRI brain show right lateral medullary and right cerebellar ischemic infarcts. Of note, he reports to have discontinued his antiplatelet medications before his stroke due to financial constraints.Impression: Cerebral thrombosis/embolism with cerebral infarction. Right PICA distribution stroke involving cerebellum and right lateral medulla - likely etiology being  large vessel disease i.e. symptomatic intracranial atherosclerosis in the setting of medication (antiplatelet therapy) noncompliance.    NEURO: neurologically without acute change- his hiccups are improved, neurologically tolerating normotension, ASA/plavix and statin for secondary stroke prevention, titrate statin to LDL goal less than 70 considering likely atheroembolic etiology of his stroke,  gabapentin to 600mg TID as hiccups have improved with this regimen, Physical therapy/OT eval with recommendations for [facility], pt uninsured and pending re-instatement of medicaid, will provide daily PT/OT to optimize treatment.     ANTITHROMBOTIC THERAPY:  ASA and clopidogrel      PULMONARY:  Chest CT (11/22) bilateral lower lobe pneumonia, completed Zosyn x 7 days, sputum culture: numerous gram positive cocci and negative rods, appreciate pulmonary and ID consult, protecting airway, encourage Incentive spirometer, monitor sats give oxygen as needed to >92% per pulm    CARDIOVASCULAR: TTE: LVEF 41%, mitral annular calcification, mild-moderate MR, global LV systolic dysfunction, mild stage 1 diastolic dysfunction mild TR,  cardiac monitoring no events, cardio consult appreciated             GASTROINTESTINAL: S/p PEG placement (11/14), patient suctioning up feedings on 11/22, diarrhea X 2 on 11/24: negative for  C.diff.  Abd x ray was negative for ileus.  vomiting TF 11/28  and not tolerating PEG feedings at goal , Abd x ray: negative for ileus, scheduled Reglan as gastric prokinetic agent, pt was on Jevity 1.5 (as per dietician)   He was unable to tolerate feeds, changed feedings to VITAL AF 1.2 and again attempted, per RN 12/4 in afternoon patient with high residuals and not tolerating.  S/p IR performed G to J tube.      Diet: TF per IR.     RENAL: continue to provide hydration as tolerated, good urine output, renal US: No hydronephrosis, Wu reinserted for urinary retention. Continue Cardura for BPH, and continue with Wu catheter until patient's status has improved and he is more ambulatory, small amounts of clots noted in urine on 11/22, now resolved, urology consult appreciated, TOV 11/25: failed, would attempt TOV/PVR today as noted with frothy dark urine overnight, replace Wu if fails.    	Na Goal: Greater than 135    	Wu: y, retention - see above    HEMATOLOGY: no si/sx of bleeding     DVT ppx: LMWH     ID: afebrile in am, blood cx; NGTD, urine and sputum cultures: klebsiella pneumoniae, pt with diarrhea on 11/24, C diff negative, as per ID  7 day ABx course of Zosyn completed for pna, UA+ nitrite, LE, bacteria, WBC, reconsult ID for recommendations.    Other: Left wrist cock up splint in place.    DISPOSITION: D/C to [facility] as per PT/OT eval once stable, Pt uninsured, SW aware, medicaid re-instatement pending. Daily PT/OT therapy to optimize therapy and referral to [facility] acute [facility] for possible suman. Anticipate once tolerating TF at goal.       CORE MEASURES:        Admission NIHSS: 3     TPA:  NO      LDL/HDL: 171/57     Depression Screen: 0     Statin Therapy: Y     Dysphagia Screen: FAIL     Smoking  NO      Afib NO     Stroke Education YES

## 2017-12-07 DIAGNOSIS — R06.6 HICCOUGH: ICD-10-CM

## 2017-12-07 LAB
-  AMIKACIN: SIGNIFICANT CHANGE UP
-  AMPICILLIN/SULBACTAM: SIGNIFICANT CHANGE UP
-  AMPICILLIN: SIGNIFICANT CHANGE UP
-  AZTREONAM: SIGNIFICANT CHANGE UP
-  CEFAZOLIN: SIGNIFICANT CHANGE UP
-  CEFEPIME: SIGNIFICANT CHANGE UP
-  CEFOXITIN: SIGNIFICANT CHANGE UP
-  CEFTAZIDIME: SIGNIFICANT CHANGE UP
-  CEFTRIAXONE: SIGNIFICANT CHANGE UP
-  CIPROFLOXACIN: SIGNIFICANT CHANGE UP
-  ERTAPENEM: SIGNIFICANT CHANGE UP
-  GENTAMICIN: SIGNIFICANT CHANGE UP
-  IMIPENEM: SIGNIFICANT CHANGE UP
-  LEVOFLOXACIN: SIGNIFICANT CHANGE UP
-  MEROPENEM: SIGNIFICANT CHANGE UP
-  NITROFURANTOIN: SIGNIFICANT CHANGE UP
-  PIPERACILLIN/TAZOBACTAM: SIGNIFICANT CHANGE UP
-  TOBRAMYCIN: SIGNIFICANT CHANGE UP
-  TRIMETHOPRIM/SULFAMETHOXAZOLE: SIGNIFICANT CHANGE UP
CULTURE RESULTS: SIGNIFICANT CHANGE UP
HCT VFR BLD CALC: 36.3 % — LOW (ref 39–50)
HGB BLD-MCNC: 11.8 G/DL — LOW (ref 13–17)
MCHC RBC-ENTMCNC: 24.7 PG — LOW (ref 27–34)
MCHC RBC-ENTMCNC: 32.5 GM/DL — SIGNIFICANT CHANGE UP (ref 32–36)
MCV RBC AUTO: 76.1 FL — LOW (ref 80–100)
METHOD TYPE: SIGNIFICANT CHANGE UP
ORGANISM # SPEC MICROSCOPIC CNT: SIGNIFICANT CHANGE UP
ORGANISM # SPEC MICROSCOPIC CNT: SIGNIFICANT CHANGE UP
PLATELET # BLD AUTO: 186 K/UL — SIGNIFICANT CHANGE UP (ref 150–400)
RBC # BLD: 4.77 M/UL — SIGNIFICANT CHANGE UP (ref 4.2–5.8)
RBC # FLD: 15.8 % — HIGH (ref 10.3–14.5)
SPECIMEN SOURCE: SIGNIFICANT CHANGE UP
WBC # BLD: 4.42 K/UL — SIGNIFICANT CHANGE UP (ref 3.8–10.5)
WBC # FLD AUTO: 4.42 K/UL — SIGNIFICANT CHANGE UP (ref 3.8–10.5)

## 2017-12-07 PROCEDURE — 93010 ELECTROCARDIOGRAM REPORT: CPT

## 2017-12-07 PROCEDURE — 99231 SBSQ HOSP IP/OBS SF/LOW 25: CPT

## 2017-12-07 RX ORDER — CLOPIDOGREL BISULFATE 75 MG/1
1 TABLET, FILM COATED ORAL
Qty: 0 | Refills: 0 | COMMUNITY
Start: 2017-12-07

## 2017-12-07 RX ORDER — METOCLOPRAMIDE HCL 10 MG
1 TABLET ORAL
Qty: 0 | Refills: 0 | COMMUNITY
Start: 2017-12-07

## 2017-12-07 RX ORDER — ACETAMINOPHEN 500 MG
650 TABLET ORAL EVERY 6 HOURS
Qty: 0 | Refills: 0 | Status: DISCONTINUED | OUTPATIENT
Start: 2017-12-07 | End: 2017-12-11

## 2017-12-07 RX ORDER — ASPIRIN/CALCIUM CARB/MAGNESIUM 324 MG
300 TABLET ORAL DAILY
Qty: 0 | Refills: 0 | Status: DISCONTINUED | OUTPATIENT
Start: 2017-12-07 | End: 2017-12-08

## 2017-12-07 RX ORDER — HYDROCHLOROTHIAZIDE 25 MG
12.5 TABLET ORAL DAILY
Qty: 0 | Refills: 0 | Status: DISCONTINUED | OUTPATIENT
Start: 2017-12-07 | End: 2017-12-11

## 2017-12-07 RX ORDER — FLUTICASONE PROPIONATE 50 MCG
1 SPRAY, SUSPENSION NASAL
Qty: 0 | Refills: 0 | COMMUNITY
Start: 2017-12-07

## 2017-12-07 RX ORDER — DOXAZOSIN MESYLATE 4 MG
1 TABLET ORAL
Qty: 0 | Refills: 0 | COMMUNITY
Start: 2017-12-07

## 2017-12-07 RX ORDER — CLOPIDOGREL BISULFATE 75 MG/1
1 TABLET, FILM COATED ORAL
Qty: 30 | Refills: 0 | OUTPATIENT

## 2017-12-07 RX ORDER — CHLORPROMAZINE HCL 10 MG
25 TABLET ORAL ONCE
Qty: 0 | Refills: 0 | Status: COMPLETED | OUTPATIENT
Start: 2017-12-07 | End: 2017-12-07

## 2017-12-07 RX ORDER — SODIUM CHLORIDE 0.65 %
1 AEROSOL, SPRAY (ML) NASAL
Qty: 0 | Refills: 0 | COMMUNITY
Start: 2017-12-07

## 2017-12-07 RX ORDER — ASPIRIN/CALCIUM CARB/MAGNESIUM 324 MG
1 TABLET ORAL
Qty: 0 | Refills: 0 | COMMUNITY
Start: 2017-12-07

## 2017-12-07 RX ORDER — GABAPENTIN 400 MG/1
12 CAPSULE ORAL
Qty: 0 | Refills: 0 | COMMUNITY
Start: 2017-12-07

## 2017-12-07 RX ORDER — LOSARTAN POTASSIUM 100 MG/1
1 TABLET, FILM COATED ORAL
Qty: 0 | Refills: 0 | COMMUNITY
Start: 2017-12-07

## 2017-12-07 RX ORDER — METOCLOPRAMIDE HCL 10 MG
5 TABLET ORAL EVERY 8 HOURS
Qty: 0 | Refills: 0 | Status: DISCONTINUED | OUTPATIENT
Start: 2017-12-07 | End: 2017-12-11

## 2017-12-07 RX ORDER — METOCLOPRAMIDE HCL 10 MG
5 TABLET ORAL EVERY 8 HOURS
Qty: 0 | Refills: 0 | Status: DISCONTINUED | OUTPATIENT
Start: 2017-12-07 | End: 2017-12-07

## 2017-12-07 RX ORDER — ATORVASTATIN CALCIUM 80 MG/1
1 TABLET, FILM COATED ORAL
Qty: 0 | Refills: 0 | COMMUNITY
Start: 2017-12-07

## 2017-12-07 RX ORDER — SODIUM CHLORIDE 0.65 %
0 AEROSOL, SPRAY (ML) NASAL
Qty: 0 | Refills: 0 | COMMUNITY
Start: 2017-12-07

## 2017-12-07 RX ADMIN — ENOXAPARIN SODIUM 40 MILLIGRAM(S): 100 INJECTION SUBCUTANEOUS at 12:27

## 2017-12-07 RX ADMIN — Medication 1 SPRAY(S): at 18:25

## 2017-12-07 RX ADMIN — CLOPIDOGREL BISULFATE 75 MILLIGRAM(S): 75 TABLET, FILM COATED ORAL at 12:27

## 2017-12-07 RX ADMIN — Medication 12.5 MILLIGRAM(S): at 06:22

## 2017-12-07 RX ADMIN — Medication 2 MILLIGRAM(S): at 22:18

## 2017-12-07 RX ADMIN — Medication 81 MILLIGRAM(S): at 12:27

## 2017-12-07 RX ADMIN — Medication 5 MILLIGRAM(S): at 13:42

## 2017-12-07 RX ADMIN — Medication 5 MILLIGRAM(S): at 23:47

## 2017-12-07 RX ADMIN — Medication 1 SPRAY(S): at 18:24

## 2017-12-07 RX ADMIN — GABAPENTIN 600 MILLIGRAM(S): 400 CAPSULE ORAL at 06:22

## 2017-12-07 RX ADMIN — Medication 10 MILLIGRAM(S): at 06:22

## 2017-12-07 RX ADMIN — Medication 1 SPRAY(S): at 06:22

## 2017-12-07 RX ADMIN — Medication 25 MILLIGRAM(S): at 13:42

## 2017-12-07 RX ADMIN — Medication 1 SPRAY(S): at 13:04

## 2017-12-07 RX ADMIN — GABAPENTIN 600 MILLIGRAM(S): 400 CAPSULE ORAL at 13:42

## 2017-12-07 RX ADMIN — Medication 875 MILLIGRAM(S): at 18:24

## 2017-12-07 RX ADMIN — ATORVASTATIN CALCIUM 80 MILLIGRAM(S): 80 TABLET, FILM COATED ORAL at 22:18

## 2017-12-07 RX ADMIN — Medication 1 SPRAY(S): at 23:48

## 2017-12-07 RX ADMIN — Medication 875 MILLIGRAM(S): at 07:21

## 2017-12-07 RX ADMIN — GABAPENTIN 600 MILLIGRAM(S): 400 CAPSULE ORAL at 22:19

## 2017-12-07 RX ADMIN — LOSARTAN POTASSIUM 100 MILLIGRAM(S): 100 TABLET, FILM COATED ORAL at 06:22

## 2017-12-07 NOTE — PROGRESS NOTE ADULT - SUBJECTIVE AND OBJECTIVE BOX
Follow-up Pulm Progress Note    No new respiratory events overnight.  Denies SOB/CP.   Intractable hiccups   97% on RA  States sputum production is diminished     Medications:  MEDICATIONS  (STANDING):  amoxicillin   80 mG/mL/clavulanate Suspension 875 milliGRAM(s) Enteral Tube two times a day  aspirin  chewable 81 milliGRAM(s) Oral daily  atorvastatin 80 milliGRAM(s) Oral at bedtime  chlorproMAZINE    Tablet 25 milliGRAM(s) Oral once  clopidogrel Tablet 75 milliGRAM(s) Oral daily  doxazosin 2 milliGRAM(s) Oral at bedtime  enoxaparin Injectable 40 milliGRAM(s) SubCutaneous daily  fluticasone propionate 50 MICROgram(s)/spray Nasal Spray 1 Spray(s) Both Nostrils two times a day  gabapentin   Solution 600 milliGRAM(s) Oral three times a day  hydrochlorothiazide 12.5 milliGRAM(s) Oral daily  influenza   Vaccine 0.5 milliLiter(s) IntraMuscular once  losartan 100 milliGRAM(s) Oral daily  metoclopramide 10 milliGRAM(s) Oral every 8 hours  sodium chloride 0.65% Nasal 1 Spray(s) Both Nostrils every 6 hours  sodium chloride 0.9%. 1000 milliLiter(s) (75 mL/Hr) IV Continuous <Continuous>    MEDICATIONS  (PRN):  acetaminophen   Tablet. 650 milliGRAM(s) Oral every 6 hours PRN Mild Pain (1 - 3)  acetaminophen  Suppository 650 milliGRAM(s) Rectal every 6 hours PRN For Temp greater than 38.5 C (101.3 F)  guaiFENesin    Syrup 200 milliGRAM(s) Oral every 6 hours PRN Cough  hydrALAZINE Injectable 10 milliGRAM(s) IV Push two times a day PRN BP > 180          Vital Signs Last 24 Hrs  T(C): 36.8 (07 Dec 2017 07:46), Max: 36.8 (07 Dec 2017 07:46)  T(F): 98.3 (07 Dec 2017 07:46), Max: 98.3 (07 Dec 2017 07:46)  HR: 68 (07 Dec 2017 07:46) (66 - 85)  BP: 130/62 (07 Dec 2017 07:46) (130/62 - 176/92)  BP(mean): --  RR: 20 (07 Dec 2017 07:46) (18 - 20)  SpO2: 95% (07 Dec 2017 07:46) (94% - 100%) on RA           @ 07:01  -   @ 07:00  --------------------------------------------------------  IN: 1375 mL / OUT: 1350 mL / NET: 25 mL          LABS:                        11.8   4.42  )-----------( 186      ( 07 Dec 2017 07:30 )             36.3                 CAPILLARY BLOOD GLUCOSE          Urinalysis Basic - ( 05 Dec 2017 22:23 )    Color: Cheri / Appearance: Slightly Turbid / S.021 / pH: x  Gluc: x / Ketone: Small  / Bili: Negative / Urobili: 1.0 mg/dL   Blood: x / Protein: 100 mg/dL / Nitrite: Positive   Leuk Esterase: Moderate / RBC: >720 /HPF / WBC 66 /HPF   Sq Epi: x / Non Sq Epi: 0 /HPF / Bacteria: Occasional                      CULTURES: (if applicable)  Culture Results:   >100,000 CFU/ml Klebsiella pneumoniae ( @ 23:11)    Most recent blood culture --  @ 23:11   -- -- .Urine Kidney  @ 23:11        Physical Examination:  PULM: Trace rhonchi bilaterally   CVS: S1, S2 heard    RADIOLOGY REVIEWED  CXR: : Clear lungs

## 2017-12-07 NOTE — PROGRESS NOTE ADULT - ASSESSMENT
ASSESSMENT:   58 Y/O man with HTN, HLD, CAD/CABG, past stroke with residual left hemiparesis, presents with transient right hemiparesis and diplopia, continuous headache, nausea and vomiting. CT head shows old right basal ganglia lacune and CTA shows severe bilateral MCA stenosis, proximal basilar/distal vertebrals occlusion and stenosis involving mid to distal basilar artery. MRI brain show right lateral medullary and right cerebellar ischemic infarcts. Of note, he reports to have discontinued his antiplatelet medications before his stroke due to financial constraints.Impression: Cerebral thrombosis/embolism with cerebral infarction. Right PICA distribution stroke involving cerebellum and right lateral medulla - likely etiology being  large vessel disease i.e. symptomatic intracranial atherosclerosis in the setting of medication (antiplatelet therapy) noncompliance.    NEURO: neurologically without acute change, neurologically tolerating normotension, ASA/plavix and statin for secondary stroke prevention, titrate statin to LDL goal less than 70 considering likely atheroembolic etiology of his stroke,  gabapentin to 600mg TID as hiccups have improved with this regimen, Physical therapy/OT eval with recommendations for AR,   provide daily PT/OT to optimize treatment.     ANTITHROMBOTIC THERAPY:  ASA and clopidogrel      PULMONARY:  Chest CT (11/22) bilateral lower lobe pneumonia, completed Zosyn x 7 days, sputum culture: numerous gram positive cocci and negative rods, appreciate pulmonary and ID consult, protecting airway, encourage Incentive spirometer, monitor sats give oxygen as needed to >92% per pulm    CARDIOVASCULAR: TTE: LVEF 41%, mitral annular calcification, mild-moderate MR, global LV systolic dysfunction, mild stage 1 diastolic dysfunction mild TR,  cardiac monitoring no events, cardio consult appreciated             GASTROINTESTINAL: S/p PEG placement (11/14)  and not tolerating PEG feedings at goal , Abd x ray: negative for ileus, scheduled Reglan as gastric prokinetic agent, pt was on Jevity 1.5 (as per dietician)   He was unable to tolerate feeds, changed feedings to VITAL AF 1.2 and again attempted, per RN 12/4 in afternoon patient with high residuals and not tolerating.  S/p IR performed G to J tube.  Denies nausea/vomiting, appears to be tolerating.      Diet: TF per IR.     RENAL: continue to provide hydration as tolerated, good urine output, renal US: No hydronephrosis, Wu reinserted for urinary retention. Continue Cardura for BPH, urology consult appreciated, TOV 11/25: failed,  attempted TOV/PVR tod12/6/17, per patient urinating without difficulty.    	Na Goal: Greater than 135  n     HEMATOLOGY: no si/sx of bleeding     DVT ppx: LMWH     ID: afebrile in am, blood cx; NGTD, urine and sputum cultures: klebsiella pneumoniae, pt with diarrhea on 11/24, C diff negative, as per ID  7 day ABx course of Zosyn completed for pna, UA+ nitrite, LE, bacteria, WBC, reconsulted ID for recommendations- recommended to continue augmentin.     Other: Left wrist cock up splint in place.    DISPOSITION: D/C to AR as per PT/OT eval once tolerating TF at goal, SW aware, Daily PT/OT therapy to optimize therapy and referral to [facility] for possible suman. Anticipate once tolerating TF at goal.       CORE MEASURES:        Admission NIHSS: 3     TPA:  NO      LDL/HDL: 171/57     Depression Screen: 0     Statin Therapy: Y     Dysphagia Screen: FAIL     Smoking  NO      Afib NO     Stroke Education YES

## 2017-12-07 NOTE — PROGRESS NOTE ADULT - SUBJECTIVE AND OBJECTIVE BOX
Interventional Radiology Follow- Up Note    This is a 56 y/o male with PMH of HTN, HLD, CAD s/p CABG, previous CVA w/ residual left hemiparesis who was admitted on 11/7/17 with right sided weakness and found to have R lateral medullary and R cerebellar ischemic infarcts (per MRI). During hospital course pt underwent PEG placement on 11/14 2/2 aspiration (failed barium swallow). Pt is currently s/p conversion of gastrostomy to gastrojejunostomy in IR onn 12/6/17 with Dr. Echeverria.    Pt seen and examined at bedside. Offers no complaints at this time.     PAST MEDICAL & SURGICAL HISTORY:  Stented coronary artery  CVA (cerebral vascular accident)  HTN (hypertension)  No significant past surgical history      Allergies: No Known Allergies      LABS:                        11.8   4.42  )-----------( 186      ( 07 Dec 2017 07:30 )             36.3     12-05    141  |  104  |  15  ----------------------------<  74  4.3   |  24  |  1.14    Ca    8.9      05 Dec 2017 11:32        I&O's Detail    06 Dec 2017 07:01  -  07 Dec 2017 07:00  --------------------------------------------------------  IN:    Free Water: 200 mL    Jevity: 345 mL    sodium chloride 0.9%.: 830 mL  Total IN: 1375 mL    OUT:    Indwelling Catheter - Urethral: 700 mL    Voided: 650 mL  Total OUT: 1350 mL    Total NET: 25 mL      Vitals: T(F): 98.3 (12-07-17 @ 07:46), Max: 98.3 (12-07-17 @ 07:46)  HR: 68 (12-07-17 @ 07:46) (66 - 85)  BP: 130/62 (12-07-17 @ 07:46) (130/62 - 176/92)  RR: 20 (12-07-17 @ 07:46) (18 - 20)  SpO2: 95% (12-07-17 @ 07:46) (94% - 100%)    PHYSICAL EXAM:  Gen: NAD, A&Ox3  Abd: G-J tube intact with retention disc flush against skin. No leakage at site noted. no erythema or signs of infection noted. Feeds currently in progress.    A/P: 57y Male s/p conversion of gastrostomy to gastrojejunostomy in IR on 12/6/17 with Dr. Echeverria.  -Continue feeds, as stated in order please flush tube with 10cc NS before and after each individual use and every 8hrs with continuous feeds. DO NOT put crushed meds in feeding tube, liquid meds only  -Keep retention disc flush against skin  -trend vitals, labs  -Continue global care per primary team  -will discuss with IR attending     Please call IR at extension [phone] with any questions, concerns, or issues regarding above.

## 2017-12-07 NOTE — PROGRESS NOTE ADULT - SUBJECTIVE AND OBJECTIVE BOX
f/u pneumonia    interval history/ROS:  hiccups.  feels well otherwise.  less drainage for posterior nasal pharynx.  Rest of ROS otherwise negative.    Allergies  No Known Allergies    ANTIMICROBIALS:   piperacillin/tazobactam IVPB. 3.375 every 8 hours (11/22-28)  amoxicillin   80 mG/mL/clavulanate Suspension 875 two times a day (12/6-)    MEDICATIONS  (STANDING):  aspirin  chewable 81 daily  atorvastatin 80 at bedtime  clopidogrel Tablet 75 daily  doxazosin 2 at bedtime  enoxaparin Injectable 40 daily  gabapentin   Solution 600 three times a day  hydrochlorothiazide 12.5 daily  influenza   Vaccine 0.5 once  losartan 100 daily  metoclopramide 5 every 8 hours    Vital Signs Last 24 Hrs  T(F): 97.9 (12-07-17 @ 12:38), Max: 98.3 (12-07-17 @ 07:46)  HR: 60 (12-07-17 @ 12:38)  BP: 130/80 (12-07-17 @ 12:38)  RR: 10 (12-07-17 @ 12:38)  SpO2: 97% (12-07-17 @ 12:38) (95% - 100%)  Wt(kg): --    PHYSICAL EXAM:  General: non-toxic; sitting up in bed  HEAD/EYES: anicteric  ENT:  supple  Cardiovascular:   S1, S2  Respiratory:  some rhonchi  GI:  soft, non-tender, normal bowel sounds; PJ tube  :  haro removed  Musculoskeletal:  no synovitis  Neurologic:  L weakness  Skin:  no rash  Lymph: no lymphadenopathy  Psychiatric:  appropriate affect  Vascular:  no phlebitis                                           11.8   4.42  )-----------( 186      ( 07 Dec 2017 07:30 )             36.3     MICROBIOLOGY:  Clostridium difficile [facility] Toxins A&amp;B, EIA:   Negative (11.25.17 @ 00:02)    Culture - Sputum . (11.23.17 @ 01:09)  Moderate Klebsiella pneumoniae  Numerous Haemophilus influenzae  Normal Respiratory Bryanna present    Culture - Sputum . (11.23.17 @ 01:09)    Gram Stain:   Numerous polymorphonuclear leukocytes per low power field  Few Squamous epithelial cells per low power field  Numerous Gram Negative Rods per oil power field  Numerous Gram positive cocci in pairs per oil power field    Specimen Source: .Sputum Sputum    Culture Results:   >100,000 CFU/ml Klebsiella pneumoniae (11.21.17 @ 17:15)    Culture - Blood (11.21.17 @ 16:57)    Specimen Source: .Blood Blood    Culture Results:   No growth to date.    .Urine Catheterized  11-10-17   No growth  --  --    .Sputum Sputum  11-10-17   Streptococcus pneumoniae  Normal Respiratory Bryanna present  --  Streptococcus pneumoniae    .Blood Blood-Peripheral  11-10-17   No growth at 5 days.  --  --    .Urine Clean Catch (Midstream)  11-08-17   No growth  --  --    RADIOLOGY:  Xray Chest 1 View AP -PORTABLE-Routine (12.05.17 @ 09:11)  IMPRESSION:  Clear lungs.    Xray Feeding Tube Check SI (11.29.17 @ 09:56)  IMPRESSION:  Gastrostomy tube within the stomach. No leak.    CT Chest No Cont (11.22.17 @ 12:10)   IMPRESSION: Bilateral lower lobe pneumonia.    MR Head No Cont (11.09.17 @ 10:49)  IMPRESSION:   In comparison the prior MRI, there is new hyperintense T2 and FLAIR signal with faint increased restricted diffusion in the right medulla extending to the inferior right brachium pontis and inferior medial right cerebellar hemisphere, new compared to 6/17/2017 consistent with evolving area of ischemic change without hemorrhagic transformation.  Redemonstration of volume loss with multiple additional areas of lacunar infarction including chronic infarcts in the right cerebellar hemisphere and right corona radiata. Decreased visualization of the flow-void within the right vertebral artery with continued irregular isointense T1 signal throughout the basilar artery. Bilateral sinus disease with bubbly secretions and air-fluid levels, greatest in the right maxillary sinus. Correlate clinically for symptoms of acute sinusitis.

## 2017-12-07 NOTE — PROGRESS NOTE ADULT - ASSESSMENT
Patient is a 58 yo [place] male w/ PMH significant for HTN, HLD, CAD s/p CABG, previous CVA w/ residual left hemiparesis, admitted for R sided weakness, HA, diplopia, diagnosed with R lateral medullary and R cerebellar ischemic infarcts per MRI. Patient failed MBS and PEG tube was placed on 11/14 and tube feeding was initiated and pt was having problems with emesis, residuals, constipation-concern for ileus. 11/22 with coughing up feeds, green malordours secretions, hypoxia which resolved. found to have bilat PNA-asp, UTI--kleb,

## 2017-12-07 NOTE — PROGRESS NOTE ADULT - SUBJECTIVE AND OBJECTIVE BOX
THE PATIENT WAS SEEN AND EXAMINED BY ME WITH THE HOUSESTAFF AND STROKE TEAM DURING MORNING ROUNDS.   HPI:  58 y/o R-handed [place] man PMH CVA with residual L-sided weakness, CAD s/p CABG, HTN presented as stroke code for dizziness. Pt at baseline ambulates independently and independent in ADLs. Pt was in usual state of health on 11/07 at 9 AM. At around 9:30 AM, he began to complain of lightheadedness not associated with changes in position. Half an hour later, he began to complain of n/v. He also endorses HA, diplopia/blurry vision, R-sided weakness that began a few days prior to admission.  He reports he ran out of his BP meds a week prior to admission and has been unable to refill his medications      SUBJECTIVE: No events overnight.  No new neurologic complaints.      acetaminophen   Tablet. 650 milliGRAM(s) Oral every 6 hours PRN  acetaminophen  Suppository 650 milliGRAM(s) Rectal every 6 hours PRN  amoxicillin   80 mG/mL/clavulanate Suspension 875 milliGRAM(s) Enteral Tube two times a day  aspirin  chewable 81 milliGRAM(s) Oral daily  atorvastatin 80 milliGRAM(s) Oral at bedtime  clopidogrel Tablet 75 milliGRAM(s) Oral daily  doxazosin 2 milliGRAM(s) Oral at bedtime  enoxaparin Injectable 40 milliGRAM(s) SubCutaneous daily  fluticasone propionate 50 MICROgram(s)/spray Nasal Spray 1 Spray(s) Both Nostrils two times a day  gabapentin   Solution 600 milliGRAM(s) Oral three times a day  guaiFENesin    Syrup 200 milliGRAM(s) Oral every 6 hours PRN  hydrALAZINE Injectable 10 milliGRAM(s) IV Push two times a day PRN  hydrochlorothiazide 12.5 milliGRAM(s) Oral daily  influenza   Vaccine 0.5 milliLiter(s) IntraMuscular once  losartan 100 milliGRAM(s) Oral daily  metoclopramide 10 milliGRAM(s) Oral every 8 hours  sodium chloride 0.65% Nasal 1 Spray(s) Both Nostrils every 6 hours  sodium chloride 0.9%. 1000 milliLiter(s) IV Continuous <Continuous>      PHYSICAL EXAM:   Vital Signs Last 24 Hrs  T(C): 36.8 (07 Dec 2017 07:46), Max: 36.8 (07 Dec 2017 07:46)  T(F): 98.3 (07 Dec 2017 07:46), Max: 98.3 (07 Dec 2017 07:46)  HR: 68 (07 Dec 2017 07:46) (66 - 85)  BP: 130/62 (07 Dec 2017 07:46) (130/62 - 176/92)  BP(mean): --  RR: 20 (07 Dec 2017 07:46) (18 - 20)  SpO2: 95% (07 Dec 2017 07:46) (94% - 100%)    General: NAD  HEENT: EOM intact, visual fields full   Abdomen: Soft, no erythema, nondistended   Extremities: No edema    NEUROLOGICAL EXAM:  Mental status: Awake, alert, oriented x3, fluent speech, no neglect, able to follow commands, moderate dysarthria   Cranial Nerves: Subtle UMN type left facial droop, EOMI, VFF, no nystagmus, right eye ptosis, hoarseness, slight palatal deviation to the left, no hiccups,   Motor exam: Normal tone, no drift, RUE 5/5, RLE 5/5, drift LUE 4-/5, drift LLE 5-/5, increase rigidity of left wrist  Sensation: sensation equal to light touch  Coordination/ Gait: LUE moderate dysmetria    LABS:                        11.8   4.42  )-----------( 186      ( 07 Dec 2017 07:30 )             36.3    12-05    141  |  104  |  15  ----------------------------<  74  4.3   |  24  |  1.14    Ca    8.9      05 Dec 2017 11:32      Hemoglobin A1C, Whole Blood: 5.8 % (11-08 @ 10:36)      IMAGING: Reviewed by me.   CT Chest No Cont (11.22.17) Bilateral lower lobe pneumonia.  Head CT/CTA head/Neck (11/07) Right basal ganglia and corona radiata infarct without significant change since 6/16/2017. There is severe steno-occlusive disease intracranially involving the right supraclinoid internal carotid artery, A1 and M1 branches, and severe narrowing of the left M1 segment of the middle cerebral artery. The distal right vertebral artery is quite small and the proximal basilar artery is not visualized. The dominant left vertebral artery ends at the PICA. Distal basilar flow appears to be due to retrograde flow from the right posterior communicating artery.  Head CT (11/07) No acute intracranial hemorrhage, mass effect, or evidence of acute territorial infarct. Chronic right corona radiata/basal ganglia and right inferior cerebellar hemisphere infarcts.  Brain MRI (11/09) In comparison the prior MRI, there is new hyperintense T2 and FLAIR signal with faint increased restricted diffusion in the right medulla extending to the inferior right brachium pontis and inferior medial right cerebellar hemisphere, new compared to 6/17/2017 consistent with evolving area of ischemic change without hemorrhagic transformation. Redemonstration of volume loss with multiple additional areas of lacunar infarction including chronic infarcts in the right cerebellar hemisphere and right corona radiata. Decreased visualization of the flow-void within the right vertebral artery with continued irregular isointense T1 signal throughout the basilar artery. Bilateral sinus disease with bubbly secretions and air-fluid levels, greatest in the right maxillary sinus THE PATIENT WAS SEEN AND EXAMINED BY ME WITH THE HOUSESTAFF AND STROKE TEAM DURING MORNING ROUNDS.   HPI:  56 y/o R-handed [place] man PMH CVA with residual L-sided weakness, CAD s/p CABG, HTN presented as stroke code for dizziness. Pt at baseline ambulates independently and independent in ADLs. Pt was in usual state of health on 11/07 at 9 AM. At around 9:30 AM, he began to complain of lightheadedness not associated with changes in position. Half an hour later, he began to complain of n/v. He also endorses HA, diplopia/blurry vision, R-sided weakness that began a few days prior to admission.  He reports he ran out of his BP meds a week prior to admission and has been unable to refill his medications      SUBJECTIVE: s/p J tube conversion 12/6/17.    acetaminophen   Tablet. 650 milliGRAM(s) Oral every 6 hours PRN  acetaminophen  Suppository 650 milliGRAM(s) Rectal every 6 hours PRN  amoxicillin   80 mG/mL/clavulanate Suspension 875 milliGRAM(s) Enteral Tube two times a day  aspirin  chewable 81 milliGRAM(s) Oral daily  atorvastatin 80 milliGRAM(s) Oral at bedtime  clopidogrel Tablet 75 milliGRAM(s) Oral daily  doxazosin 2 milliGRAM(s) Oral at bedtime  enoxaparin Injectable 40 milliGRAM(s) SubCutaneous daily  fluticasone propionate 50 MICROgram(s)/spray Nasal Spray 1 Spray(s) Both Nostrils two times a day  gabapentin   Solution 600 milliGRAM(s) Oral three times a day  guaiFENesin    Syrup 200 milliGRAM(s) Oral every 6 hours PRN  hydrALAZINE Injectable 10 milliGRAM(s) IV Push two times a day PRN  hydrochlorothiazide 12.5 milliGRAM(s) Oral daily  influenza   Vaccine 0.5 milliLiter(s) IntraMuscular once  losartan 100 milliGRAM(s) Oral daily  metoclopramide 10 milliGRAM(s) Oral every 8 hours  sodium chloride 0.65% Nasal 1 Spray(s) Both Nostrils every 6 hours  sodium chloride 0.9%. 1000 milliLiter(s) IV Continuous <Continuous>      PHYSICAL EXAM:   Vital Signs Last 24 Hrs  T(C): 36.8 (07 Dec 2017 07:46), Max: 36.8 (07 Dec 2017 07:46)  T(F): 98.3 (07 Dec 2017 07:46), Max: 98.3 (07 Dec 2017 07:46)  HR: 68 (07 Dec 2017 07:46) (66 - 85)  BP: 130/62 (07 Dec 2017 07:46) (130/62 - 176/92)  BP(mean): --  RR: 20 (07 Dec 2017 07:46) (18 - 20)  SpO2: 95% (07 Dec 2017 07:46) (94% - 100%)    General: NAD  HEENT: EOM intact, visual fields full   Abdomen: Soft, no erythema, nondistended   Extremities: No edema    NEUROLOGICAL EXAM:  Mental status: Awake, alert, oriented x3, fluent speech, no neglect, able to follow commands, moderate dysarthria   Cranial Nerves: Subtle UMN type left facial droop, EOMI, VFF, no nystagmus, right eye ptosis, hoarseness, slight palatal deviation to the left, no hiccups,   Motor exam: Normal tone, no drift, RUE 5/5, RLE 5/5, drift LUE 4-/5, drift LLE 5-/5, increase rigidity of left wrist  Sensation: sensation equal to light touch  Coordination/ Gait: LUE mild dysmetria    LABS:                        11.8   4.42  )-----------( 186      ( 07 Dec 2017 07:30 )             36.3    12-05    141  |  104  |  15  ----------------------------<  74  4.3   |  24  |  1.14    Ca    8.9      05 Dec 2017 11:32      Hemoglobin A1C, Whole Blood: 5.8 % (11-08 @ 10:36)      IMAGING: Reviewed by me.   CT Chest No Cont (11.22.17) Bilateral lower lobe pneumonia.  Head CT/CTA head/Neck (11/07) Right basal ganglia and corona radiata infarct without significant change since 6/16/2017. There is severe steno-occlusive disease intracranially involving the right supraclinoid internal carotid artery, A1 and M1 branches, and severe narrowing of the left M1 segment of the middle cerebral artery. The distal right vertebral artery is quite small and the proximal basilar artery is not visualized. The dominant left vertebral artery ends at the PICA. Distal basilar flow appears to be due to retrograde flow from the right posterior communicating artery.  Head CT (11/07) No acute intracranial hemorrhage, mass effect, or evidence of acute territorial infarct. Chronic right corona radiata/basal ganglia and right inferior cerebellar hemisphere infarcts.  Brain MRI (11/09) In comparison the prior MRI, there is new hyperintense T2 and FLAIR signal with faint increased restricted diffusion in the right medulla extending to the inferior right brachium pontis and inferior medial right cerebellar hemisphere, new compared to 6/17/2017 consistent with evolving area of ischemic change without hemorrhagic transformation. Redemonstration of volume loss with multiple additional areas of lacunar infarction including chronic infarcts in the right cerebellar hemisphere and right corona radiata. Decreased visualization of the flow-void within the right vertebral artery with continued irregular isointense T1 signal throughout the basilar artery. Bilateral sinus disease with bubbly secretions and air-fluid levels, greatest in the right maxillary sinus

## 2017-12-08 LAB
ANION GAP SERPL CALC-SCNC: 14 MMOL/L — SIGNIFICANT CHANGE UP (ref 5–17)
BUN SERPL-MCNC: 10 MG/DL — SIGNIFICANT CHANGE UP (ref 7–23)
CALCIUM SERPL-MCNC: 8.5 MG/DL — SIGNIFICANT CHANGE UP (ref 8.4–10.5)
CHLORIDE SERPL-SCNC: 99 MMOL/L — SIGNIFICANT CHANGE UP (ref 96–108)
CO2 SERPL-SCNC: 25 MMOL/L — SIGNIFICANT CHANGE UP (ref 22–31)
CREAT SERPL-MCNC: 0.9 MG/DL — SIGNIFICANT CHANGE UP (ref 0.5–1.3)
GLUCOSE SERPL-MCNC: 154 MG/DL — HIGH (ref 70–99)
HCT VFR BLD CALC: 35.5 % — LOW (ref 39–50)
HGB BLD-MCNC: 11.9 G/DL — LOW (ref 13–17)
MCHC RBC-ENTMCNC: 25.3 PG — LOW (ref 27–34)
MCHC RBC-ENTMCNC: 33.5 GM/DL — SIGNIFICANT CHANGE UP (ref 32–36)
MCV RBC AUTO: 75.4 FL — LOW (ref 80–100)
PLATELET # BLD AUTO: 167 K/UL — SIGNIFICANT CHANGE UP (ref 150–400)
POTASSIUM SERPL-MCNC: 3.2 MMOL/L — LOW (ref 3.5–5.3)
POTASSIUM SERPL-SCNC: 3.2 MMOL/L — LOW (ref 3.5–5.3)
RBC # BLD: 4.71 M/UL — SIGNIFICANT CHANGE UP (ref 4.2–5.8)
RBC # FLD: 15.7 % — HIGH (ref 10.3–14.5)
SODIUM SERPL-SCNC: 138 MMOL/L — SIGNIFICANT CHANGE UP (ref 135–145)
WBC # BLD: 4.13 K/UL — SIGNIFICANT CHANGE UP (ref 3.8–10.5)
WBC # FLD AUTO: 4.13 K/UL — SIGNIFICANT CHANGE UP (ref 3.8–10.5)

## 2017-12-08 PROCEDURE — 99233 SBSQ HOSP IP/OBS HIGH 50: CPT

## 2017-12-08 PROCEDURE — 99231 SBSQ HOSP IP/OBS SF/LOW 25: CPT

## 2017-12-08 RX ORDER — ASPIRIN/CALCIUM CARB/MAGNESIUM 324 MG
81 TABLET ORAL DAILY
Qty: 0 | Refills: 0 | Status: DISCONTINUED | OUTPATIENT
Start: 2017-12-08 | End: 2017-12-11

## 2017-12-08 RX ORDER — POTASSIUM CHLORIDE 20 MEQ
40 PACKET (EA) ORAL ONCE
Qty: 0 | Refills: 0 | Status: COMPLETED | OUTPATIENT
Start: 2017-12-08 | End: 2017-12-08

## 2017-12-08 RX ADMIN — Medication 1 SPRAY(S): at 13:28

## 2017-12-08 RX ADMIN — ENOXAPARIN SODIUM 40 MILLIGRAM(S): 100 INJECTION SUBCUTANEOUS at 13:28

## 2017-12-08 RX ADMIN — LOSARTAN POTASSIUM 100 MILLIGRAM(S): 100 TABLET, FILM COATED ORAL at 06:00

## 2017-12-08 RX ADMIN — Medication 1 SPRAY(S): at 06:01

## 2017-12-08 RX ADMIN — Medication 5 MILLIGRAM(S): at 06:00

## 2017-12-08 RX ADMIN — Medication 875 MILLIGRAM(S): at 21:45

## 2017-12-08 RX ADMIN — Medication 81 MILLIGRAM(S): at 13:33

## 2017-12-08 RX ADMIN — Medication 1 SPRAY(S): at 18:10

## 2017-12-08 RX ADMIN — GABAPENTIN 600 MILLIGRAM(S): 400 CAPSULE ORAL at 06:01

## 2017-12-08 RX ADMIN — SODIUM CHLORIDE 75 MILLILITER(S): 9 INJECTION INTRAMUSCULAR; INTRAVENOUS; SUBCUTANEOUS at 06:01

## 2017-12-08 RX ADMIN — Medication 12.5 MILLIGRAM(S): at 06:00

## 2017-12-08 RX ADMIN — GABAPENTIN 600 MILLIGRAM(S): 400 CAPSULE ORAL at 21:32

## 2017-12-08 RX ADMIN — Medication 2 MILLIGRAM(S): at 21:32

## 2017-12-08 RX ADMIN — GABAPENTIN 600 MILLIGRAM(S): 400 CAPSULE ORAL at 13:31

## 2017-12-08 RX ADMIN — Medication 875 MILLIGRAM(S): at 06:01

## 2017-12-08 RX ADMIN — Medication 5 MILLIGRAM(S): at 13:30

## 2017-12-08 RX ADMIN — Medication 40 MILLIEQUIVALENT(S): at 10:00

## 2017-12-08 RX ADMIN — Medication 40 MILLIEQUIVALENT(S): at 13:32

## 2017-12-08 RX ADMIN — Medication 5 MILLIGRAM(S): at 21:33

## 2017-12-08 RX ADMIN — ATORVASTATIN CALCIUM 80 MILLIGRAM(S): 80 TABLET, FILM COATED ORAL at 21:32

## 2017-12-08 RX ADMIN — CLOPIDOGREL BISULFATE 75 MILLIGRAM(S): 75 TABLET, FILM COATED ORAL at 13:29

## 2017-12-08 NOTE — PROGRESS NOTE ADULT - PROBLEM SELECTOR PROBLEM 5
R/O Vocal cord dysfunction
R/O Vocal cord dysfunction
CVA (cerebral vascular accident)
CVA (cerebral vascular accident)
R/O Vocal cord dysfunction

## 2017-12-08 NOTE — CHART NOTE - NSCHARTNOTEFT_GEN_A_CORE
NUTRITION FOLLOW UP NOTE   Pt seen for length of stay.     Source: Patient [x ]    Family [ ]     other [ x] comprehensive chart review    Diet : Enteral: Ordered for Jevity 1.2 @ 60mL/hr.  Observed Jevity 1.5 running at 60mL/hr.      Patient reports [ ] nausea  [ x] vomiting  this morning. Pt states he vomited due to medication administration [ ] diarrhea [ ] constipation  [ ]chewing problems [ ] swallowing issues  [ ] other:   Per chart Pt vomited with feeds were running at 70mL/hr so it was decreased to 60mL/hr.       Enteral /Parenteral Nutrition: Jevity 1.2 at 60ml/hr x24 hrs. Provides 1728 kcal, 80g protein.       No new wt. Dosing Wt 11/8: 89.4kg, 12/5: 69.9kg.    Pertinent Medications: MEDICATIONS  (STANDING):  amoxicillin   80 mG/mL/clavulanate Suspension 875 milliGRAM(s) Enteral Tube two times a day  aspirin  chewable 81 milliGRAM(s) Oral daily  atorvastatin 80 milliGRAM(s) Oral at bedtime  clopidogrel Tablet 75 milliGRAM(s) Oral daily  doxazosin 2 milliGRAM(s) Oral at bedtime  enoxaparin Injectable 40 milliGRAM(s) SubCutaneous daily  fluticasone propionate 50 MICROgram(s)/spray Nasal Spray 1 Spray(s) Both Nostrils two times a day  gabapentin   Solution 600 milliGRAM(s) Oral three times a day  hydrochlorothiazide 12.5 milliGRAM(s) Oral daily  influenza   Vaccine 0.5 milliLiter(s) IntraMuscular once  losartan 100 milliGRAM(s) Oral daily  metoclopramide   Syrup 5 milliGRAM(s) Oral every 8 hours  sodium chloride 0.65% Nasal 1 Spray(s) Both Nostrils every 6 hours    MEDICATIONS  (PRN):  acetaminophen    Suspension. 650 milliGRAM(s) Oral every 6 hours PRN Mild Pain (1 - 3)  acetaminophen  Suppository 650 milliGRAM(s) Rectal every 6 hours PRN For Temp greater than 38.5 C (101.3 F)  guaiFENesin    Syrup 200 milliGRAM(s) Oral every 6 hours PRN Cough  hydrALAZINE Injectable 10 milliGRAM(s) IV Push two times a day PRN BP > 180    Pertinent Labs:  12-08 Na138 mmol/L Glu 154 mg/dL<H> K+ 3.2 mmol/L<L> Cr  0.90 mg/dL BUN 10 mg/dL Phos n/a   Alb n/a   PAB n/a         Skin: intact. No edema noted.    Estimated Needs:   [ x] no change since previous assessment  [ ] recalculated:       Previous Nutrition Diagnosis:     [ ] Inadequate Energy Intake [ ]Inadequate Oral Intake [ ] Excessive Energy Intake     [ ] Underweight [ ] Increased Nutrient Needs [ ] Overweight/Obesity     [ ] Altered GI Function [ ] Unintended Weight Loss [ ] Food & Nutrition Related Knowledge Deficit [x ] Malnutrition          Nutrition Diagnosis is [ x] ongoing  [ ] resolved [ ] not applicable          New Nutrition Diagnosis: [ x] not applicable     Interventions:     Recommend    [ ] Change Diet To:    [ ] Nutrition Supplement:     [ x] Nutrition Support: Jevity 1.5 at 55mL/hr. Goal provides 1980kcal, 84g protein (~ 28kcal/kg, 1.2g protein/kg/actual Wt)    [ ] Other:        Monitoring and Evaluation:     [ ] PO intake [ x] Tolerance to diet prescription [ x] weights [x ] follow up per protocol    RD to remain available for further nutritional interventions as indicated/requested by medical team/pt.   Wojciech Gutierrez, RD, CDN, CDE. Pager: [phone]

## 2017-12-08 NOTE — PROGRESS NOTE ADULT - SUBJECTIVE AND OBJECTIVE BOX
THE PATIENT WAS SEEN AND EXAMINED BY ME WITH THE HOUSESTAFF AND STROKE TEAM DURING MORNING ROUNDS.   HPI:  58 y/o R-handed [place]i man PMH CVA with residual L-sided weakness, CAD s/p CABG, HTN presented as stroke code for dizziness. Pt at baseline ambulates independently and independent in ADLs. Pt was in usual state of health on 11/07 at 9 AM. At around 9:30 AM, he began to complain of lightheadedness not associated with changes in position. Half an hour later, he began to complain of n/v. He also endorses HA, diplopia/blurry vision, R-sided weakness that began a few days prior to admission.  He reports he ran out of his BP meds a week prior to admission and has been unable to refill his medications      SUBJECTIVE: No events overnight.  No new neurologic complaints.      acetaminophen    Suspension. 650 milliGRAM(s) Oral every 6 hours PRN  acetaminophen  Suppository 650 milliGRAM(s) Rectal every 6 hours PRN  amoxicillin   80 mG/mL/clavulanate Suspension 875 milliGRAM(s) Enteral Tube two times a day  aspirin Suppository 300 milliGRAM(s) Rectal daily  atorvastatin 80 milliGRAM(s) Oral at bedtime  clopidogrel Tablet 75 milliGRAM(s) Oral daily  doxazosin 2 milliGRAM(s) Oral at bedtime  enoxaparin Injectable 40 milliGRAM(s) SubCutaneous daily  fluticasone propionate 50 MICROgram(s)/spray Nasal Spray 1 Spray(s) Both Nostrils two times a day  gabapentin   Solution 600 milliGRAM(s) Oral three times a day  guaiFENesin    Syrup 200 milliGRAM(s) Oral every 6 hours PRN  hydrALAZINE Injectable 10 milliGRAM(s) IV Push two times a day PRN  hydrochlorothiazide 12.5 milliGRAM(s) Oral daily  influenza   Vaccine 0.5 milliLiter(s) IntraMuscular once  losartan 100 milliGRAM(s) Oral daily  metoclopramide   Syrup 5 milliGRAM(s) Oral every 8 hours  sodium chloride 0.65% Nasal 1 Spray(s) Both Nostrils every 6 hours  sodium chloride 0.9%. 1000 milliLiter(s) IV Continuous <Continuous>      PHYSICAL EXAM:   Vital Signs Last 24 Hrs  T(C): 36.7 (08 Dec 2017 08:30), Max: 36.8 (07 Dec 2017 23:21)  T(F): 98 (08 Dec 2017 08:30), Max: 98.2 (07 Dec 2017 23:21)  HR: 74 (08 Dec 2017 08:30) (53 - 85)  BP: 133/83 (08 Dec 2017 08:30) (130/80 - 168/96)  BP(mean): --  RR: 20 (08 Dec 2017 08:30) (10 - 20)  SpO2: 97% (08 Dec 2017 08:30) (95% - 97%)    General: NAD  HEENT: EOM intact, visual fields full   Abdomen: Soft, no erythema, nondistended   Extremities: No edema    NEUROLOGICAL EXAM:  Mental status: Awake, alert, oriented x3, fluent speech, no neglect, able to follow commands, moderate dysarthria   Cranial Nerves: Subtle UMN type left facial droop, EOMI, VFF, no nystagmus, right eye ptosis, hoarseness, slight palatal deviation to the left, no hiccups,   Motor exam: Normal tone, no drift, RUE 5/5, RLE 5/5, drift LUE 4-/5, drift LLE 5-/5, increase rigidity of left wrist  Sensation: sensation equal to light touch  Coordination/ Gait: LUE mild dysmetria      LABS:                        11.9   4.13  )-----------( 167      ( 08 Dec 2017 07:28 )             35.5    12-08    138  |  99  |  10  ----------------------------<  154<H>  3.2<L>   |  25  |  0.90    Ca    8.5      08 Dec 2017 07:39      Hemoglobin A1C, Whole Blood: 5.8 % (11-08 @ 10:36)      IMAGING: Reviewed by me.     CT Chest No Cont (11.22.17) Bilateral lower lobe pneumonia.  Head CT/CTA head/Neck (11/07) Right basal ganglia and corona radiata infarct without significant change since 6/16/2017. There is severe steno-occlusive disease intracranially involving the right supraclinoid internal carotid artery, A1 and M1 branches, and severe narrowing of the left M1 segment of the middle cerebral artery. The distal right vertebral artery is quite small and the proximal basilar artery is not visualized. The dominant left vertebral artery ends at the PICA. Distal basilar flow appears to be due to retrograde flow from the right posterior communicating artery.  Head CT (11/07) No acute intracranial hemorrhage, mass effect, or evidence of acute territorial infarct. Chronic right corona radiata/basal ganglia and right inferior cerebellar hemisphere infarcts.  Brain MRI (11/09) In comparison the prior MRI, there is new hyperintense T2 and FLAIR signal with faint increased restricted diffusion in the right medulla extending to the inferior right brachium pontis and inferior medial right cerebellar hemisphere, new compared to 6/17/2017 consistent with evolving area of ischemic change without hemorrhagic transformation. Redemonstration of volume loss with multiple additional areas of lacunar infarction including chronic infarcts in the right cerebellar hemisphere and right corona radiata. Decreased visualization of the flow-void within the right vertebral artery with continued irregular isointense T1 signal throughout the basilar artery. Bilateral sinus disease with bubbly secretions and air-fluid levels, greatest in the right maxillary sinus THE PATIENT WAS SEEN AND EXAMINED BY ME WITH THE HOUSESTAFF AND STROKE TEAM DURING MORNING ROUNDS.   HPI:  58 y/o R-handed [place] man PMH CVA with residual L-sided weakness, CAD s/p CABG, HTN presented as stroke code for dizziness. Pt at baseline ambulates independently and independent in ADLs. Pt was in usual state of health on 11/07 at 9 AM. At around 9:30 AM, he began to complain of lightheadedness not associated with changes in position. Half an hour later, he began to complain of n/v. He also endorses HA, diplopia/blurry vision, R-sided weakness that began a few days prior to admission.  He reports he ran out of his BP meds a week prior to admission and has been unable to refill his medications      SUBJECTIVE: Vomited overnight.  No new neurologic complaints.      acetaminophen    Suspension. 650 milliGRAM(s) Oral every 6 hours PRN  acetaminophen  Suppository 650 milliGRAM(s) Rectal every 6 hours PRN  amoxicillin   80 mG/mL/clavulanate Suspension 875 milliGRAM(s) Enteral Tube two times a day  aspirin Suppository 300 milliGRAM(s) Rectal daily  atorvastatin 80 milliGRAM(s) Oral at bedtime  clopidogrel Tablet 75 milliGRAM(s) Oral daily  doxazosin 2 milliGRAM(s) Oral at bedtime  enoxaparin Injectable 40 milliGRAM(s) SubCutaneous daily  fluticasone propionate 50 MICROgram(s)/spray Nasal Spray 1 Spray(s) Both Nostrils two times a day  gabapentin   Solution 600 milliGRAM(s) Oral three times a day  guaiFENesin    Syrup 200 milliGRAM(s) Oral every 6 hours PRN  hydrALAZINE Injectable 10 milliGRAM(s) IV Push two times a day PRN  hydrochlorothiazide 12.5 milliGRAM(s) Oral daily  influenza   Vaccine 0.5 milliLiter(s) IntraMuscular once  losartan 100 milliGRAM(s) Oral daily  metoclopramide   Syrup 5 milliGRAM(s) Oral every 8 hours  sodium chloride 0.65% Nasal 1 Spray(s) Both Nostrils every 6 hours  sodium chloride 0.9%. 1000 milliLiter(s) IV Continuous <Continuous>      PHYSICAL EXAM:   Vital Signs Last 24 Hrs  T(C): 36.7 (08 Dec 2017 08:30), Max: 36.8 (07 Dec 2017 23:21)  T(F): 98 (08 Dec 2017 08:30), Max: 98.2 (07 Dec 2017 23:21)  HR: 74 (08 Dec 2017 08:30) (53 - 85)  BP: 133/83 (08 Dec 2017 08:30) (130/80 - 168/96)  BP(mean): --  RR: 20 (08 Dec 2017 08:30) (10 - 20)  SpO2: 97% (08 Dec 2017 08:30) (95% - 97%)    General: NAD  HEENT: EOM intact, visual fields full   Abdomen: Soft, no erythema, nondistended   Extremities: No edema    NEUROLOGICAL EXAM:  Mental status: Awake, alert, oriented x3, fluent speech, no neglect, able to follow commands, moderate dysarthria   Cranial Nerves: Subtle UMN type left facial droop, EOMI, VFF, no nystagmus, right eye ptosis, hoarseness, slight palatal deviation to the left, no hiccups,   Motor exam: Normal tone, no drift, RUE 5/5, RLE 5/5, drift LUE 4-/5, drift LLE 5-/5, increase rigidity of left wrist  Sensation: sensation equal to light touch  Coordination/ Gait: LUE mild dysmetria      LABS:                        11.9   4.13  )-----------( 167      ( 08 Dec 2017 07:28 )             35.5    12-08    138  |  99  |  10  ----------------------------<  154<H>  3.2<L>   |  25  |  0.90    Ca    8.5      08 Dec 2017 07:39      Hemoglobin A1C, Whole Blood: 5.8 % (11-08 @ 10:36)      IMAGING: Reviewed by me.     CT Chest No Cont (11.22.17) Bilateral lower lobe pneumonia.  Head CT/CTA head/Neck (11/07) Right basal ganglia and corona radiata infarct without significant change since 6/16/2017. There is severe steno-occlusive disease intracranially involving the right supraclinoid internal carotid artery, A1 and M1 branches, and severe narrowing of the left M1 segment of the middle cerebral artery. The distal right vertebral artery is quite small and the proximal basilar artery is not visualized. The dominant left vertebral artery ends at the PICA. Distal basilar flow appears to be due to retrograde flow from the right posterior communicating artery.  Head CT (11/07) No acute intracranial hemorrhage, mass effect, or evidence of acute territorial infarct. Chronic right corona radiata/basal ganglia and right inferior cerebellar hemisphere infarcts.  Brain MRI (11/09) In comparison the prior MRI, there is new hyperintense T2 and FLAIR signal with faint increased restricted diffusion in the right medulla extending to the inferior right brachium pontis and inferior medial right cerebellar hemisphere, new compared to 6/17/2017 consistent with evolving area of ischemic change without hemorrhagic transformation. Redemonstration of volume loss with multiple additional areas of lacunar infarction including chronic infarcts in the right cerebellar hemisphere and right corona radiata. Decreased visualization of the flow-void within the right vertebral artery with continued irregular isointense T1 signal throughout the basilar artery. Bilateral sinus disease with bubbly secretions and air-fluid levels, greatest in the right maxillary sinus THE PATIENT WAS SEEN AND EXAMINED BY ME WITH THE HOUSESTAFF AND STROKE TEAM DURING MORNING ROUNDS.   HPI:  58 y/o R-handed [place] man PMH CVA with residual L-sided weakness, CAD s/p CABG, HTN presented as stroke code for dizziness. Pt at baseline ambulates independently and independent in ADLs. Pt was in usual state of health on 11/07 at 9 AM. At around 9:30 AM, he began to complain of lightheadedness not associated with changes in position. Half an hour later, he began to complain of n/v. He also endorses HA, diplopia/blurry vision, R-sided weakness that began a few days prior to admission.  He reports he ran out of his BP meds a week prior to admission and has been unable to refill his medications    SUBJECTIVE: Vomited overnight.  No new neurologic complaints.      acetaminophen    Suspension. 650 milliGRAM(s) Oral every 6 hours PRN  acetaminophen  Suppository 650 milliGRAM(s) Rectal every 6 hours PRN  amoxicillin   80 mG/mL/clavulanate Suspension 875 milliGRAM(s) Enteral Tube two times a day  aspirin Suppository 300 milliGRAM(s) Rectal daily  atorvastatin 80 milliGRAM(s) Oral at bedtime  clopidogrel Tablet 75 milliGRAM(s) Oral daily  doxazosin 2 milliGRAM(s) Oral at bedtime  enoxaparin Injectable 40 milliGRAM(s) SubCutaneous daily  fluticasone propionate 50 MICROgram(s)/spray Nasal Spray 1 Spray(s) Both Nostrils two times a day  gabapentin   Solution 600 milliGRAM(s) Oral three times a day  guaiFENesin    Syrup 200 milliGRAM(s) Oral every 6 hours PRN  hydrALAZINE Injectable 10 milliGRAM(s) IV Push two times a day PRN  hydrochlorothiazide 12.5 milliGRAM(s) Oral daily  influenza   Vaccine 0.5 milliLiter(s) IntraMuscular once  losartan 100 milliGRAM(s) Oral daily  metoclopramide   Syrup 5 milliGRAM(s) Oral every 8 hours  sodium chloride 0.65% Nasal 1 Spray(s) Both Nostrils every 6 hours  sodium chloride 0.9%. 1000 milliLiter(s) IV Continuous <Continuous>      PHYSICAL EXAM:   Vital Signs Last 24 Hrs  T(C): 36.7 (08 Dec 2017 08:30), Max: 36.8 (07 Dec 2017 23:21)  T(F): 98 (08 Dec 2017 08:30), Max: 98.2 (07 Dec 2017 23:21)  HR: 74 (08 Dec 2017 08:30) (53 - 85)  BP: 133/83 (08 Dec 2017 08:30) (130/80 - 168/96)  BP(mean): --  RR: 20 (08 Dec 2017 08:30) (10 - 20)  SpO2: 97% (08 Dec 2017 08:30) (95% - 97%)    General: NAD  HEENT: EOM intact, visual fields full   Abdomen: Soft, no erythema, nondistended   Extremities: No edema    NEUROLOGICAL EXAM:  Mental status: Awake, alert, oriented x3, fluent speech, no neglect, able to follow commands, moderate dysarthria   Cranial Nerves: Subtle UMN type left facial droop, EOMI, VFF, no nystagmus, right eye ptosis, hoarseness, slight palatal deviation to the left, no hiccups,   Motor exam: Normal tone, no drift, RUE 5/5, RLE 5/5, drift LUE 4-/5, drift LLE 5-/5, increase rigidity of left wrist  Sensation: sensation equal to light touch  Coordination/ Gait: LUE mild dysmetria      LABS:                        11.9   4.13  )-----------( 167      ( 08 Dec 2017 07:28 )             35.5    12-08    138  |  99  |  10  ----------------------------<  154<H>  3.2<L>   |  25  |  0.90    Ca    8.5      08 Dec 2017 07:39      Hemoglobin A1C, Whole Blood: 5.8 % (11-08 @ 10:36)      IMAGING: Reviewed by me.     CT Chest No Cont (11.22.17) Bilateral lower lobe pneumonia.  Head CT/CTA head/Neck (11/07) Right basal ganglia and corona radiata infarct without significant change since 6/16/2017. There is severe steno-occlusive disease intracranially involving the right supraclinoid internal carotid artery, A1 and M1 branches, and severe narrowing of the left M1 segment of the middle cerebral artery. The distal right vertebral artery is quite small and the proximal basilar artery is not visualized. The dominant left vertebral artery ends at the PICA. Distal basilar flow appears to be due to retrograde flow from the right posterior communicating artery.  Head CT (11/07) No acute intracranial hemorrhage, mass effect, or evidence of acute territorial infarct. Chronic right corona radiata/basal ganglia and right inferior cerebellar hemisphere infarcts.  Brain MRI (11/09) In comparison the prior MRI, there is new hyperintense T2 and FLAIR signal with faint increased restricted diffusion in the right medulla extending to the inferior right brachium pontis and inferior medial right cerebellar hemisphere, new compared to 6/17/2017 consistent with evolving area of ischemic change without hemorrhagic transformation. Redemonstration of volume loss with multiple additional areas of lacunar infarction including chronic infarcts in the right cerebellar hemisphere and right corona radiata. Decreased visualization of the flow-void within the right vertebral artery with continued irregular isointense T1 signal throughout the basilar artery. Bilateral sinus disease with bubbly secretions and air-fluid levels, greatest in the right maxillary sinus

## 2017-12-08 NOTE — PROGRESS NOTE ADULT - SUBJECTIVE AND OBJECTIVE BOX
f/u pneumonia    interval history/ROS:      Allergies  No Known Allergies    ANTIMICROBIALS:   piperacillin/tazobactam IVPB. 3.375 every 8 hours (11/22-28)  amoxicillin   80 mG/mL/clavulanate Suspension 875 two times a day (12/6-)    MEDICATIONS  (STANDING):  aspirin  chewable 81 daily  atorvastatin 80 at bedtime  clopidogrel Tablet 75 daily  doxazosin 2 at bedtime  enoxaparin Injectable 40 daily  gabapentin   Solution 600 three times a day  hydrochlorothiazide 12.5 daily  influenza   Vaccine 0.5 once  losartan 100 daily  metoclopramide   Syrup 5 every 8 hours    Vital Signs Last 24 Hrs  T(F): 97.8 (12-08-17 @ 12:15), Max: 98.2 (12-07-17 @ 23:21)  HR: 82 (12-08-17 @ 12:15)  BP: 114/72 (12-08-17 @ 12:15)  RR: 20 (12-08-17 @ 12:15)  SpO2: 95% (12-08-17 @ 12:15) (95% - 97%)    PHYSICAL EXAM:  General: non-toxic; sitting up in bed  HEAD/EYES: anicteric  ENT:  supple  Cardiovascular:   S1, S2  Respiratory:  some rhonchi  GI:  soft, non-tender, normal bowel sounds; PJ tube  :  haro removed  Musculoskeletal:  no synovitis  Neurologic:  L weakness  Skin:  no rash  Lymph: no lymphadenopathy  Psychiatric:  appropriate affect  Vascular:  no phlebitis                                                11.9   4.13  )-----------( 167      ( 08 Dec 2017 07:28 )             35.5 12-08    138  |  99  |  10  ----------------------------<  154  3.2   |  25  |  0.90  Ca    8.5      08 Dec 2017 07:39    MICROBIOLOGY:  Clostridium difficile GDH Toxins A&amp;B, EIA:   Negative (11.25.17 @ 00:02)    Culture - Sputum . (11.23.17 @ 01:09)  Moderate Klebsiella pneumoniae  Numerous Haemophilus influenzae  Normal Respiratory Bryanna present    Culture - Sputum . (11.23.17 @ 01:09)    Gram Stain:   Numerous polymorphonuclear leukocytes per low power field  Few Squamous epithelial cells per low power field  Numerous Gram Negative Rods per oil power field  Numerous Gram positive cocci in pairs per oil power field    Specimen Source: .Sputum Sputum    Culture Results:   >100,000 CFU/ml Klebsiella pneumoniae (11.21.17 @ 17:15)    Culture - Blood (11.21.17 @ 16:57)    Specimen Source: .Blood Blood    Culture Results:   No growth to date.    .Urine Catheterized  11-10-17   No growth  --  --    .Sputum Sputum  11-10-17   Streptococcus pneumoniae  Normal Respiratory Bryanna present  --  Streptococcus pneumoniae    .Blood Blood-Peripheral  11-10-17   No growth at 5 days.  --  --    .Urine Clean Catch (Midstream)  11-08-17   No growth  --  --    RADIOLOGY:  Xray Chest 1 View AP -PORTABLE-Routine (12.05.17 @ 09:11)  IMPRESSION:  Clear lungs.    Xray Feeding Tube Check SI (11.29.17 @ 09:56)  IMPRESSION:  Gastrostomy tube within the stomach. No leak.    CT Chest No Cont (11.22.17 @ 12:10)   IMPRESSION: Bilateral lower lobe pneumonia.    MR Head No Cont (11.09.17 @ 10:49)  IMPRESSION:   In comparison the prior MRI, there is new hyperintense T2 and FLAIR signal with faint increased restricted diffusion in the right medulla extending to the inferior right brachium pontis and inferior medial right cerebellar hemisphere, new compared to 6/17/2017 consistent with evolving area of ischemic change without hemorrhagic transformation.  Redemonstration of volume loss with multiple additional areas of lacunar infarction including chronic infarcts in the right cerebellar hemisphere and right corona radiata. Decreased visualization of the flow-void within the right vertebral artery with continued irregular isointense T1 signal throughout the basilar artery. Bilateral sinus disease with bubbly secretions and air-fluid levels, greatest in the right maxillary sinus. Correlate clinically for symptoms of acute sinusitis. f/u pneumonia    interval history/ROS:  hiccups have resolved.  no sob/CP.  sitting reading a magazine.  was able to walk to the bathroom with assistance.  Rest of ROS otherwise negative.    Allergies  No Known Allergies    ANTIMICROBIALS:   piperacillin/tazobactam IVPB. 3.375 every 8 hours (11/22-28)  amoxicillin   80 mG/mL/clavulanate Suspension 875 two times a day (12/6-)    MEDICATIONS  (STANDING):  aspirin  chewable 81 daily  atorvastatin 80 at bedtime  clopidogrel Tablet 75 daily  doxazosin 2 at bedtime  enoxaparin Injectable 40 daily  gabapentin   Solution 600 three times a day  hydrochlorothiazide 12.5 daily  influenza   Vaccine 0.5 once  losartan 100 daily  metoclopramide   Syrup 5 every 8 hours    Vital Signs Last 24 Hrs  T(F): 97.8 (12-08-17 @ 12:15), Max: 98.2 (12-07-17 @ 23:21)  HR: 82 (12-08-17 @ 12:15)  BP: 114/72 (12-08-17 @ 12:15)  RR: 20 (12-08-17 @ 12:15)  SpO2: 95% (12-08-17 @ 12:15) (95% - 97%)    PHYSICAL EXAM:  General: non-toxic; sitting in chair reading  HEAD/EYES: anicteric  ENT:  supple  Cardiovascular:   S1, S2  Respiratory:  some rhonchi  GI:  soft, non-tender, normal bowel sounds; PJ tube  :  haro removed  Musculoskeletal:  no synovitis  Neurologic:  L weakness  Skin:  no rash  Lymph: no lymphadenopathy  Psychiatric:  appropriate affect  Vascular:  no phlebitis                                                11.9   4.13  )-----------( 167      ( 08 Dec 2017 07:28 )             35.5 12-08    138  |  99  |  10  ----------------------------<  154  3.2   |  25  |  0.90  Ca    8.5      08 Dec 2017 07:39    MICROBIOLOGY:  Clostridium difficile GDH Toxins A&amp;B, EIA:   Negative (11.25.17 @ 00:02)    Culture - Sputum . (11.23.17 @ 01:09)  Moderate Klebsiella pneumoniae  Numerous Haemophilus influenzae  Normal Respiratory Bryanna present    Culture - Sputum . (11.23.17 @ 01:09)    Gram Stain:   Numerous polymorphonuclear leukocytes per low power field  Few Squamous epithelial cells per low power field  Numerous Gram Negative Rods per oil power field  Numerous Gram positive cocci in pairs per oil power field    Specimen Source: .Sputum Sputum    Culture Results:   >100,000 CFU/ml Klebsiella pneumoniae (11.21.17 @ 17:15)    Culture - Blood (11.21.17 @ 16:57)    Specimen Source: .Blood Blood    Culture Results:   No growth to date.    .Urine Catheterized  11-10-17   No growth  --  --    .Sputum Sputum  11-10-17   Streptococcus pneumoniae  Normal Respiratory Bryanna present  --  Streptococcus pneumoniae    .Blood Blood-Peripheral  11-10-17   No growth at 5 days.  --  --    .Urine Clean Catch (Midstream)  11-08-17   No growth  --  --    RADIOLOGY:  Xray Chest 1 View AP -PORTABLE-Routine (12.05.17 @ 09:11)  IMPRESSION:  Clear lungs.    Xray Feeding Tube Check SI (11.29.17 @ 09:56)  IMPRESSION:  Gastrostomy tube within the stomach. No leak.    CT Chest No Cont (11.22.17 @ 12:10)   IMPRESSION: Bilateral lower lobe pneumonia.    MR Head No Cont (11.09.17 @ 10:49)  IMPRESSION:   In comparison the prior MRI, there is new hyperintense T2 and FLAIR signal with faint increased restricted diffusion in the right medulla extending to the inferior right brachium pontis and inferior medial right cerebellar hemisphere, new compared to 6/17/2017 consistent with evolving area of ischemic change without hemorrhagic transformation.  Redemonstration of volume loss with multiple additional areas of lacunar infarction including chronic infarcts in the right cerebellar hemisphere and right corona radiata. Decreased visualization of the flow-void within the right vertebral artery with continued irregular isointense T1 signal throughout the basilar artery. Bilateral sinus disease with bubbly secretions and air-fluid levels, greatest in the right maxillary sinus. Correlate clinically for symptoms of acute sinusitis.

## 2017-12-08 NOTE — PROGRESS NOTE ADULT - ASSESSMENT
57M CAD s/p CABG, HTN, hx CVA with L weakness. Admitted with new right medullary stroke.  Course complicated by urinary retention requiring haro which has been removed, aspiration pneumonia s/p zosyn and now sinusitis.  Today is D#3 augmentin of 10 days.      Please call the ID service [phone] with questions or concerns over the weekend

## 2017-12-08 NOTE — PROGRESS NOTE ADULT - PROBLEM SELECTOR PLAN 5
no vc dysfunction on ent exam  + pooling of secretions  asp precautions, pulmonary toliet
as per neuro
as per neuro
ent called  r/o VC dysfunction
no vc dysfunction on ent exam  + pooling of secretions  asp precautions, pulmonary toliet

## 2017-12-08 NOTE — PROGRESS NOTE ADULT - ASSESSMENT
ASSESSMENT:   58 Y/O man with HTN, HLD, CAD/CABG, past stroke with residual left hemiparesis, presents with transient right hemiparesis and diplopia, continuous headache, nausea and vomiting. CT head shows old right basal ganglia lacune and CTA shows severe bilateral MCA stenosis, proximal basilar/distal vertebrals occlusion and stenosis involving mid to distal basilar artery. MRI brain show right lateral medullary and right cerebellar ischemic infarcts. Of note, he reports to have discontinued his antiplatelet medications before his stroke due to financial constraints.Impression: Cerebral thrombosis/embolism with cerebral infarction. Right PICA distribution stroke involving cerebellum and right lateral medulla - likely etiology being  large vessel disease i.e. symptomatic intracranial atherosclerosis in the setting of medication (antiplatelet therapy) noncompliance.    NEURO: neurologically without acute change, neurologically tolerating normotension, ASA/plavix and statin for secondary stroke prevention, titrate statin to LDL goal less than 70 considering likely atheroembolic etiology of his stroke,  continue gabapentin to 600mg TID as hiccups have improved with this regimen, Physical therapy/OT eval with recommendations for AR,   provide daily PT/OT to optimize treatment.     ANTITHROMBOTIC THERAPY:  ASA and clopidogrel      PULMONARY:  Chest CT (11/22) bilateral lower lobe pneumonia, completed Zosyn x 7 days, sputum culture: numerous gram positive cocci and negative rods, appreciate pulmonary and ID consult, protecting airway, encourage Incentive spirometer, monitor sats give oxygen as needed to >92% per pulm, continue Augmentin for sinusitis (3/10 days)    CARDIOVASCULAR: TTE: LVEF 41%, mitral annular calcification, mild-moderate MR, global LV systolic dysfunction, mild stage 1 diastolic dysfunction mild TR,  cardiac monitoring no events, cardio consult appreciated             GASTROINTESTINAL: S/p PEG placement (11/14)  and not tolerating PEG feedings at goal , Abd x ray: negative for ileus, scheduled Reglan as gastric prokinetic agent, pt was on Jevity 1.5 (as per dietician)   He was unable to tolerate feeds, changed feedings to VITAL AF 1.2 and again attempted, per RN 12/4 in afternoon patient with high residuals and not tolerating.  S/p IR performed G to J tube (12/06).  Denies nausea/vomiting, appears to be tolerating.      Diet: TF per IR.     RENAL: continue to provide hydration as tolerated, good urine output, renal US: No hydronephrosis, Wu reinserted for urinary retention. Continue Cardura for BPH, urology consult appreciated, TOV 11/25: failed,  attempted TOV/PVR tod12/6/17, per patient urinating without difficulty.  Hypokalemia, will replete potassium   Na Goal: Greater than 135    HEMATOLOGY: no si/sx of bleeding     DVT ppx: LMWH     ID: afebrile in am, blood cx; NGTD, urine and sputum cultures: klebsiella pneumoniae, pt with diarrhea on 11/24, C diff negative, as per ID  7 day ABx course of Zosyn completed for pna, UA+ nitrite, LE, bacteria, WBC, reconsulted ID for recommendations- recommended to continue Augmentin for sinusitis/uti.     Other: Left wrist cock up splint in place.    DISPOSITION: D/C to AR as per PT/OT eval once TF at goal, SW aware, Daily PT/OT therapy to optimize therapy and referral to [facility] for possible suman.     CORE MEASURES:        Admission NIHSS: 3     TPA:  NO      LDL/HDL: 171/57     Depression Screen: 0     Statin Therapy: Y     Dysphagia Screen: FAIL     Smoking  NO      Afib NO     Stroke Education YES ASSESSMENT:   58 Y/O man with HTN, HLD, CAD/CABG, past stroke with residual left hemiparesis, presents with transient right hemiparesis and diplopia, continuous headache, nausea and vomiting. CT head shows old right basal ganglia lacune and CTA shows severe bilateral MCA stenosis, proximal basilar/distal vertebrals occlusion and stenosis involving mid to distal basilar artery. MRI brain show right lateral medullary and right cerebellar ischemic infarcts. Of note, he reports to have discontinued his antiplatelet medications before his stroke due to financial constraints.Impression: Cerebral thrombosis/embolism with cerebral infarction. Right PICA distribution stroke involving cerebellum and right lateral medulla - likely etiology being  large vessel disease i.e. symptomatic intracranial atherosclerosis in the setting of medication (antiplatelet therapy) noncompliance.    NEURO: neurologically without acute change, neurologically tolerating normotension, ASA/plavix and statin for secondary stroke prevention, titrate statin to LDL goal less than 70 considering likely atheroembolic etiology of his stroke,  continue gabapentin to 600mg TID as hiccups have improved with this regimen, Physical therapy/OT eval with recommendations for AR,   provide daily PT/OT to optimize treatment.     ANTITHROMBOTIC THERAPY:  ASA and clopidogrel      PULMONARY:  Chest CT (11/22) bilateral lower lobe pneumonia, completed Zosyn x 7 days, sputum culture: numerous gram positive cocci and negative rods, appreciate pulmonary and ID consult, protecting airway, encourage Incentive spirometer, monitor sats give oxygen as needed to >92% per pulm, continue Augmentin for sinusitis (3/10 days)    CARDIOVASCULAR: TTE: LVEF 41%, mitral annular calcification, mild-moderate MR, global LV systolic dysfunction, mild stage 1 diastolic dysfunction mild TR,  cardiac monitoring no events, cardio consult appreciated             GASTROINTESTINAL: S/p PEG placement (11/14)  and not tolerating PEG feedings at goal , Abd x ray: negative for ileus, scheduled Reglan as gastric prokinetic agent, pt was on Jevity 1.5 (as per dietician)   He was unable to tolerate feeds, changed feedings to VITAL AF 1.2 and again attempted, per RN 12/4 in afternoon patient with high residuals and not tolerating.  S/p IR performed G to J tube (12/06).  vomit X 1 on 12/07 once ge was at 70ml feeding goal, will decrease goal to 60ml and will continue to monitor     Diet: TF per IR.     RENAL: continue to provide hydration as tolerated, good urine output, renal US: No hydronephrosis, Wu reinserted for urinary retention. Continue Cardura for BPH, urology consult appreciated, TOV 11/25: failed,  attempted TOV/PVR tod12/6/17, per patient urinating without difficulty.  Hypokalemia, will replete potassium   Na Goal: Greater than 135    HEMATOLOGY: no si/sx of bleeding     DVT ppx: LMWH     ID: afebrile in am, blood cx; NGTD, urine and sputum cultures: klebsiella pneumoniae, pt with diarrhea on 11/24, C diff negative, as per ID  7 day ABx course of Zosyn completed for pna, UA+ nitrite, LE, bacteria, WBC, reconsulted ID for recommendations- recommended to continue Augmentin for sinusitis/uti.     Other: Left wrist cock up splint in place.    DISPOSITION: D/C to AR as per PT/OT eval once TF at goal, SW aware, Daily PT/OT therapy to optimize therapy and referral to [facility] for possible suman.     CORE MEASURES:        Admission NIHSS: 3     TPA:  NO      LDL/HDL: 171/57     Depression Screen: 0     Statin Therapy: Y     Dysphagia Screen: FAIL     Smoking  NO      Afib NO     Stroke Education YES

## 2017-12-08 NOTE — PROGRESS NOTE ADULT - ASSESSMENT
Patient is a 58 yo [place] male w/ PMH significant for HTN, HLD, CAD s/p CABG, previous CVA w/ residual left hemiparesis, admitted for R sided weakness, HA, diplopia, diagnosed with R lateral medullary and R cerebellar ischemic infarcts per MRI. Patient failed MBS and PEG tube was placed on 11/14 and tube feeding was initiated and pt was having problems with emesis, residuals, constipation-concern for ileus. 11/22 with coughing up feeds, green malordours secretions, hypoxia which resolved. found to have bilat PNA-asp, UTI--kleb,

## 2017-12-08 NOTE — PROGRESS NOTE ADULT - SUBJECTIVE AND OBJECTIVE BOX
Follow-up Pulm Progress Note    Appears comfortable. Hiccups are better.     Medications:  MEDICATIONS  (STANDING):  amoxicillin   80 mG/mL/clavulanate Suspension 875 milliGRAM(s) Enteral Tube two times a day  aspirin Suppository 300 milliGRAM(s) Rectal daily  atorvastatin 80 milliGRAM(s) Oral at bedtime  clopidogrel Tablet 75 milliGRAM(s) Oral daily  doxazosin 2 milliGRAM(s) Oral at bedtime  enoxaparin Injectable 40 milliGRAM(s) SubCutaneous daily  fluticasone propionate 50 MICROgram(s)/spray Nasal Spray 1 Spray(s) Both Nostrils two times a day  gabapentin   Solution 600 milliGRAM(s) Oral three times a day  hydrochlorothiazide 12.5 milliGRAM(s) Oral daily  influenza   Vaccine 0.5 milliLiter(s) IntraMuscular once  losartan 100 milliGRAM(s) Oral daily  metoclopramide   Syrup 5 milliGRAM(s) Oral every 8 hours  potassium chloride   Solution 40 milliEquivalent(s) Oral once  sodium chloride 0.65% Nasal 1 Spray(s) Both Nostrils every 6 hours  sodium chloride 0.9%. 1000 milliLiter(s) (75 mL/Hr) IV Continuous <Continuous>    MEDICATIONS  (PRN):  acetaminophen    Suspension. 650 milliGRAM(s) Oral every 6 hours PRN Mild Pain (1 - 3)  acetaminophen  Suppository 650 milliGRAM(s) Rectal every 6 hours PRN For Temp greater than 38.5 C (101.3 F)  guaiFENesin    Syrup 200 milliGRAM(s) Oral every 6 hours PRN Cough  hydrALAZINE Injectable 10 milliGRAM(s) IV Push two times a day PRN BP > 180    Vital Signs Last 24 Hrs  T(C): 36.7 (08 Dec 2017 08:30), Max: 36.8 (07 Dec 2017 23:21)  T(F): 98 (08 Dec 2017 08:30), Max: 98.2 (07 Dec 2017 23:21)  HR: 74 (08 Dec 2017 08:30) (53 - 85)  BP: 133/83 (08 Dec 2017 08:30) (130/80 - 168/96)  BP(mean): --  RR: 20 (08 Dec 2017 08:30) (10 - 20)  SpO2: 97% (08 Dec 2017 08:30) (95% - 97%)    12-07 @ 07:01  -  12-08 @ 07:00  --------------------------------------------------------  IN: 1590 mL / OUT: 1101 mL / NET: 489 mL    LABS:                        11.9   4.13  )-----------( 167      ( 08 Dec 2017 07:28 )             35.5     12-08    138  |  99  |  10  ----------------------------<  154<H>  3.2<L>   |  25  |  0.90    Ca    8.5      08 Dec 2017 07:39    CAPILLARY BLOOD GLUCOSE      CULTURES: (if applicable)  Culture Results:   >100,000 CFU/ml Klebsiella pneumoniae (12-05 @ 23:11)    Most recent blood culture -- 12-05 @ 23:11   Klebsiella pneumoniae Klebsiella pneumoniae .Urine Kidney 12-05 @ 23:11    Physical Examination:  PULM: Clear to auscultation bilaterally, no significant sputum production  CVS: S1, S2 heard    RADIOLOGY REVIEWED  CXR:     CT chest:    TTE:

## 2017-12-09 LAB
ANION GAP SERPL CALC-SCNC: 10 MMOL/L — SIGNIFICANT CHANGE UP (ref 5–17)
BUN SERPL-MCNC: 11 MG/DL — SIGNIFICANT CHANGE UP (ref 7–23)
CALCIUM SERPL-MCNC: 9.1 MG/DL — SIGNIFICANT CHANGE UP (ref 8.4–10.5)
CHLORIDE SERPL-SCNC: 102 MMOL/L — SIGNIFICANT CHANGE UP (ref 96–108)
CO2 SERPL-SCNC: 27 MMOL/L — SIGNIFICANT CHANGE UP (ref 22–31)
CREAT SERPL-MCNC: 1.01 MG/DL — SIGNIFICANT CHANGE UP (ref 0.5–1.3)
GLUCOSE SERPL-MCNC: 107 MG/DL — HIGH (ref 70–99)
HCT VFR BLD CALC: 36.6 % — LOW (ref 39–50)
HGB BLD-MCNC: 12.2 G/DL — LOW (ref 13–17)
MCHC RBC-ENTMCNC: 25.4 PG — LOW (ref 27–34)
MCHC RBC-ENTMCNC: 33.3 GM/DL — SIGNIFICANT CHANGE UP (ref 32–36)
MCV RBC AUTO: 76.3 FL — LOW (ref 80–100)
PLATELET # BLD AUTO: 175 K/UL — SIGNIFICANT CHANGE UP (ref 150–400)
POTASSIUM SERPL-MCNC: 3.6 MMOL/L — SIGNIFICANT CHANGE UP (ref 3.5–5.3)
POTASSIUM SERPL-SCNC: 3.6 MMOL/L — SIGNIFICANT CHANGE UP (ref 3.5–5.3)
RBC # BLD: 4.8 M/UL — SIGNIFICANT CHANGE UP (ref 4.2–5.8)
RBC # FLD: 16.2 % — HIGH (ref 10.3–14.5)
SODIUM SERPL-SCNC: 139 MMOL/L — SIGNIFICANT CHANGE UP (ref 135–145)
WBC # BLD: 3.72 K/UL — LOW (ref 3.8–10.5)
WBC # FLD AUTO: 3.72 K/UL — LOW (ref 3.8–10.5)

## 2017-12-09 PROCEDURE — 99233 SBSQ HOSP IP/OBS HIGH 50: CPT

## 2017-12-09 RX ORDER — FLUOXETINE HCL 10 MG
20 CAPSULE ORAL AT BEDTIME
Qty: 0 | Refills: 0 | Status: DISCONTINUED | OUTPATIENT
Start: 2017-12-09 | End: 2017-12-11

## 2017-12-09 RX ADMIN — ENOXAPARIN SODIUM 40 MILLIGRAM(S): 100 INJECTION SUBCUTANEOUS at 11:49

## 2017-12-09 RX ADMIN — Medication 1 SPRAY(S): at 17:22

## 2017-12-09 RX ADMIN — GABAPENTIN 600 MILLIGRAM(S): 400 CAPSULE ORAL at 05:22

## 2017-12-09 RX ADMIN — Medication 2 MILLIGRAM(S): at 21:31

## 2017-12-09 RX ADMIN — LOSARTAN POTASSIUM 100 MILLIGRAM(S): 100 TABLET, FILM COATED ORAL at 05:22

## 2017-12-09 RX ADMIN — Medication 5 MILLIGRAM(S): at 05:22

## 2017-12-09 RX ADMIN — Medication 20 MILLIGRAM(S): at 21:31

## 2017-12-09 RX ADMIN — Medication 875 MILLIGRAM(S): at 05:22

## 2017-12-09 RX ADMIN — Medication 1 SPRAY(S): at 11:49

## 2017-12-09 RX ADMIN — Medication 1 SPRAY(S): at 05:22

## 2017-12-09 RX ADMIN — Medication 5 MILLIGRAM(S): at 12:35

## 2017-12-09 RX ADMIN — CLOPIDOGREL BISULFATE 75 MILLIGRAM(S): 75 TABLET, FILM COATED ORAL at 11:49

## 2017-12-09 RX ADMIN — Medication 81 MILLIGRAM(S): at 11:49

## 2017-12-09 RX ADMIN — Medication 5 MILLIGRAM(S): at 21:32

## 2017-12-09 RX ADMIN — Medication 875 MILLIGRAM(S): at 17:22

## 2017-12-09 RX ADMIN — ATORVASTATIN CALCIUM 80 MILLIGRAM(S): 80 TABLET, FILM COATED ORAL at 21:31

## 2017-12-09 RX ADMIN — GABAPENTIN 600 MILLIGRAM(S): 400 CAPSULE ORAL at 21:31

## 2017-12-09 RX ADMIN — Medication 12.5 MILLIGRAM(S): at 05:22

## 2017-12-09 RX ADMIN — GABAPENTIN 600 MILLIGRAM(S): 400 CAPSULE ORAL at 12:37

## 2017-12-09 NOTE — PROGRESS NOTE ADULT - SUBJECTIVE AND OBJECTIVE BOX
THE PATIENT WAS SEEN AND EXAMINED BY ME WITH THE HOUSESTAFF AND STROKE TEAM DURING MORNING ROUNDS.   HPI:  56 y/o R-handed [place] man PMH CVA with residual L-sided weakness, CAD s/p CABG, HTN presented as stroke code for dizziness. Pt at baseline ambulates independently and independent in ADLs. Pt was in usual state of health on 11/07 at 9 AM. At around 9:30 AM, he began to complain of lightheadedness not associated with changes in position. Half an hour later, he began to complain of n/v. He also endorses HA, diplopia/blurry vision, R-sided weakness that began a few days prior to admission.  He reports he ran out of his BP meds a week prior to admission and has been unable to refill his medications        SUBJECTIVE: No events overnight.  No new neurologic complaints.      acetaminophen    Suspension. 650 milliGRAM(s) Oral every 6 hours PRN  acetaminophen  Suppository 650 milliGRAM(s) Rectal every 6 hours PRN  amoxicillin   80 mG/mL/clavulanate Suspension 875 milliGRAM(s) Enteral Tube two times a day  aspirin  chewable 81 milliGRAM(s) Oral daily  atorvastatin 80 milliGRAM(s) Oral at bedtime  clopidogrel Tablet 75 milliGRAM(s) Oral daily  doxazosin 2 milliGRAM(s) Oral at bedtime  enoxaparin Injectable 40 milliGRAM(s) SubCutaneous daily  FLUoxetine 20 milliGRAM(s) Oral at bedtime  fluticasone propionate 50 MICROgram(s)/spray Nasal Spray 1 Spray(s) Both Nostrils two times a day  gabapentin   Solution 600 milliGRAM(s) Oral three times a day  guaiFENesin    Syrup 200 milliGRAM(s) Oral every 6 hours PRN  hydrALAZINE Injectable 10 milliGRAM(s) IV Push two times a day PRN  hydrochlorothiazide 12.5 milliGRAM(s) Oral daily  influenza   Vaccine 0.5 milliLiter(s) IntraMuscular once  losartan 100 milliGRAM(s) Oral daily  metoclopramide   Syrup 5 milliGRAM(s) Oral every 8 hours  sodium chloride 0.65% Nasal 1 Spray(s) Both Nostrils every 6 hours      PHYSICAL EXAM:   Vital Signs Last 24 Hrs  T(C): 36.7 (09 Dec 2017 07:59), Max: 36.8 (08 Dec 2017 22:52)  T(F): 98.1 (09 Dec 2017 07:59), Max: 98.2 (08 Dec 2017 22:52)  HR: 72 (09 Dec 2017 07:59) (57 - 88)  BP: 116/72 (09 Dec 2017 07:59) (114/72 - 124/75)  BP(mean): --  RR: 18 (09 Dec 2017 07:59) (18 - 20)  SpO2: 94% (09 Dec 2017 07:59) (94% - 98%)    General: NAD  HEENT: EOM intact, visual fields full   Abdomen: Soft, no erythema, nondistended   Extremities: No edema    NEUROLOGICAL EXAM:  Mental status: Awake, alert, oriented x3, fluent speech, no neglect, able to follow commands, moderate dysarthria   Cranial Nerves: Subtle UMN type left facial droop, EOMI, VFF, no nystagmus, right eye ptosis, hoarseness, slight palatal deviation to the left, no hiccups,   Motor exam: Normal tone, no drift, RUE 5/5, RLE 5/5, drift LUE 4-/5, drift LLE 5-/5, increase rigidity of left wrist  Sensation: sensation equal to light touch  Coordination/ Gait: LUE mild dysmetria      LABS:                        12.2   3.72  )-----------( 175      ( 09 Dec 2017 07:51 )             36.6    12-09    139  |  102  |  11  ----------------------------<  107<H>  3.6   |  27  |  1.01    Ca    9.1      09 Dec 2017 07:34      Hemoglobin A1C, Whole Blood: 5.8 % (11-08 @ 10:36)      IMAGING: Reviewed by me.     CT Chest No Cont (11.22.17) Bilateral lower lobe pneumonia.  Head CT/CTA head/Neck (11/07) Right basal ganglia and corona radiata infarct without significant change since 6/16/2017. There is severe steno-occlusive disease intracranially involving the right supraclinoid internal carotid artery, A1 and M1 branches, and severe narrowing of the left M1 segment of the middle cerebral artery. The distal right vertebral artery is quite small and the proximal basilar artery is not visualized. The dominant left vertebral artery ends at the PICA. Distal basilar flow appears to be due to retrograde flow from the right posterior communicating artery.  Head CT (11/07) No acute intracranial hemorrhage, mass effect, or evidence of acute territorial infarct. Chronic right corona radiata/basal ganglia and right inferior cerebellar hemisphere infarcts.  Brain MRI (11/09) In comparison the prior MRI, there is new hyperintense T2 and FLAIR signal with faint increased restricted diffusion in the right medulla extending to the inferior right brachium pontis and inferior medial right cerebellar hemisphere, new compared to 6/17/2017 consistent with evolving area of ischemic change without hemorrhagic transformation. Redemonstration of volume loss with multiple additional areas of lacunar infarction including chronic infarcts in the right cerebellar hemisphere and right corona radiata. Decreased visualization of the flow-void within the right vertebral artery with continued irregular isointense T1 signal throughout the basilar artery. Bilateral sinus disease with bubbly secretions and air-fluid levels, greatest in the right maxillary sinus

## 2017-12-09 NOTE — PROGRESS NOTE ADULT - SUBJECTIVE AND OBJECTIVE BOX
Follow-up Pulm Progress Note    No new respiratory events overnight.  Denies SOB/CP.   97% on RA  sputum color improved       Medications:  MEDICATIONS  (STANDING):  amoxicillin   80 mG/mL/clavulanate Suspension 875 milliGRAM(s) Enteral Tube two times a day  aspirin  chewable 81 milliGRAM(s) Oral daily  atorvastatin 80 milliGRAM(s) Oral at bedtime  clopidogrel Tablet 75 milliGRAM(s) Oral daily  doxazosin 2 milliGRAM(s) Oral at bedtime  enoxaparin Injectable 40 milliGRAM(s) SubCutaneous daily  FLUoxetine 20 milliGRAM(s) Oral at bedtime  fluticasone propionate 50 MICROgram(s)/spray Nasal Spray 1 Spray(s) Both Nostrils two times a day  gabapentin   Solution 600 milliGRAM(s) Oral three times a day  hydrochlorothiazide 12.5 milliGRAM(s) Oral daily  influenza   Vaccine 0.5 milliLiter(s) IntraMuscular once  losartan 100 milliGRAM(s) Oral daily  metoclopramide   Syrup 5 milliGRAM(s) Oral every 8 hours  sodium chloride 0.65% Nasal 1 Spray(s) Both Nostrils every 6 hours    MEDICATIONS  (PRN):  acetaminophen    Suspension. 650 milliGRAM(s) Oral every 6 hours PRN Mild Pain (1 - 3)  acetaminophen  Suppository 650 milliGRAM(s) Rectal every 6 hours PRN For Temp greater than 38.5 C (101.3 F)  guaiFENesin    Syrup 200 milliGRAM(s) Oral every 6 hours PRN Cough  hydrALAZINE Injectable 10 milliGRAM(s) IV Push two times a day PRN BP > 180          Vital Signs Last 24 Hrs  T(C): 36.8 (09 Dec 2017 12:59), Max: 36.8 (08 Dec 2017 22:52)  T(F): 98.2 (09 Dec 2017 12:59), Max: 98.2 (08 Dec 2017 22:52)  HR: 58 (09 Dec 2017 12:59) (57 - 88)  BP: 121/76 (09 Dec 2017 12:59) (114/74 - 124/75)  BP(mean): --  RR: 18 (09 Dec 2017 12:59) (18 - 18)  SpO2: 96% (09 Dec 2017 12:59) (94% - 98%) on RA          12-08 @ 07:01  -  12-09 @ 07:00  --------------------------------------------------------  IN: 1960 mL / OUT: 0 mL / NET: 1960 mL          LABS:                        12.2   3.72  )-----------( 175      ( 09 Dec 2017 07:51 )             36.6     12-09    139  |  102  |  11  ----------------------------<  107<H>  3.6   |  27  |  1.01    Ca    9.1      09 Dec 2017 07:34        CAPILLARY BLOOD GLUCOSE                            CULTURES: (if applicable)  Culture Results:   >100,000 CFU/ml Klebsiella pneumoniae (12-05 @ 23:11)    Most recent blood culture -- 12-05 @ 23:11   Klebsiella pneumoniae Klebsiella pneumoniae .Urine Kidney 12-05 @ 23:11    Blood culture 12-05 @ 23:11  --  --  [facility]  Klebsiella pneumoniae  Klebsiella pneumoniae    Urine culture    -->      Physical Examination:  PULM: Clear to auscultation bilaterally, no significant sputum production  CVS: S1, S2 heard    RADIOLOGY REVIEWED

## 2017-12-09 NOTE — PROGRESS NOTE ADULT - ASSESSMENT
Patient is a 56 yo [place] male w/ PMH significant for HTN, HLD, CAD s/p CABG, previous CVA w/ residual left hemiparesis, admitted for R sided weakness, HA, diplopia, diagnosed with R lateral medullary and R cerebellar ischemic infarcts per MRI. Patient failed MBS and PEG tube was placed on 11/14 and tube feeding was initiated and pt was having problems with emesis, residuals, constipation-concern for ileus. 11/22 with coughing up feeds, green malordours secretions, hypoxia which resolved. found to have bilat PNA-asp, UTI--kleb,

## 2017-12-09 NOTE — PROGRESS NOTE ADULT - ASSESSMENT
ASSESSMENT:   56 Y/O man with HTN, HLD, CAD/CABG, past stroke with residual left hemiparesis, presents with transient right hemiparesis and diplopia, continuous headache, nausea and vomiting. CT head shows old right basal ganglia lacune and CTA shows severe bilateral MCA stenosis, proximal basilar/distal vertebrals occlusion and stenosis involving mid to distal basilar artery. MRI brain show right lateral medullary and right cerebellar ischemic infarcts. Of note, he reports to have discontinued his antiplatelet medications before his stroke due to financial constraints.Impression: Cerebral thrombosis/embolism with cerebral infarction. Right PICA distribution stroke involving cerebellum and right lateral medulla - likely etiology being  large vessel disease i.e. symptomatic intracranial atherosclerosis in the setting of medication (antiplatelet therapy) noncompliance.    NEURO: neurologically without acute change, neurologically tolerating normotension, ASA/plavix and statin for secondary stroke prevention, titrate statin to LDL goal less than 70 considering likely atheroembolic etiology of his stroke,  continue gabapentin to 600mg TID as hiccups have improved with this regimen, Physical therapy/OT eval with recommendations for AR,   provide daily PT/OT to optimize treatment.     ANTITHROMBOTIC THERAPY:  ASA and clopidogrel      PULMONARY:  Chest CT (11/22) bilateral lower lobe pneumonia, completed Zosyn x 7 days, sputum culture: numerous gram positive cocci and negative rods, appreciate pulmonary and ID consult, protecting airway, encourage Incentive spirometer, monitor sats give oxygen as needed to >92% per pulm, continue Augmentin for sinusitis (4/10 days)    CARDIOVASCULAR: TTE: LVEF 41%, mitral annular calcification, mild-moderate MR, global LV systolic dysfunction, mild stage 1 diastolic dysfunction mild TR,  cardiac monitoring no events, cardio consult appreciated             GASTROINTESTINAL: S/p PEG placement (11/14)  and not tolerating PEG feedings at goal , continue Reglan as gastric prokinetic agent, S/p IR performed G to J tube (12/06).  vomit X 1 on 12/07   when feedings were at 70ml. He is now tolerating  PEG feedings at 60ml      Diet: TF per IR.     RENAL: continue to provide hydration as tolerated, good urine output, renal US: No hydronephrosis, Wu reinserted for urinary retention. Continue Cardura for BPH, urology consult appreciated, TOV 11/25: failed,  attempted TOV/PVR tod12/6/17, per patient urinating without difficulty.   Na Goal: Greater than 135    HEMATOLOGY: no si/sx of bleeding     DVT ppx: LMWH     ID: afebrile in am, blood cx; NGTD, urine and sputum cultures: klebsiella pneumoniae, pt with diarrhea on 11/24, C diff negative, as per ID  7 day ABx course of Zosyn completed for pna, UA+ nitrite, LE, bacteria, WBC, reconsulted ID for recommendations- recommended to continue Augmentin (4/10 days)for sinusitis/uti.     Other: Left wrist cock up splint in place.    DISPOSITION: D/C to AR as per PT/OT eval once TF at goal, SW aware, Daily PT/OT therapy to optimize therapy and referral to [facility] for possible suman.     CORE MEASURES:        Admission NIHSS: 3     TPA:  NO      LDL/HDL: 171/57     Depression Screen: 0     Statin Therapy: Y     Dysphagia Screen: FAIL     Smoking  NO      Afib NO     Stroke Education YES

## 2017-12-10 PROCEDURE — 99233 SBSQ HOSP IP/OBS HIGH 50: CPT

## 2017-12-10 RX ADMIN — CLOPIDOGREL BISULFATE 75 MILLIGRAM(S): 75 TABLET, FILM COATED ORAL at 11:06

## 2017-12-10 RX ADMIN — Medication 81 MILLIGRAM(S): at 10:58

## 2017-12-10 RX ADMIN — GABAPENTIN 600 MILLIGRAM(S): 400 CAPSULE ORAL at 21:08

## 2017-12-10 RX ADMIN — Medication 2 MILLIGRAM(S): at 21:08

## 2017-12-10 RX ADMIN — Medication 1 SPRAY(S): at 18:01

## 2017-12-10 RX ADMIN — LOSARTAN POTASSIUM 100 MILLIGRAM(S): 100 TABLET, FILM COATED ORAL at 06:18

## 2017-12-10 RX ADMIN — GABAPENTIN 600 MILLIGRAM(S): 400 CAPSULE ORAL at 14:41

## 2017-12-10 RX ADMIN — ENOXAPARIN SODIUM 40 MILLIGRAM(S): 100 INJECTION SUBCUTANEOUS at 11:08

## 2017-12-10 RX ADMIN — GABAPENTIN 600 MILLIGRAM(S): 400 CAPSULE ORAL at 06:18

## 2017-12-10 RX ADMIN — Medication 1 SPRAY(S): at 18:02

## 2017-12-10 RX ADMIN — Medication 12.5 MILLIGRAM(S): at 06:18

## 2017-12-10 RX ADMIN — Medication 1 SPRAY(S): at 06:19

## 2017-12-10 RX ADMIN — Medication 1 SPRAY(S): at 10:59

## 2017-12-10 RX ADMIN — Medication 1 SPRAY(S): at 06:18

## 2017-12-10 RX ADMIN — Medication 5 MILLIGRAM(S): at 06:19

## 2017-12-10 RX ADMIN — Medication 875 MILLIGRAM(S): at 06:18

## 2017-12-10 RX ADMIN — ATORVASTATIN CALCIUM 80 MILLIGRAM(S): 80 TABLET, FILM COATED ORAL at 21:08

## 2017-12-10 RX ADMIN — Medication 20 MILLIGRAM(S): at 21:08

## 2017-12-10 RX ADMIN — Medication 5 MILLIGRAM(S): at 14:14

## 2017-12-10 RX ADMIN — Medication 5 MILLIGRAM(S): at 21:08

## 2017-12-10 NOTE — PROGRESS NOTE ADULT - SUBJECTIVE AND OBJECTIVE BOX
1THE PATIENT WAS SEEN AND EXAMINED BY ME WITH THE HOUSESTAFF AND STROKE TEAM DURING MORNING ROUNDS.   HPI:  56 y/o R-handed [place] man PMH CVA with residual L-sided weakness, CAD s/p CABG, HTN presented as stroke code for dizziness. Pt at baseline ambulates independently and independent in ADLs. Pt was in usual state of health on 11/07 at 9 AM. At around 9:30 AM, he began to complain of lightheadedness not associated with changes in position. Half an hour later, he began to complain of n/v. He also endorses HA, diplopia/blurry vision, R-sided weakness that began a few days prior to admission.  He reports he ran out of his BP meds a week prior to admission and has been unable to refill his medications      SUBJECTIVE: States he feels better today.  No new neurologic complaints.      acetaminophen    Suspension. 650 milliGRAM(s) Oral every 6 hours PRN  acetaminophen  Suppository 650 milliGRAM(s) Rectal every 6 hours PRN  amoxicillin   80 mG/mL/clavulanate Suspension 875 milliGRAM(s) Enteral Tube two times a day  aspirin  chewable 81 milliGRAM(s) Oral daily  atorvastatin 80 milliGRAM(s) Oral at bedtime  clopidogrel Tablet 75 milliGRAM(s) Oral daily  doxazosin 2 milliGRAM(s) Oral at bedtime  enoxaparin Injectable 40 milliGRAM(s) SubCutaneous daily  FLUoxetine 20 milliGRAM(s) Oral at bedtime  fluticasone propionate 50 MICROgram(s)/spray Nasal Spray 1 Spray(s) Both Nostrils two times a day  gabapentin   Solution 600 milliGRAM(s) Oral three times a day  guaiFENesin    Syrup 200 milliGRAM(s) Oral every 6 hours PRN  hydrALAZINE Injectable 10 milliGRAM(s) IV Push two times a day PRN  hydrochlorothiazide 12.5 milliGRAM(s) Oral daily  influenza   Vaccine 0.5 milliLiter(s) IntraMuscular once  losartan 100 milliGRAM(s) Oral daily  metoclopramide   Syrup 5 milliGRAM(s) Oral every 8 hours  sodium chloride 0.65% Nasal 1 Spray(s) Both Nostrils every 6 hours      PHYSICAL EXAM:   Vital Signs Last 24 Hrs  T(C): 36.6 (10 Dec 2017 12:13), Max: 36.8 (10 Dec 2017 08:15)  T(F): 97.8 (10 Dec 2017 12:13), Max: 98.2 (10 Dec 2017 08:15)  HR: 74 (10 Dec 2017 12:13) (65 - 83)  BP: 121/82 (10 Dec 2017 12:13) (104/70 - 133/81)  BP(mean): --  RR: 18 (10 Dec 2017 12:13) (18 - 18)  SpO2: 96% (10 Dec 2017 12:13) (96% - 100%)    General: NAD  HEENT: EOM intact, visual fields full   Abdomen: Soft, no erythema, nondistended   Extremities: No edema    NEUROLOGICAL EXAM:  Mental status: Awake, alert, oriented x3, fluent speech, no neglect, able to follow commands, moderate dysarthria   Cranial Nerves: Subtle UMN type left facial droop, EOMI, VFF, no nystagmus, right eye ptosis, hoarseness, slight palatal deviation to the left, no hiccups,   Motor exam: Normal tone, no drift, RUE 5/5, RLE 5/5, drift LUE 4-/5, drift LLE 5-/5, increase rigidity of left wrist  Sensation: sensation equal to light touch  Coordination/ Gait: LUE mild dysmetria    LABS:                        12.2   3.72  )-----------( 175      ( 09 Dec 2017 07:51 )             36.6    12-09    139  |  102  |  11  ----------------------------<  107<H>  3.6   |  27  |  1.01    Ca    9.1      09 Dec 2017 07:34      Hemoglobin A1C, Whole Blood: 5.8 % (11-08 @ 10:36)      IMAGING: Reviewed by me.     CT Chest No Cont (11.22.17) Bilateral lower lobe pneumonia.  Head CT/CTA head/Neck (11/07) Right basal ganglia and corona radiata infarct without significant change since 6/16/2017. There is severe steno-occlusive disease intracranially involving the right supraclinoid internal carotid artery, A1 and M1 branches, and severe narrowing of the left M1 segment of the middle cerebral artery. The distal right vertebral artery is quite small and the proximal basilar artery is not visualized. The dominant left vertebral artery ends at the PICA. Distal basilar flow appears to be due to retrograde flow from the right posterior communicating artery.  Head CT (11/07) No acute intracranial hemorrhage, mass effect, or evidence of acute territorial infarct. Chronic right corona radiata/basal ganglia and right inferior cerebellar hemisphere infarcts.  Brain MRI (11/09) In comparison the prior MRI, there is new hyperintense T2 and FLAIR signal with faint increased restricted diffusion in the right medulla extending to the inferior right brachium pontis and inferior medial right cerebellar hemisphere, new compared to 6/17/2017 consistent with evolving area of ischemic change without hemorrhagic transformation. Redemonstration of volume loss with multiple additional areas of lacunar infarction including chronic infarcts in the right cerebellar hemisphere and right corona radiata. Decreased visualization of the flow-void within the right vertebral artery with continued irregular isointense T1 signal throughout the basilar artery. Bilateral sinus disease with bubbly secretions and air-fluid levels, greatest in the right maxillary sinus

## 2017-12-11 ENCOUNTER — INPATIENT (INPATIENT)
Facility: HOSPITAL | Age: 57
LOS: 24 days | Discharge: NURSING, REHAB OR LONG-TERM CARE | DRG: 949 | End: 2018-01-05
Attending: STUDENT IN AN ORGANIZED HEALTH CARE EDUCATION/TRAINING PROGRAM | Admitting: PSYCHIATRY & NEUROLOGY
Payer: MEDICAID

## 2017-12-11 VITALS
HEART RATE: 69 BPM | TEMPERATURE: 98 F | OXYGEN SATURATION: 98 % | RESPIRATION RATE: 18 BRPM | DIASTOLIC BLOOD PRESSURE: 88 MMHG | SYSTOLIC BLOOD PRESSURE: 134 MMHG

## 2017-12-11 DIAGNOSIS — I63.9 CEREBRAL INFARCTION, UNSPECIFIED: ICD-10-CM

## 2017-12-11 PROCEDURE — 87324 CLOSTRIDIUM AG IA: CPT

## 2017-12-11 PROCEDURE — 92610 EVALUATE SWALLOWING FUNCTION: CPT

## 2017-12-11 PROCEDURE — 97116 GAIT TRAINING THERAPY: CPT

## 2017-12-11 PROCEDURE — 81001 URINALYSIS AUTO W/SCOPE: CPT

## 2017-12-11 PROCEDURE — 97162 PT EVAL MOD COMPLEX 30 MIN: CPT

## 2017-12-11 PROCEDURE — 97110 THERAPEUTIC EXERCISES: CPT

## 2017-12-11 PROCEDURE — 87070 CULTURE OTHR SPECIMN AEROBIC: CPT

## 2017-12-11 PROCEDURE — 85014 HEMATOCRIT: CPT

## 2017-12-11 PROCEDURE — 97760 ORTHOTIC MGMT&TRAING 1ST ENC: CPT

## 2017-12-11 PROCEDURE — 96374 THER/PROPH/DIAG INJ IV PUSH: CPT | Mod: XU

## 2017-12-11 PROCEDURE — 84295 ASSAY OF SERUM SODIUM: CPT

## 2017-12-11 PROCEDURE — C1889: CPT

## 2017-12-11 PROCEDURE — 99223 1ST HOSP IP/OBS HIGH 75: CPT

## 2017-12-11 PROCEDURE — 83735 ASSAY OF MAGNESIUM: CPT

## 2017-12-11 PROCEDURE — 87486 CHLMYD PNEUM DNA AMP PROBE: CPT

## 2017-12-11 PROCEDURE — 82550 ASSAY OF CK (CPK): CPT

## 2017-12-11 PROCEDURE — 80061 LIPID PANEL: CPT

## 2017-12-11 PROCEDURE — 74020: CPT

## 2017-12-11 PROCEDURE — 92526 ORAL FUNCTION THERAPY: CPT

## 2017-12-11 PROCEDURE — 93306 TTE W/DOPPLER COMPLETE: CPT

## 2017-12-11 PROCEDURE — 87040 BLOOD CULTURE FOR BACTERIA: CPT

## 2017-12-11 PROCEDURE — 76770 US EXAM ABDO BACK WALL COMP: CPT

## 2017-12-11 PROCEDURE — C1887: CPT

## 2017-12-11 PROCEDURE — 82553 CREATINE MB FRACTION: CPT

## 2017-12-11 PROCEDURE — 80048 BASIC METABOLIC PNL TOTAL CA: CPT

## 2017-12-11 PROCEDURE — 71250 CT THORAX DX C-: CPT

## 2017-12-11 PROCEDURE — 82803 BLOOD GASES ANY COMBINATION: CPT

## 2017-12-11 PROCEDURE — 74018 RADEX ABDOMEN 1 VIEW: CPT

## 2017-12-11 PROCEDURE — 84132 ASSAY OF SERUM POTASSIUM: CPT

## 2017-12-11 PROCEDURE — 82947 ASSAY GLUCOSE BLOOD QUANT: CPT

## 2017-12-11 PROCEDURE — 85610 PROTHROMBIN TIME: CPT

## 2017-12-11 PROCEDURE — 49446 CHANGE G-TUBE TO G-J PERC: CPT

## 2017-12-11 PROCEDURE — 82330 ASSAY OF CALCIUM: CPT

## 2017-12-11 PROCEDURE — 85027 COMPLETE CBC AUTOMATED: CPT

## 2017-12-11 PROCEDURE — 80053 COMPREHEN METABOLIC PANEL: CPT

## 2017-12-11 PROCEDURE — 74230 X-RAY XM SWLNG FUNCJ C+: CPT

## 2017-12-11 PROCEDURE — 76705 ECHO EXAM OF ABDOMEN: CPT

## 2017-12-11 PROCEDURE — 92611 MOTION FLUOROSCOPY/SWALLOW: CPT

## 2017-12-11 PROCEDURE — 99285 EMERGENCY DEPT VISIT HI MDM: CPT | Mod: 25

## 2017-12-11 PROCEDURE — 82435 ASSAY OF BLOOD CHLORIDE: CPT

## 2017-12-11 PROCEDURE — 85730 THROMBOPLASTIN TIME PARTIAL: CPT

## 2017-12-11 PROCEDURE — 99231 SBSQ HOSP IP/OBS SF/LOW 25: CPT

## 2017-12-11 PROCEDURE — 97535 SELF CARE MNGMENT TRAINING: CPT

## 2017-12-11 PROCEDURE — C1769: CPT

## 2017-12-11 PROCEDURE — 83605 ASSAY OF LACTIC ACID: CPT

## 2017-12-11 PROCEDURE — 70496 CT ANGIOGRAPHY HEAD: CPT

## 2017-12-11 PROCEDURE — 70450 CT HEAD/BRAIN W/O DYE: CPT

## 2017-12-11 PROCEDURE — 84484 ASSAY OF TROPONIN QUANT: CPT

## 2017-12-11 PROCEDURE — 36600 WITHDRAWAL OF ARTERIAL BLOOD: CPT

## 2017-12-11 PROCEDURE — 93005 ELECTROCARDIOGRAM TRACING: CPT

## 2017-12-11 PROCEDURE — 94640 AIRWAY INHALATION TREATMENT: CPT

## 2017-12-11 PROCEDURE — 87798 DETECT AGENT NOS DNA AMP: CPT

## 2017-12-11 PROCEDURE — 49465 FLUORO EXAM OF G/COLON TUBE: CPT

## 2017-12-11 PROCEDURE — 70551 MRI BRAIN STEM W/O DYE: CPT

## 2017-12-11 PROCEDURE — 87086 URINE CULTURE/COLONY COUNT: CPT

## 2017-12-11 PROCEDURE — 87581 M.PNEUMON DNA AMP PROBE: CPT

## 2017-12-11 PROCEDURE — 87184 SC STD DISK METHOD PER PLATE: CPT

## 2017-12-11 PROCEDURE — 84100 ASSAY OF PHOSPHORUS: CPT

## 2017-12-11 PROCEDURE — 97112 NEUROMUSCULAR REEDUCATION: CPT

## 2017-12-11 PROCEDURE — 82962 GLUCOSE BLOOD TEST: CPT

## 2017-12-11 PROCEDURE — 97165 OT EVAL LOW COMPLEX 30 MIN: CPT

## 2017-12-11 PROCEDURE — 97530 THERAPEUTIC ACTIVITIES: CPT

## 2017-12-11 PROCEDURE — 70498 CT ANGIOGRAPHY NECK: CPT

## 2017-12-11 PROCEDURE — 83036 HEMOGLOBIN GLYCOSYLATED A1C: CPT

## 2017-12-11 PROCEDURE — 71045 X-RAY EXAM CHEST 1 VIEW: CPT

## 2017-12-11 PROCEDURE — 87449 NOS EACH ORGANISM AG IA: CPT

## 2017-12-11 PROCEDURE — 87186 SC STD MICRODIL/AGAR DIL: CPT

## 2017-12-11 PROCEDURE — 87633 RESP VIRUS 12-25 TARGETS: CPT

## 2017-12-11 RX ORDER — METOCLOPRAMIDE HCL 10 MG
1 TABLET ORAL
Qty: 0 | Refills: 0 | COMMUNITY

## 2017-12-11 RX ORDER — GABAPENTIN 400 MG/1
1 CAPSULE ORAL
Qty: 90 | Refills: 0 | OUTPATIENT
Start: 2017-12-11 | End: 2018-01-09

## 2017-12-11 RX ORDER — FLUOXETINE HCL 10 MG
1 CAPSULE ORAL
Qty: 0 | Refills: 0 | COMMUNITY
Start: 2017-12-11

## 2017-12-11 RX ADMIN — Medication 1 SPRAY(S): at 00:00

## 2017-12-11 RX ADMIN — Medication 1 SPRAY(S): at 05:32

## 2017-12-11 RX ADMIN — ENOXAPARIN SODIUM 40 MILLIGRAM(S): 100 INJECTION SUBCUTANEOUS at 12:25

## 2017-12-11 RX ADMIN — Medication 81 MILLIGRAM(S): at 12:25

## 2017-12-11 RX ADMIN — Medication 1 SPRAY(S): at 05:31

## 2017-12-11 RX ADMIN — Medication 12.5 MILLIGRAM(S): at 05:31

## 2017-12-11 RX ADMIN — Medication 1 SPRAY(S): at 17:22

## 2017-12-11 RX ADMIN — CLOPIDOGREL BISULFATE 75 MILLIGRAM(S): 75 TABLET, FILM COATED ORAL at 12:25

## 2017-12-11 RX ADMIN — Medication 1 SPRAY(S): at 12:26

## 2017-12-11 RX ADMIN — Medication 5 MILLIGRAM(S): at 05:31

## 2017-12-11 RX ADMIN — LOSARTAN POTASSIUM 100 MILLIGRAM(S): 100 TABLET, FILM COATED ORAL at 05:31

## 2017-12-11 RX ADMIN — GABAPENTIN 600 MILLIGRAM(S): 400 CAPSULE ORAL at 14:53

## 2017-12-11 RX ADMIN — GABAPENTIN 600 MILLIGRAM(S): 400 CAPSULE ORAL at 05:32

## 2017-12-11 RX ADMIN — Medication 5 MILLIGRAM(S): at 17:21

## 2017-12-11 NOTE — PROGRESS NOTE ADULT - SUBJECTIVE AND OBJECTIVE BOX
THE PATIENT WAS SEEN AND EXAMINED BY ME WITH THE HOUSESTAFF AND STROKE TEAM DURING MORNING ROUNDS.   HPI:  58 y/o R-handed Ukrainian man PMH CVA with residual L-sided weakness, CAD s/p CABG, HTN presented as stroke code for dizziness. Pt at baseline ambulates independently and independent in ADLs. Pt was in usual state of health on 11/07 at 9 AM. At around 9:30 AM, he began to complain of lightheadedness not associated with changes in position. Half an hour later, he began to complain of n/v. He also endorses HA, diplopia/blurry vision, R-sided weakness that began a few days prior to admission.  He reports he ran out of his BP meds a week prior to admission and has been unable to refill his medications      SUBJECTIVE: No events overnight.  No new neurologic complaints.      acetaminophen    Suspension. 650 milliGRAM(s) Oral every 6 hours PRN  acetaminophen  Suppository 650 milliGRAM(s) Rectal every 6 hours PRN  AMOXICILLIN/CLAVULANATE 200MG/28.5MG 21.88 milliLiter(s) 21.88 milliLiter(s) Enteral Tube two times a day  aspirin  chewable 81 milliGRAM(s) Oral daily  atorvastatin 80 milliGRAM(s) Oral at bedtime  clopidogrel Tablet 75 milliGRAM(s) Oral daily  doxazosin 2 milliGRAM(s) Oral at bedtime  enoxaparin Injectable 40 milliGRAM(s) SubCutaneous daily  FLUoxetine 20 milliGRAM(s) Oral at bedtime  fluticasone propionate 50 MICROgram(s)/spray Nasal Spray 1 Spray(s) Both Nostrils two times a day  gabapentin   Solution 600 milliGRAM(s) Oral three times a day  guaiFENesin    Syrup 200 milliGRAM(s) Oral every 6 hours PRN  hydrALAZINE Injectable 10 milliGRAM(s) IV Push two times a day PRN  hydrochlorothiazide 12.5 milliGRAM(s) Oral daily  influenza   Vaccine 0.5 milliLiter(s) IntraMuscular once  losartan 100 milliGRAM(s) Oral daily  metoclopramide   Syrup 5 milliGRAM(s) Oral every 8 hours  sodium chloride 0.65% Nasal 1 Spray(s) Both Nostrils every 6 hours      PHYSICAL EXAM:   Vital Signs Last 24 Hrs  T(C): 36.5 (11 Dec 2017 08:07), Max: 36.8 (10 Dec 2017 08:15)  T(F): 97.7 (11 Dec 2017 08:07), Max: 98.2 (10 Dec 2017 08:15)  HR: 74 (11 Dec 2017 08:07) (65 - 78)  BP: 79/79 (11 Dec 2017 08:07) (79/79 - 138/89)  BP(mean): --  RR: 18 (11 Dec 2017 08:07) (18 - 18)  SpO2: 96% (11 Dec 2017 08:07) (96% - 98%)    General: NAD  HEENT: EOM intact, visual fields full   Abdomen: Soft, no erythema, nondistended   Extremities: No edema    NEUROLOGICAL EXAM:  Mental status: Awake, alert, oriented x3, fluent speech, no neglect, able to follow commands, moderate dysarthria   Cranial Nerves: Subtle UMN type left facial droop, EOMI, VFF, no nystagmus, right eye ptosis, hoarseness, slight palatal deviation to the left, no hiccups,   Motor exam: Normal tone, no drift, RUE 5/5, RLE 5/5, drift LUE 4-/5, drift LLE 5-/5, increase rigidity of left wrist  Sensation: sensation equal to light touch  Coordination/ Gait: LUE mild dysmetria  LABS:         Hemoglobin A1C, Whole Blood: 5.8 % (11-08 @ 10:36)      IMAGING: Reviewed by me.     CT Chest No Cont (11.22.17) Bilateral lower lobe pneumonia.  Head CT/CTA head/Neck (11/07) Right basal ganglia and corona radiata infarct without significant change since 6/16/2017. There is severe steno-occlusive disease intracranially involving the right supraclinoid internal carotid artery, A1 and M1 branches, and severe narrowing of the left M1 segment of the middle cerebral artery. The distal right vertebral artery is quite small and the proximal basilar artery is not visualized. The dominant left vertebral artery ends at the PICA. Distal basilar flow appears to be due to retrograde flow from the right posterior communicating artery.  Head CT (11/07) No acute intracranial hemorrhage, mass effect, or evidence of acute territorial infarct. Chronic right corona radiata/basal ganglia and right inferior cerebellar hemisphere infarcts.  Brain MRI (11/09) In comparison the prior MRI, there is new hyperintense T2 and FLAIR signal with faint increased restricted diffusion in the right medulla extending to the inferior right brachium pontis and inferior medial right cerebellar hemisphere, new compared to 6/17/2017 consistent with evolving area of ischemic change without hemorrhagic transformation. Redemonstration of volume loss with multiple additional areas of lacunar infarction including chronic infarcts in the right cerebellar hemisphere and right corona radiata. Decreased visualization of the flow-void within the right vertebral artery with continued irregular isointense T1 signal throughout the basilar artery. Bilateral sinus disease with bubbly secretions and air-fluid levels, greatest in the right maxillary sinus

## 2017-12-11 NOTE — CHART NOTE - NSCHARTNOTEFT_GEN_A_CORE
NUTRITION FOLLOW UP NOTE   Pt seen for length of stay.     Source: Patient [ ]    Family [ ]     other [ x] comprehensive chart review; Pt asleep.    Diet : Enteral: Ordered for Jevity 1.2 @ 60mL/hr.        Patient reports [ ] nausea  [ ] vomiting   [ ] diarrhea [ ] constipation  [ ]chewing problems [ ] swallowing issues  [ ] other: no tube feeding intolerance noted.  Per chart Pt vomited with feeds were running at 70mL/hr so it was decreased to 60mL/hr.       Enteral /Parenteral Nutrition: Jevity 1.2 at 60ml/hr x24 hrs. Provides 1728 kcal, 80g protein.       No new wt. Dosing Wt 11/8: 89.4kg, 12/5: 69.9kg.      Pertinent Medications: MEDICATIONS  (STANDING):  AMOXICILLIN/CLAVULANATE 200MG/28.5MG 21.88 milliLiter(s) 21.88 milliLiter(s) Enteral Tube two times a day  aspirin  chewable 81 milliGRAM(s) Oral daily  atorvastatin 80 milliGRAM(s) Oral at bedtime  clopidogrel Tablet 75 milliGRAM(s) Oral daily  doxazosin 2 milliGRAM(s) Oral at bedtime  enoxaparin Injectable 40 milliGRAM(s) SubCutaneous daily  FLUoxetine 20 milliGRAM(s) Oral at bedtime  fluticasone propionate 50 MICROgram(s)/spray Nasal Spray 1 Spray(s) Both Nostrils two times a day  gabapentin   Solution 600 milliGRAM(s) Oral three times a day  hydrochlorothiazide 12.5 milliGRAM(s) Oral daily  influenza   Vaccine 0.5 milliLiter(s) IntraMuscular once  losartan 100 milliGRAM(s) Oral daily  metoclopramide   Syrup 5 milliGRAM(s) Oral every 8 hours  sodium chloride 0.65% Nasal 1 Spray(s) Both Nostrils every 6 hours    MEDICATIONS  (PRN):  acetaminophen    Suspension. 650 milliGRAM(s) Oral every 6 hours PRN Mild Pain (1 - 3)  acetaminophen  Suppository 650 milliGRAM(s) Rectal every 6 hours PRN For Temp greater than 38.5 C (101.3 F)  guaiFENesin    Syrup 200 milliGRAM(s) Oral every 6 hours PRN Cough  hydrALAZINE Injectable 10 milliGRAM(s) IV Push two times a day PRN BP > 180    Pertinent Labs:       Skin: intact. No edema noted.    Estimated Needs:   [ x] no change since previous assessment  [ ] recalculated:       Previous Nutrition Diagnosis:     [ ] Inadequate Energy Intake [ ]Inadequate Oral Intake [ ] Excessive Energy Intake     [ ] Underweight [ ] Increased Nutrient Needs [ ] Overweight/Obesity     [ ] Altered GI Function [ ] Unintended Weight Loss [ ] Food & Nutrition Related Knowledge Deficit [x ] Malnutrition          Nutrition Diagnosis is [ x] ongoing  [ ] resolved [ ] not applicable          New Nutrition Diagnosis: [ x] not applicable     Interventions:     Recommend    [ ] Change Diet To:    [ ] Nutrition Supplement:     [ x] Nutrition Support: Sinha feeds to Jevity 1.5 at 55mL/hr. Goal provides 1980kcal, 84g protein (~ 28kcal/kg, 1.2g protein/kg/actual Wt)    [ ] Other:        Monitoring and Evaluation:     [ ] PO intake [ x] Tolerance to diet prescription [ x] weights [x ] follow up per protocol    RD to remain available for further nutritional interventions as indicated/requested by medical team/pt.   Wojciech Gutierrez, RD, CDN, CDE. Pager: [phone] NUTRITION FOLLOW UP NOTE   Pt seen for length of stay.     58 Y/O man with HTN, HLD, CAD/CABG, past stroke with residual left hemiparesis, presents with transient right hemiparesis and diplopia, continuous headache, nausea and vomiting. CT head shows old right basal ganglia lacune and CTA shows severe bilateral MCA stenosis, proximal basilar/distal vertebrals occlusion and stenosis involving mid to distal basilar artery. MRI brain show right lateral medullary and right cerebellar ischemic infarcts.    Source: Patient [ ]    Family [ ]     other [ x] comprehensive chart review; Pt asleep.    Diet : Enteral: Ordered for Jevity 1.2 @ 60mL/hr.        Patient reports [ ] nausea  [ ] vomiting   [ ] diarrhea [ ] constipation  [ ]chewing problems [ ] swallowing issues  [ ] other: no tube feeding intolerance noted.  Per chart Pt vomited with feeds were running at 70mL/hr so it was decreased to 60mL/hr, however Jevity 1.2 @ 60mL/hr is not Pt's feeding goal, recommendations below.       Enteral /Parenteral Nutrition: Jevity 1.2 at 60ml/hr x24 hrs. Provides 1728 kcal, 80g protein.       No new wt. Dosing Wt 11/8: 89.4kg, 12/5: 69.9kg.      Pertinent Medications: MEDICATIONS  (STANDING):  AMOXICILLIN/CLAVULANATE 200MG/28.5MG 21.88 milliLiter(s) 21.88 milliLiter(s) Enteral Tube two times a day  aspirin  chewable 81 milliGRAM(s) Oral daily  atorvastatin 80 milliGRAM(s) Oral at bedtime  clopidogrel Tablet 75 milliGRAM(s) Oral daily  doxazosin 2 milliGRAM(s) Oral at bedtime  enoxaparin Injectable 40 milliGRAM(s) SubCutaneous daily  FLUoxetine 20 milliGRAM(s) Oral at bedtime  fluticasone propionate 50 MICROgram(s)/spray Nasal Spray 1 Spray(s) Both Nostrils two times a day  gabapentin   Solution 600 milliGRAM(s) Oral three times a day  hydrochlorothiazide 12.5 milliGRAM(s) Oral daily  influenza   Vaccine 0.5 milliLiter(s) IntraMuscular once  losartan 100 milliGRAM(s) Oral daily  metoclopramide   Syrup 5 milliGRAM(s) Oral every 8 hours  sodium chloride 0.65% Nasal 1 Spray(s) Both Nostrils every 6 hours    MEDICATIONS  (PRN):  acetaminophen    Suspension. 650 milliGRAM(s) Oral every 6 hours PRN Mild Pain (1 - 3)  acetaminophen  Suppository 650 milliGRAM(s) Rectal every 6 hours PRN For Temp greater than 38.5 C (101.3 F)  guaiFENesin    Syrup 200 milliGRAM(s) Oral every 6 hours PRN Cough  hydrALAZINE Injectable 10 milliGRAM(s) IV Push two times a day PRN BP > 180    Pertinent Labs:       Skin: intact. No edema noted.    Estimated Needs:   [ x] no change since previous assessment  [ ] recalculated:       Previous Nutrition Diagnosis:     [ ] Inadequate Energy Intake [ ]Inadequate Oral Intake [ ] Excessive Energy Intake     [ ] Underweight [ ] Increased Nutrient Needs [ ] Overweight/Obesity     [ ] Altered GI Function [ ] Unintended Weight Loss [ ] Food & Nutrition Related Knowledge Deficit [x ] Malnutrition          Nutrition Diagnosis is [ x] ongoing  [ ] resolved [ ] not applicable          New Nutrition Diagnosis: [ x] not applicable     Interventions:     Recommend    [ ] Change Diet To:    [ ] Nutrition Supplement:     [ x] Nutrition Support: Sinha feeds to Jevity 1.5 at 55mL/hr. Goal provides 1980kcal, 84g protein (~ 28kcal/kg, 1.2g protein/kg/actual Wt)    [ ] Other:        Monitoring and Evaluation:     [ ] PO intake [ x] Tolerance to diet prescription [ x] weights [x ] follow up per protocol    RD to remain available for further nutritional interventions as indicated/requested by medical team/pt.   Wojciech Gutierrez, RD, CDN, CDE. Pager: [phone]

## 2017-12-11 NOTE — PROGRESS NOTE ADULT - PROBLEM SELECTOR PROBLEM 3
CVA (cerebral vascular accident)
CVA (cerebral vascular accident)
Aspiration pneumonia
Aspiration pneumonia
CVA (cerebral vascular accident)
Ileus
Ileus
CVA (cerebral vascular accident)
Ileus
Ileus
CVA (cerebral vascular accident)

## 2017-12-11 NOTE — PROGRESS NOTE ADULT - PROBLEM SELECTOR PROBLEM 2
Ileus
Aspiration pneumonia
Aspiration pneumonia
Hiccups
Hiccups
Ileus
Aspiration pneumonia
Aspiration pneumonia
Ileus
Ileus

## 2017-12-11 NOTE — PROGRESS NOTE ADULT - PROBLEM SELECTOR PROBLEM 1
Aspiration pneumonia
R/O Sinusitis
Sinusitis
Aspiration pneumonia
R/O Sinusitis
Sinusitis
Aspiration pneumonia

## 2017-12-11 NOTE — PROGRESS NOTE ADULT - PROBLEM SELECTOR PLAN 2
PEG/feeds as per gi
w/ aspiration of feeds   PEG/feeds as per gi  for J tube in IR on monday
-Promotility agents with Reglan  -No episodes of vomiting today  -TF changed to Vital
-Thorazine 25mg q8 PRN
-Thorazine 25mg q8 PRN  -Check EKG to assess qtc as patient is already receiving Reglan
PEG as per gi
PEG as per gi
PEG/feeds as per gi
s/p treatment of Klebsiella PNA 2nd aspiration with Zosyn  -High risk of aspiration 2nd decreased sensation from stroke. No evidence of VC dysfunction  -s/p PEG, advanced to J-tube in IR on 12/6  -Aspiration Precautions
s/p treatment of Klebsiella PNA 2nd aspiration with Zosyn  -High risk of aspiration 2nd decreased sensation from stroke. No evidence of VC dysfunction  -s/p PEG, advanced to J-tube in IR on 12/6  -Aspiration Precautions
vomited tube feeds today  PEG/feeds as per gi  consider G-J tube
s/p treatment of Klebsiella PNA 2nd aspiration with Zosyn  -High risk of aspiration 2nd decreased sensation from stroke. No evidence of VC dysfunction  -s/p PEG  -Aspiration Precautions  -Feeds changed to Vital AF
s/p treatment of Klebsiella PNA 2nd aspiration with Zosyn  -High risk of aspiration 2nd decreased sensation from stroke. No evidence of VC dysfunction  -s/p PEG  -Aspiration Precautions  -Feeds changed to Vital AF
w/ aspiration of feeds   PEG/feeds as per gi  for J tube in IR on monday

## 2017-12-11 NOTE — PROGRESS NOTE ADULT - PROBLEM SELECTOR PLAN 3
as per neuro
as per neuro
-Promotility agents with Reglan  -s/p G-J conversion in IR 12/6
-Promotility agents with Reglan  -s/p G-J conversion in IR 12/6
as per neuro
s/p treatment of Klebsiella PNA 2nd aspiration with Zosyn  -High risk of aspiration 2nd decreased sensation from stroke. No evidence of VC dysfunction  -s/p PEG, advanced to J-tube in IR on 12/6  -Aspiration Precautions
s/p treatment of Klebsiella PNA 2nd aspiration with Zosyn  -High risk of aspiration 2nd decreased sensation from stroke. No evidence of VC dysfunction  -s/p PEG, advanced to J-tube in IR on 12/6  -Aspiration Precautions
as per neuro
-Promotility agents with Reglan  -TF changed to Vital  -Plan for G-J conversion with IR today
-Promotility agents with Reglan  -TF changed to Vital  -Plan for G-J conversion with IR tomorrow
as per neuro

## 2017-12-11 NOTE — PROGRESS NOTE ADULT - PROVIDER SPECIALTY LIST ADULT
ENT
Gastroenterology
Infectious Disease
Intervent Radiology
Neurology
Pulmonology
Urology
Gastroenterology
Gastroenterology
Neurology
Gastroenterology
Infectious Disease
Neurology
Neurology
Pulmonology

## 2017-12-11 NOTE — PROGRESS NOTE ADULT - PROBLEM SELECTOR PLAN 1
R maxillary sinusitis   -Appreciate ENT consult  -Epistaxis is source of hemoptysis  -c/w Nasal saline q6, Flonase BID  -Augmentin 875/125mg BID x 10 days for acute sinusitis R maxillary sinusitis   -Appreciate ENT consult  -Epistaxis was source of hemoptysis  -c/w Nasal saline q6, Flonase BID  -Augmentin 875/125mg BID x 10 days for acute sinusitis

## 2017-12-11 NOTE — PROGRESS NOTE ADULT - ASSESSMENT
Patient is a 56 yo [place] male w/ PMH significant for HTN, HLD, CAD s/p CABG, previous CVA w/ residual left hemiparesis, admitted for R sided weakness, HA, diplopia, diagnosed with R lateral medullary and R cerebellar ischemic infarcts per MRI. Patient failed MBS and PEG tube was placed on 11/14 and tube feeding was initiated and pt was having problems with emesis, residuals, constipation-concern for ileus. 11/22 with coughing up feeds, green malordours secretions, hypoxia which resolved. found to have bilat PNA-asp, UTI--kleb,

## 2017-12-11 NOTE — PROGRESS NOTE ADULT - I WAS PHYSICALLY PRESENT FOR THE KEY PORTIONS OF THE EVALUATION AND MANAGEMENT (E/M) SERVICE PROVIDED.  I AGREE WITH THE ABOVE HISTORY, PHYSICAL, AND PLAN WHICH I HAVE REVIEWED AND EDITED WHERE APPROPRIATE

## 2017-12-11 NOTE — PROGRESS NOTE ADULT - ATTENDING COMMENTS
The patient is at "higher risk" of bleeding during a PEG-tube placement as he is on dual antiplatelet therapy (DAPT) with Aspirin and Plavix, according to ASGE guidelines (Tena et al. 2014, Gastrointest. Endoscopy). The recommendation is to hold Plavix for at least five days if possible.   The case was discussed with Dr. Funk and Dr. Handy from neurology, who stated that neither agent can be stopped due to his risk of recurrent stroke, and that his dysphagia is not expected to improve in a matter of days, rather, over six months. Further, three of four arteries supplying the brain are stenotic.  The case was also discussed with several gastroenterologists at our institution and at [facility]. Dr. Baez and Dr. Mesa have not placed PEG tubes on DAPT because of the concern of bleeding. Dr. Arroyo may have done a case, but was unsure and generally does not do them in this situation. Dr. Garrett has done between five to ten cases, none of which developed bleeding. At [facility], Dr. Sandoval has done over a thousand PEG-tube placements, but none on DAPT. Dr. Avalos has done several dozen cases on DAPT and reported significant bleeding in a few cases that did not lead to a bad outcome. One of these developed a "giant" abdominal wall hematoma that tamponaded itself.  I told the patient, who can speak fairly well, but states that he spits most of his saliva into a bucket, that he is at somewhat increased risk for bleeding, but that we would likely be able to stop a possible bleeding before it could lead to significant drop of his blood pressure and related consequences.  It told him that I would do the procedure if he understands and accepts a highter risk, but he stated that he does not want to take a risk, and that he heard about a case on the radio in which someone had a minor surgery and  from it.  I told him we can continue to discuss the options among all involved parties and he agreed.
agree with above; ROS otherwise negative
Patient seen and examined with PA RN and resident staff.  History and imaging reviewed.  Exam as outlined above   Agree with assessment and plan as documented above.
Patient seen and examined with PA RN and resident staff.  Hx and exam and imaging reviewed.  Agree with assessment and plan as outlined above.
aagree with above; ROS otherwise negative
agree with above; ROS otherwise negative
58 yo M pmh HTN HL, CAD s/p CABG and 2 CVA s/p PEG 1 week ago now with difficulty tolerating TF in setting of severe constipation. Had not had BM for past 2 weeks reported.  Ultimately had 2 large BMs today, significant relief of distension and symptoms and less distension of abdomen.  NPO currently given found to have 'high residuals'.    Impression:  1) Dysphagia s/p PEG  2) CVA  3) Constipation  4) CAD s/p CABG    Plan:  1) Restart TF at 10 cc/hour and advance as tolerated given patient now with resolved ileus  2) Can follow up official read of KUB, though no overt ileus on self-read  3) Would NOT check tube feed residuals as multiple studies have demonstrated limited to no clinical utility  4) Continue with aggressive bowel regimen - can stop colace, but use Miralax 2x/day and Colace 300 qhs  5) Encourage ambulation, OOB chair  6) GI to follow
Agree with above; ROS otherwise negative
No erythema or tenderness at site of PEG.  No discharge or sign of pus.  patient overall looks improved since 48 hours ago and now off O2.  Abdomen remains soft and patient with regular BM    From GI standpoint:  - Start tube feeds, no contraindication to tube feeds  - Follow his status clinically re: feeds  - Continue with bowel regimen and OOB to chair/ambulation  - Defer rest of management to primary team    Call with any ?, will s/o
Patient seen and examined. Scope #2 used. Left nasal passage with mucoid secretions, clear middle meatus without purulence or blood. Right nasal passage with purulence at middle meatus. Sphenoethmoid recess with thick bloody secretions draining down into nasopharynx. No active bright red blood noted. Laryngoscopy shows oropharynx clear, hypopharynx with extensive pooling of secretions and gross aspiration. Cords appear mobile but poorly visualized under copious secretions. No blood or clot noted in pharynx or subglottis.    Suspect the hemoptysis is originating from this right acute sinusitis with bloody discharge. Recommend treating infection as recs above. No further intervention and pt should f/u with ENT as outpatient in 2-3 weeks to ensure complete resolution of infection. [phone]
agree with above; ROS otherwise negative
as above
as above
d/c planning
d/c IVF  observe for hiccups  abx as per ID/ENT

## 2017-12-11 NOTE — PROGRESS NOTE ADULT - SUBJECTIVE AND OBJECTIVE BOX
f/u pneumonia    interval history/ROS:  no hiccups.  no dysuria.  no further cough.  no drainage from nasal cavities.  Rest of ROS otherwise negative.    Allergies  No Known Allergies    ANTIMICROBIALS:   piperacillin/tazobactam IVPB. 3.375 every 8 hours (11/22-28)  amoxicillin   80 mG/mL/clavulanate Suspension 875 two times a day (12/6-)    MEDICATIONS  (STANDING):  aspirin  chewable 81 daily  atorvastatin 80 at bedtime  clopidogrel Tablet 75 daily  doxazosin 2 at bedtime  enoxaparin Injectable 40 daily  FLUoxetine 20 at bedtime  gabapentin   Solution 600 three times a day  hydrochlorothiazide 12.5 daily  influenza   Vaccine 0.5 once  losartan 100 daily  metoclopramide   Syrup 5 every 8 hours    Vital Signs Last 24 Hrs  T(F): 97.7 (12-11-17 @ 08:07), Max: 98 (12-10-17 @ 20:20)  HR: 74 (12-11-17 @ 08:07)  BP: 111/71 (12-11-17 @ 09:35)  RR: 18 (12-11-17 @ 08:07)  SpO2: 96% (12-11-17 @ 08:07) (96% - 98%)  Wt(kg): --    PHYSICAL EXAM:  General: non-toxic  HEAD/EYES: anicteric  ENT:  normal rom  Cardiovascular:   S1, S2  Respiratory:  no tachypnea  GI:  GJ tube  :  no haro  Musculoskeletal:  no synovitis  Neurologic:  L weakness  Skin:  no rash  Psychiatric:  appropriate affect  Vascular:                    no new labs    MICROBIOLOGY:  Culture - Urine (12.05.17 @ 23:11)    -  Gentamicin: S <=1    -  Ertapenem: S <=0.5    -  Ceftriaxone: S <=1    -  Ceftazidime: S <=1    -  Ciprofloxacin: S <=0.5    -  Aztreonam: S <=4    -  Ampicillin: R >16    -  Ampicillin/Sulbactam: S 8/4    -  Cefazolin: S <=2    -  Cefepime: S <=2    -  Cefoxitin: S <=4    -  Piperacillin/Tazobactam: S <=8    -  Tobramycin: S <=2    -  Nitrofurantoin: I 64    -  Imipenem: S <=1    -  Levofloxacin: S <=1    -  Meropenem: S <=1    -  Trimethoprim/Sulfamethoxazole: S <=0.5/9.5    -  Amikacin: S <=8    Specimen Source: .Urine Kidney    Culture Results:   >100,000 CFU/ml Klebsiella pneumoniae    Organism Identification: Klebsiella pneumoniae    Organism: Klebsiella pneumoniae    Method Type: EMERSON    Clostridium difficile GDH Toxins A&amp;B, EIA:   Negative (11.25.17 @ 00:02)    Culture - Sputum . (11.23.17 @ 01:09)  Moderate Klebsiella pneumoniae  Numerous Haemophilus influenzae  Normal Respiratory Bryanna present    Culture - Sputum . (11.23.17 @ 01:09)    Gram Stain:   Numerous polymorphonuclear leukocytes per low power field  Few Squamous epithelial cells per low power field  Numerous Gram Negative Rods per oil power field  Numerous Gram positive cocci in pairs per oil power field    Specimen Source: .Sputum Sputum    Culture Results:   >100,000 CFU/ml Klebsiella pneumoniae (11.21.17 @ 17:15)    Culture - Blood (11.21.17 @ 16:57)    Specimen Source: .Blood Blood    Culture Results:   No growth to date.    .Urine Catheterized  11-10-17   No growth  --  --    .Sputum Sputum  11-10-17   Streptococcus pneumoniae  Normal Respiratory Bryanna present  --  Streptococcus pneumoniae    .Blood Blood-Peripheral  11-10-17   No growth at 5 days.  --  --    .Urine Clean Catch (Midstream)  11-08-17   No growth  --  --    RADIOLOGY:  Xray Chest 1 View AP -PORTABLE-Routine (12.05.17 @ 09:11)  IMPRESSION:  Clear lungs.    Xray Feeding Tube Check SI (11.29.17 @ 09:56)  IMPRESSION:  Gastrostomy tube within the stomach. No leak.    CT Chest No Cont (11.22.17 @ 12:10)   IMPRESSION: Bilateral lower lobe pneumonia.    MR Head No Cont (11.09.17 @ 10:49)  IMPRESSION:   In comparison the prior MRI, there is new hyperintense T2 and FLAIR signal with faint increased restricted diffusion in the right medulla extending to the inferior right brachium pontis and inferior medial right cerebellar hemisphere, new compared to 6/17/2017 consistent with evolving area of ischemic change without hemorrhagic transformation.  Redemonstration of volume loss with multiple additional areas of lacunar infarction including chronic infarcts in the right cerebellar hemisphere and right corona radiata. Decreased visualization of the flow-void within the right vertebral artery with continued irregular isointense T1 signal throughout the basilar artery. Bilateral sinus disease with bubbly secretions and air-fluid levels, greatest in the right maxillary sinus. Correlate clinically for symptoms of acute sinusitis.

## 2017-12-11 NOTE — PROGRESS NOTE ADULT - PROBLEM SELECTOR PLAN 4
s/p treatment for KLEB uti  s/p course zosyn  ID on board
as per neuro
as per neuro
MAGDALENA uti  s/p course zosyn  ID on board
no vc dysfunction on ent exam  + pooling of secretions  asp precautions, pulmonary toliet
-Promotility agents with Reglan  -s/p G-J conversion in IR 12/6
-Promotility agents with Reglan  -s/p G-J conversion in IR 12/6
KLEB uti  on zosyn  f/u all cx  ID on board
KLEB uti  on zosyn  f/u all cx  ID on board
as per neuro
as per neuro
s/p treatment for KLEB uti  s/p course zosyn  ID on board
KLEB uti  on zosyn  f/u all cx  ID on board

## 2017-12-11 NOTE — PROGRESS NOTE ADULT - SUBJECTIVE AND OBJECTIVE BOX
Follow-up Pulm Progress Note    No new respiratory events overnight.  Denies SOB/CP. secretions  improved, 02 sat 95% on room air     Medications:  MEDICATIONS  (STANDING):  AMOXICILLIN/CLAVULANATE 200MG/28.5MG 21.88 milliLiter(s) 21.88 milliLiter(s) Enteral Tube two times a day  aspirin  chewable 81 milliGRAM(s) Oral daily  atorvastatin 80 milliGRAM(s) Oral at bedtime  clopidogrel Tablet 75 milliGRAM(s) Oral daily  doxazosin 2 milliGRAM(s) Oral at bedtime  enoxaparin Injectable 40 milliGRAM(s) SubCutaneous daily  FLUoxetine 20 milliGRAM(s) Oral at bedtime  fluticasone propionate 50 MICROgram(s)/spray Nasal Spray 1 Spray(s) Both Nostrils two times a day  gabapentin   Solution 600 milliGRAM(s) Oral three times a day  hydrochlorothiazide 12.5 milliGRAM(s) Oral daily  influenza   Vaccine 0.5 milliLiter(s) IntraMuscular once  losartan 100 milliGRAM(s) Oral daily  metoclopramide   Syrup 5 milliGRAM(s) Oral every 8 hours  sodium chloride 0.65% Nasal 1 Spray(s) Both Nostrils every 6 hours    MEDICATIONS  (PRN):  acetaminophen    Suspension. 650 milliGRAM(s) Oral every 6 hours PRN Mild Pain (1 - 3)  acetaminophen  Suppository 650 milliGRAM(s) Rectal every 6 hours PRN For Temp greater than 38.5 C (101.3 F)  guaiFENesin    Syrup 200 milliGRAM(s) Oral every 6 hours PRN Cough  hydrALAZINE Injectable 10 milliGRAM(s) IV Push two times a day PRN BP > 180          Vital Signs Last 24 Hrs  T(C): 36.5 (11 Dec 2017 08:07), Max: 36.7 (10 Dec 2017 20:20)  T(F): 97.7 (11 Dec 2017 08:07), Max: 98 (10 Dec 2017 20:20)  HR: 74 (11 Dec 2017 08:07) (65 - 78)  BP: 111/71 (11 Dec 2017 09:35) (104/70 - 138/89)  BP(mean): --  RR: 18 (11 Dec 2017 08:07) (18 - 18)  SpO2: 96% (11 Dec 2017 08:07) (96% - 98%)          12-10 @ 07:01  -  12-11 @ 07:00  --------------------------------------------------------  IN: 980 mL / OUT: 0 mL / NET: 980 mL          LABS:                CAPILLARY BLOOD GLUCOSE                            CULTURES: (if applicable)  Culture Results:  Culture - Urine (12.05.17 @ 23:11)    Specimen Source: .Urine Kidney    Culture Results:   >100,000 CFU/ml Klebsiella pneumoniae    Organism Identification: Klebsiella pneumoniae    Organism: Klebsiella pneumoniae    Method Type: EMERSON      Urine Microscopic-Add On (NC) (12.05.17 @ 22:23)    Hyaline Casts: 2 /LPF    White Blood Cell - Urine: 66 /HPF    Red Blood Cell - Urine: >720 /HPF    Bacteria: Occasional    Epithelial Cells: 0 /HPF    Urinalysis (12.05.17 @ 22:23)    pH Urine: 5.5    Blood, Urine: Large    Glucose Qualitative, Urine: Negative mg/dL    Color: Cheri    Urine Appearance: Slightly Turbid    Bilirubin: Negative    Ketone - Urine: Small    Specific Gravity: 1.021    Protein, Urine: 100 mg/dL    Urobilinogen: 1.0 mg/dL    Nitrite: Positive    Leukocyte Esterase Concentration: Moderate              Physical Examination:  PULM: decreased bs bilaterally, no significant sputum production  CVS: S1, S2 heard    RADIOLOGY REVIEWED  CXR: < from: Xray Chest 1 View AP -PORTABLE-Routine (12.05.17 @ 09:11) >  IMPRESSION:     Clear lungs.    < end of copied text > Follow-up Pulm Progress Note    No new respiratory events overnight.  Denies SOB/CP. secretions  improved, 02 sat 95% on room air     Medications:  MEDICATIONS  (STANDING):  AMOXICILLIN/CLAVULANATE 200MG/28.5MG 21.88 milliLiter(s) 21.88 milliLiter(s) Enteral Tube two times a day  aspirin  chewable 81 milliGRAM(s) Oral daily  atorvastatin 80 milliGRAM(s) Oral at bedtime  clopidogrel Tablet 75 milliGRAM(s) Oral daily  doxazosin 2 milliGRAM(s) Oral at bedtime  enoxaparin Injectable 40 milliGRAM(s) SubCutaneous daily  FLUoxetine 20 milliGRAM(s) Oral at bedtime  fluticasone propionate 50 MICROgram(s)/spray Nasal Spray 1 Spray(s) Both Nostrils two times a day  gabapentin   Solution 600 milliGRAM(s) Oral three times a day  hydrochlorothiazide 12.5 milliGRAM(s) Oral daily  influenza   Vaccine 0.5 milliLiter(s) IntraMuscular once  losartan 100 milliGRAM(s) Oral daily  metoclopramide   Syrup 5 milliGRAM(s) Oral every 8 hours  sodium chloride 0.65% Nasal 1 Spray(s) Both Nostrils every 6 hours    MEDICATIONS  (PRN):  acetaminophen    Suspension. 650 milliGRAM(s) Oral every 6 hours PRN Mild Pain (1 - 3)  acetaminophen  Suppository 650 milliGRAM(s) Rectal every 6 hours PRN For Temp greater than 38.5 C (101.3 F)  guaiFENesin    Syrup 200 milliGRAM(s) Oral every 6 hours PRN Cough  hydrALAZINE Injectable 10 milliGRAM(s) IV Push two times a day PRN BP > 180    Vital Signs Last 24 Hrs  T(C): 36.5 (11 Dec 2017 08:07), Max: 36.7 (10 Dec 2017 20:20)  T(F): 97.7 (11 Dec 2017 08:07), Max: 98 (10 Dec 2017 20:20)  HR: 74 (11 Dec 2017 08:07) (65 - 78)  BP: 111/71 (11 Dec 2017 09:35) (104/70 - 138/89)  BP(mean): --  RR: 18 (11 Dec 2017 08:07) (18 - 18)  SpO2: 96% (11 Dec 2017 08:07) (96% - 98%)    12-10 @ 07:01  -  12-11 @ 07:00  --------------------------------------------------------  IN: 980 mL / OUT: 0 mL / NET: 980 mL          LABS:    CAPILLARY BLOOD GLUCOSE    CULTURES: (if applicable)  Culture Results:  Culture - Urine (12.05.17 @ 23:11)    Specimen Source: .Urine Kidney    Culture Results:   >100,000 CFU/ml Klebsiella pneumoniae    Organism Identification: Klebsiella pneumoniae    Organism: Klebsiella pneumoniae    Method Type: EMERSON      Urine Microscopic-Add On (NC) (12.05.17 @ 22:23)    Hyaline Casts: 2 /LPF    White Blood Cell - Urine: 66 /HPF    Red Blood Cell - Urine: >720 /HPF    Bacteria: Occasional    Epithelial Cells: 0 /HPF    Urinalysis (12.05.17 @ 22:23)    pH Urine: 5.5    Blood, Urine: Large    Glucose Qualitative, Urine: Negative mg/dL    Color: Cheri    Urine Appearance: Slightly Turbid    Bilirubin: Negative    Ketone - Urine: Small    Specific Gravity: 1.021    Protein, Urine: 100 mg/dL    Urobilinogen: 1.0 mg/dL    Nitrite: Positive    Leukocyte Esterase Concentration: Moderate    Physical Examination:  PULM: decreased bs bilaterally, no significant sputum production  CVS: S1, S2 heard    RADIOLOGY REVIEWED  CXR: < from: Xray Chest 1 View AP -PORTABLE-Routine (12.05.17 @ 09:11) >  IMPRESSION:     Clear lungs.    < end of copied text >

## 2017-12-11 NOTE — PROGRESS NOTE ADULT - PROBLEM SELECTOR PROBLEM 4
CVA (cerebral vascular accident)
CVA (cerebral vascular accident)
UTI (urinary tract infection)
R/O Vocal cord dysfunction
CVA (cerebral vascular accident)
CVA (cerebral vascular accident)
Ileus
Ileus
UTI (urinary tract infection)

## 2017-12-11 NOTE — PROGRESS NOTE ADULT - ASSESSMENT
57M CAD s/p CABG, HTN, hx CVA with L weakness. Admitted with new right medullary stroke.  Course complicated by urinary retention requiring haro which has been removed, aspiration pneumonia s/p zosyn and now sinusitis.  Today is D#6 augmentin of 10 days.  Asymptomatic bacteriuria    stop augmentin 12/15    Lois Campbell  Division of Infectious Disease  Pager [phone]  After 5pm/weekend #[phone]    Please call Infectious Diseases if there is a change in status.  Thank you.  [phone].

## 2017-12-11 NOTE — PROGRESS NOTE ADULT - ASSESSMENT
ASSESSMENT:   58 Y/O man with HTN, HLD, CAD/CABG, past stroke with residual left hemiparesis, presents with transient right hemiparesis and diplopia, continuous headache, nausea and vomiting. CT head shows old right basal ganglia lacune and CTA shows severe bilateral MCA stenosis, proximal basilar/distal vertebrals occlusion and stenosis involving mid to distal basilar artery. MRI brain show right lateral medullary and right cerebellar ischemic infarcts. Of note, he reports to have discontinued his antiplatelet medications before his stroke due to financial constraints.Impression: Cerebral thrombosis/embolism with cerebral infarction. Right PICA distribution stroke involving cerebellum and right lateral medulla - likely etiology being  large vessel disease i.e. symptomatic intracranial atherosclerosis in the setting of medication (antiplatelet therapy) noncompliance.    NEURO: neurologically without acute change, neurologically tolerating normotension, ASA/plavix and statin for secondary stroke prevention, titrate statin to LDL goal less than 70 considering likely atheroembolic etiology of his stroke,  continue gabapentin to 600mg TID as hiccups have improved with this regimen, Physical therapy/OT eval with recommendations for AR,   provide daily PT/OT to optimize treatment.     ANTITHROMBOTIC THERAPY:  ASA and clopidogrel      PULMONARY:  Chest CT (11/22) bilateral lower lobe pneumonia, completed Zosyn x 7 days, sputum culture: numerous gram positive cocci and negative rods, appreciate pulmonary and ID consult, protecting airway, encourage Incentive spirometer, monitor sats give oxygen as needed to >92% per pulm, continue Augmentin for sinusitis (7/10 days)    CARDIOVASCULAR: TTE: LVEF 41%, mitral annular calcification, mild-moderate MR, global LV systolic dysfunction, mild stage 1 diastolic dysfunction mild TR,  cardiac monitoring no events, cardio consult appreciated             GASTROINTESTINAL: S/p PEG placement (11/14)  and not tolerating PEG feedings at goal , continue Reglan as gastric prokinetic agent, S/p IR performed G to J tube (12/06).  vomit X 1 on 12/07 when feedings were at 70ml. per dietary place on Jevity 1.5 at 55cc.hr     Diet: TF per IR.     RENAL: continue to provide hydration as tolerated, good urine output, renal US: No hydronephrosis, Wu reinserted for urinary retention. Continue Cardura for BPH, urology consult appreciated, TOV 11/25: failed,  TOV/PVR 12/6/17, per patient urinating without difficulty.   Na Goal: Greater than 135    HEMATOLOGY: no si/sx of bleeding     DVT ppx: LMWH     ID: afebrile in am, blood cx; NGTD, urine and sputum cultures: klebsiella pneumoniae, pt with diarrhea on 11/24, C diff negative, as per ID  7 day ABx course of Zosyn completed for pna, UA+ nitrite, LE, bacteria, WBC, reconsulted ID for recommendations- recommended to continue Augmentin (7/10 days)for sinusitis/uti.     Other: Left wrist cock up splint in place.    DISPOSITION: D/C to [facility] as per PT/OT eval once TF at goal, SW aware, Daily PT/OT therapy to optimize therapy and referral to [facility] for possible suman.     CORE MEASURES:        Admission NIHSS: 3     TPA:  NO      LDL/HDL: 171/57     Depression Screen: 0     Statin Therapy: Y     Dysphagia Screen: FAIL     Smoking  NO      Afib NO     Stroke Education YES

## 2017-12-12 PROCEDURE — 93010 ELECTROCARDIOGRAM REPORT: CPT

## 2017-12-12 PROCEDURE — 99232 SBSQ HOSP IP/OBS MODERATE 35: CPT

## 2017-12-12 PROCEDURE — 99233 SBSQ HOSP IP/OBS HIGH 50: CPT

## 2017-12-13 PROCEDURE — 96116 NUBHVL XM PHYS/QHP 1ST HR: CPT

## 2017-12-13 PROCEDURE — 99232 SBSQ HOSP IP/OBS MODERATE 35: CPT

## 2017-12-14 PROCEDURE — 99232 SBSQ HOSP IP/OBS MODERATE 35: CPT

## 2017-12-15 PROCEDURE — 99232 SBSQ HOSP IP/OBS MODERATE 35: CPT

## 2017-12-15 PROCEDURE — 74230 X-RAY XM SWLNG FUNCJ C+: CPT | Mod: 26

## 2017-12-16 PROCEDURE — 99232 SBSQ HOSP IP/OBS MODERATE 35: CPT

## 2017-12-17 PROCEDURE — 99232 SBSQ HOSP IP/OBS MODERATE 35: CPT | Mod: GC

## 2017-12-18 PROCEDURE — 99232 SBSQ HOSP IP/OBS MODERATE 35: CPT

## 2017-12-19 PROCEDURE — 99232 SBSQ HOSP IP/OBS MODERATE 35: CPT

## 2017-12-20 PROCEDURE — 99232 SBSQ HOSP IP/OBS MODERATE 35: CPT

## 2017-12-21 PROCEDURE — 99232 SBSQ HOSP IP/OBS MODERATE 35: CPT

## 2017-12-21 PROCEDURE — 99233 SBSQ HOSP IP/OBS HIGH 50: CPT

## 2017-12-22 PROCEDURE — 99232 SBSQ HOSP IP/OBS MODERATE 35: CPT

## 2017-12-23 PROCEDURE — 99232 SBSQ HOSP IP/OBS MODERATE 35: CPT

## 2017-12-24 PROCEDURE — 99232 SBSQ HOSP IP/OBS MODERATE 35: CPT

## 2017-12-25 PROCEDURE — 99232 SBSQ HOSP IP/OBS MODERATE 35: CPT

## 2017-12-26 PROCEDURE — 99232 SBSQ HOSP IP/OBS MODERATE 35: CPT

## 2017-12-26 PROCEDURE — 90832 PSYTX W PT 30 MINUTES: CPT

## 2017-12-27 PROCEDURE — 99232 SBSQ HOSP IP/OBS MODERATE 35: CPT

## 2017-12-28 PROCEDURE — 71010: CPT | Mod: 26

## 2017-12-28 PROCEDURE — 99233 SBSQ HOSP IP/OBS HIGH 50: CPT

## 2017-12-28 PROCEDURE — 99232 SBSQ HOSP IP/OBS MODERATE 35: CPT

## 2017-12-29 PROCEDURE — 99232 SBSQ HOSP IP/OBS MODERATE 35: CPT

## 2017-12-30 PROCEDURE — 99232 SBSQ HOSP IP/OBS MODERATE 35: CPT

## 2017-12-31 PROCEDURE — 99232 SBSQ HOSP IP/OBS MODERATE 35: CPT

## 2018-01-01 PROCEDURE — 99232 SBSQ HOSP IP/OBS MODERATE 35: CPT

## 2018-01-02 PROCEDURE — 74230 X-RAY XM SWLNG FUNCJ C+: CPT | Mod: 26

## 2018-01-02 PROCEDURE — 99232 SBSQ HOSP IP/OBS MODERATE 35: CPT

## 2018-01-02 PROCEDURE — 71046 X-RAY EXAM CHEST 2 VIEWS: CPT | Mod: 26

## 2018-01-03 PROCEDURE — 99232 SBSQ HOSP IP/OBS MODERATE 35: CPT

## 2018-01-03 PROCEDURE — 99233 SBSQ HOSP IP/OBS HIGH 50: CPT

## 2018-01-04 PROCEDURE — 99232 SBSQ HOSP IP/OBS MODERATE 35: CPT

## 2018-01-05 PROCEDURE — 85027 COMPLETE CBC AUTOMATED: CPT

## 2018-01-05 PROCEDURE — 97110 THERAPEUTIC EXERCISES: CPT

## 2018-01-05 PROCEDURE — 99238 HOSP IP/OBS DSCHRG MGMT 30/<: CPT

## 2018-01-05 PROCEDURE — 92610 EVALUATE SWALLOWING FUNCTION: CPT

## 2018-01-05 PROCEDURE — 92507 TX SP LANG VOICE COMM INDIV: CPT

## 2018-01-05 PROCEDURE — 97032 APPL MODALITY 1+ESTIM EA 15: CPT

## 2018-01-05 PROCEDURE — 74230 X-RAY XM SWLNG FUNCJ C+: CPT

## 2018-01-05 PROCEDURE — 97112 NEUROMUSCULAR REEDUCATION: CPT

## 2018-01-05 PROCEDURE — 83735 ASSAY OF MAGNESIUM: CPT

## 2018-01-05 PROCEDURE — 92523 SPEECH SOUND LANG COMPREHEN: CPT

## 2018-01-05 PROCEDURE — 97116 GAIT TRAINING THERAPY: CPT

## 2018-01-05 PROCEDURE — 71045 X-RAY EXAM CHEST 1 VIEW: CPT

## 2018-01-05 PROCEDURE — 97163 PT EVAL HIGH COMPLEX 45 MIN: CPT

## 2018-01-05 PROCEDURE — 93005 ELECTROCARDIOGRAM TRACING: CPT

## 2018-01-05 PROCEDURE — 92526 ORAL FUNCTION THERAPY: CPT

## 2018-01-05 PROCEDURE — 97167 OT EVAL HIGH COMPLEX 60 MIN: CPT

## 2018-01-05 PROCEDURE — 97530 THERAPEUTIC ACTIVITIES: CPT

## 2018-01-05 PROCEDURE — 80053 COMPREHEN METABOLIC PANEL: CPT

## 2018-01-05 PROCEDURE — 97535 SELF CARE MNGMENT TRAINING: CPT

## 2018-01-05 PROCEDURE — 71046 X-RAY EXAM CHEST 2 VIEWS: CPT

## 2018-01-05 PROCEDURE — 80048 BASIC METABOLIC PNL TOTAL CA: CPT

## 2018-01-05 PROCEDURE — 97140 MANUAL THERAPY 1/> REGIONS: CPT

## 2018-01-05 PROCEDURE — 84100 ASSAY OF PHOSPHORUS: CPT

## 2018-01-05 PROCEDURE — 92611 MOTION FLUOROSCOPY/SWALLOW: CPT

## 2018-01-05 PROCEDURE — 85025 COMPLETE CBC W/AUTO DIFF WBC: CPT

## 2018-02-14 ENCOUNTER — OUTPATIENT (OUTPATIENT)
Dept: OUTPATIENT SERVICES | Facility: HOSPITAL | Age: 58
LOS: 1 days | End: 2018-02-14
Payer: COMMERCIAL

## 2018-02-14 ENCOUNTER — APPOINTMENT (OUTPATIENT)
Dept: RADIOLOGY | Facility: HOSPITAL | Age: 58
End: 2018-02-14

## 2018-02-14 DIAGNOSIS — R13.12 DYSPHAGIA, OROPHARYNGEAL PHASE: ICD-10-CM

## 2018-02-14 PROCEDURE — 92611 MOTION FLUOROSCOPY/SWALLOW: CPT

## 2018-02-14 PROCEDURE — 74230 X-RAY XM SWLNG FUNCJ C+: CPT

## 2018-06-27 NOTE — PROGRESS NOTE ADULT - ASSESSMENT
For rash around mouth (perleche):  1) Apply vinegar using Q-tip twice daily  2) Use two prescription creams twice daily  3) Apply zinc oxide paste (such as Desitin, Aquaphor 3-in-1 diaper rash cream or Gian's Butt Paste) to the area as a skin barrier    XEROSIS (DRY SKIN)      1. Definition    Xerosis is the term for dry skin.  We all have a natural oil coating over our skin produced by the skin oil glands.  If this oil is removed, the skin becomes dry which can lead to cracking, which can lead to inflammation.  Xerosis is usually a long-term problem that recurs often, especially in the winter.    2. Cause     Long hot baths or showers can remove our natural oil and lead to xerosis.  One should never take more than one bath or shower a day and for no longer than ten minutes.   Use of harsh soaps such as Zest, Dial, Yanira Spring, Lever and Ivory can worsen and cause xerosis.   Cold winter weather worsens xerosis because the amount of moisture contained in cold air is much less than the amount of moisture in warm air.    3. Treatment     Treatment is intended to restore the natural oil to your skin.  Keep the skin lubricated.     Do not take more than one bath or shower a day.  Use lukewarm water, not hot.  Hot water dries out the skin.     Use a gentle moisturizing soap such as Cetaphil soap, Dove, or Cetaphil Restoraderm cleanser.     When toweling dry, dont rub.  Blot the skin so there is still some water left on the skin.  You should apply a moisturizing cream to all of the skin such as Cerave cream, Cetaphil cream, Restoraderm or Eucerin Original Formula cream.   Alpha hydroxyacid lotions, i.e., AmLactin, also work very well for preventing dry skin, but may burn when used on inflamed or reddened skin.      [facility]   DERMATOLOGY  [address] [place] [address]    Dept: [phone]   Dept Fax: [phone]           Patient is a 56 yo [place] male w/ PMH significant for HTN, HLD, CAD s/p CABG, previous CVA w/ residual left hemiparesis, admitted for R sided weakness, HA, diplopia, diagnosed with R lateral medullary and R cerebellar ischemic infarcts per MRI. Patient failed MBS and PEG tube was placed on 11/14 and tube feeding was initiated and pt was having problems with emesis, residuals, constipation-concern for ileus. 11/22 with coughing up feeds, green malordours secretions, hypoxia which resolved. found to have bilat PNA-asp, UTI--kleb,

## 2019-06-14 NOTE — ED PROVIDER NOTE - DATE/TIME 2
[facility]    [place] History and Physical        Girl Lore Canales Patient Status:      2019 MRN [id number]   Location [facility]  3SE-N Attending Elly Richter MD   Hosp Day # 1 PCP    Consultant No primary care pr HGB 11.4 g/dL 19 0550    Platelets 063.6 70(6)QA 19 0550    GTT 1 Hr       Glucose Fasting       Glucose 1 Hr       Glucose 2 Hr       Glucose 3 Hr       TSH        Profile Negative  19 1252      3rd Trimester Labs (GA 24-41w) 5 minutes: 9                          10 minutes:     Resuscitation:     Physical Exam:   Birth Weight: Weight: 7 lb 7.9 oz (3.4 kg)(Filed from Delivery Summary)  Birth Length: Height: 1' 8.08\" (46 cm)(Filed from Delivery Summary)  Birth He Normal  care, encourage feeding every 2-3 hours. Vitamin K and EES given   Monitor jaundice pattern, Bili levels to be done per routine. [place] screen and hearing screen and CCHD to be done prior to discharge.     Discussed anticipatory guidance a 07-Nov-2017 19:48

## 2019-08-08 NOTE — CONSULT NOTE ADULT - PROVIDER SPECIALTY LIST ADULT
Cardiology pt seen   feeling much better  ICU Vital Signs Last 24 Hrs  T(C): 36.7 (08 Aug 2019 08:28), Max: 38.6 (07 Aug 2019 15:31)  T(F): 98 (08 Aug 2019 08:28), Max: 101.4 (07 Aug 2019 15:31)  HR: 87 (08 Aug 2019 08:28) (76 - 113)  BP: 112/76 (08 Aug 2019 08:28) (112/75 - 135/76)  BP(mean): --  ABP: --  ABP(mean): --  RR: 16 (08 Aug 2019 08:28) (16 - 17)  SpO2: 99% (08 Aug 2019 08:28) (98% - 100%)  gen-NAD  resp-clear  R flank abscess, slight redness, no pus                          11.7   2.29  )-----------( 156      ( 08 Aug 2019 07:23 )             35.0

## 2021-01-18 NOTE — PATIENT PROFILE ADULT. - BLOOD TRANSFUSION, PREVIOUS, PROFILE
Medication: Sumatriptan Succinate 50 MG oral tab  Use at onset; repeat once after 2 hours-ONLY 2 IN 24 HR MAX.  This is a 30 day supply    Date of last refill: 12/21/2020 (#9/0)  [place]    Last office visit: 12/21/202 medications as needed; also discussed optimal timing of migraine specific medications for abortive therapy to maximize effectiveness, and risk of medication overuse headache.     Patient was additionally advised to keep a headache diary, detailing migraine yes

## 2021-04-18 NOTE — PHYSICAL THERAPY INITIAL EVALUATION ADULT - PRECAUTIONS/LIMITATIONS, REHAB EVAL
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

## 2023-01-19 NOTE — STROKE CODE NOTE - NIH STROKE SCALE: 4. FACIAL PALSY, QM
Initiate Treatment: Vtama cream: apply to affected areas of skin daily Discontinue Regimen: cetirizine 10 mg tablet: Take one tab po QAM\\nhydroxyzine HCl 25 mg tablet: Take one tab po QHS Continue Regimen: clobetasol 0.05% cream: Apply to psoriasis on arms and legs bid\\nHydrocortisone 2.5% lotion: apply a thin layer to AA of face QAM PRN flare up. Render In Strict Bullet Format?: No Plan: Patient does not wish to use any biologic medications at this time Detail Level: Zone Samples Given: Vtama (1) Minor paralysis (flattened nasolabial fold, asymmetry on smiling) Initiate Treatment: adapalene 0.3% topical gel: Apply a pea size amount to the face QHS

## 2023-07-13 NOTE — PROGRESS NOTE ADULT - PROBLEM SELECTOR PLAN 1
R maxillary sinusitis   -Appreciate ENT consult  -Epistaxis is source of hemoptysis  -c/w Nasal saline q6, Flonase BID  -Augmentin 875/125mg BID x 10 days for acute sinusitis Double Z Plasty Text: The lesion was extirpated to the level of the fat with a #15 scalpel blade. Given the location of the defect, shape of the defect and the proximity to free margins a double Z-plasty was deemed most appropriate for repair. Using a sterile surgical marker, the appropriate transposition arms of the double Z-plasty were drawn incorporating the defect and placing the expected incisions within the relaxed skin tension lines where possible. The area thus outlined was incised deep to adipose tissue with a #15 scalpel blade. The skin margins were undermined to an appropriate distance in all directions utilizing iris scissors. The opposing transposition arms were then transposed and carried over into place in opposite direction and anchored with interrupted buried subcutaneous sutures.

## 2024-08-19 NOTE — PROVIDER CONTACT NOTE (OTHER) - RECOMMENDATIONS
Latex:  Patient denies allergy to latex.  Medications reviewed with patient.  Tobacco use verified.  Allergies verified.    REFERRING MD:  established pt  Primary Medical Doctor: Raphael Miles MD   DATE OF INJURY/SURGERY:  chronic  WORK RELATED: no  OCCUPATION: Retired    Patient is here for bilateral shoulder pain follow up. Previously had Depo-medrol with Toradol injections on 4/15/24 which gave approx 3 months of relief.  Takes Hydrocodone, Naltrexone for pain. Also uses Biofreeze.   NP made aware

## 2024-12-20 NOTE — PHYSICAL THERAPY INITIAL EVALUATION ADULT - AMBULATION SKILLS, REHAB EVAL
Advancement-Rotation Flap Text: The defect edges were debeveled with a #15 scalpel blade. Given the location of the defect, shape of the defect and the proximity to free margins an advancement-rotation flap was deemed most appropriate. Using a sterile surgical marker, an appropriate flap was drawn incorporating the defect and placing the expected incisions within the relaxed skin tension lines where possible. The area thus outlined was incised deep to adipose tissue with a #15 scalpel blade. The skin margins were undermined to an appropriate distance in all directions utilizing iris scissors. Following this, the designed flap was carried over into the primary defect and sutured into place. Hemostasis: Electrocautery Asc Procedure Text (A): After obtaining clear surgical margins the patient was sent to an ASC for surgical repair.  The patient understands they will receive post-surgical care and follow-up from the ASC physician. Validate Note Data (See Information Below): Yes Tarsorrhaphy Text: A tarsorrhaphy was performed using Frost sutures. Full Thickness Lip Wedge Repair (Flap) Text: Given the location of the defect and the proximity to free margins a full thickness wedge repair was deemed most appropriate. Using a sterile surgical marker, the appropriate repair was drawn incorporating the defect and placing the expected incisions perpendicular to the vermilion border.  The vermilion border was also meticulously outlined to ensure appropriate reapproximation during the repair.  The area thus outlined was incised through and through with a #15 scalpel blade.  The muscularis and dermis were reaproximated with deep sutures following hemostasis. Care was taken to realign the vermilion border before proceeding with the superficial closure.  Once the vermilion was realigned the superfical and mucosal closure was finished. Quadrants Reporting?: 0 Eye Shield Used: No Z Plasty Text: The lesion was extirpated to the level of the fat with a #15 scalpel blade. Given the location of the defect, shape of the defect and the proximity to free margins a Z-plasty was deemed most appropriate for repair. Using a sterile surgical marker, the appropriate transposition arms of the Z-plasty were drawn incorporating the defect and placing the expected incisions within the relaxed skin tension lines where possible. The area thus outlined was incised deep to adipose tissue with a #15 scalpel blade. The skin margins were undermined to an appropriate distance in all directions utilizing iris scissors. The opposing transposition arms were then transposed and carried over into place in opposite direction and anchored with interrupted buried subcutaneous sutures. Pain Refusal Text: I offered to prescribe pain medication but the patient refused to take this medication. Banner Transposition Flap Text: The defect edges were debeveled with a #15 scalpel blade. Given the location of the defect and the proximity to free margins a Banner transposition flap was deemed most appropriate. Using a sterile surgical marker, an appropriate flap was drawn around the defect. The area thus outlined was incised deep to adipose tissue with a #15 scalpel blade. The skin margins were undermined to an appropriate distance in all directions utilizing iris scissors. Following this, the designed flap was carried into the primary defect and sutured into place. Closure 2 Information: This tab is for additional flaps and grafts, including complex repair and grafts and complex repair and flaps. You can also specify a different location for the additional defect, if the location is the same you do not need to select a new one. We will insert the automated text for the repair you select below just as we do for solitary flaps and grafts. Please note that at this time if you select a location with a different insurance zone you will need to override the ICD10 and CPT if appropriate. Consent 1/Introductory Paragraph: The rationale for Mohs was explained to the patient and consent was obtained. The risks, benefits and alternatives to therapy were discussed in detail. Specifically, the risks of infection, scarring, bleeding, prolonged wound healing, incomplete removal, allergy to anesthesia, nerve injury and recurrence were addressed. Prior to the procedure, the treatment site was clearly identified and confirmed by the patient. All components of Universal Protocol/PAUSE Rule completed. Hemigard Postcare Instructions: The HEMIGARD strips are to remain completely dry for at least 5-7 days. independent Referred To Oculoplastics For Closure Text (Leave Blank If You Do Not Want): After obtaining clear surgical margins the patient was sent to oculoplastics for surgical repair.  The patient understands they will receive post-surgical care and follow-up from the referring physician's office. Histology Selection Override (Optional- Will Default To Parent Diagnosis If N/A): Squamous Cell Carcinoma in situ Perineural Invasion (For Histology - Be Specific If Possible): absent Otolaryngologist Procedure Text (C): After obtaining clear surgical margins the patient was sent to otolaryngology for surgical repair.  The patient understands they will receive post-surgical care and follow-up from the referring physician's office. Complex Repair And Graft Additional Text (Will Appearing After The Standard Complex Repair Text): The complex repair was not sufficient to completely close the primary defect. The remaining additional defect was repaired with the graft mentioned below. Provider Procedure Text (B): After obtaining clear surgical margins the defect was repaired by another provider. Peng Advancement Flap Text: The defect edges were debeveled with a #15 scalpel blade. Given the location of the defect, shape of the defect and the proximity to free margins a Peng advancement flap was deemed most appropriate. Using a sterile surgical marker, an appropriate advancement flap was drawn incorporating the defect and placing the expected incisions within the relaxed skin tension lines where possible. The area thus outlined was incised deep to adipose tissue with a #15 scalpel blade. The skin margins were undermined to an appropriate distance in all directions utilizing iris scissors. Following this, the designed flap was advanced and carried over into the primary defect and sutured into place. Burow's Graft Text: The defect edges were debeveled with a #15 scalpel blade. Given the location of the defect, shape of the defect, the proximity to free margins and the presence of a standing cone deformity a Burow's skin graft was deemed most appropriate. The standing cone was removed and this tissue was then trimmed to the shape of the primary defect. The adipose tissue was also removed until only dermis and epidermis were left.  The skin margins of the secondary defect were undermined to an appropriate distance in all directions utilizing iris scissors.  The secondary defect was closed with interrupted buried subcutaneous sutures.  The skin edges were then re-apposed with running  sutures.  The skin graft was then placed in the primary defect and oriented appropriately. Cheek-To-Nose Interpolation Flap Text: A decision was made to reconstruct the defect utilizing an interpolation axial flap and a staged reconstruction.  A telfa template was made of the defect.  This telfa template was then used to outline the Cheek-To-Nose Interpolation flap.  The donor area for the pedicle flap was then injected with anesthesia.  The flap was excised through the skin and subcutaneous tissue down to the layer of the underlying musculature.  The interpolation flap was carefully excised within this deep plane to maintain its blood supply.  The edges of the donor site were undermined.   The donor site was closed in a primary fashion.  The pedicle was then rotated into position and sutured.  Once the tube was sutured into place, adequate blood supply was confirmed with blanching and refill.  The pedicle was then wrapped with xeroform gauze and dressed appropriately with a telfa and gauze bandage to ensure continued blood supply and protect the attached pedicle. Referred To Mid-Level For Closure Text (Leave Blank If You Do Not Want): After obtaining clear surgical margins the patient was sent to a mid-level provider for surgical repair.  The patient understands they will receive post-surgical care and follow-up from the mid-level provider. Intermediate Repair Preamble Text (Leave Blank If You Do Not Want): Undermining was performed with blunt dissection. Consent (Marginal Mandibular)/Introductory Paragraph: The rationale for Mohs was explained to the patient and consent was obtained. The risks, benefits and alternatives to therapy were discussed in detail. Specifically, the risks of damage to the marginal mandibular branch of the facial nerve, infection, scarring, bleeding, prolonged wound healing, incomplete removal, allergy to anesthesia, and recurrence were addressed. Prior to the procedure, the treatment site was clearly identified and confirmed by the patient. All components of Universal Protocol/PAUSE Rule completed. Nostril Rim Text: The closure involved the nostril rim. Transposition Flap Text: The defect edges were debeveled with a #15 scalpel blade. Given the location of the defect and the proximity to free margins a transposition flap was deemed most appropriate. Using a sterile surgical marker, an appropriate transposition flap was drawn incorporating the defect. The area thus outlined was incised deep to adipose tissue with a #15 scalpel blade. The skin margins were undermined to an appropriate distance in all directions utilizing iris scissors. Following this, the designed flap was carried over into the primary defect and sutured into place. Dorsal Nasal Flap Text: The defect edges were debeveled with a #15 scalpel blade. Given the location of the defect and the proximity to free margins a dorsal nasal flap was deemed most appropriate. Using a sterile surgical marker, an appropriate dorsal nasal flap was drawn around the defect. The area thus outlined was incised deep to adipose tissue with a #15 scalpel blade. The skin margins were undermined to an appropriate distance in all directions utilizing iris scissors. Following this, the designed flap was carried into the primary defect and sutured into place. Home Suture Removal Text: Patient was provided instructions on removing sutures and will remove their sutures at home.  If they have any questions or difficulties they will call the office. Stage 1: Number Of Blocks?: 1 Bcc Infiltrative Histology Text: There were numerous aggregates of basaloid cells demonstrating an infiltrative pattern. Wound Care: Petrolatum Spiral Flap Text: The defect edges were debeveled with a #15 scalpel blade. Given the location of the defect, shape of the defect and the proximity to free margins a spiral flap was deemed most appropriate. Using a sterile surgical marker, an appropriate rotation flap was drawn incorporating the defect and placing the expected incisions within the relaxed skin tension lines where possible. The area thus outlined was incised deep to adipose tissue with a #15 scalpel blade. The skin margins were undermined to an appropriate distance in all directions utilizing iris scissors. Following this, the designed flap was carried over into the primary defect and sutured into place. Width Of Defect Perpendicular To Closure In Cm (Required): 1.4 Was The Patient On Physician Recommended Anticoagulation Therapy?: Please Select the Appropriate Response Stage 5: Additional Anesthesia Type: 1% lidocaine with epinephrine Same Histology In Subsequent Stages Text: The pattern and morphology of the tumor is as described in the first stage. Staged Advancement Flap Text: The defect edges were debeveled with a #15 scalpel blade. Given the location of the defect, shape of the defect and the proximity to free margins a staged advancement flap was deemed most appropriate. Using a sterile surgical marker, an appropriate advancement flap was drawn incorporating the defect and placing the expected incisions within the relaxed skin tension lines where possible. The area thus outlined was incised deep to adipose tissue with a #15 scalpel blade. The skin margins were undermined to an appropriate distance in all directions utilizing iris scissors. Following this, the designed flap was carried over into the primary defect and sutured into place. Suturegard Retention Suture: 2-0 Nylon Area H Indication Text: Tumors in this location are included in Area H (eyelids, eyebrows, nose, lips, chin, ear, pre-auricular, post-auricular, temple, genitalia, hands, feet, ankles and areola).  Tissue conservation is critical in these anatomic locations. Chonodrocutaneous Helical Advancement Flap Text: The defect edges were debeveled with a #15 scalpel blade. Given the location of the defect and the proximity to free margins a chondrocutaneous helical advancement flap was deemed most appropriate. Using a sterile surgical marker, the appropriate advancement flap was drawn incorporating the defect and placing the expected incisions within the relaxed skin tension lines where possible. The area thus outlined was incised deep to adipose tissue with a #15 scalpel blade. The skin margins were undermined to an appropriate distance in all directions utilizing iris scissors. Following this, the designed flap was advanced and carried over into the primary defect and sutured into place. Skin Substitute Text: The defect edges were debeveled with a #15 scalpel blade. Given the location of the defect, shape of the defect and the proximity to free margins a skin substitute graft was deemed most appropriate.  The graft material was trimmed to fit the size of the defect. The graft was then placed in the primary defect and oriented appropriately. Paramedian Forehead Flap Text: A decision was made to reconstruct the defect utilizing an interpolation axial flap and a staged reconstruction.  A telfa template was made of the defect.  This telfa template was then used to outline the paramedian forehead pedicle flap.  The donor area for the pedicle flap was then injected with anesthesia.  The flap was excised through the skin and subcutaneous tissue down to the layer of the underlying musculature.  The pedicle flap was carefully excised within this deep plane to maintain its blood supply.  The edges of the donor site were undermined.   The donor site was closed in a primary fashion.  The pedicle was then rotated into position and sutured.  Once the tube was sutured into place, adequate blood supply was confirmed with blanching and refill.  The pedicle was then wrapped with xeroform gauze and dressed appropriately with a telfa and gauze bandage to ensure continued blood supply and protect the attached pedicle. Epidermal Sutures: 4-0 Nylon Date Of Previous Biopsy (Optional): 11- Island Pedicle Flap-Requiring Vessel Identification Text: The defect edges were debeveled with a #15 scalpel blade. Given the location of the defect, shape of the defect and the proximity to free margins an island pedicle advancement flap was deemed most appropriate. Using a sterile surgical marker, an appropriate advancement flap was drawn, based on the axial vessel mentioned above, incorporating the defect, outlining the appropriate donor tissue and placing the expected incisions within the relaxed skin tension lines where possible. The area thus outlined was incised deep to adipose tissue with a #15 scalpel blade. The skin margins were undermined to an appropriate distance in all directions around the primary defect and laterally outward around the island pedicle utilizing iris scissors.  There was minimal undermining beneath the pedicle flap. Following this, the designed flap was carried over into the primary defect and sutured into place. Orbicularis Oris Muscle Flap Text: The defect edges were debeveled with a #15 scalpel blade.  Given that the defect affected the competency of the oral sphincter an orbicularis oris muscle flap was deemed most appropriate to restore this competency and normal muscle function.  Using a sterile surgical marker, an appropriate flap was drawn incorporating the defect. The area thus outlined was incised with a #15 scalpel blade. Following this, the designed flap was carried over into the primary defect and sutured into place. Length To Time In Minutes Device Was In Place: 10 Purse String (Intermediate) Text: Given the location of the defect and the characteristics of the surrounding skin a purse string intermediate closure was deemed most appropriate.  Undermining was performed circumferentially around the surgical defect.  A purse string suture was then placed and tightened. Special Stains Stage 4 - Results: Base On Clearance Noted Above Estlander Flap (Lower To Upper Lip) Text: The defect of the lower lip was assessed and measured.  Given the location and size of the defect, an Estlander flap was deemed most appropriate. Using a sterile surgical marker, an appropriate Estlander flap was measured and drawn on the upper lip. Local anesthesia was then infiltrated. A scalpel was then used to incise the lateral aspect of the flap, through skin, muscle and mucosa, leaving the flap pedicled medially.  The flap was then rotated and positioned to fill the lower lip defect.  The flap was then sutured into place with a three layer technique, closing the orbicularis oris muscle layer with subcutaneous buried sutures, followed by a mucosal layer and an epidermal layer. O-L Flap Text: The defect edges were debeveled with a #15 scalpel blade. Given the location of the defect, shape of the defect and the proximity to free margins an O-L flap was deemed most appropriate. Using a sterile surgical marker, an appropriate advancement flap was drawn incorporating the defect and placing the expected incisions within the relaxed skin tension lines where possible. The area thus outlined was incised deep to adipose tissue with a #15 scalpel blade. The skin margins were undermined to an appropriate distance in all directions utilizing iris scissors. Following this, the designed flap was advanced and carried over into the primary defect and sutured into place. Double O-Z Plasty Text: The defect edges were debeveled with a #15 scalpel blade. Given the location of the defect, shape of the defect and the proximity to free margins a Double O-Z plasty (double transposition flap) was deemed most appropriate. Using a sterile surgical marker, the appropriate transposition flaps were drawn incorporating the defect and placing the expected incisions within the relaxed skin tension lines where possible. The area thus outlined was incised deep to adipose tissue with a #15 scalpel blade. The skin margins were undermined to an appropriate distance in all directions utilizing iris scissors. Hemostasis was achieved with electrocautery. The flaps were then transposed and carried over into place, one clockwise and the other counterclockwise, and anchored with interrupted buried subcutaneous sutures. Non-Graft Cartilage Fenestration Text: The cartilage was fenestrated with a 2mm punch biopsy to help facilitate healing. Bi-Rhombic Flap Text: The defect edges were debeveled with a #15 scalpel blade. Given the location of the defect and the proximity to free margins a bi-rhombic flap was deemed most appropriate. Using a sterile surgical marker, an appropriate rhombic flap was drawn incorporating the defect. The area thus outlined was incised deep to adipose tissue with a #15 scalpel blade. The skin margins were undermined to an appropriate distance in all directions utilizing iris scissors. Following this, the designed flap was carried over into the primary defect and sutured into place. Mohs Method Verbiage: An incision at a 45 degree angle following the standard Mohs approach was done and the specimen was harvested as a microscopic controlled layer. Plastic Surgeon Procedure Text (B): After obtaining clear surgical margins the patient was sent to plastics for surgical repair.  The patient understands they will receive post-surgical care and follow-up from the referring physician's office. Intermediate Repair And Flap Additional Text (Will Appearing After The Standard Complex Repair Text): The intermediate repair was not sufficient to completely close the primary defect. The remaining additional defect was repaired with the flap mentioned below. Mercedes Flap Text: The defect edges were debeveled with a #15 scalpel blade. Given the location of the defect, shape of the defect and the proximity to free margins a Mercedes flap was deemed most appropriate. Using a sterile surgical marker, an appropriate advancement flap was drawn incorporating the defect and placing the expected incisions within the relaxed skin tension lines where possible. The area thus outlined was incised deep to adipose tissue with a #15 scalpel blade. The skin margins were undermined to an appropriate distance in all directions utilizing iris scissors. Following this, the designed flap was advanced and carried over into the primary defect and sutured into place. Ftsg Text: The defect edges were debeveled with a #15 scalpel blade. Given the location of the defect, shape of the defect and the proximity to free margins a full thickness skin graft was deemed most appropriate. Using a sterile surgical marker, the primary defect shape was transferred to the donor site. The area thus outlined was incised deep to adipose tissue with a #15 scalpel blade.  The harvested graft was then trimmed of adipose tissue until only dermis and epidermis was left.  The skin margins of the secondary defect were undermined to an appropriate distance in all directions utilizing iris scissors.  The secondary defect was closed with interrupted buried subcutaneous sutures.  The skin edges were then re-apposed with running  sutures.  The skin graft was then placed in the primary defect and oriented appropriately. Double Z Plasty Text: The lesion was extirpated to the level of the fat with a #15 scalpel blade. Given the location of the defect, shape of the defect and the proximity to free margins a double Z-plasty was deemed most appropriate for repair. Using a sterile surgical marker, the appropriate transposition arms of the double Z-plasty were drawn incorporating the defect and placing the expected incisions within the relaxed skin tension lines where possible. The area thus outlined was incised deep to adipose tissue with a #15 scalpel blade. The skin margins were undermined to an appropriate distance in all directions utilizing iris scissors. The opposing transposition arms were then transposed and carried over into place in opposite direction and anchored with interrupted buried subcutaneous sutures. Mart-1 - Negative Histology Text: MART-1 staining demonstrates a normal density and pattern of melanocytes along the dermal-epidermal junction. The surgical margins are negative for tumor cells. Consent 2/Introductory Paragraph: Mohs surgery was explained to the patient and consent was obtained. The risks, benefits and alternatives to therapy were discussed in detail. Specifically, the risks of infection, scarring, bleeding, prolonged wound healing, incomplete removal, allergy to anesthesia, nerve injury and recurrence were addressed. Prior to the procedure, the treatment site was clearly identified and confirmed by the patient. All components of Universal Protocol/PAUSE Rule completed. Bilobed Flap Text: The defect edges were debeveled with a #15 scalpel blade. Given the location of the defect and the proximity to free margins a bilobe flap was deemed most appropriate. Using a sterile surgical marker, an appropriate bilobe flap drawn around the defect. The area thus outlined was incised deep to adipose tissue with a #15 scalpel blade. The skin margins were undermined to an appropriate distance in all directions utilizing iris scissors. Following this, the designed flap was carried over into the primary defect and sutured into place. Mauc Instructions: By selecting yes to the question below the MAUC number will be added into the note.  This will be calculated automatically based on the diagnosis chosen, the size entered, the body zone selected (H,M,L) and the specific indications you chose. You will also have the option to override the Mohs AUC if you disagree with the automatically calculated number and this option is found in the Case Summary tab. Subsequent Stages Histo Method Verbiage: Using a similar technique to that described above, a thin layer of tissue was removed from all areas where tumor was visible on the previous stage.  The tissue was again oriented, mapped, dyed, and processed as above. Donor Site Anesthesia Type: same as repair anesthesia Manual Repair Warning Statement: We plan on removing the manually selected variable below in favor of our much easier automatic structured text blocks found in the previous tab. We decided to do this to help make the flow better and give you the full power of structured data. Manual selection is never going to be ideal in our platform and I would encourage you to avoid using manual selection from this point on, especially since I will be sunsetting this feature. It is important that you do one of two things with the customized text below. First, you can save all of the text in a word file so you can have it for future reference. Second, transfer the text to the appropriate area in the Library tab. Lastly, if there is a flap or graft type which we do not have you need to let us know right away so I can add it in before the variable is hidden. No need to panic, we plan to give you roughly 6 months to make the change. Cartilage Graft Text: The defect edges were debeveled with a #15 scalpel blade. Given the location of the defect, shape of the defect, the fact the defect involved a full thickness cartilage defect a cartilage graft was deemed most appropriate.  An appropriate donor site was identified, cleansed, and anesthetized. The cartilage graft was then harvested and transferred to the recipient site, oriented appropriately and then sutured into place.  The secondary defect was then repaired using a primary closure. Adjacent Tissue Transfer Text: The defect edges were debeveled with a #15 scalpel blade. Given the location of the defect and the proximity to free margins an adjacent tissue transfer was deemed most appropriate. Using a sterile surgical marker, an appropriate flap was drawn incorporating the defect and placing the expected incisions within the relaxed skin tension lines where possible. The area thus outlined was incised deep to adipose tissue with a #15 scalpel blade. The skin margins were undermined to an appropriate distance in all directions utilizing iris scissors and carried over to close the primary defect. Hatchet Flap Text: The defect edges were debeveled with a #15 scalpel blade. Given the location of the defect, shape of the defect and the proximity to free margins a hatchet flap was deemed most appropriate. Using a sterile surgical marker, an appropriate hatchet flap was drawn incorporating the defect and placing the expected incisions within the relaxed skin tension lines where possible. The area thus outlined was incised deep to adipose tissue with a #15 scalpel blade. The skin margins were undermined to an appropriate distance in all directions utilizing iris scissors. Following this, the designed flap was carried over into the primary defect and sutured into place. Initial Size Of Lesion: 1.2 Consent (Nose)/Introductory Paragraph: The rationale for Mohs was explained to the patient and consent was obtained. The risks, benefits and alternatives to therapy were discussed in detail. Specifically, the risks of nasal deformity, changes in the flow of air through the nose, infection, scarring, bleeding, prolonged wound healing, incomplete removal, allergy to anesthesia, nerve injury and recurrence were addressed. Prior to the procedure, the treatment site was clearly identified and confirmed by the patient. All components of Universal Protocol/PAUSE Rule completed. Interpolation Flap Text: A decision was made to reconstruct the defect utilizing an interpolation axial flap and a staged reconstruction.  A telfa template was made of the defect.  This telfa template was then used to outline the interpolation flap.  The donor area for the pedicle flap was then injected with anesthesia.  The flap was excised through the skin and subcutaneous tissue down to the layer of the underlying musculature.  The interpolation flap was carefully excised within this deep plane to maintain its blood supply.  The edges of the donor site were undermined.   The donor site was closed in a primary fashion.  The pedicle was then rotated into position and sutured.  Once the tube was sutured into place, adequate blood supply was confirmed with blanching and refill.  The pedicle was then wrapped with xeroform gauze and dressed appropriately with a telfa and gauze bandage to ensure continued blood supply and protect the attached pedicle. Island Pedicle Flap Text: The defect edges were debeveled with a #15 scalpel blade. Given the location of the defect, shape of the defect and the proximity to free margins an island pedicle advancement flap was deemed most appropriate. Using a sterile surgical marker, an appropriate advancement flap was drawn incorporating the defect, outlining the appropriate donor tissue and placing the expected incisions within the relaxed skin tension lines where possible. The area thus outlined was incised deep to adipose tissue with a #15 scalpel blade. The skin margins were undermined to an appropriate distance in all directions around the primary defect and laterally outward around the island pedicle utilizing iris scissors.  There was minimal undermining beneath the pedicle flap. Following this, the flap was carried over into the primary defect and sutured into place. Crescentic Complex Repair Preamble Text (Leave Blank If You Do Not Want): Extensive wide undermining was performed. Distance Of Undermining In Cm (Required): 1.5 Consent (Spinal Accessory)/Introductory Paragraph: The rationale for Mohs was explained to the patient and consent was obtained. The risks, benefits and alternatives to therapy were discussed in detail. Specifically, the risks of damage to the spinal accessory nerve, infection, scarring, bleeding, prolonged wound healing, incomplete removal, allergy to anesthesia, and recurrence were addressed. Prior to the procedure, the treatment site was clearly identified and confirmed by the patient. All components of Universal Protocol/PAUSE Rule completed. Muscle Hinge Flap Text: The defect edges were debeveled with a #15 scalpel blade.  Given the size, depth and location of the defect and the proximity to free margins a muscle hinge flap was deemed most appropriate. Using a sterile surgical marker, an appropriate hinge flap was drawn incorporating the defect. The area thus outlined was incised with a #15 scalpel blade. The skin margins were undermined to an appropriate distance in all directions utilizing iris scissors. Following this, the designed flap was carried into the primary defect and sutured into place. Mustarde Flap Text: The defect edges were debeveled with a #15 scalpel blade.  Given the size, depth and location of the defect and the proximity to free margins a Mustarde flap was deemed most appropriate. Using a sterile surgical marker, an appropriate flap was drawn incorporating the defect. The area thus outlined was incised with a #15 scalpel blade. The skin margins were undermined to an appropriate distance in all directions utilizing iris scissors. Following this, the designed flap was carried into the primary defect and sutured into place. Consent (Near Eyelid Margin)/Introductory Paragraph: The rationale for Mohs was explained to the patient and consent was obtained. The risks, benefits and alternatives to therapy were discussed in detail. Specifically, the risks of ectropion or eyelid deformity, infection, scarring, bleeding, prolonged wound healing, incomplete removal, allergy to anesthesia, nerve injury and recurrence were addressed. Prior to the procedure, the treatment site was clearly identified and confirmed by the patient. All components of Universal Protocol/PAUSE Rule completed. Undermining Type: Entire Wound Area M Indication Text: Tumors in this location are included in Area M (cheek, forehead, scalp, neck, jawline and pretibial skin).  Mohs surgery is indicated for tumors in these anatomic locations. Star Wedge Flap Text: The defect edges were debeveled with a #15 scalpel blade. Given the location of the defect, shape of the defect and the proximity to free margins a star wedge flap was deemed most appropriate. Using a sterile surgical marker, an appropriate rotation flap was drawn incorporating the defect and placing the expected incisions within the relaxed skin tension lines where possible. The area thus outlined was incised deep to adipose tissue with a #15 scalpel blade. The skin margins were undermined to an appropriate distance in all directions utilizing iris scissors. Following this, the designed flap was carried over into the primary defect and sutured into place. Dressing: dry sterile dressing No Residual Tumor Seen Histology Text: There were no malignant cells seen in the sections examined. Tissue Cultured Epidermal Autograft Text: The defect edges were debeveled with a #15 scalpel blade. Given the location of the defect, shape of the defect and the proximity to free margins a tissue cultured epidermal autograft was deemed most appropriate.  The graft was then trimmed to fit the size of the defect.  The graft was then placed in the primary defect and oriented appropriately. Anesthesia Volume In Cc: 3 Previous Accession (Optional): [id number] Crescentic Advancement Flap Text: The defect edges were debeveled with a #15 scalpel blade. Given the location of the defect and the proximity to free margins a crescentic advancement flap was deemed most appropriate. Using a sterile surgical marker, the appropriate advancement flap was drawn incorporating the defect and placing the expected incisions within the relaxed skin tension lines where possible. The area thus outlined was incised deep to adipose tissue with a #15 scalpel blade. The skin margins were undermined to an appropriate distance in all directions utilizing iris scissors. Following this, the designed flap was advanced and carried over into the primary defect and sutured into place. Abbe Flap (Upper To Lower Lip) Text: The defect of the lower lip was assessed and measured.  Given the location and size of the defect, an Abbe flap was deemed most appropriate. Using a sterile surgical marker, an appropriate Abbe flap was measured and drawn on the upper lip. Local anesthesia was then infiltrated.  A scalpel was then used to incise the upper lip through and through the skin, vermilion, muscle and mucosa, leaving the flap pedicled on the opposite side.  The flap was then rotated and transferred to the lower lip defect.  The flap was then sutured into place with a three layer technique, closing the orbicularis oris muscle layer with subcutaneous buried sutures, followed by a mucosal layer and an epidermal layer. Keystone Flap Text: The defect edges were debeveled with a #15 scalpel blade. Given the location of the defect, shape of the defect a keystone flap was deemed most appropriate. Using a sterile surgical marker, an appropriate keystone flap was drawn incorporating the defect, outlining the appropriate donor tissue and placing the expected incisions within the relaxed skin tension lines where possible. The area thus outlined was incised deep to adipose tissue with a #15 scalpel blade. The skin margins were undermined to an appropriate distance in all directions around the primary defect and laterally outward around the flap utilizing iris scissors. Following this, the designed flap was carried into the primary defect and sutured into place. Melolabial Transposition Flap Text: The defect edges were debeveled with a #15 scalpel blade. Given the location of the defect and the proximity to free margins a melolabial flap was deemed most appropriate. Using a sterile surgical marker, an appropriate melolabial transposition flap was drawn incorporating the defect. The area thus outlined was incised deep to adipose tissue with a #15 scalpel blade. The skin margins were undermined to an appropriate distance in all directions utilizing iris scissors. Following this, the designed flap was carried over into the primary defect and sutured into place. Partial Purse String (Simple) Text: Given the location of the defect and the characteristics of the surrounding skin a simple purse string closure was deemed most appropriate.  Undermining was performed circumferentially around the surgical defect.  A purse string suture was then placed and tightened. Wound tension only allowed a partial closure of the circular defect. Cheiloplasty (Less Than 50%) Text: A decision was made to reconstruct the defect with a  cheiloplasty.  The defect was undermined extensively.  Additional orbicularis oris muscle was excised with a 15 blade scalpel.  The defect was converted into a full thickness wedge, of less than 50% of the vertical height of the lip, to facilite a better cosmetic result.  Small vessels were then tied off with 5-0 monocyrl. The orbicularis oris, superficial fascia, adipose and dermis were then reapproximated.  After the deeper layers were approximated the epidermis was reapproximated with particular care given to realign the vermilion border. O-Z Flap Text: The defect edges were debeveled with a #15 scalpel blade. Given the location of the defect, shape of the defect and the proximity to free margins an O-Z flap was deemed most appropriate. Using a sterile surgical marker, an appropriate transposition flap was drawn incorporating the defect and placing the expected incisions within the relaxed skin tension lines where possible. The area thus outlined was incised deep to adipose tissue with a #15 scalpel blade. The skin margins were undermined to an appropriate distance in all directions utilizing iris scissors. Following this, the designed flap was carried over into the primary defect and sutured into place. V-Y Plasty Text: The defect edges were debeveled with a #15 scalpel blade. Given the location of the defect, shape of the defect and the proximity to free margins an V-Y advancement flap was deemed most appropriate. Using a sterile surgical marker, an appropriate advancement flap was drawn incorporating the defect and placing the expected incisions within the relaxed skin tension lines where possible. The area thus outlined was incised deep to adipose tissue with a #15 scalpel blade. The skin margins were undermined to an appropriate distance in all directions utilizing iris scissors. Following this, the designed flap was advanced and carried over into the primary defect and sutured into place. Helical Rim Advancement Flap Text: The defect edges were debeveled with a #15 blade scalpel.  Given the location of the defect and the proximity to free margins (helical rim) a double helical rim advancement flap was deemed most appropriate. Using a sterile surgical marker, the appropriate advancement flaps were drawn incorporating the defect and placing the expected incisions between the helical rim and antihelix where possible.  The area thus outlined was incised through and through with a #15 scalpel blade.  With a skin hook and iris scissors, the flaps were gently and sharply undermined and freed up. Folllowing this, the designed flaps were carried over into the primary defect and sutured into place. Surgeon/Pathologist Verbiage (Will Incorporate Name Of Surgeon From Intro If Not Blank): operated in two distinct and integrated capacities as the surgeon and pathologist. Suturegard Intro: Intraoperative tissue expansion was performed, utilizing the SUTUREGARD device, in order to reduce wound tension. Graft Donor Site Bandage (Optional-Leave Blank If You Don't Want In Note): Steri-strips and a pressure bandage were applied to the donor site. Graft Cartilage Fenestration Text: The cartilage was fenestrated with a 2mm punch biopsy to help facilitate graft survival and healing. Bilateral Helical Rim Advancement Flap Text: The defect edges were debeveled with a #15 blade scalpel.  Given the location of the defect and the proximity to free margins (helical rim) a bilateral helical rim advancement flap was deemed most appropriate. Using a sterile surgical marker, the appropriate advancement flaps were drawn incorporating the defect and placing the expected incisions between the helical rim and antihelix where possible.  The area thus outlined was incised through and through with a #15 scalpel blade.  With a skin hook and iris scissors, the flaps were gently and sharply undermined and freed up. Following this, the designed flaps were placed into the primary defect and sutured into place. Secondary Intention Text (Leave Blank If You Do Not Want): The defect will heal with secondary intention. Mohs Histo Method Verbiage: Each section was then chromacoded and processed in the Mohs lab using the Mohs protocol and submitted for frozen section, utilizing hematoxylin and eosin histochemical stains. Suturegard Body: The suture ends were repeatedly re-tightened and re-clamped to achieve the desired tissue expansion. Estimated Blood Loss (Cc): minimal Medical Necessity Statement: Based on my medical judgement, Mohs surgery is the most appropriate treatment for this cancer compared to other treatments. Depth Of Tumor Invasion (For Histology): tumor not visualized Modified Advancement Flap Text: The defect edges were debeveled with a #15 scalpel blade. Given the location of the defect, shape of the defect and the proximity to free margins a modified advancement flap was deemed most appropriate. Using a sterile surgical marker, an appropriate advancement flap was drawn incorporating the defect and placing the expected incisions within the relaxed skin tension lines where possible. The area thus outlined was incised deep to adipose tissue with a #15 scalpel blade. The skin margins were undermined to an appropriate distance in all directions utilizing iris scissors. Following this, the designed flap was advanced and carried over into the primary defect and sutured into place. Intermediate Repair And Graft Additional Text (Will Appearing After The Standard Complex Repair Text): The intermediate repair was not sufficient to completely close the primary defect. The remaining additional defect was repaired with the graft mentioned below. Split-Thickness Skin Graft Text: The defect edges were debeveled with a #15 scalpel blade. Given the location of the defect, shape of the defect and the proximity to free margins a split thickness skin graft was deemed most appropriate. Using a sterile surgical marker, the primary defect shape was transferred to the donor site. The split thickness graft was then harvested.  The skin graft was then placed in the primary defect and oriented appropriately. Zygomaticofacial Flap Text: Given the location of the defect, shape of the defect and the proximity to free margins a zygomaticofacial flap was deemed most appropriate for repair. Using a sterile surgical marker, the appropriate flap was drawn incorporating the defect and placing the expected incisions within the relaxed skin tension lines where possible. The area thus outlined was incised deep to adipose tissue with a #15 scalpel blade with preservation of a vascular pedicle.  The skin margins were undermined to an appropriate distance in all directions utilizing iris scissors. The flap was then carried over into the defect and anchored with interrupted buried subcutaneous sutures. Brow Lift Text: A midfrontal incision was made medially to the defect to allow access to the tissues just superior to the left eyebrow. Following careful dissection inferiorly in a supraperiosteal plane to the level of the left eyebrow, several 3-0 monocryl sutures were used to resuspend the eyebrow orbicularis oculi muscular unit to the superior frontal bone periosteum. This resulted in an appropriate reapproximation of static eyebrow symmetry and correction of the left brow ptosis. Mart-1 - Positive Histology Text: MART-1 staining demonstrates areas of higher density and clustering of melanocytes with Pagetoid spread upwards within the epidermis. The surgical margins are positive for tumor cells. Consent 3/Introductory Paragraph: I gave the patient a chance to ask questions they had about the procedure.  Following this I explained the Mohs procedure and consent was obtained. The risks, benefits and alternatives to therapy were discussed in detail. Specifically, the risks of infection, scarring, bleeding, prolonged wound healing, incomplete removal, allergy to anesthesia, nerve injury and recurrence were addressed. Prior to the procedure, the treatment site was clearly identified and confirmed by the patient. All components of Universal Protocol/PAUSE Rule completed. Mohs Rapid Report Verbiage: The area of clinically evident tumor was marked with skin marking ink and appropriately hatched.  The initial incision was made following the Mohs approach through the skin.  The specimen was taken to the lab, divided into the necessary number of pieces, chromacoded and processed according to the Mohs protocol.  This was repeated in successive stages until a tumor free defect was achieved. Bilobed Transposition Flap Text: The defect edges were debeveled with a #15 scalpel blade. Given the location of the defect and the proximity to free margins a bilobed transposition flap was deemed most appropriate. Using a sterile surgical marker, an appropriate bilobe flap drawn around the defect. The area thus outlined was incised deep to adipose tissue with a #15 scalpel blade. The skin margins were undermined to an appropriate distance in all directions utilizing iris scissors. Following this, the designed flap was carried over into the primary defect and sutured into place. Helical Rim Text: The closure involved the helical rim. Presence Of Scar Tissue (For Histology): present Where Do You Want The Question To Include Opioid Counseling Located?: Case Summary Tab Bcc Histology Text: There were numerous aggregates of basaloid cells. Simple / Intermediate / Complex Repair - Final Wound Length In Cm: 3.5 Advancement Flap (Single) Text: The defect edges were debeveled with a #15 scalpel blade. Given the location of the defect and the proximity to free margins a single advancement flap was deemed most appropriate. Using a sterile surgical marker, an appropriate advancement flap was drawn incorporating the defect and placing the expected incisions within the relaxed skin tension lines where possible. The area thus outlined was incised deep to adipose tissue with a #15 scalpel blade. The skin margins were undermined to an appropriate distance in all directions utilizing iris scissors. Following this, the designed flap was advanced and carried over into the primary defect and sutured into place. Composite Graft Text: The defect edges were debeveled with a #15 scalpel blade. Given the location of the defect, shape of the defect, the proximity to free margins and the fact the defect was full thickness a composite graft was deemed most appropriate.  The defect was outline and then transferred to the donor site.  A full thickness graft was then excised from the donor site. The graft was then placed in the primary defect, oriented appropriately and then sutured into place.  The secondary defect was then repaired using a primary closure. Melolabial Interpolation Flap Text: A decision was made to reconstruct the defect utilizing an interpolation axial flap and a staged reconstruction.  A telfa template was made of the defect.  This telfa template was then used to outline the melolabial interpolation flap.  The donor area for the pedicle flap was then injected with anesthesia.  The flap was excised through the skin and subcutaneous tissue down to the layer of the underlying musculature.  The pedicle flap was carefully excised within this deep plane to maintain its blood supply.  The edges of the donor site were undermined.   The donor site was closed in a primary fashion.  The pedicle was then rotated into position and sutured.  Once the tube was sutured into place, adequate blood supply was confirmed with blanching and refill.  The pedicle was then wrapped with xeroform gauze and dressed appropriately with a telfa and gauze bandage to ensure continued blood supply and protect the attached pedicle. Rotation Flap Text: The defect edges were debeveled with a #15 scalpel blade. Given the location of the defect, shape of the defect and the proximity to free margins a rotation flap was deemed most appropriate. Using a sterile surgical marker, an appropriate rotation flap was drawn incorporating the defect and placing the expected incisions within the relaxed skin tension lines where possible. The area thus outlined was incised deep to adipose tissue with a #15 scalpel blade. The skin margins were undermined to an appropriate distance in all directions utilizing iris scissors. Following this, the designed flap was carried over into the primary defect and sutured into place. Consent (Lip)/Introductory Paragraph: The rationale for Mohs was explained to the patient and consent was obtained. The risks, benefits and alternatives to therapy were discussed in detail. Specifically, the risks of lip deformity, changes in the oral aperture, infection, scarring, bleeding, prolonged wound healing, incomplete removal, allergy to anesthesia, nerve injury and recurrence were addressed. Prior to the procedure, the treatment site was clearly identified and confirmed by the patient. All components of Universal Protocol/PAUSE Rule completed. Island Pedicle Flap With Canthal Suspension Text: The defect edges were debeveled with a #15 scalpel blade. Given the location of the defect, shape of the defect and the proximity to free margins an island pedicle advancement flap was deemed most appropriate. Using a sterile surgical marker, an appropriate advancement flap was drawn incorporating the defect, outlining the appropriate donor tissue and placing the expected incisions within the relaxed skin tension lines where possible. The area thus outlined was incised deep to adipose tissue with a #15 scalpel blade. The skin margins were undermined to an appropriate distance in all directions around the primary defect and laterally outward around the island pedicle utilizing iris scissors.  There was minimal undermining beneath the pedicle flap. A suspension suture was placed in the canthal tendon to prevent tension and prevent ectropion. Following this, the designed flap was placed into the primary defect and sutured into place. Retention Suture Text: Retention sutures were placed to support the closure and prevent dehiscence. Alar Island Pedicle Flap Text: The defect edges were debeveled with a #15 scalpel blade. Given the location of the defect, shape of the defect and the proximity to the alar rim an island pedicle advancement flap was deemed most appropriate. Using a sterile surgical marker, an appropriate advancement flap was drawn incorporating the defect, outlining the appropriate donor tissue and placing the expected incisions within the nasal ala running parallel to the alar rim. The area thus outlined was incised with a #15 scalpel blade. The skin margins were undermined minimally to an appropriate distance in all directions around the primary defect and laterally outward around the island pedicle utilizing iris scissors.  There was minimal undermining beneath the pedicle flap. Following this, the designed flap was carried over into the primary defect and sutured into place. Nasal Turnover Hinge Flap Text: The defect edges were debeveled with a #15 scalpel blade.  Given the size, depth, location of the defect and the defect being full thickness a nasal turnover hinge flap was deemed most appropriate. Using a sterile surgical marker, an appropriate hinge flap was drawn incorporating the defect. The area thus outlined was incised with a #15 scalpel blade. The flap was designed to recreate the nasal mucosal lining and the alar rim. The skin margins were undermined to an appropriate distance in all directions utilizing iris scissors. Following this, the designed flap was carried over into the primary defect and sutured into place Post-Care Instructions: I reviewed with the patient in detail post-care instructions. Patient is not to engage in any heavy lifting, exercise, or swimming for the next 14 days. Should the patient develop any fevers, chills, bleeding, severe pain patient will contact the office immediately. Consent (Ear)/Introductory Paragraph: The rationale for Mohs was explained to the patient and consent was obtained. The risks, benefits and alternatives to therapy were discussed in detail. Specifically, the risks of ear deformity, infection, scarring, bleeding, prolonged wound healing, incomplete removal, allergy to anesthesia, nerve injury and recurrence were addressed. Prior to the procedure, the treatment site was clearly identified and confirmed by the patient. All components of Universal Protocol/PAUSE Rule completed. Inflammation Suggestive Of Cancer Camouflage Histology Text: There was a dense lymphocytic infiltrate which prevented adequate histologic evaluation of adjacent structures. Staging Info: By selecting yes to the question above you will include information on AJCC 8 tumor staging in your Mohs note. Information on tumor staging will be automatically added for SCCs on the head and neck. AJCC 8 includes tumor size, tumor depth, perineural involvement and bone invasion. Retention Suture Bite Size: 3 mm Area L Indication Text: Tumors in this location are included in Area L (trunk and extremities).  Mohs surgery is indicated for larger tumors, or tumors with aggressive histologic features, in these anatomic locations. Xenograft Text: The defect edges were debeveled with a #15 scalpel blade. Given the location of the defect, shape of the defect and the proximity to free margins a xenograft was deemed most appropriate.  The graft was then trimmed to fit the size of the defect.  The graft was then placed in the primary defect and oriented appropriately. Abbe Flap (Lower To Upper Lip) Text: The defect of the upper lip was assessed and measured.  Given the location and size of the defect, an Abbe flap was deemed most appropriate. Using a sterile surgical marker, an appropriate Abbe flap was measured and drawn on the lower lip. Local anesthesia was then infiltrated. A scalpel was then used to incise the upper lip through and through the skin, vermilion, muscle and mucosa, leaving the flap pedicled on the opposite side.  The flap was then rotated and transferred to the lower lip defect.  The flap was then sutured into place with a three layer technique, closing the orbicularis oris muscle layer with subcutaneous buried sutures, followed by a mucosal layer and an epidermal layer. A-T Advancement Flap Text: The defect edges were debeveled with a #15 scalpel blade. Given the location of the defect, shape of the defect and the proximity to free margins an A-T advancement flap was deemed most appropriate. Using a sterile surgical marker, an appropriate advancement flap was drawn incorporating the defect and placing the expected incisions within the relaxed skin tension lines where possible. The area thus outlined was incised deep to adipose tissue with a #15 scalpel blade. The skin margins were undermined to an appropriate distance in all directions utilizing iris scissors. Following this, the designed flap was advanced and carried over into the primary defect and sutured into place. O-T Plasty Text: The defect edges were debeveled with a #15 scalpel blade. Given the location of the defect, shape of the defect and the proximity to free margins an O-T plasty was deemed most appropriate. Using a sterile surgical marker, an appropriate O-T plasty was drawn incorporating the defect and placing the expected incisions within the relaxed skin tension lines where possible. The area thus outlined was incised deep to adipose tissue with a #15 scalpel blade. The skin margins were undermined to an appropriate distance in all directions utilizing iris scissors. Following this, the designed flap was carried over into the primary defect and sutured into place. Rhombic Flap Text: The defect edges were debeveled with a #15 scalpel blade. Given the location of the defect and the proximity to free margins a rhombic flap was deemed most appropriate. Using a sterile surgical marker, an appropriate rhombic flap was drawn incorporating the defect. The area thus outlined was incised deep to adipose tissue with a #15 scalpel blade. The skin margins were undermined to an appropriate distance in all directions utilizing iris scissors. Following this, the designed flap was carried over into the primary defect and sutured into place. Partial Purse String (Intermediate) Text: Given the location of the defect and the characteristics of the surrounding skin an intermediate purse string closure was deemed most appropriate.  Undermining was performed circumferentially around the surgical defect.  A purse string suture was then placed and tightened. Wound tension only allowed a partial closure of the circular defect. Epidermal Closure: running Double O-Z Flap Text: The defect edges were debeveled with a #15 scalpel blade. Given the location of the defect, shape of the defect and the proximity to free margins a Double O-Z flap was deemed most appropriate. Using a sterile surgical marker, an appropriate transposition flap was drawn incorporating the defect and placing the expected incisions within the relaxed skin tension lines where possible. The area thus outlined was incised deep to adipose tissue with a #15 scalpel blade. The skin margins were undermined to an appropriate distance in all directions utilizing iris scissors. Following this, the designed flap was carried over into the primary defect and sutured into place. Closure 4 Information: This tab is for additional flaps and grafts above and beyond our usual structured repairs.  Please note if you enter information here it will not currently bill and you will need to add the billing information manually. Postop Diagnosis: same Tumor Depth: Less than 6mm from granular layer and no invasion beyond the subcutaneous fat Cheiloplasty (Complex) Text: A decision was made to reconstruct the defect with a  cheiloplasty.  The defect was undermined extensively.  Additional orbicularis oris muscle was excised with a 15 blade scalpel.  The defect was converted into a full thickness wedge to facilite a better cosmetic result.  Small vessels were then tied off with 5-0 monocyrl. The orbicularis oris, superficial fascia, adipose and dermis were then reapproximated.  After the deeper layers were approximated the epidermis was reapproximated with particular care given to realign the vermilion border. H Plasty Text: Given the location of the defect, shape of the defect and the proximity to free margins a H-plasty was deemed most appropriate for repair. Using a sterile surgical marker, the appropriate advancement arms of the H-plasty were drawn incorporating the defect and placing the expected incisions within the relaxed skin tension lines where possible. The area thus outlined was incised deep to adipose tissue with a #15 scalpel blade. The skin margins were undermined to an appropriate distance in all directions utilizing iris scissors.  The opposing advancement arms were then advanced and carried over into place in opposite direction and anchored with interrupted buried subcutaneous sutures. Consent Type: Consent 1 (Standard) Ear Wedge Repair Text: A wedge excision was completed by carrying down an excision through the full thickness of the ear and cartilage with an inward facing Burow's triangle. The wound was then closed in a layered fashion. W Plasty Text: The lesion was extirpated to the level of the fat with a #15 scalpel blade. Given the location of the defect, shape of the defect and the proximity to free margins a W-plasty was deemed most appropriate for repair. Using a sterile surgical marker, the appropriate transposition arms of the W-plasty were drawn incorporating the defect and placing the expected incisions within the relaxed skin tension lines where possible. The area thus outlined was incised deep to adipose tissue with a #15 scalpel blade. The skin margins were undermined to an appropriate distance in all directions utilizing iris scissors. The opposing transposition arms were then transposed and carried over into place in opposite direction and anchored with interrupted buried subcutaneous sutures. Debridement Text: The wound edges were debrided prior to proceeding with the closure to facilitate wound healing. No Repair - Repaired With Adjacent Surgical Defect Text (Leave Blank If You Do Not Want): After obtaining clear surgical margins the defect was repaired concurrently with another surgical defect which was in close approximation. Ear Star Wedge Flap Text: The defect edges were debeveled with a #15 blade scalpel.  Given the location of the defect and the proximity to free margins (helical rim) an ear star wedge flap was deemed most appropriate. Using a sterile surgical marker, the appropriate flap was drawn incorporating the defect and placing the expected incisions between the helical rim and antihelix where possible.  The area thus outlined was incised through and through with a #15 scalpel blade. Following this, the designed flap was carried over into the primary defect and sutured into place. Hemigard Intro: Due to skin fragility and wound tension, it was decided to use HEMIGARD adhesive retention suture devices to permit a linear closure. The skin was cleaned and dried for a 6cm distance away from the wound. Excessive hair, if present, was removed to allow for adhesion. Information: Selecting Yes will display possible errors in your note based on the variables you have selected. This validation is only offered as a suggestion for you. PLEASE NOTE THAT THE VALIDATION TEXT WILL BE REMOVED WHEN YOU FINALIZE YOUR NOTE. IF YOU WANT TO FAX A PRELIMINARY NOTE YOU WILL NEED TO TOGGLE THIS TO 'NO' IF YOU DO NOT WANT IT IN YOUR FAXED NOTE. Alternatives Discussed Intro (Do Not Add Period): I discussed alternative treatments to Mohs surgery and specifically discussed the risks and benefits of Complex Repair And Flap Additional Text (Will Appearing After The Standard Complex Repair Text): The complex repair was not sufficient to completely close the primary defect. The remaining additional defect was repaired with the flap mentioned below. Deep Sutures: 3-0 PDO Pinch Graft Text: The defect edges were debeveled with a #15 scalpel blade. Given the location of the defect, shape of the defect and the proximity to free margins a pinch graft was deemed most appropriate. Using a sterile surgical marker, the primary defect shape was transferred to the donor site. The area thus outlined was incised deep to adipose tissue with a #15 scalpel blade.  The harvested graft was then trimmed of adipose tissue until only dermis and epidermis was left. The skin margins of the secondary defect were undermined to an appropriate distance in all directions utilizing iris scissors.  The secondary defect was closed with interrupted buried subcutaneous sutures.  The skin edges were then re-apposed with running  sutures.  The skin graft was then placed in the primary defect and oriented appropriately. Mucosal Advancement Flap Text: Given the location of the defect, shape of the defect and the proximity to free margins a mucosal advancement flap was deemed most appropriate. Incisions were made with a 15 blade scalpel in the appropriate fashion along the cutaneous vermilion border and the mucosal lip. The remaining actinically damaged mucosal tissue was excised.  The mucosal advancement flap was then elevated to the gingival sulcus with care taken to preserve the neurovascular structures and advanced into the primary defect. Care was taken to ensure that precise realignment of the vermilion border was achieved. Cheek Interpolation Flap Text: A decision was made to reconstruct the defect utilizing an interpolation axial flap and a staged reconstruction.  A telfa template was made of the defect.  This telfa template was then used to outline the Cheek Interpolation flap.  The donor area for the pedicle flap was then injected with anesthesia.  The flap was excised through the skin and subcutaneous tissue down to the layer of the underlying musculature.  The interpolation flap was carefully excised within this deep plane to maintain its blood supply.  The edges of the donor site were undermined.   The donor site was closed in a primary fashion.  The pedicle was then rotated into position and sutured.  Once the tube was sutured into place, adequate blood supply was confirmed with blanching and refill.  The pedicle was then wrapped with xeroform gauze and dressed appropriately with a telfa and gauze bandage to ensure continued blood supply and protect the attached pedicle. Trilobed Flap Text: The defect edges were debeveled with a #15 scalpel blade. Given the location of the defect and the proximity to free margins a trilobed flap was deemed most appropriate. Using a sterile surgical marker, an appropriate trilobed flap was drawn around the defect. The area thus outlined was incised deep to adipose tissue with a #15 scalpel blade. The skin margins were undermined to an appropriate distance in all directions utilizing iris scissors. Following this, the designed flap was carried into the primary defect and sutured into place. Vermilion Border Text: The closure involved the vermilion border. Consent (Temporal Branch)/Introductory Paragraph: The rationale for Mohs was explained to the patient and consent was obtained. The risks, benefits and alternatives to therapy were discussed in detail. Specifically, the risks of damage to the temporal branch of the facial nerve, infection, scarring, bleeding, prolonged wound healing, incomplete removal, allergy to anesthesia, and recurrence were addressed. Prior to the procedure, the treatment site was clearly identified and confirmed by the patient. All components of Universal Protocol/PAUSE Rule completed. Unna Boot Text: An Unna boot was placed to help immobilize the limb and facilitate more rapid healing. Advancement Flap (Double) Text: The defect edges were debeveled with a #15 scalpel blade. Given the location of the defect and the proximity to free margins a double advancement flap was deemed most appropriate. Using a sterile surgical marker, the appropriate advancement flaps were drawn incorporating the defect and placing the expected incisions within the relaxed skin tension lines where possible. The area thus outlined was incised deep to adipose tissue with a #15 scalpel blade. The skin margins were undermined to an appropriate distance in all directions utilizing iris scissors. Following this, the designed flaps were advanced and carried over into the primary defect and sutured into place. Epidermal Autograft Text: The defect edges were debeveled with a #15 scalpel blade. Given the location of the defect, shape of the defect and the proximity to free margins an epidermal autograft was deemed most appropriate. Using a sterile surgical marker, the primary defect shape was transferred to the donor site. The epidermal graft was then harvested.  The skin graft was then placed in the primary defect and oriented appropriately. Bilateral Rotation Flap Text: The defect edges were debeveled with a #15 scalpel blade. Given the location of the defect, shape of the defect and the proximity to free margins a bilateral rotation flap was deemed most appropriate. Using a sterile surgical marker, an appropriate rotation flap was drawn incorporating the defect and placing the expected incisions within the relaxed skin tension lines where possible. The area thus outlined was incised deep to adipose tissue with a #15 scalpel blade. The skin margins were undermined to an appropriate distance in all directions utilizing iris scissors. Following this, the designed flap was carried over into the primary defect and sutured into place. Mastoid Interpolation Flap Text: A decision was made to reconstruct the defect utilizing an interpolation axial flap and a staged reconstruction.  A telfa template was made of the defect.  This telfa template was then used to outline the mastoid interpolation flap.  The donor area for the pedicle flap was then injected with anesthesia.  The flap was excised through the skin and subcutaneous tissue down to the layer of the underlying musculature.  The pedicle flap was carefully excised within this deep plane to maintain its blood supply.  The edges of the donor site were undermined.   The donor site was closed in a primary fashion.  The pedicle was then rotated into position and sutured.  Once the tube was sutured into place, adequate blood supply was confirmed with blanching and refill.  The pedicle was then wrapped with xeroform gauze and dressed appropriately with a telfa and gauze bandage to ensure continued blood supply and protect the attached pedicle. Consent (Scalp)/Introductory Paragraph: The rationale for Mohs was explained to the patient and consent was obtained. The risks, benefits and alternatives to therapy were discussed in detail. Specifically, the risks of changes in hair growth pattern secondary to repair, infection, scarring, bleeding, prolonged wound healing, incomplete removal, allergy to anesthesia, nerve injury and recurrence were addressed. Prior to the procedure, the treatment site was clearly identified and confirmed by the patient. All components of Universal Protocol/PAUSE Rule completed. Dermal Autograft Text: The defect edges were debeveled with a #15 scalpel blade. Given the location of the defect, shape of the defect and the proximity to free margins a dermal autograft was deemed most appropriate. Using a sterile surgical marker, the primary defect shape was transferred to the donor site. The area thus outlined was incised deep to adipose tissue with a #15 scalpel blade.  The harvested graft was then trimmed of adipose and epidermal tissue until only dermis was left.  The skin graft was then placed in the primary defect and oriented appropriately. Burow's Advancement Flap Text: The defect edges were debeveled with a #15 scalpel blade. Given the location of the defect and the proximity to free margins a Burow's advancement flap was deemed most appropriate. Using a sterile surgical marker, the appropriate advancement flap was drawn incorporating the defect and placing the expected incisions within the relaxed skin tension lines where possible. The area thus outlined was incised deep to adipose tissue with a #15 scalpel blade. The skin margins were undermined to an appropriate distance in all directions utilizing iris scissors. Following this, the designed flap was advanced and carried over into the primary defect and sutured into place. Detail Level: Detailed Posterior Auricular Interpolation Flap Text: A decision was made to reconstruct the defect utilizing an interpolation axial flap and a staged reconstruction.  A telfa template was made of the defect.  This telfa template was then used to outline the posterior auricular interpolation flap.  The donor area for the pedicle flap was then injected with anesthesia.  The flap was excised through the skin and subcutaneous tissue down to the layer of the underlying musculature.  The pedicle flap was carefully excised within this deep plane to maintain its blood supply.  The edges of the donor site were undermined.   The donor site was closed in a primary fashion.  The pedicle was then rotated into position and sutured.  Once the tube was sutured into place, adequate blood supply was confirmed with blanching and refill.  The pedicle was then wrapped with xeroform gauze and dressed appropriately with a telfa and gauze bandage to ensure continued blood supply and protect the attached pedicle. Double Island Pedicle Flap Text: The defect edges were debeveled with a #15 scalpel blade. Given the location of the defect, shape of the defect and the proximity to free margins a double island pedicle advancement flap was deemed most appropriate. Using a sterile surgical marker, an appropriate advancement flap was drawn incorporating the defect, outlining the appropriate donor tissue and placing the expected incisions within the relaxed skin tension lines where possible. The area thus outlined was incised deep to adipose tissue with a #15 scalpel blade. The skin margins were undermined to an appropriate distance in all directions around the primary defect and laterally outward around the island pedicle utilizing iris scissors.  There was minimal undermining beneath the pedicle flap. Following this, the flap was carried over into the primary defect and sutured into place. Nasalis-Muscle-Based Myocutaneous Island Pedicle Flap Text: Using a #15 blade, an incision was made around the donor flap to the level of the nasalis muscle. Wide lateral undermining was then performed in both the subcutaneous plane above the nasalis muscle, and in a submuscular plane just above periosteum. This allowed the formation of a free nasalis muscle axial pedicle (based on the angular artery) which was still attached to the actual cutaneous flap, increasing its mobility and vascular viability. Hemostasis was obtained with pinpoint electrocoagulation. The flap was mobilized into position and the pivotal anchor points positioned and stabilized with buried interrupted sutures. Subcutaneous and dermal tissues were closed in a multilayered fashion with sutures. Tissue redundancies were excised, and the epidermal edges were apposed without significant tension and sutured with sutures. Anesthesia Volume In Cc: 6 Purse String (Simple) Text: Given the location of the defect and the characteristics of the surrounding skin a purse string closure was deemed most appropriate.  Undermining was performed circumferentially around the surgical defect.  A purse string suture was then placed and tightened. Repair Type: Complex Repair O-T Advancement Flap Text: The defect edges were debeveled with a #15 scalpel blade. Given the location of the defect, shape of the defect and the proximity to free margins an O-T advancement flap was deemed most appropriate. Using a sterile surgical marker, an appropriate advancement flap was drawn incorporating the defect and placing the expected incisions within the relaxed skin tension lines where possible. The area thus outlined was incised deep to adipose tissue with a #15 scalpel blade. The skin margins were undermined to an appropriate distance in all directions utilizing iris scissors. Following this, the designed flap was advanced and carried over into the primary defect and sutured into place. O-Z Plasty Text: The defect edges were debeveled with a #15 scalpel blade. Given the location of the defect, shape of the defect and the proximity to free margins an O-Z plasty (double transposition flap) was deemed most appropriate. Using a sterile surgical marker, the appropriate transposition flaps were drawn incorporating the defect and placing the expected incisions within the relaxed skin tension lines where possible. The area thus outlined was incised deep to adipose tissue with a #15 scalpel blade. The skin margins were undermined to an appropriate distance in all directions utilizing iris scissors. Hemostasis was achieved with electrocautery. The flaps were then transposed and carried over into place, one clockwise and the other counterclockwise, and anchored with interrupted buried subcutaneous sutures. Rhomboid Transposition Flap Text: The defect edges were debeveled with a #15 scalpel blade. Given the location of the defect and the proximity to free margins a rhomboid transposition flap was deemed most appropriate. Using a sterile surgical marker, an appropriate rhomboid flap was drawn incorporating the defect. The area thus outlined was incised deep to adipose tissue with a #15 scalpel blade. The skin margins were undermined to an appropriate distance in all directions utilizing iris scissors. Following this, the designed flap was carried over into the primary defect and sutured into place. Epidermal Closure Graft Donor Site (Optional): simple interrupted Eye Protection Verbiage: Before proceeding with the stage, a plastic scleral shield was inserted. The globe was anesthetized with a few drops of 1% lidocaine with 1:100,000 epinephrine. Then, an appropriate sized scleral shield was chosen and coated with lacrilube ointment. The shield was gently inserted and left in place for the duration of each stage. After the stage was completed, the shield was gently removed. Localized Dermabrasion With Wire Brush Text: The patient was draped in routine manner.  Localized dermabrasion using 3 x 17 mm wire brush was performed in routine manner to papillary dermis. This spot dermabrasion is being performed to complete skin cancer reconstruction. It also will eliminate the other sun damaged precancerous cells that are known to be part of the regional effect of a lifetime's worth of sun exposure. This localized dermabrasion is therapeutic and should not be considered cosmetic in any regard. V-Y Flap Text: The defect edges were debeveled with a #15 scalpel blade. Given the location of the defect, shape of the defect and the proximity to free margins a V-Y flap was deemed most appropriate. Using a sterile surgical marker, an appropriate advancement flap was drawn incorporating the defect and placing the expected incisions within the relaxed skin tension lines where possible. The area thus outlined was incised deep to adipose tissue with a #15 scalpel blade. The skin margins were undermined to an appropriate distance in all directions utilizing iris scissors. Following this, the designed flap was advanced and carried over into the primary defect and sutured into place. Nasolabial Transposition Flap Text: The defect edges were debeveled with a #15 scalpel blade.  Given the size, depth and location of the defect and the proximity to free margins a nasolabial transposition flap was deemed most appropriate. Using a sterile surgical marker, an appropriate flap was drawn incorporating the defect. The area thus outlined was incised with a #15 scalpel blade. The skin margins were undermined to an appropriate distance in all directions utilizing iris scissors. Following this, the designed flap was carried into the primary defect and sutured into place. Rectangular Flap Text: The defect edges were debeveled with a #15 scalpel blade. Given the location of the defect and the proximity to free margins a rectangular flap was deemed most appropriate. Using a sterile surgical marker, an appropriate rectangular flap was drawn incorporating the defect. The area thus outlined was incised deep to adipose tissue with a #15 scalpel blade. The skin margins were undermined to an appropriate distance in all directions utilizing iris scissors. Following this, the designed flap was carried over into the primary defect and sutured into place. Which Instrument Did You Use For Dermabrasion?: Wire Brush Which Eyelid Repair Cpt Are You Using?: 05154 Bill 59 Modifier?: No - Continue to Bill 79 Modifier Eyelid Full Thickness Repair - 64138: The eyelid defect was full thickness which required a wedge repair of the eyelid. Special care was taken to ensure that the eyelid margin was realligned when placing sutures. Eyelid Partial Thickness Repair - 46137: The eyelid defect was partial thickness which required a wedge repair of the eyelid. Special care was taken to ensure that the eyelid margin was realligned when placing sutures. Localized Dermabrasion With 15 Blade Text: The patient was draped in routine manner.  Localized dermabrasion using a 15 blade was performed in routine manner to papillary dermis. This spot dermabrasion is being performed to complete skin cancer reconstruction. It also will eliminate the other sun damaged precancerous cells that are known to be part of the regional effect of a lifetime's worth of sun exposure. This localized dermabrasion is therapeutic and should not be considered cosmetic in any regard. Localized Dermabrasion With Sand Papertext: The patient was draped in routine manner.  Localized dermabrasion using sterile sand paper was performed in routine manner to papillary dermis. This spot dermabrasion is being performed to complete skin cancer reconstruction. It also will eliminate the other sun damaged precancerous cells that are known to be part of the regional effect of a lifetime's worth of sun exposure. This localized dermabrasion is therapeutic and should not be considered cosmetic in any regard. Flip-Flop Flap Text: The defect edges were debeveled with a #15 blade scalpel.  Given the location of the defect and the proximity to free margins a flip-flop flap was deemed most appropriate. Using a sterile surgical marker, the appropriate flap was drawn incorporating the defect and placing the expected incisions between the helical rim and antihelix where possible.  The area thus outlined was incised through and through with a #15 scalpel blade. Following this, the designed flap was carried over into the primary defect and sutured into place. Nasalis Myocutaneous Flap Text: Using a #15 blade, an incision was made around the donor flap to the level of the nasalis muscle. Wide lateral undermining was then performed in both the subcutaneous plane above the nasalis muscle, and in a submuscular plane just above periosteum. This allowed the formation of a free nasalis muscle axial pedicle which was still attached to the actual cutaneous flap, increasing its mobility and vascular viability. Hemostasis was obtained with pinpoint electrocoagulation. The flap was mobilized into position and the pivotal anchor points positioned and stabilized with buried interrupted sutures. Subcutaneous and dermal tissues were closed in a multilayered fashion with sutures. Tissue redundancies were excised, and the epidermal edges were apposed without significant tension and sutured with sutures.